# Patient Record
Sex: FEMALE | Race: BLACK OR AFRICAN AMERICAN | Employment: FULL TIME | URBAN - METROPOLITAN AREA
[De-identification: names, ages, dates, MRNs, and addresses within clinical notes are randomized per-mention and may not be internally consistent; named-entity substitution may affect disease eponyms.]

---

## 2017-02-14 ENCOUNTER — OFFICE VISIT (OUTPATIENT)
Dept: FAMILY MEDICINE CLINIC | Age: 22
End: 2017-02-14

## 2017-02-14 VITALS
DIASTOLIC BLOOD PRESSURE: 83 MMHG | BODY MASS INDEX: 24.83 KG/M2 | WEIGHT: 149 LBS | HEART RATE: 104 BPM | OXYGEN SATURATION: 98 % | RESPIRATION RATE: 18 BRPM | HEIGHT: 65 IN | TEMPERATURE: 98.1 F | SYSTOLIC BLOOD PRESSURE: 124 MMHG

## 2017-02-14 DIAGNOSIS — N93.9 VAGINAL BLEEDING: Primary | ICD-10-CM

## 2017-02-14 DIAGNOSIS — N76.0 ACUTE VAGINITIS: ICD-10-CM

## 2017-02-14 DIAGNOSIS — E55.9 VITAMIN D DEFICIENCY: ICD-10-CM

## 2017-02-14 DIAGNOSIS — R00.0 TACHYCARDIA: ICD-10-CM

## 2017-02-14 DIAGNOSIS — I10 ESSENTIAL HYPERTENSION: ICD-10-CM

## 2017-02-14 LAB
HCG URINE, QL. (POC): NEGATIVE
VALID INTERNAL CONTROL?: YES

## 2017-02-14 RX ORDER — ERGOCALCIFEROL 1.25 MG/1
50000 CAPSULE ORAL
Qty: 8 CAP | Refills: 0 | Status: SHIPPED | OUTPATIENT
Start: 2017-02-14 | End: 2017-04-05

## 2017-02-14 RX ORDER — MEDROXYPROGESTERONE ACETATE 150 MG/ML
INJECTION, SUSPENSION INTRAMUSCULAR
Refills: 0 | COMMUNITY
Start: 2016-12-22 | End: 2017-03-09 | Stop reason: SDUPTHER

## 2017-02-14 RX ORDER — NAPROXEN 500 MG/1
TABLET ORAL
Qty: 14 TAB | Refills: 0 | Status: SHIPPED | OUTPATIENT
Start: 2017-02-14 | End: 2017-07-20

## 2017-02-14 NOTE — PATIENT INSTRUCTIONS
TODAY, please go to:   CHECK OUT     Please schedule the following appointments at [facility] OUT:  · tachycardia follow up with Dr. Jocelin Jean after you see Cardiology    _____________________     Today's Plan:  · Your pregnancy test is negative  · We will let you know the results of the other tests when they return  · If the bleeding recurs, take the naprosyn, 1 pill every 12 hours for 7 days. I am sending this to your pharmacy. · In the interim you may take ibuprofen OR naprosyn/naproxen over-the-counter as needed for the pain    · Your BP is normal today    · Your heart rate is still above normal frequently. Your EKG today is normal. Please see cardiology, you may wear a heart rate monitor for several days. _____________________     Review your health maintenance below. Make plans to return and address anything that is due or will be due soon. There are no preventive care reminders to display for this patient.

## 2017-02-14 NOTE — PROGRESS NOTES
[address] Visit   Patient ID:   Christiana Gillespie is a 19 y.o. female. Assessment/Plan:    Rishi George was seen today for follow-up and depo. Diagnoses and all orders for this visit:    Vaginal bleeding  R/o pregnancy and infection as ordered. upt neg. Treat with naprosyn if recurs. If needed can consider LUIS. -     AMB POC URINE PREGNANCY TEST, VISUAL COLOR COMPARISON  -     NUSWAB VAGINITIS PLUS  -     naproxen (NAPROSYN) 500 mg tablet; If vaginal bleeding recurs, take 1 tab every 12 hours for 7 days. Tachycardia  EKG NSR. Refer to cardiology for likely holter. -     AMB POC EKG ROUTINE W/ 12 LEADS, INTER & REP  -     REFERRAL TO CARDIOLOGY    Vitamin D deficiency  Per prior labs  -     ergocalciferol (ERGOCALCIFEROL) 50,000 unit capsule; Take 1 Cap by mouth every seven (7) days for 8 doses. Then start taking 1000 international units of vitamin D3 daily. Essential hypertension  BP normal today    Discuss foot pain more on follow up, may need Sports Medicine or Ortho referral.    Naomi Vyas pt on:  Patient health concerns. Patient was offered a choice/choices in the treatment plan today. Patient expresses understanding of the plan and agrees with recommendations. More than 50% of this >25 minute encounter was spent in counseling and coordination of care today. Patient Instructions   TODAY, please go to:   CHECK OUT     Please schedule the following appointments at [facility] OUT:  · tachycardia follow up with Dr. Paris Ruiz after you see Cardiology    _____________________     Today's Plan:  · Your pregnancy test is negative  · We will let you know the results of the other tests when they return  · If the bleeding recurs, take the naprosyn, 1 pill every 12 hours for 7 days. I am sending this to your pharmacy.    · In the interim you may take ibuprofen OR naprosyn/naproxen over-the-counter as needed for the pain    · Your BP is normal today    · Your heart rate is still above normal frequently. Your EKG today is normal. Please see cardiology, you may wear a heart rate monitor for several days. _____________________     Review your health maintenance below. Make plans to return and address anything that is due or will be due soon. There are no preventive care reminders to display for this patient. ?  Subjective:   HPI:  Ina Cano is a 19 y.o. female being seen for:   Chief Complaint   Patient presents with    Follow-up     B/P    Depo     stated that she hasn't had a period for years and last week experienced horrible cramps with heavy bleeding and isn't sure why        BP  · At goal today    Abnormal bleeding  · About 6 days ago had crampy Right sided pelvic pain, noticed blood in panties  · Pain and blood lasted two days then resolved. · Has had some intermittent RLQ pain  · Some N yesterday  · No vomiting  · No diarrhea  · No vaginal d/c, itch, odor  · Active, monogamous  · No lesions of concern     HR up, notes that she sometimes feel it    Mentions persistent right dorsal foot pain at great toe before pelvic exam. Had strain in the past. Still bothers her. Review of Systems  Otherwise, per HPI  Active Problem List:  Patient Active Problem List   Diagnosis Code    Allergic rhinitis J30.9    Anemia D64.9    History of compression fracture of spine, thoracic with chronic back pain Z87.81    Unspecified vitamin D deficiency E55.9    Thyromegaly E04.9    Atypical mole D22.9    Mastodynia N64.4    History of gestational diabetes Z80.34    Essential hypertension I10     ?   Objective:     Visit Vitals    /83 (BP 1 Location: Right arm, BP Patient Position: Sitting)    Pulse (!) 104    Temp 98.1 °F (36.7 °C) (Oral)    Resp 18    Ht 5' 5\" (1.651 m)    Wt 149 lb (67.6 kg)    SpO2 98%    BMI 24.79 kg/m2     Wt Readings from Last 3 Encounters:   02/14/17 149 lb (67.6 kg)   12/22/16 150 lb 12.8 oz (68.4 kg)   12/10/16 151 lb (68.5 kg)     PHQ 2 / 9, over the last two weeks 2/14/2017   Little interest or pleasure in doing things Not at all   Feeling down, depressed or hopeless Not at all   Total Score PHQ 2 0     Physical Exam   Constitutional: She appears well-developed and well-nourished. No distress. Cardiovascular: Regular rhythm. Exam reveals no gallop and no friction rub. No murmur heard. Pulmonary/Chest: Effort normal and breath sounds normal. No respiratory distress. She has no wheezes. She has no rales. Abdominal: Soft. Bowel sounds are normal. She exhibits no distension and no mass. There is tenderness (mild right pelvis). There is no rigidity, no rebound and no guarding. Genitourinary: Vagina normal. Uterus is not tender. Cervix exhibits no motion tenderness, no discharge and no friability. Right adnexum displays tenderness. Right adnexum displays no mass and no fullness. Left adnexum displays no mass, no tenderness and no fullness. No erythema or bleeding in the vagina. No foreign body in the vagina. No signs of injury around the vagina. Vaginal discharge: thin, homogenous white, minimal.   Neurological: She is alert. Psychiatric: She has a normal mood and affect. Her behavior is normal.     No Known Allergies  Prior to Admission medications    Medication Sig Start Date End Date Taking? Authorizing Provider   medroxyPROGESTERone (DEPO-PROVERA) 150 mg/mL syrg INJECT 1 ML INTRAMUSCULAR EVERY 3 MONTHS 12/22/16  Yes Historical Provider   ergocalciferol (ERGOCALCIFEROL) 50,000 unit capsule Take 1 Cap by mouth every seven (7) days for 8 doses. Then start taking 1000 international units of vitamin D3 daily. 2/14/17 4/5/17 Yes 2115 Eufemia Murphy MD   naproxen (NAPROSYN) 500 mg tablet If vaginal bleeding recurs, take 1 tab every 12 hours for 7 days. 2/14/17  Yes 2115 Eufemia Murphy MD   diclofenac (VOLTAREN) 1 % gel Apply 2-4 g to affected area every six (6) hours as needed for Pain. 12/22/16  Yes 2115 Eufemia Murphy MD

## 2017-02-14 NOTE — PROGRESS NOTES
Chief Complaint   Patient presents with    Follow-up     B/P    Jose Miguel     stated that she hasn't had a period for years and last week experienced horrible cramps with heavy bleeding and isn't sure why      1. Have you been to the ER, urgent care clinic since your last visit? Hospitalized since your last visit? No     2. Have you seen or consulted any other health care providers outside of the [address] [place] [address] since your last visit? Include any pap smears or colon screening. No     The patient was counseled on the dangers of tobacco use, and was advised never to start. Reviewed strategies to maximize success, including never to start. There are no preventive care reminders to display for this patient.     ACP is not on file

## 2017-02-14 NOTE — MR AVS SNAPSHOT
Visit Information Date & Time Provider Department Dept. Phone Encounter #  
 2/14/2017  2:40 PM Jose Miguel Brown. Roberta Cortez MD [facility] [phone] [id number] Your Appointments 3/9/2017  3:40 PM  
Nurse Visit with Boubacar Briggs Roberta Cortez, [facility] ([address]) Appt Note: Depo injection. (10 min.) - lp  
 [address] 
[address] [facility] [address]  
[phone]  
  
   
 [address] [facility] [address] [facility] [address] [facility] Upcoming Health Maintenance Date Due  
 PAP AKA CERVICAL CYTOLOGY 11/25/2017 DTaP/Tdap/Td series (6 - Td) 2/15/2021 Allergies as of 2/14/2017  Review Complete On: 2/14/2017 By: Rajendra Vazquez LPN No Known Allergies Current Immunizations  Reviewed on 11/12/2015 Name Date DTAP Vaccine 9/1/2005, 2/18/1998, 1/21/1998, 1993 HIB Vaccine 1993 Hepatitis A Vaccine 2/15/2011, 8/28/2007 Hepatitis B Vaccine 8/28/2007, 8/23/2006, 9/1/2005 Human Papillomavirus 4/7/2008, 10/31/2007, 8/28/2007 IPV 2/18/1998, 1/21/1998, 1993 Influenza Vaccine 10/24/2014 Influenza Vaccine (Quad) PF 10/6/2016 MMR Vaccine 2/18/1998, 1/21/1998 Meningococcal Vaccine 2/15/2011 TDAP Vaccine 2/15/2011 Not reviewed this visit You Were Diagnosed With   
  
 Codes Comments Vaginal bleeding    -  Primary ICD-10-CM: N93.9 ICD-9-CM: 623.8 Tachycardia     ICD-10-CM: R00.0 ICD-9-CM: 245. 0 Vitamin D deficiency     ICD-10-CM: E55.9 ICD-9-CM: 268.9 Essential hypertension     ICD-10-CM: I10 
ICD-9-CM: 401.9 Vitals BP Pulse Temp Resp Height(growth percentile) Weight(growth percentile) 124/83 (BP 1 Location: Right arm, BP Patient Position: Sitting) (!) 104 98.1 °F (36.7 °C) (Oral) 18 5' 5\" (1.651 m) 149 lb (67.6 kg) SpO2 BMI OB Status Smoking Status 98% 24.79 kg/m2 Injection Never Smoker BMI and BSA Data Body Mass Index Body Surface Area 24.79 kg/m 2 1.76 m 2 Preferred Pharmacy Pharmacy Name Phone [facility]/PHARMACY #[id number] Cavanaugh [place] [phone] Your Updated Medication List  
  
   
This list is accurate as of: 2/14/17  4:07 PM.  Always use your most recent med list.  
  
  
  
  
 diclofenac 1 % Gel Commonly known as:  VOLTAREN Apply 2-4 g to affected area every six (6) hours as needed for Pain.  
  
 ergocalciferol 50,000 unit capsule Commonly known as:  ERGOCALCIFEROL Take 1 Cap by mouth every seven (7) days for 8 doses. Then start taking 1000 international units of vitamin D3 daily. medroxyPROGESTERone 150 mg/mL Syrg Commonly known as:  DEPO-PROVERA INJECT 1 ML INTRAMUSCULAR EVERY 3 MONTHS  
  
 naproxen 500 mg tablet Commonly known as:  NAPROSYN If vaginal bleeding recurs, take 1 tab every 12 hours for 7 days. Prescriptions Sent to Pharmacy Refills  
 ergocalciferol (ERGOCALCIFEROL) 50,000 unit capsule 0 Sig: Take 1 Cap by mouth every seven (7) days for 8 doses. Then start taking 1000 international units of vitamin D3 daily. Class: Normal  
 Pharmacy: [facility]/pharmacy #[id number] Stefano enymotion, [address] Ph #: [phone] Route: Oral  
 naproxen (NAPROSYN) 500 mg tablet 0 Sig: If vaginal bleeding recurs, take 1 tab every 12 hours for 7 days. Class: Normal  
 Pharmacy: [facility]/pharmacy #[id number] Stefano Hankins, [address] Ph #: [phone] We Performed the Following AMB POC EKG ROUTINE W/ 12 LEADS, INTER & REP [70866 CPT(R)] AMB POC URINE PREGNANCY TEST, VISUAL COLOR COMPARISON [41751 CPT(R)] [address] [id number] Custom] REFERRAL TO CARDIOLOGY [HSY64 Custom] Comments:  
 Please evaluate patient for tachycardia Referral Information  Referral ID Referred By Referred To  
  
 [id number] Javon Pena MD   
 [address] Phone: [phone] Visits Status Start Date End Date 1 New Request 2/14/17 2/14/18 If your referral has a status of pending review or denied, additional information will be sent to support the outcome of this decision. Patient Instructions TODAY, please go to: CHECK OUT Please schedule the following appointments at [facility] OUT: 
· tachycardia follow up with Dr. Bolaños Cancer after you see Cardiology 
 
_____________________ Today's Plan: 
· Your pregnancy test is negative · We will let you know the results of the other tests when they return · If the bleeding recurs, take the naprosyn, 1 pill every 12 hours for 7 days. I am sending this to your pharmacy. · In the interim you may take ibuprofen OR naprosyn/naproxen over-the-counter as needed for the pain · Your BP is normal today · Your heart rate is still above normal frequently. Your EKG today is normal. Please see cardiology, you may wear a heart rate monitor for several days. _____________________ Review your health maintenance below. Make plans to return and address anything that is due or will be due soon. There are no preventive care reminders to display for this patient. Introducing [facility]! Dear Jannie Nielson: 
Thank you for requesting a [employer] account. Our records indicate that you already have an active [employer] account. You can access your account anytime at [email or web address] Did you know that you can access your hospital and ER discharge instructions at any time in [employer] You can also review all of your test results from your hospital stay or ER visit. Additional Information If you have questions, please visit the Frequently Asked Questions section of the [employer] website at [email or web address] Remember, [employer] is NOT to be used for urgent needs. For medical emergencies, dial 911. Now available from your iPhone and Android! Please provide this summary of care documentation to your next provider. Your primary care clinician is listed as Ruma Beard. Rosalia Morgan. If you have any questions after today's visit, please call [phone].

## 2017-02-17 LAB
A VAGINAE DNA VAG QL NAA+PROBE: ABNORMAL SCORE
BVAB2 DNA VAG QL NAA+PROBE: ABNORMAL SCORE
C ALBICANS DNA VAG QL NAA+PROBE: POSITIVE
C GLABRATA DNA VAG QL NAA+PROBE: NEGATIVE
C TRACH RRNA SPEC QL NAA+PROBE: NEGATIVE
MEGA1 DNA VAG QL NAA+PROBE: ABNORMAL SCORE
N GONORRHOEA RRNA SPEC QL NAA+PROBE: NEGATIVE
T VAGINALIS RRNA SPEC QL NAA+PROBE: NEGATIVE

## 2017-02-17 RX ORDER — FLUCONAZOLE 150 MG/1
150 TABLET ORAL DAILY
Qty: 1 TAB | Refills: 1 | Status: SHIPPED | OUTPATIENT
Start: 2017-02-17 | End: 2017-02-18

## 2017-02-17 RX ORDER — METRONIDAZOLE 500 MG/1
500 TABLET ORAL 2 TIMES DAILY
Qty: 14 TAB | Refills: 0 | Status: SHIPPED | OUTPATIENT
Start: 2017-02-17 | End: 2017-02-24

## 2017-02-17 NOTE — PROGRESS NOTES
Called patient to give results, advised her of her treatment options she stated she would prefer oral meds.  02/17/17

## 2017-02-17 NOTE — PROGRESS NOTES
Please call patient and let her know that she has bacterial vaginosis and a yeast infection. I am sending the oral medications to treat them. She should take metronidazole first and then diclofenac. If she would prefer the vaginal medication I can send that instead.

## 2017-03-09 RX ORDER — MEDROXYPROGESTERONE ACETATE 150 MG/ML
INJECTION, SUSPENSION INTRAMUSCULAR
Qty: 1 SYRINGE | Refills: 0 | Status: SHIPPED | OUTPATIENT
Start: 2017-03-09 | End: 2017-06-13 | Stop reason: SDUPTHER

## 2017-03-21 ENCOUNTER — CLINICAL SUPPORT (OUTPATIENT)
Dept: FAMILY MEDICINE CLINIC | Age: 22
End: 2017-03-21

## 2017-03-21 DIAGNOSIS — Z78.9 USES BIRTH CONTROL: Primary | ICD-10-CM

## 2017-03-21 LAB
HCG URINE, QL. (POC): NEGATIVE
VALID INTERNAL CONTROL?: YES

## 2017-06-13 NOTE — TELEPHONE ENCOUNTER
Patient calling to request a script for her depo vaccine be called into her pharmacy. Her contact # is [phone].

## 2017-06-14 RX ORDER — MEDROXYPROGESTERONE ACETATE 150 MG/ML
150 INJECTION, SUSPENSION INTRAMUSCULAR
Qty: 1 SYRINGE | Refills: 3
Start: 2017-06-14 | End: 2018-09-24

## 2017-06-20 ENCOUNTER — CLINICAL SUPPORT (OUTPATIENT)
Dept: FAMILY MEDICINE CLINIC | Age: 22
End: 2017-06-20

## 2017-06-20 DIAGNOSIS — Z78.9 USES BIRTH CONTROL: Primary | ICD-10-CM

## 2017-06-20 LAB
HCG URINE, QL. (POC): NEGATIVE
VALID INTERNAL CONTROL?: YES

## 2017-06-20 NOTE — PROGRESS NOTES
Date last pap: 11-. Last Depo-Provera: 03-. Side Effects if any: None. Serum HCG indicated? Negative. Depo-Provera 150 mg IM given by: JEN Tatum  .   Next appointment due September 5-19 2017      Patient supplied her own  Lot: [id number]  EXP:   [address]: [id number]  Manufacture: Catarino Vu

## 2017-07-20 ENCOUNTER — HOSPITAL ENCOUNTER (OUTPATIENT)
Dept: LAB | Age: 22
Discharge: HOME | End: 2017-07-20

## 2017-07-20 ENCOUNTER — HOSPITAL ENCOUNTER (EMERGENCY)
Age: 22
Discharge: HOME | End: 2017-07-20
Attending: FAMILY MEDICINE

## 2017-07-20 VITALS
DIASTOLIC BLOOD PRESSURE: 98 MMHG | OXYGEN SATURATION: 99 % | BODY MASS INDEX: 26.33 KG/M2 | WEIGHT: 158 LBS | HEIGHT: 65 IN | SYSTOLIC BLOOD PRESSURE: 139 MMHG | RESPIRATION RATE: 16 BRPM | TEMPERATURE: 97.8 F | HEART RATE: 99 BPM

## 2017-07-20 DIAGNOSIS — N39.0 URINARY TRACT INFECTION WITHOUT HEMATURIA, SITE UNSPECIFIED: Primary | ICD-10-CM

## 2017-07-20 DIAGNOSIS — B96.89 BV (BACTERIAL VAGINOSIS): ICD-10-CM

## 2017-07-20 DIAGNOSIS — N76.0 BV (BACTERIAL VAGINOSIS): ICD-10-CM

## 2017-07-20 LAB
BILIRUB UR QL: NEGATIVE
GLUCOSE UR QL STRIP.AUTO: NEGATIVE MG/DL
HCG UR QL: NEGATIVE
KETONES UR-MCNC: ABNORMAL MG/DL
LEUKOCYTE ESTERASE UR QL STRIP: ABNORMAL
NITRITE UR QL: NEGATIVE
PH UR: 7 [PH] (ref 5–8)
PROT UR QL: NEGATIVE MG/DL
RBC # UR STRIP: ABNORMAL /UL
SP GR UR: 1.02 (ref 1–1.03)
UROBILINOGEN UR QL: 1 EU/DL (ref 0.2–1)

## 2017-07-20 PROCEDURE — 87147 CULTURE TYPE IMMUNOLOGIC: CPT | Performed by: FAMILY MEDICINE

## 2017-07-20 PROCEDURE — 87086 URINE CULTURE/COLONY COUNT: CPT | Performed by: FAMILY MEDICINE

## 2017-07-20 RX ORDER — CLINDAMYCIN HYDROCHLORIDE 300 MG/1
300 CAPSULE ORAL 4 TIMES DAILY
Qty: 28 CAP | Refills: 0 | Status: SHIPPED | OUTPATIENT
Start: 2017-07-20 | End: 2017-07-27

## 2017-07-20 NOTE — DISCHARGE INSTRUCTIONS
Urinary Tract Infection in Women: Care Instructions  Your Care Instructions    A urinary tract infection, or UTI, is a general term for an infection anywhere between the kidneys and the urethra (where urine comes out). Most UTIs are bladder infections. They often cause pain or burning when you urinate. UTIs are caused by bacteria and can be cured with antibiotics. Be sure to complete your treatment so that the infection goes away. Follow-up care is a key part of your treatment and safety. Be sure to make and go to all appointments, and call your doctor if you are having problems. It's also a good idea to know your test results and keep a list of the medicines you take. How can you care for yourself at home? · Take your antibiotics as directed. Do not stop taking them just because you feel better. You need to take the full course of antibiotics. · Drink extra water and other fluids for the next day or two. This may help wash out the bacteria that are causing the infection. (If you have kidney, heart, or liver disease and have to limit fluids, talk with your doctor before you increase your fluid intake.)  · Avoid drinks that are carbonated or have caffeine. They can irritate the bladder. · Urinate often. Try to empty your bladder each time. · To relieve pain, take a hot bath or lay a heating pad set on low over your lower belly or genital area. Never go to sleep with a heating pad in place. To prevent UTIs  · Drink plenty of water each day. This helps you urinate often, which clears bacteria from your system. (If you have kidney, heart, or liver disease and have to limit fluids, talk with your doctor before you increase your fluid intake.)  · Urinate when you need to. · Urinate right after you have sex. · Change sanitary pads often. · Avoid douches, bubble baths, feminine hygiene sprays, and other feminine hygiene products that have deodorants.   · After going to the bathroom, wipe from front to back.  When should you call for help? Call your doctor now or seek immediate medical care if:  · Symptoms such as fever, chills, nausea, or vomiting get worse or appear for the first time. · You have new pain in your back just below your rib cage. This is called flank pain. · There is new blood or pus in your urine. · You have any problems with your antibiotic medicine. Watch closely for changes in your health, and be sure to contact your doctor if:  · You are not getting better after taking an antibiotic for 2 days. · Your symptoms go away but then come back. Where can you learn more? Go to http://mickey-ksye.info/. Enter L438 in the search box to learn more about \"Urinary Tract Infection in Women: Care Instructions. \"  Current as of: November 28, 2016  Content Version: 11.3  © 9493-3389 [employer]. Care instructions adapted under license by [employer] (which disclaims liability or warranty for this information). If you have questions about a medical condition or this instruction, always ask your healthcare professional. [place] [address] any warranty or liability for your use of this information. Bacterial Vaginosis: Care Instructions  Your Care Instructions    Bacterial vaginosis is a type of vaginal infection. It is caused by excess growth of certain bacteria that are normally found in the vagina. Symptoms can include itching, swelling, pain when you urinate or have sex, and a gray or yellow discharge with a \"fishy\" odor. It is not considered an infection that is spread through sexual contact. Although symptoms can be annoying and uncomfortable, bacterial vaginosis does not usually cause other health problems. However, if you have it while you are pregnant, it can cause complications. While the infection may go away on its own, most doctors use antibiotics to treat it.  You may have been prescribed pills or vaginal cream. With treatment, bacterial vaginosis usually clears up in 5 to 7 days. Follow-up care is a key part of your treatment and safety. Be sure to make and go to all appointments, and call your doctor if you are having problems. It's also a good idea to know your test results and keep a list of the medicines you take. How can you care for yourself at home? · Take your antibiotics as directed. Do not stop taking them just because you feel better. You need to take the full course of antibiotics. · Do not eat or drink anything that contains alcohol if you are taking metronidazole (Flagyl). · Keep using your medicine if you start your period. Use pads instead of tampons while using a vaginal cream or suppository. Tampons can absorb the medicine. · Wear loose cotton clothing. Do not wear nylon and other materials that hold body heat and moisture close to the skin. · Do not scratch. Relieve itching with a cold pack or a cool bath. · Do not wash your vaginal area more than once a day. Use plain water or a mild, unscented soap. Do not douche. When should you call for help? Watch closely for changes in your health, and be sure to contact your doctor if:  · You have unexpected vaginal bleeding. · You have a fever. · You have new or increased pain in your vagina or pelvis. · You are not getting better after 1 week. · Your symptoms return after you finish the course of your medicine. Where can you learn more? Go to http://mickey-skye.info/. Eber Choudhary in the search box to learn more about \"Bacterial Vaginosis: Care Instructions. \"  Current as of: October 13, 2016  Content Version: 11.3  © 8628-5367 [employer]. Care instructions adapted under license by [employer] (which disclaims liability or warranty for this information).  If you have questions about a medical condition or this instruction, always ask your healthcare professional. Matias Carlos disclaims any warranty or liability for your use of this information.

## 2017-07-20 NOTE — UC PROVIDER NOTE
Patient is a 19 y.o. female presenting with urinary pain. The history is provided by the patient. Urinary Pain    This is a recurrent problem. The current episode started more than 1 week ago. The problem occurs intermittently. The problem has not changed since onset. The pain is at a severity of 5/10. The pain is moderate. There has been no fever. Associated symptoms include frequency, urgency, flank pain, vaginal discharge (fishy odor- h/o recureent BV and sxs of BV with UTI ) and back pain (on rt side). Pertinent negatives include no chills and no abdominal pain. She has tried nothing for the symptoms. Her past medical history is significant for recurrent UTIs. Her past medical history does not include kidney stones or single kidney. Past Medical History:   Diagnosis Date    Acne 10/7/2009    Allergic rhinitis 2009    Anemia 2009    Headache     History of gestational diabetes     diet controlled    Other ill-defined conditions(799.89)     scoliosis    PCB (post coital bleeding) 2014    Thoracic compression fracture ([address] 3/2014    Yousif Lopez    Thyromegaly 2014    On exam     Unspecified vitamin D deficiency 2014    Varicella 1998        Past Surgical History:   Procedure Laterality Date    HX GYN                Family History   Problem Relation Age of Onset    Breast Cancer Maternal Grandmother      great, great gma, age?  Hypertension Maternal Grandmother     Breast Cancer Maternal Aunt 32     survivor    Diabetes Father     Hypertension Paternal Grandmother     Diabetes Paternal Grandmother     Osteoporosis Neg Hx         Social History     Social History    Marital status: SINGLE     Spouse name: N/A    Number of children: N/A    Years of education: N/A     Occupational History    Not on file.      Social History Main Topics    Smoking status: Never Smoker    Smokeless tobacco: Never Used    Alcohol use Yes      Comment: once every 3 months, 3 drinks at a time    Drug use: No    Sexual activity: Yes     Partners: Male     Birth control/ protection: Injection     Other Topics Concern    Not on file     Social History Narrative    Lives in [facility] with parents and 3 yo son. Works at [employer]. ALLERGIES: Review of patient's allergies indicates no known allergies. Review of Systems   Constitutional: Negative for chills. Gastrointestinal: Negative for abdominal pain. Genitourinary: Positive for flank pain, frequency, urgency and vaginal discharge (fishy odor- h/o recureent BV and sxs of BV with UTI ). Musculoskeletal: Positive for back pain (on rt side). All other systems reviewed and are negative. Vitals:    07/20/17 1507   BP: (!) 139/98   Pulse: 99   Resp: 16   Temp: 97.8 °F (36.6 °C)   SpO2: 99%   Weight: 71.7 kg (158 lb)   Height: 5' 5\" (1.651 m)       Physical Exam   Constitutional: No distress. HENT:   Mouth/Throat: No oropharyngeal exudate. Eyes: No scleral icterus. Abdominal: Soft. Bowel sounds are normal. She exhibits no distension and no mass. There is no tenderness. There is no rebound and no guarding. Musculoskeletal: She exhibits no edema. Lumbar back: Normal.   Skin: No rash noted. Nursing note and vitals reviewed. MDM     Differential Diagnosis; Clinical Impression; Plan:     CLINICAL IMPRESSION:  Urinary tract infection without hematuria, site unspecified  (primary encounter diagnosis)  BV (bacterial vaginosis)      DDX    Plan:      UA- moderate LE  Start Clinda and follow urine culture  Amount and/or Complexity of Data Reviewed:   Clinical lab tests:  Ordered and reviewed   Review and summarize past medical records:  Yes  Risk of Significant Complications, Morbidity, and/or Mortality:   Presenting problems: Moderate  Management options:   Moderate      Procedures

## 2017-07-21 LAB
BACTERIA SPEC CULT: ABNORMAL
CC UR VC: ABNORMAL
SERVICE CMNT-IMP: ABNORMAL

## 2017-07-25 RX ORDER — CEPHALEXIN 500 MG/1
500 CAPSULE ORAL 2 TIMES DAILY
Qty: 14 CAP | Refills: 0 | Status: SHIPPED | OUTPATIENT
Start: 2017-07-25 | End: 2017-08-01

## 2017-08-22 ENCOUNTER — HOSPITAL ENCOUNTER (EMERGENCY)
Age: 22
Discharge: HOME | End: 2017-08-22
Attending: EMERGENCY MEDICINE
Payer: MEDICAID

## 2017-08-22 VITALS
HEIGHT: 65 IN | TEMPERATURE: 98.8 F | HEART RATE: 97 BPM | OXYGEN SATURATION: 100 % | BODY MASS INDEX: 26.52 KG/M2 | WEIGHT: 159.17 LBS | RESPIRATION RATE: 18 BRPM | DIASTOLIC BLOOD PRESSURE: 125 MMHG | SYSTOLIC BLOOD PRESSURE: 162 MMHG

## 2017-08-22 DIAGNOSIS — B96.89 BV (BACTERIAL VAGINOSIS): ICD-10-CM

## 2017-08-22 DIAGNOSIS — N89.8 VAGINAL LESION: ICD-10-CM

## 2017-08-22 DIAGNOSIS — K59.00 CONSTIPATION, UNSPECIFIED CONSTIPATION TYPE: ICD-10-CM

## 2017-08-22 DIAGNOSIS — N76.0 BV (BACTERIAL VAGINOSIS): ICD-10-CM

## 2017-08-22 DIAGNOSIS — N30.01 ACUTE CYSTITIS WITH HEMATURIA: Primary | ICD-10-CM

## 2017-08-22 LAB

## 2017-08-22 PROCEDURE — 81001 URINALYSIS AUTO W/SCOPE: CPT | Performed by: PHYSICIAN ASSISTANT

## 2017-08-22 PROCEDURE — 87255 GENET VIRUS ISOLATE HSV: CPT | Performed by: PHYSICIAN ASSISTANT

## 2017-08-22 PROCEDURE — 87210 SMEAR WET MOUNT SALINE/INK: CPT | Performed by: PHYSICIAN ASSISTANT

## 2017-08-22 PROCEDURE — 87086 URINE CULTURE/COLONY COUNT: CPT | Performed by: PHYSICIAN ASSISTANT

## 2017-08-22 PROCEDURE — 87077 CULTURE AEROBIC IDENTIFY: CPT | Performed by: PHYSICIAN ASSISTANT

## 2017-08-22 PROCEDURE — 74011250637 HC RX REV CODE- 250/637: Performed by: PHYSICIAN ASSISTANT

## 2017-08-22 PROCEDURE — 87186 SC STD MICRODIL/AGAR DIL: CPT | Performed by: PHYSICIAN ASSISTANT

## 2017-08-22 PROCEDURE — 87491 CHLMYD TRACH DNA AMP PROBE: CPT | Performed by: PHYSICIAN ASSISTANT

## 2017-08-22 PROCEDURE — 99284 EMERGENCY DEPT VISIT MOD MDM: CPT

## 2017-08-22 RX ORDER — MAGNESIUM CITRATE
296 SOLUTION, ORAL ORAL
Status: COMPLETED | OUTPATIENT
Start: 2017-08-22 | End: 2017-08-22

## 2017-08-22 RX ORDER — CIPROFLOXACIN 500 MG/1
500 TABLET ORAL 2 TIMES DAILY
Qty: 10 TAB | Refills: 0 | Status: SHIPPED | OUTPATIENT
Start: 2017-08-22 | End: 2017-08-27

## 2017-08-22 RX ORDER — LIDOCAINE HYDROCHLORIDE 20 MG/ML
JELLY TOPICAL
Qty: 11 ML | Refills: 0 | Status: SHIPPED | OUTPATIENT
Start: 2017-08-22 | End: 2017-10-17

## 2017-08-22 RX ORDER — METRONIDAZOLE 500 MG/1
500 TABLET ORAL 2 TIMES DAILY
Qty: 14 TAB | Refills: 0 | Status: SHIPPED | OUTPATIENT
Start: 2017-08-22 | End: 2017-10-17

## 2017-08-22 RX ORDER — ACYCLOVIR 800 MG/1
400 TABLET ORAL 3 TIMES DAILY
Qty: 15 TAB | Refills: 0 | Status: SHIPPED | OUTPATIENT
Start: 2017-08-22 | End: 2017-09-01

## 2017-08-22 RX ADMIN — MAGESIUM CITRATE 296 ML: 1.75 LIQUID ORAL at 19:34

## 2017-08-22 NOTE — ED PROVIDER NOTES
HPI Comments: Liban Vargas is a 19 y.o. female with PMhx significant for allergic rhinitis, anemia, varicella, scoliosis who presents ambulatory to the ED with cc of gradually worsening 8/10 vaginal pain x 1 week. Pt reports associated rectal pain noting that the skin between her vagina and rectum is \"extremely irritated. \" She also reports associated malodorous urine, and bilateral pelvic pain. Pt reports having a yeast infection \"not to long ago,\" and reports some residual vaginal discharge. She denies fitz of medication to modify her symptoms. Pt specifically denies any dysuria, nausea, vomiting, fevers, chills, vaginal bleedings, or hematuria . Social history significant for: - Tobacco, + EtOH, - Illicit drug use    PCP: Shaila Muhammad. Kee Tyson MD    There are no other complaints, changes or physical findings at this time. Written by JENNIFER Catherine, as dictated by Yusuf Holley PA-C      The history is provided by the patient. No  was used. Past Medical History:   Diagnosis Date    Acne 10/7/2009    Allergic rhinitis 2009    Anemia 2009    Headache     History of gestational diabetes     diet controlled    Other ill-defined conditions     scoliosis    PCB (post coital bleeding) 2014    Thoracic compression fracture ([address] 3/2014    Yousif Lopez    Thyromegaly 2014    On exam     Unspecified vitamin D deficiency 2014    Varicella 1998       Past Surgical History:   Procedure Laterality Date    HX GYN                Family History:   Problem Relation Age of Onset    Breast Cancer Maternal Grandmother      great, great gma, age?     Hypertension Maternal Grandmother     Breast Cancer Maternal Aunt 32     survivor    Diabetes Father     Hypertension Paternal Grandmother     Diabetes Paternal Grandmother     Osteoporosis Neg Hx        Social History     Social History    Marital status: SINGLE     Spouse name: N/A   Criselda Valencia Number of children: N/A    Years of education: N/A     Occupational History    Not on file. Social History Main Topics    Smoking status: Never Smoker    Smokeless tobacco: Never Used    Alcohol use Yes      Comment: once every 3 months, 3 drinks at a time    Drug use: No    Sexual activity: Yes     Partners: Male     Birth control/ protection: Injection     Other Topics Concern    Not on file     Social History Narrative    Lives in [facility] with parents and 3 yo son. Works at [employer]. ALLERGIES: Review of patient's allergies indicates no known allergies. Review of Systems   Constitutional: Negative. Negative for chills and fever. HENT: Negative. Negative for rhinorrhea and sore throat. Eyes: Negative. Negative for visual disturbance. Respiratory: Negative. Negative for cough, chest tightness, shortness of breath and wheezing. Cardiovascular: Negative. Negative for chest pain and palpitations. Gastrointestinal: Negative. Negative for abdominal pain, constipation, diarrhea, nausea and vomiting.        + rectal pain   Genitourinary: Positive for pelvic pain (Bilateral), vaginal discharge and vaginal pain. Negative for dysuria and hematuria.        + malodorous urine   Musculoskeletal: Negative. Negative for arthralgias and myalgias. Skin: Negative. Negative for rash. Allergic/Immunologic: Negative. Negative for environmental allergies and food allergies. Neurological: Negative. Negative for headaches. Psychiatric/Behavioral: Negative. Negative for suicidal ideas. Patient Vitals for the past 12 hrs:   Temp Pulse Resp BP SpO2   08/22/17 1801 98.8 °F (37.1 °C) 97 18 (!) 162/125 100 %     Physical Exam   Constitutional: She is oriented to person, place, and time. She appears well-developed and well-nourished. No distress. Pt is an AAF, awake and alert in NAD. HENT:   Head: Normocephalic and atraumatic.    Right Ear: Tympanic membrane, external ear and ear canal normal.   Left Ear: Tympanic membrane, external ear and ear canal normal.   Nose: Nose normal.   Mouth/Throat: Uvula is midline, oropharynx is clear and moist and mucous membranes are normal.   Eyes: Conjunctivae and EOM are normal. Pupils are equal, round, and reactive to light. Right eye exhibits no discharge. Left eye exhibits no discharge. Neck: Normal range of motion. Cardiovascular: Normal rate, normal heart sounds and intact distal pulses. Pulmonary/Chest: Effort normal and breath sounds normal. No respiratory distress. She has no wheezes. She has no rales. She exhibits no tenderness. Abdominal: Soft. Bowel sounds are normal. She exhibits no distension. There is no tenderness. There is no rebound and no guarding. No CVA tenderness b/l. Genitourinary:   Genitourinary Comments: External vagina with small open lesions to posterior labial commissure. No discoloration. + white discharge. Os closed. No CMT. No uterine or adnexal TTP. Palpable stool in rectum. Musculoskeletal: Normal range of motion. She exhibits no edema or tenderness. Neurological: She is alert and oriented to person, place, and time. Coordination normal.   No focal neuro deficits. Skin: Skin is warm and dry. No rash noted. She is not diaphoretic. No erythema. No pallor. Psychiatric: She has a normal mood and affect. Her behavior is normal.   Vitals reviewed.        MDM  Number of Diagnoses or Management Options  Acute cystitis with hematuria:   BV (bacterial vaginosis):   Constipation, unspecified constipation type:   Vaginal lesion:   Diagnosis management comments:   DDx: HSV, BV, STD, UTI, constipation       Amount and/or Complexity of Data Reviewed  Clinical lab tests: ordered and reviewed  Review and summarize past medical records: yes    Patient Progress  Patient progress: stable        Procedures    Procedure Note - Pelvic Exam:    6:50 PM  Performed by: Jesus Darby PA-C  Chaperoned by: Megan Pulido RN  Pelvic exam was performed using bimanual and speculum. Further findings noted in physical exam.   The procedure took 1-15 minutes, and pt tolerated well. Written by JENNIFER Elmore, as dictated by Dolores Rodríguez PA-C.    LABORATORY TESTS:  Recent Results (from the past 12 hour(s))   SHANNAN, OTHER SOURCES    Collection Time: 08/22/17  6:45 PM   Result Value Ref Range    Special Requests: NO SPECIAL REQUESTS      KOH NO YEAST SEEN     WET PREP    Collection Time: 08/22/17  6:55 PM   Result Value Ref Range    Clue cells CLUE CELLS PRESENT      Wet prep NO TRICHOMONAS SEEN     URINALYSIS W/ REFLEX CULTURE    Collection Time: 08/22/17  7:38 PM   Result Value Ref Range    Color YELLOW/STRAW      Appearance CLOUDY (A) CLEAR      Specific gravity 1.007 1.003 - 1.030      pH (UA) 6.5 5.0 - 8.0      Protein NEGATIVE  NEG mg/dL    Glucose NEGATIVE  NEG mg/dL    Ketone NEGATIVE  NEG mg/dL    Bilirubin NEGATIVE  NEG      Blood TRACE (A) NEG      Urobilinogen 0.2 0.2 - 1.0 EU/dL    Nitrites NEGATIVE  NEG      Leukocyte Esterase LARGE (A) NEG      WBC PENDING /hpf    RBC PENDING /hpf    Epithelial cells PENDING /lpf    Bacteria PENDING /hpf    UA:UC IF INDICATED PENDING      MEDICATIONS GIVEN:  Medications   magnesium citrate solution 296 mL (296 mL Oral Given 8/22/17 1934)       IMPRESSION:  1. Acute cystitis with hematuria    2. Constipation, unspecified constipation type    3. Vaginal lesion    4. BV (bacterial vaginosis)        PLAN:  1. Current Discharge Medication List      START taking these medications    Details   ciprofloxacin HCl (CIPRO) 500 mg tablet Take 1 Tab by mouth two (2) times a day for 5 days. Qty: 10 Tab, Refills: 0      metroNIDAZOLE (FLAGYL) 500 mg tablet Take 1 Tab by mouth two (2) times a day.   Qty: 14 Tab, Refills: 0      lidocaine (URO-JET) 2 % jelp jelly Apply to vagina as needed for pain  Qty: 11 mL, Refills: 0      acyclovir (ZOVIRAX) 800 mg tablet Take 0.5 Tabs by mouth three (3) times daily for 10 days. Qty: 15 Tab, Refills: 0           2. Follow-up Information     Follow up With Details Comments [address] [facility] MD Ismael Schedule an appointment as soon as possible for a visit in 2 days  [facility]  [phone]      [facility] EMERGENCY DEPT  As needed or, If symptoms worsen [address] [place] [address]  [phone]        Return to ED if worse     DISCHARGE NOTE  8:06 PM  The patient has been re-evaluated and is ready for discharge. Reviewed available results with patient. Counseled patient on diagnosis and care plan. Patient has expressed understanding, and all questions have been answered. Patient agrees with plan and agrees to follow up as recommended, or return to the ED if their symptoms worsen. Discharge instructions have been provided and explained to the patient, along with reasons to return to the ED. This note is prepared by Debby Jones, acting as Scribe for Wellington Plummer PA-C. Wellington Plummer PA-C: The scribe's documentation has been prepared under my direction and personally reviewed by me in its entirety. I confirm that the note above accurately reflects all work, treatment, procedures, and medical decision making performed by me. This note will not be viewable in [address]

## 2017-08-22 NOTE — ED NOTES
Pt reports to the ED for vaginal/rectal pain due to skin irritation. Pt reports using vaseline and anti-itch cream X1 week. Pt reports being treated for a UTI 3 weeks ago. Pt reports urine smelling strong, reports not drinking enough fluids. Pt alert, oriented and ambulatory to room. Visitors at bedside and call bell within reach.

## 2017-08-22 NOTE — LETTER
[facility] EMERGENCY DEPT 
[address] [place] [address] [address] [id number] 
[phone] Work/School Note Date: 8/22/2017 To Whom It May concern: 
 
Quesada Smoke was seen and treated today in the emergency room by the following provider(s): 
Attending Provider: Lenka Morrissey DO Physician Assistant: BOSSMAN Blackwell. Quesada Smoke may return to work in 1-2 days. Sincerely, Aaron Carty, [address]

## 2017-08-23 ENCOUNTER — HOSPITAL ENCOUNTER (EMERGENCY)
Age: 22
Discharge: HOME | End: 2017-08-23
Attending: STUDENT IN AN ORGANIZED HEALTH CARE EDUCATION/TRAINING PROGRAM
Payer: MEDICAID

## 2017-08-23 VITALS
DIASTOLIC BLOOD PRESSURE: 103 MMHG | HEIGHT: 66 IN | RESPIRATION RATE: 16 BRPM | TEMPERATURE: 98.6 F | SYSTOLIC BLOOD PRESSURE: 142 MMHG | WEIGHT: 158.5 LBS | OXYGEN SATURATION: 98 % | HEART RATE: 102 BPM | BODY MASS INDEX: 25.47 KG/M2

## 2017-08-23 DIAGNOSIS — K60.2 ANAL FISSURE: ICD-10-CM

## 2017-08-23 DIAGNOSIS — K59.00 CONSTIPATION, UNSPECIFIED CONSTIPATION TYPE: Primary | ICD-10-CM

## 2017-08-23 LAB
C TRACH DNA SPEC QL NAA+PROBE: NEGATIVE
HCG UR QL: NEGATIVE
N GONORRHOEA DNA SPEC QL NAA+PROBE: NEGATIVE
SAMPLE TYPE: NORMAL
SERVICE CMNT-IMP: NORMAL
SPECIMEN SOURCE: NORMAL

## 2017-08-23 PROCEDURE — 81025 URINE PREGNANCY TEST: CPT

## 2017-08-23 PROCEDURE — 99284 EMERGENCY DEPT VISIT MOD MDM: CPT

## 2017-08-23 RX ORDER — DOCUSATE SODIUM 100 MG/1
100 CAPSULE, LIQUID FILLED ORAL 2 TIMES DAILY
Qty: 60 CAP | Refills: 0 | Status: SHIPPED | OUTPATIENT
Start: 2017-08-23 | End: 2017-10-17

## 2017-08-23 RX ORDER — CLONIDINE HYDROCHLORIDE 0.1 MG/1
0.2 TABLET ORAL
Status: DISCONTINUED | OUTPATIENT
Start: 2017-08-23 | End: 2017-08-23

## 2017-08-23 NOTE — DISCHARGE INSTRUCTIONS
Bacterial Vaginosis: Care Instructions  Your Care Instructions    Bacterial vaginosis is a type of vaginal infection. It is caused by excess growth of certain bacteria that are normally found in the vagina. Symptoms can include itching, swelling, pain when you urinate or have sex, and a gray or yellow discharge with a \"fishy\" odor. It is not considered an infection that is spread through sexual contact. Although symptoms can be annoying and uncomfortable, bacterial vaginosis does not usually cause other health problems. However, if you have it while you are pregnant, it can cause complications. While the infection may go away on its own, most doctors use antibiotics to treat it. You may have been prescribed pills or vaginal cream. With treatment, bacterial vaginosis usually clears up in 5 to 7 days. Follow-up care is a key part of your treatment and safety. Be sure to make and go to all appointments, and call your doctor if you are having problems. It's also a good idea to know your test results and keep a list of the medicines you take. How can you care for yourself at home? · Take your antibiotics as directed. Do not stop taking them just because you feel better. You need to take the full course of antibiotics. · Do not eat or drink anything that contains alcohol if you are taking metronidazole (Flagyl). · Keep using your medicine if you start your period. Use pads instead of tampons while using a vaginal cream or suppository. Tampons can absorb the medicine. · Wear loose cotton clothing. Do not wear nylon and other materials that hold body heat and moisture close to the skin. · Do not scratch. Relieve itching with a cold pack or a cool bath. · Do not wash your vaginal area more than once a day. Use plain water or a mild, unscented soap. Do not douche. When should you call for help?   Watch closely for changes in your health, and be sure to contact your doctor if:  · You have unexpected vaginal bleeding. · You have a fever. · You have new or increased pain in your vagina or pelvis. · You are not getting better after 1 week. · Your symptoms return after you finish the course of your medicine. Where can you learn more? Go to http://mickey-skye.info/. Mick Islas in the search box to learn more about \"Bacterial Vaginosis: Care Instructions. \"  Current as of: October 13, 2016  Content Version: 11.3  © 5708-0506 [employer]. Care instructions adapted under license by [employer] (which disclaims liability or warranty for this information). If you have questions about a medical condition or this instruction, always ask your healthcare professional. [place] [address] any warranty or liability for your use of this information. Urinary Tract Infection in Women: Care Instructions  Your Care Instructions    A urinary tract infection, or UTI, is a general term for an infection anywhere between the kidneys and the urethra (where urine comes out). Most UTIs are bladder infections. They often cause pain or burning when you urinate. UTIs are caused by bacteria and can be cured with antibiotics. Be sure to complete your treatment so that the infection goes away. Follow-up care is a key part of your treatment and safety. Be sure to make and go to all appointments, and call your doctor if you are having problems. It's also a good idea to know your test results and keep a list of the medicines you take. How can you care for yourself at home? · Take your antibiotics as directed. Do not stop taking them just because you feel better. You need to take the full course of antibiotics. · Drink extra water and other fluids for the next day or two. This may help wash out the bacteria that are causing the infection.  (If you have kidney, heart, or liver disease and have to limit fluids, talk with your doctor before you increase your fluid intake.)  · Avoid drinks that are carbonated or have caffeine. They can irritate the bladder. · Urinate often. Try to empty your bladder each time. · To relieve pain, take a hot bath or lay a heating pad set on low over your lower belly or genital area. Never go to sleep with a heating pad in place. To prevent UTIs  · Drink plenty of water each day. This helps you urinate often, which clears bacteria from your system. (If you have kidney, heart, or liver disease and have to limit fluids, talk with your doctor before you increase your fluid intake.)  · Urinate when you need to. · Urinate right after you have sex. · Change sanitary pads often. · Avoid douches, bubble baths, feminine hygiene sprays, and other feminine hygiene products that have deodorants. · After going to the bathroom, wipe from front to back. When should you call for help? Call your doctor now or seek immediate medical care if:  · Symptoms such as fever, chills, nausea, or vomiting get worse or appear for the first time. · You have new pain in your back just below your rib cage. This is called flank pain. · There is new blood or pus in your urine. · You have any problems with your antibiotic medicine. Watch closely for changes in your health, and be sure to contact your doctor if:  · You are not getting better after taking an antibiotic for 2 days. · Your symptoms go away but then come back. Where can you learn more? Go to http://mickey-skye.info/. Enter T123 in the search box to learn more about \"Urinary Tract Infection in Women: Care Instructions. \"  Current as of: November 28, 2016  Content Version: 11.3  © 1291-2283 [employer]. Care instructions adapted under license by [employer] (which disclaims liability or warranty for this information).  If you have questions about a medical condition or this instruction, always ask your healthcare professional. Juanita Harp disclaims any warranty or liability for your use of this information.

## 2017-08-23 NOTE — ED NOTES
Discharge instructions reviewed with patient. Discharge instructions given to patient per Poli Everett PA-C. Patient able to return/verbalize discharge instructions. Copy of discharge instructions provided. Patient condition stable, respiratory status within normal limits, neuro status intact. Ambulatory out of ER, accompanied by family.

## 2017-08-23 NOTE — DISCHARGE INSTRUCTIONS
We hope that we have addressed all of your medical concerns. The examination and treatment you received in the Emergency Department were for an emergent problem and were not intended as complete care. It is important that you follow up with your healthcare provider(s) for ongoing care. If your symptoms worsen or do not improve as expected, and you are unable to reach your usual health care provider(s), you should return to the Emergency Department. Today's healthcare is undergoing tremendous change, and patient satisfaction surveys are one of the many tools to assess the quality of medical care. You may receive a survey from the [facility] regarding your experience in the Emergency Department. I hope that your experience has been completely positive, particularly the medical care that I provided. As such, please participate in the survey; anything less than excellent does not meet my expectations or intentions. [address] [facility] and [address] [place] [address] participate in nationally recognized quality of care measures. If your blood pressure is greater than 120/80, as reported below, we urge that you seek medical care to address the potential of high blood pressure, commonly known as hypertension. Hypertension can be hereditary or can be caused by certain medical conditions, pain, stress, or \"white coat syndrome. \"       Please make an appointment with your health care provider(s) for follow up of your Emergency Department visit. VITALS:   Patient Vitals for the past 8 hrs:   Temp Pulse Resp BP SpO2   08/23/17 1407 98.4 °F (36.9 °C) 100 16 (!) 145/105 100 %          Thank you for allowing us to provide you with medical care today. We realize that you have many choices for your emergency care needs. Please choose us in the future for any continued health care needs. Damion Chambers, [address] Office: [phone]            Recent Results (from the past 24 hour(s))   CHLAMYDIA/GC PCR    Collection Time: 08/22/17  6:45 PM   Result Value Ref Range    Sample type SWAB      Source ENDOCERVICAL      Chlamydia amplified NEGATIVE  NEG      N. gonorrhea, amplified NEGATIVE  NEG      Comment        Testing performed by the [employer] Jerilyn CT/NG method, utilizing PCR amplification to identify DNA of the pathogens. This method is not recommended as the sole method of evaluation of cases of sexual abuse nor for other medico-legal indications. SHANNAN, OTHER SOURCES    Collection Time: 08/22/17  6:45 PM   Result Value Ref Range    Special Requests: NO SPECIAL REQUESTS      KOH NO YEAST SEEN     WET PREP    Collection Time: 08/22/17  6:55 PM   Result Value Ref Range    Clue cells CLUE CELLS PRESENT      Wet prep NO TRICHOMONAS SEEN     URINALYSIS W/ REFLEX CULTURE    Collection Time: 08/22/17  7:38 PM   Result Value Ref Range    Color YELLOW/STRAW      Appearance CLOUDY (A) CLEAR      Specific gravity 1.007 1.003 - 1.030      pH (UA) 6.5 5.0 - 8.0      Protein NEGATIVE  NEG mg/dL    Glucose NEGATIVE  NEG mg/dL    Ketone NEGATIVE  NEG mg/dL    Bilirubin NEGATIVE  NEG      Blood TRACE (A) NEG      Urobilinogen 0.2 0.2 - 1.0 EU/dL    Nitrites NEGATIVE  NEG      Leukocyte Esterase LARGE (A) NEG      WBC 10-20 0 - 4 /hpf    RBC 5-10 0 - 5 /hpf    Epithelial cells MANY (A) FEW /lpf    Bacteria 2+ (A) NEG /hpf    UA:UC IF INDICATED URINE CULTURE ORDERED (A) CNI     CULTURE, URINE    Collection Time: 08/22/17  7:38 PM   Result Value Ref Range    Special Requests: NO SPECIAL REQUESTS  Reflexed from [id number]        [place] Count >100,000  COLONIES/mL        Culture result: GRAM NEGATIVE RODS (A)     HCG URINE, QL. - POC    Collection Time: 08/23/17  3:30 PM   Result Value Ref Range    Pregnancy test,urine (POC) NEGATIVE  NEG         No results found.            Constipation: Care Instructions  Your Care Instructions  Constipation means that you have a hard time passing stools (bowel movements). People pass stools from 3 times a day to once every 3 days. What is normal for you may be different. Constipation may occur with pain in the rectum and cramping. The pain may get worse when you try to pass stools. Sometimes there are small amounts of bright red blood on toilet paper or the surface of stools. This is because of enlarged veins near the rectum (hemorrhoids). A few changes in your diet and lifestyle may help you avoid ongoing constipation. Your doctor may also prescribe medicine to help loosen your stool. Some medicines can cause constipation. These include pain medicines and antidepressants. Tell your doctor about all the medicines you take. Your doctor may want to make a medicine change to ease your symptoms. Follow-up care is a key part of your treatment and safety. Be sure to make and go to all appointments, and call your doctor if you are having problems. It's also a good idea to know your test results and keep a list of the medicines you take. How can you care for yourself at home? · Drink plenty of fluids, enough so that your urine is light yellow or clear like water. If you have kidney, heart, or liver disease and have to limit fluids, talk with your doctor before you increase the amount of fluids you drink. · Include high-fiber foods in your diet each day. These include fruits, vegetables, beans, and whole grains. · Get at least 30 minutes of exercise on most days of the week. Walking is a good choice. You also may want to do other activities, such as running, swimming, cycling, or playing tennis or team sports. · Take a fiber supplement, such as Citrucel or Metamucil, every day. Read and follow all instructions on the label. · Schedule time each day for a bowel movement. A daily routine may help. Take your time having your bowel movement. · Support your feet with a small step stool when you sit on the toilet.  This helps flex your hips and places your pelvis in a squatting position. · Your doctor may recommend an over-the-counter laxative to relieve your constipation. Examples are Milk of Magnesia and MiraLax. Read and follow all instructions on the label. Do not use laxatives on a long-term basis. When should you call for help? Call your doctor now or seek immediate medical care if:  · You have new or worse belly pain. · You have new or worse nausea or vomiting. · You have blood in your stools. Watch closely for changes in your health, and be sure to contact your doctor if:  · Your constipation is getting worse. · You do not get better as expected. Where can you learn more? Go to http://mickey-skye.info/. Enter 21 [phone] in the search box to learn more about \"Constipation: Care Instructions. \"  Current as of: March 20, 2017  Content Version: 11.3  © 9477-3059 [employer]. Care instructions adapted under license by [employer] (which disclaims liability or warranty for this information). If you have questions about a medical condition or this instruction, always ask your healthcare professional. [place] [address] any warranty or liability for your use of this information. Anal Fissure: Care Instructions  Your Care Instructions  An anal fissure is a tear in the lining of the lower rectum (anus). It can itch and cause pain. You may notice bright red blood on toilet paper after you wipe. A fissure may form if you are constipated and try to pass a large, hard stool or if you do not relax your anal muscles during a bowel movement. Most anal fissures heal with home treatment after a few days or weeks. If you have an anal fissure that takes more time to heal, your doctor may prescribe medicine. In rare cases, surgery may be needed. Anal fissures do not lead to colon cancer or other serious illnesses.  However, if you have blood mixed in with the stool, talk to your doctor. Follow-up care is a key part of your treatment and safety. Be sure to make and go to all appointments, and call your doctor if you are having problems. It's also a good idea to know your test results and keep a list of the medicines you take. How can you care for yourself at home? · If your doctor prescribed cream or ointment, use it exactly as prescribed. Call your doctor if you think you are having a problem with your medicine. You will get more details on the specific medicines your doctor prescribes. · Sit in a few inches of warm water (sitz bath) 3 times a day and after bowel movements. The warm water helps the area heal and eases discomfort. Do not put soaps, salts, or shampoos in the water. · Avoid constipation:  ¨ Include fruits, vegetables, beans, and whole grains in your diet each day. These foods are high in fiber. ¨ Drink plenty of fluids, enough so that your urine is light yellow or clear like water. If you have kidney, heart, or liver disease and have to limit fluids, talk with your doctor before you increase the amount of fluids you drink. ¨ Get some exercise every day. Build up slowly to 30 to 60 minutes a day on 5 or more days of the week. ¨ Take a fiber supplement, such as Benefiber, Citrucel, or Metamucil, every day if needed. Read and follow all instructions on the label. ¨ Use the toilet when you feel the urge. Or when you can, schedule time each day for a bowel movement. A daily routine may help. Take your time and do not strain when having a bowel movement. But do not sit on the toilet too long. · Support your feet with a small step stool when you sit on the toilet. This helps flex your hips and places your pelvis in a squatting position. · Your doctor may recommend an over-the-counter laxative, such as Miralax, Milk of Magnesia, or Ex-Lax. Read and follow all instructions on the label, and do not use these medicines on a long-term basis.   · Do not use over-the-counter ointments or creams without talking to your doctor. Some of these preparations may not help. · Use baby wipes or medicated pads, such as Preparation H or Tucks, instead of toilet paper to clean after a bowel movement. These products do not irritate the anus. · Be safe with medicines. Read and follow all instructions on the label. If the doctor gave you a prescription medicine for pain, take it as prescribed. If you are not taking a prescription pain medicine, ask your doctor if you can take an over-the-counter medicine. When should you call for help? Watch closely for changes in your health, and be sure to contact your doctor if:  · You do not get better as expected. · You have difficulty passing stools. · You have any new symptoms, such as blood in your stools. Where can you learn more? Go to http://mickey-skye.info/. Enter L249 in the search box to learn more about \"Anal Fissure: Care Instructions. \"  Current as of: August 9, 2016  Content Version: 11.3  © 0095-8640 [employer]. Care instructions adapted under license by [employer] (which disclaims liability or warranty for this information). If you have questions about a medical condition or this instruction, always ask your healthcare professional. [address] any warranty or liability for your use of this information.

## 2017-08-23 NOTE — ED TRIAGE NOTES
Pt stated she is constipated, last bm last week, seen at [facility] yesterday, pt now having some rectal bleeding

## 2017-08-23 NOTE — ED PROVIDER NOTES
HPI Comments: 19 y.o. female with past medical history significant for anemia, varicella, scoliosis, and thoracic compression fracture who presents from home with chief complaint of constipation. Pt states for the past week she has been constipated and states her LBM was one week ago. Pt was seen at [facility] yesterday and diagnosed with a UTI and bacterial vaginosis. Pt was prescribed Cipro, Flagyl, Lidocaine, and magnesium citrate. Pt states she took the first dose of her abx today. Pt states she used the mag citrate last night but has been unable to pass a BM. Pt states she has had \"bright red\" blood in her stool since yesterday with severe rectal pain. Pt states these symptoms are new and not previously experienced in the past.  Pt states she normally has irregular bowel movements, but this is the longest she has gone without passing a BM. Pt also notes she regularly receives Depo-Provera injections. There are no other acute medical concerns at this time. Significant FMHx: Grandfather - colon CA    PCP: Tanja Macias MD    Note written by Saad Cortes, as dictated by Rob Razo MD 3:36 PM    The history is provided by the patient. Past Medical History:   Diagnosis Date    Acne 10/7/2009    Allergic rhinitis 2009    Anemia 2009    Headache     History of gestational diabetes     diet controlled    Other ill-defined conditions     scoliosis    PCB (post coital bleeding) 2014    Thoracic compression fracture ([address] 3/2014    Yousif Lopez    Thyromegaly 2014    On exam     Unspecified vitamin D deficiency 2014    Varicella 1998       Past Surgical History:   Procedure Laterality Date    HX GYN                Family History:   Problem Relation Age of Onset    Breast Cancer Maternal Grandmother      great, great gma, age?     Hypertension Maternal Grandmother     Breast Cancer Maternal Aunt 32     survivor    Diabetes Father     Hypertension Paternal Grandmother     Diabetes Paternal Grandmother     Osteoporosis Neg Hx        Social History     Social History    Marital status: SINGLE     Spouse name: N/A    Number of children: N/A    Years of education: N/A     Occupational History    Not on file. Social History Main Topics    Smoking status: Never Smoker    Smokeless tobacco: Never Used    Alcohol use Yes      Comment: once every 3 months, 3 drinks at a time    Drug use: No    Sexual activity: Yes     Partners: Male     Birth control/ protection: Injection     Other Topics Concern    Not on file     Social History Narrative    Lives in [place] with parents and 3 yo son. Works at [employer]. ALLERGIES: Review of patient's allergies indicates no known allergies. Review of Systems   Gastrointestinal: Positive for blood in stool, constipation and rectal pain. All other systems reviewed and are negative. Vitals:    08/23/17 1407   BP: (!) 145/105   Pulse: 100   Resp: 16   Temp: 98.4 °F (36.9 °C)   SpO2: 100%   Weight: 71.9 kg (158 lb 8 oz)   Height: 5' 5.5\" (1.664 m)            Physical Exam   Constitutional: She is oriented to person, place, and time. She appears well-developed. No distress. HENT:   Head: Normocephalic and atraumatic. Eyes: Conjunctivae and EOM are normal. Pupils are equal, round, and reactive to light. Neck: Normal range of motion. Neck supple. Cardiovascular: Normal rate, regular rhythm and normal heart sounds. No murmur heard. Pulmonary/Chest: Effort normal and breath sounds normal. No respiratory distress. Abdominal: Soft. Bowel sounds are normal. She exhibits no distension. There is no tenderness. There is no rebound. Genitourinary:   Genitourinary Comments: Rectal Exam: two small fissures at 12 and 6 o' clock. No active bleeding. No hemorrhoids. Small fecal impaction, but unable to remove any stool. Musculoskeletal: Normal range of motion.  She exhibits no edema. Neurological: She is alert and oriented to person, place, and time. No cranial nerve deficit. She exhibits normal muscle tone. Coordination normal.   Skin: Skin is warm and dry. No rash noted. Psychiatric: She has a normal mood and affect. Her behavior is normal.   Nursing note and vitals reviewed. Note written by Bee Canela.  Radha Savage, as dictated by Alberto Hernandez MD 3:36 PM       [facility]  ED Course       Procedures

## 2017-08-24 LAB
BACTERIA SPEC CULT: ABNORMAL
CC UR VC: ABNORMAL
SERVICE CMNT-IMP: ABNORMAL

## 2017-08-25 LAB
HSV SPEC CULT: POSITIVE
SPECIMEN SOURCE: ABNORMAL

## 2017-08-25 NOTE — PROGRESS NOTES
Left VM to review results. Pt was prescribed Acyclovir at time of visit. Need to review +HSV results and ensure she is taking antiviral.  Pt was seen at [facility] the day following this visit. No documentation noted that the HSV swab results were reviewed.

## 2017-08-25 NOTE — PROGRESS NOTES
Returned call, reviewed results. Encouraged use of Acyclovir as prescribed and follow up with her Ob/Gyn. Advised to use barrier protection and to contact her partner(s) so they might be tested. Good understanding noted.

## 2017-09-15 ENCOUNTER — CLINICAL SUPPORT (OUTPATIENT)
Dept: FAMILY MEDICINE CLINIC | Age: 22
End: 2017-09-15

## 2017-09-15 DIAGNOSIS — Z78.9 USES BIRTH CONTROL: Primary | ICD-10-CM

## 2017-10-17 ENCOUNTER — OFFICE VISIT (OUTPATIENT)
Dept: FAMILY MEDICINE CLINIC | Age: 22
End: 2017-10-17

## 2017-10-17 DIAGNOSIS — J06.9 VIRAL UPPER RESPIRATORY TRACT INFECTION: ICD-10-CM

## 2017-10-17 DIAGNOSIS — M94.0 COSTOCHONDRITIS: ICD-10-CM

## 2017-10-17 DIAGNOSIS — R07.9 CHEST PAIN, UNSPECIFIED TYPE: Primary | ICD-10-CM

## 2017-10-17 DIAGNOSIS — N64.52 NIPPLE DISCHARGE: ICD-10-CM

## 2017-10-17 RX ORDER — DICLOFENAC SODIUM 10 MG/G
4 GEL TOPICAL 4 TIMES DAILY
Qty: 100 G | Refills: 0 | Status: SHIPPED | OUTPATIENT
Start: 2017-10-17 | End: 2017-10-17 | Stop reason: SDUPTHER

## 2017-10-17 RX ORDER — DICLOFENAC SODIUM 10 MG/G
4 GEL TOPICAL 4 TIMES DAILY
Qty: 100 G | Refills: 0 | Status: SHIPPED | OUTPATIENT
Start: 2017-10-17 | End: 2018-02-26

## 2017-10-17 RX ORDER — ACYCLOVIR 200 MG/1
200 CAPSULE ORAL DAILY
COMMUNITY
Start: 2017-10-13 | End: 2021-12-06

## 2017-10-17 NOTE — PROGRESS NOTES
[address] [place] [address] Visit   10/17/2017  Patient ID: Lucie Lauren is a 23 y.o. female. Assessment/Plan:    Diagnoses and all orders for this visit:    1. Chest pain, unspecified type  EKG unremarkable  -     AMB POC EKG ROUTINE W/ 12 LEADS, INTER & REP    2. Viral upper respiratory tract infection    3. Costochondritis  Pain from chest wall.   -     diclofenac (VOLTAREN) 1 % gel; Apply 4 g to affected area four (4) times daily. For chest pain    4. Nipple discharge  Referred back to breast surgery to complete follow up  -     [facility] pt on Patient health concerns and plans. Patient was offered a choice/choices in the treatment plan today. Reviewed return precautions as appropriate. Patient expresses understanding of the plan and agrees with recommendations. See patient instructions for more. Patient Instructions   TODAY, please go to:     [address] the  your Referral Requisition      Please schedule the following appointments at CHECK OUT:  Bleeding follow up with  [facility] in 2 weeks    Today's Plan:      - voltaren, acetaminophen, heat or ice or chest           Subjective:   HPI:  Lucie Lauren is a 23 y.o. female being seen for:   Chief Complaint   Patient presents with    Chest Pain     patient states sx had happened on Monday [pain in the center of the chest and rib cage, pt states no heavy lifting,No s.o.b     ·  ribs hurt, back hurt, ear pressure, chest pain under bra on right, nasal congestion, productive cough. X1 day, headache  · No sick contacts  · Chest pain about 4-5 days, sensitive to touch. 8.5/10. Sore. Mostly present. · No clear triggers or mitigating factors  · Perhaps worse when moving      · Blood when toileting since about august.   · Stool improved, soft. Review of Systems   Constitutional: Negative for fever. Respiratory: Positive for cough. Negative for shortness of breath and wheezing. Gastrointestinal: Negative for abdominal pain, diarrhea, nausea and vomiting. Otherwise as noted in HPI  ? Objective: There were no vitals taken for this visit. Wt Readings from Last 3 Encounters:   17 158 lb 8 oz (71.9 kg)   17 159 lb 2.8 oz (72.2 kg)   17 158 lb (71.7 kg)     BP Readings from Last 3 Encounters:   17 (!) 142/103   17 (!) 162/125   17 (!) 139/98     PHQ over the last two weeks 2017   Little interest or pleasure in doing things Not at all   Feeling down, depressed or hopeless Not at all   Total Score PHQ 2 0       Physical Exam   Constitutional: She appears well-developed and well-nourished. No distress. Pulmonary/Chest: Effort normal. No respiratory distress. She exhibits tenderness. Right breast exhibits nipple discharge. Right breast exhibits no inverted nipple and no mass. Left breast exhibits nipple discharge. Left breast exhibits no inverted nipple and no mass. Breasts are symmetrical.   milkly discharge b/l with compression of areola   Lymphadenopathy:     She has no cervical adenopathy. She has no axillary adenopathy. Right: No supraclavicular adenopathy present. Left: No supraclavicular adenopathy present. Neurological: She is alert. Psychiatric: She has a normal mood and affect. Her behavior is normal.       No Known Allergies  Prior to Admission medications    Medication Sig Start Date End Date Taking? Authorizing Provider   acyclovir (ZOVIRAX) 200 mg capsule  10/13/17  Yes Historical Provider   diclofenac (VOLTAREN) 1 % gel Apply 4 g to affected area four (4) times daily. For chest pain 10/17/17  Yes Ltanya Stands. Maite Andrade MD   medroxyPROGESTERone (DEPO-PROVERA) 150 mg/mL syrg 1 mL by IntraMUSCular route every three (3) months. 17  Yes Ordonal Andrade MD     Patient Active Problem List   Diagnosis Code    Allergic rhinitis J30.9    Anemia D64.9    History of compression fracture of spine, thoracic with chronic back pain Z87.81    Unspecified vitamin D deficiency E55.9    Thyromegaly E01.0    Atypical mole D22.9    Mastodynia N64.4    History of gestational diabetes Z80.34    Essential hypertension I10

## 2017-10-17 NOTE — PROGRESS NOTES
Chief Complaint   Patient presents with    Chest Pain     patient states sx had happened on Monday [pain in the center of the chest and rib cage, pt states no heavy lifting,No s. o.b     1. Have you been to the ER, urgent care clinic since your last visit? Hospitalized since your last visit? No    2. Have you seen or consulted any other health care providers outside of the [address] [place] [address] since your last visit? Include any pap smears or colon screening.  Yes When: OB/GYN-(last seen on sept 2017)

## 2017-10-17 NOTE — PATIENT INSTRUCTIONS
TODAY, please go to:     [address] the  your Referral Requisition      Please schedule the following appointments at CHECK OUT:  Bleeding follow up with Dr. Lalitha Parikh in 2 weeks    Today's Plan:      - voltaren, acetaminophen, heat or ice or chest           Costochondritis: Care Instructions  Your Care Instructions  You have chest pain because the cartilage of your rib cage is inflamed. This problem is called costochondritis. This type of chest wall pain may last from days to weeks. It is not a heart problem. Sometimes costochondritis occurs with a cold or the flu, and other times the exact cause is not known. Follow-up care is a key part of your treatment and safety. Be sure to make and go to all appointments, and call your doctor if you are having problems. Its also a good idea to know your test results and keep a list of the medicines you take. How can you care for yourself at home? · Take medicines for pain and inflammation exactly as directed. ¨ If the doctor gave you a prescription medicine, take it as prescribed. ¨ If you are not taking a prescription pain medicine, ask your doctor if you can take an over-the-counter medicine. ¨ Do not take two or more pain medicines at the same time unless the doctor told you to. Many pain medicines have acetaminophen, which is Tylenol. Too much acetaminophen (Tylenol) can be harmful. · It may help to use a warm compress or heating pad (set on low) on your chest. You can also try alternating heat and ice. Put ice or a cold pack on the area for 10 to 20 minutes at a time. Put a thin cloth between the ice and your skin. · Avoid any activity that strains the chest area. As your pain gets better, you can slowly return to your normal activities. · Do not use tape, an elastic bandage, a \"rib belt,\" or anything else that restricts your chest wall motion. When should you call for help? Call 911 anytime you think you may need emergency care.  For example, call if:  · You have new or different chest pain or pressure. This may occur with:  ¨ Sweating. ¨ Shortness of breath. ¨ Nausea or vomiting. ¨ Pain that spreads from the chest to the neck, jaw, or one or both shoulders or arms. ¨ Dizziness or lightheadedness. ¨ A fast or uneven pulse. After calling 911, chew 1 adult-strength aspirin. Wait for an ambulance. Do not try to drive yourself. · You have severe trouble breathing. Call your doctor now or seek immediate medical care if:  · You have a fever or cough. · You have any trouble breathing. · Your chest pain gets worse. Watch closely for changes in your health, and be sure to contact your doctor if:  · Your chest pain continues even though you are taking anti-inflammatory medicine. · Your chest wall pain has not improved after 5 to 7 days. Where can you learn more? Go to http://mickey-skye.info/. Enter E699 in the search box to learn more about \"Costochondritis: Care Instructions. \"  Current as of: March 20, 2017  Content Version: 11.3  © 1251-6799 [employer]. Care instructions adapted under license by [employer] (which disclaims liability or warranty for this information). If you have questions about a medical condition or this instruction, always ask your healthcare professional. [address] any warranty or liability for your use of this information.

## 2017-12-01 ENCOUNTER — OFFICE VISIT (OUTPATIENT)
Dept: FAMILY MEDICINE CLINIC | Age: 22
End: 2017-12-01

## 2017-12-01 VITALS
HEIGHT: 66 IN | RESPIRATION RATE: 19 BRPM | BODY MASS INDEX: 27.31 KG/M2 | HEART RATE: 68 BPM | TEMPERATURE: 99 F | SYSTOLIC BLOOD PRESSURE: 124 MMHG | WEIGHT: 169.9 LBS | DIASTOLIC BLOOD PRESSURE: 84 MMHG | OXYGEN SATURATION: 97 %

## 2017-12-01 DIAGNOSIS — R03.0 ELEVATED BLOOD PRESSURE READING: Primary | ICD-10-CM

## 2017-12-01 RX ORDER — LISINOPRIL 10 MG/1
10 TABLET ORAL DAILY
Qty: 30 TAB | Refills: 1 | Status: SHIPPED | OUTPATIENT
Start: 2017-12-01 | End: 2018-03-22 | Stop reason: SDUPTHER

## 2017-12-01 NOTE — PROGRESS NOTES
Chief Complaint   Patient presents with    Elevated Blood Pressure    Headache       Reviewed record in preparation for visit and have obtained necessary documentation. Identified pt with two pt identifiers(name and ). Chief Complaint   Patient presents with    Elevated Blood Pressure    Headache       Vitals:    17 1450   BP: 124/84   Pulse: 68   Resp: 19   Temp: 99 °F (37.2 °C)   TempSrc: Oral   SpO2: 97%   Weight: 169 lb 14.4 oz (77.1 kg)   Height: 5' 5.5\" (1.664 m)   PainSc:   0 - No pain       Coordination of Care Questionnaire:  :     1) Have you been to an emergency room, urgent care clinic since your last visit? yes  [address] [facility] surgery  Hospitalized since your last visit? no             2) Have you seen or consulted any other health care providers outside of [address] [place] [address] since your last visit? no  (Include any pap smears or colon screenings in this section.)    3) In the event something were to happen to you and you were unable to speak on your behalf, do you have an Advance Directive/ Living Will in place stating your wishes? NO    Do you have an Advance Directive on file? no    4) Are you interested in receiving information on Advance Directives? NO    Patient is accompanied by self I have received verbal consent from Yanira Carlson to discuss any/all medical information while they are present in the room.

## 2017-12-01 NOTE — PATIENT INSTRUCTIONS
1) Please check your blood pressure 2-3 times a week. Keep a written record of your blood pressure readings and bring it to the next appointment in 4 week. If your systolic blood pressure is consistently greater than 150mmHg or less than 100mmHg then please call the office. 2) Healthy Weight  Body Mass Index is a noninvasive way to screen for weight and body fat. This is a mathematical calculation based on your height and weight. A healthy BMI is between 20% -24.9%. Your BMI is 27.8 %. There is a relationship between high BMI and various healthy problems, such as osteoarthritis, muscle pain, increased risk of cancer, diabetes, heart disease, stroke, hypertension, high cholesterol, sleep apnea, breathing problems, depression, which is why weight loss is so important. In terms of weight loss, a 5-10% reduction in body weight over 3-6 months is a reasonable goal.  There would likely be improvements in risk for disease such as diabetes and heart disease, with this amount of weight loss. In order to lose weight, try reducing your daily intake of calories by decreasing portion size of food and increase exercise to help reduce weight. Eat 3-5 small meals per day instead of 3 large meals. Choose lean meats for protein source which include chicken, pork, and turkey. The recommended serving size for protein is a 2-3 oz serving (the size of your fist), and 1-1.5 oz of carbohydrate per meal (about 1 cup). Increase servings of fruits and vegetables. Limit processed carbohydrates, (i.e. most breads, crackers, pasta, chips, rice, breaded or battered food, etc). If you choose to eat carbohydrates, whole wheat, (instead of white) is a healthier option for bread, rice, and crackers. Avoid fried foods. Limit sugar and do not drink alcohol, juice, sodas or sweet teas. Drink plenty of water (at least 64 oz/day). Daily exercise will also help with weight loss and overall health.   A minimum of 150 minutes a week of moderate exercise is recommended (30 minutes per day). Make exercise a routine part of your day - for example, park in spaces far away from [facility], take stairs instead of elevator, if sitting for long periods, get up from chair and walk every hour. Recruit a friend or relative to exercise with you and keep you on schedule. It is much easier to exercise with a vidhi who will make sure you work out each day! [employer] is a eight week mixed martial arts (MMA) based workout. It has 5 main workout DVDs, each one is 45 minutes consisting of a 10 minute warm-up, five 5 minute workouts and a 6 minute stretching/cool down. It is easy to follow, with options to modify each move and you only need hand weights and some space to do the work out. Meal planning smart phone applications like [employer] can help plan healthy meals. Get 7-8 hours of sleep each night. You may wish to consider seeing the nutritionist at [facility] ([phone]), [facility] ([phone]) or [facility] ([phone]). For reliable dietary information, go to www. EATRIGHT.org.    Free smart phone application to help manage weight loss: MyFitnessPal = tracks food intake, exercise and weight. Daily nutritional summary. Meal             Learning About High Blood Pressure  What is high blood pressure? Blood pressure is a measure of how hard the blood pushes against the walls of your arteries. It's normal for blood pressure to go up and down throughout the day, but if it stays up, you have high blood pressure. Another name for high blood pressure is hypertension. Two numbers tell you your blood pressure. The first number is the systolic pressure. It shows how hard the blood pushes when your heart is pumping. The second number is the diastolic pressure. It shows how hard the blood pushes between heartbeats, when your heart is relaxed and filling with blood.   A blood pressure of less than 120/80 (say \"120 over 80\") is ideal for an adult. High blood pressure is 140/90 or higher. You have high blood pressure if your top number is 140 or higher or your bottom number is 90 or higher, or both. Many people fall into the category in between, called prehypertension. People with prehypertension need to make lifestyle changes to bring their blood pressure down and help prevent or delay high blood pressure. What happens when you have high blood pressure? · Blood flows through your arteries with too much force. Over time, this damages the walls of your arteries. But you can't feel it. High blood pressure usually doesn't cause symptoms. · Fat and calcium start to build up in your arteries. This buildup is called plaque. Plaque makes your arteries narrower and stiffer. Blood can't flow through them as easily. · This lack of good blood flow starts to damage some of the organs in your body. This can lead to problems such as coronary artery disease and heart attack, heart failure, stroke, kidney failure, and eye damage. How can you prevent high blood pressure? · Stay at a healthy weight. · Try to limit how much sodium you eat to less than 2,300 milligrams (mg) a day. If you limit your sodium to 1,500 mg a day, you can lower your blood pressure even more. ¨ Buy foods that are labeled \"unsalted,\" \"sodium-free,\" or \"low-sodium. \" Foods labeled \"reduced-sodium\" and \"light sodium\" may still have too much sodium. ¨ Flavor your food with garlic, lemon juice, onion, vinegar, herbs, and spices instead of salt. Do not use soy sauce, steak sauce, onion salt, garlic salt, mustard, or ketchup on your food. ¨ Use less salt (or none) when recipes call for it. You can often use half the salt a recipe calls for without losing flavor. · Be physically active. Get at least 30 minutes of exercise on most days of the week. Walking is a good choice.  You also may want to do other activities, such as running, swimming, cycling, or playing tennis or team sports. · Limit alcohol to 2 drinks a day for men and 1 drink a day for women. · Eat plenty of fruits, vegetables, and low-fat dairy products. Eat less saturated and total fats. How is high blood pressure treated? · Your doctor will suggest making lifestyle changes. For example, your doctor may ask you to eat healthy foods, quit smoking, lose extra weight, and be more active. · If lifestyle changes don't help enough or your blood pressure is very high, you will have to take medicine every day. Follow-up care is a key part of your treatment and safety. Be sure to make and go to all appointments, and call your doctor if you are having problems. It's also a good idea to know your test results and keep a list of the medicines you take. Where can you learn more? Go to http://mickey-skye.info/. Enter P501 in the search box to learn more about \"Learning About High Blood Pressure. \"  Current as of: September 21, 2016  Content Version: 11.4  © 4852-3271 [employer]. Care instructions adapted under license by [employer] (which disclaims liability or warranty for this information). If you have questions about a medical condition or this instruction, always ask your healthcare professional. [address] any warranty or liability for your use of this information. Low Blood Pressure: Care Instructions  Your Care Instructions    Blood pressure is a measurement of the force of the blood against the walls of the blood vessels during and after each beat of the heart. Low blood pressure is also called hypotension. It means that your blood pressure is much lower than normal. Some people, especially young, slim women, may have slightly low blood pressure without symptoms. But in many people, low blood pressure can cause symptoms such as feeling dizzy or lightheaded.  When your blood pressure is too low, your heart, brain, and other organs do not get enough blood. Low blood pressure can be caused by many things, including heart problems and some medicines. Diabetes that is not under control can cause your blood pressure to drop. And so can a severe allergic reaction or infection. Another cause is dehydration, which is when your body loses too much fluid. Treatment for low blood pressure depends on the cause. Follow-up care is a key part of your treatment and safety. Be sure to make and go to all appointments, and call your doctor if you are having problems. It's also a good idea to know your test results and keep a list of the medicines you take. How can you care for yourself at home? · Drink plenty of fluids, enough so that your urine is light yellow or clear like water. If you have kidney, heart, or liver disease and have to limit fluids, talk with your doctor before you increase the amount of fluids you drink. · Be safe with medicines. Call your doctor if you think you are having a problem with your medicine. You will get more details on the specific medicines your doctor prescribes. · Stand up or get out of bed very slowly to allow your body to adjust.  · Get plenty of rest.  · Do not smoke. Smoking increases your risk of heart attack. If you need help quitting, talk to your doctor about stop-smoking programs and medicines. These can increase your chances of quitting for good. · Limit alcohol to 2 drinks a day for men and 1 drink a day for women. Alcohol may interfere with your medicine. In addition, alcohol can make your low blood pressure worse by causing your body to lose water. When should you call for help? Call 911 anytime you think you may need emergency care. For example, call if:  ? · You passed out (lost consciousness). ?Call your doctor now or seek immediate medical care if:  ? · You are dizzy or lightheaded, or you feel like you may faint. ? Watch closely for changes in your health, and be sure to contact your doctor if you have any problems. Where can you learn more? Go to http://mickey-skye.info/. Enter C304 in the search box to learn more about \"Low Blood Pressure: Care Instructions. \"  Current as of: September 21, 2016  Content Version: 11.4  © 5334-0852 [employer]. Care instructions adapted under license by [employer] (which disclaims liability or warranty for this information). If you have questions about a medical condition or this instruction, always ask your healthcare professional. [address] any warranty or liability for your use of this information.

## 2017-12-01 NOTE — MR AVS SNAPSHOT
Visit Information Date & Time Provider Department Dept. Phone Encounter #  
 12/1/2017  2:40 PM Brie Calloway  [facility] [phone] [id number] Your Appointments 12/13/2017  9:15 AM  
Nurse Visit with [address] Vanessa Gauthier ([address]) Appt Note: depo shot [address] [facility] [address]  
[phone]  
  
   
 [address] [facility] [address] [place] [address] Upcoming Health Maintenance Date Due Influenza Age 5 to Adult 8/1/2017 PAP AKA CERVICAL CYTOLOGY 11/25/2017 DTaP/Tdap/Td series (6 - Td) 2/15/2021 Allergies as of 12/1/2017  Review Complete On: 12/1/2017 By: Torie Ocampo LPN No Known Allergies Current Immunizations  Reviewed on 11/12/2015 Name Date DTAP Vaccine 9/1/2005, 2/18/1998, 1/21/1998, 1993 HIB Vaccine 1993 Hepatitis A Vaccine 2/15/2011, 8/28/2007 Hepatitis B Vaccine 8/28/2007, 8/23/2006, 9/1/2005 Human Papillomavirus 4/7/2008, 10/31/2007, 8/28/2007 IPV 2/18/1998, 1/21/1998, 1993 Influenza Vaccine 10/24/2014 Influenza Vaccine (Quad) PF 10/6/2016 MMR Vaccine 2/18/1998, 1/21/1998 Meningococcal Vaccine 2/15/2011 TDAP Vaccine 2/15/2011 Not reviewed this visit You Were Diagnosed With   
  
 Codes Comments Elevated blood pressure reading    -  Primary ICD-10-CM: R03.0 ICD-9-CM: 796.2 Vitals BP Pulse Temp Resp Height(growth percentile) Weight(growth percentile) 124/84 (BP 1 Location: Right arm, BP Patient Position: Sitting) 68 99 °F (37.2 °C) (Oral) 19 5' 5.5\" (1.664 m) 169 lb 14.4 oz (77.1 kg) SpO2 BMI OB Status Smoking Status 97% 27.84 kg/m2 Injection Never Smoker BMI and BSA Data Body Mass Index Body Surface Area  
 27.84 kg/m 2 1.89 m 2 Preferred Pharmacy Pharmacy Name Phone [facility][id number] Zhen Little, [address] [place] [address] [phone] Your Updated Medication List  
  
   
This list is accurate as of: 12/1/17  3:23 PM.  Always use your most recent med list.  
  
  
  
  
 acyclovir 200 mg capsule Commonly known as:  ZOVIRAX  
  
 diclofenac 1 % Gel Commonly known as:  VOLTAREN Apply 4 g to affected area four (4) times daily. For chest pain  
  
 lisinopril 10 mg tablet Commonly known as:  Yuni Faisal Take 1 Tab by mouth daily. medroxyPROGESTERone 150 mg/mL Syrg Commonly known as:  DEPO-PROVERA  
1 mL by IntraMUSCular route every three (3) months. Prescriptions Sent to Pharmacy Refills  
 lisinopril (PRINIVIL, ZESTRIL) 10 mg tablet 1 Sig: Take 1 Tab by mouth daily. Class: Normal  
 Pharmacy: [facility]/pharmacy [address] [facility], [address] [place] [address] Ph #: [phone] Route: Oral  
  
Patient Instructions 1) Please check your blood pressure 2-3 times a week. Keep a written record of your blood pressure readings and bring it to the next appointment in 4 week. If your systolic blood pressure is consistently greater than 150mmHg or less than 100mmHg then please call the office. 2) Healthy Weight Body Mass Index is a noninvasive way to screen for weight and body fat. This is a mathematical calculation based on your height and weight. A healthy BMI is between 20% -24.9%. Your BMI is 27.8 %. There is a relationship between high BMI and various healthy problems, such as osteoarthritis, muscle pain, increased risk of cancer, diabetes, heart disease, stroke, hypertension, high cholesterol, sleep apnea, breathing problems, depression, which is why weight loss is so important.  
 
In terms of weight loss, a 5-10% reduction in body weight over 3-6 months is a reasonable goal.  There would likely be improvements in risk for disease such as diabetes and heart disease, with this amount of weight loss. In order to lose weight, try reducing your daily intake of calories by decreasing portion size of food and increase exercise to help reduce weight. Eat 3-5 small meals per day instead of 3 large meals. Choose lean meats for protein source which include chicken, pork, and turkey. The recommended serving size for protein is a 2-3 oz serving (the size of your fist), and 1-1.5 oz of carbohydrate per meal (about 1 cup). Increase servings of fruits and vegetables. Limit processed carbohydrates, (i.e. most breads, crackers, pasta, chips, rice, breaded or battered food, etc). If you choose to eat carbohydrates, whole wheat, (instead of white) is a healthier option for bread, rice, and crackers. Avoid fried foods. Limit sugar and do not drink alcohol, juice, sodas or sweet teas. Drink plenty of water (at least 64 oz/day). Daily exercise will also help with weight loss and overall health. A minimum of 150 minutes a week of moderate exercise is recommended (30 minutes per day). Make exercise a routine part of your day - for example, park in spaces far away from [facility], take stairs instead of elevator, if sitting for long periods, get up from chair and walk every hour. Recruit a friend or relative to exercise with you and keep you on schedule. It is much easier to exercise with a vidhi who will make sure you work out each day! "[employer] is a eight week mixed martial arts (MMA) based workout. It has 5 main workout DVDs, each one is 45 minutes consisting of a 10 minute warm-up, five 5 minute workouts and a 6 minute stretching/cool down. It is easy to follow, with options to modify each move and you only need hand weights and some space to do the work out. Meal planning smart phone applications like [employer] can help plan healthy meals. Get 7-8 hours of sleep each night. You may wish to consider seeing the nutritionist at [facility] ([phone]), [facility] ([phone]) or [facility] ([phone]). For reliable dietary information, go to www. EATRIGHT.org. 
 
Free smart phone application to help manage weight loss: [employer] = tracks food intake, exercise and weight. Daily nutritional summary. Meal  Learning About High Blood Pressure What is high blood pressure? Blood pressure is a measure of how hard the blood pushes against the walls of your arteries. It's normal for blood pressure to go up and down throughout the day, but if it stays up, you have high blood pressure. Another name for high blood pressure is hypertension. Two numbers tell you your blood pressure. The first number is the systolic pressure. It shows how hard the blood pushes when your heart is pumping. The second number is the diastolic pressure. It shows how hard the blood pushes between heartbeats, when your heart is relaxed and filling with blood. A blood pressure of less than 120/80 (say \"120 over 80\") is ideal for an adult. High blood pressure is 140/90 or higher. You have high blood pressure if your top number is 140 or higher or your bottom number is 90 or higher, or both. Many people fall into the category in between, called prehypertension. People with prehypertension need to make lifestyle changes to bring their blood pressure down and help prevent or delay high blood pressure. What happens when you have high blood pressure? · Blood flows through your arteries with too much force. Over time, this damages the walls of your arteries. But you can't feel it. High blood pressure usually doesn't cause symptoms. · Fat and calcium start to build up in your arteries. This buildup is called plaque. Plaque makes your arteries narrower and stiffer. Blood can't flow through them as easily. · This lack of good blood flow starts to damage some of the organs in your body. This can lead to problems such as coronary artery disease and heart attack, heart failure, stroke, kidney failure, and eye damage. How can you prevent high blood pressure? · Stay at a healthy weight. · Try to limit how much sodium you eat to less than 2,300 milligrams (mg) a day. If you limit your sodium to 1,500 mg a day, you can lower your blood pressure even more. ¨ Buy foods that are labeled \"unsalted,\" \"sodium-free,\" or \"low-sodium. \" Foods labeled \"reduced-sodium\" and \"light sodium\" may still have too much sodium. ¨ Flavor your food with garlic, lemon juice, onion, vinegar, herbs, and spices instead of salt. Do not use soy sauce, steak sauce, onion salt, garlic salt, mustard, or ketchup on your food. ¨ Use less salt (or none) when recipes call for it. You can often use half the salt a recipe calls for without losing flavor. · Be physically active. Get at least 30 minutes of exercise on most days of the week. Walking is a good choice. You also may want to do other activities, such as running, swimming, cycling, or playing tennis or team sports. · Limit alcohol to 2 drinks a day for men and 1 drink a day for women. · Eat plenty of fruits, vegetables, and low-fat dairy products. Eat less saturated and total fats. How is high blood pressure treated? · Your doctor will suggest making lifestyle changes. For example, your doctor may ask you to eat healthy foods, quit smoking, lose extra weight, and be more active. · If lifestyle changes don't help enough or your blood pressure is very high, you will have to take medicine every day. Follow-up care is a key part of your treatment and safety. Be sure to make and go to all appointments, and call your doctor if you are having problems. It's also a good idea to know your test results and keep a list of the medicines you take. Where can you learn more? Go to http://mickey-skye.info/. Enter P501 in the search box to learn more about \"Learning About High Blood Pressure. \" Current as of: September 21, 2016 Content Version: 11.4 © 9536-9178 [employer]. Care instructions adapted under license by [employer] (which disclaims liability or warranty for this information). If you have questions about a medical condition or this instruction, always ask your healthcare professional. [place] [address] any warranty or liability for your use of this information. Low Blood Pressure: Care Instructions Your Care Instructions Blood pressure is a measurement of the force of the blood against the walls of the blood vessels during and after each beat of the heart. Low blood pressure is also called hypotension. It means that your blood pressure is much lower than normal. Some people, especially young, slim women, may have slightly low blood pressure without symptoms. But in many people, low blood pressure can cause symptoms such as feeling dizzy or lightheaded. When your blood pressure is too low, your heart, brain, and other organs do not get enough blood. Low blood pressure can be caused by many things, including heart problems and some medicines. Diabetes that is not under control can cause your blood pressure to drop. And so can a severe allergic reaction or infection. Another cause is dehydration, which is when your body loses too much fluid. Treatment for low blood pressure depends on the cause. Follow-up care is a key part of your treatment and safety. Be sure to make and go to all appointments, and call your doctor if you are having problems. It's also a good idea to know your test results and keep a list of the medicines you take. How can you care for yourself at home?  
· Drink plenty of fluids, enough so that your urine is light yellow or clear like water. If you have kidney, heart, or liver disease and have to limit fluids, talk with your doctor before you increase the amount of fluids you drink. · Be safe with medicines. Call your doctor if you think you are having a problem with your medicine. You will get more details on the specific medicines your doctor prescribes. · Stand up or get out of bed very slowly to allow your body to adjust. 
· Get plenty of rest. 
· Do not smoke. Smoking increases your risk of heart attack. If you need help quitting, talk to your doctor about stop-smoking programs and medicines. These can increase your chances of quitting for good. · Limit alcohol to 2 drinks a day for men and 1 drink a day for women. Alcohol may interfere with your medicine. In addition, alcohol can make your low blood pressure worse by causing your body to lose water. When should you call for help? Call 911 anytime you think you may need emergency care. For example, call if: 
? · You passed out (lost consciousness). ?Call your doctor now or seek immediate medical care if: 
? · You are dizzy or lightheaded, or you feel like you may faint. ? Watch closely for changes in your health, and be sure to contact your doctor if you have any problems. Where can you learn more? Go to http://mickey-skye.info/. Enter C304 in the search box to learn more about \"Low Blood Pressure: Care Instructions. \" Current as of: September 21, 2016 Content Version: 11.4 © 4115-9104 [employer]enda. Care instructions adapted under license by [employer] (which disclaims liability or warranty for this information). If you have questions about a medical condition or this instruction, always ask your healthcare professional. [place] [address] any warranty or liability for your use of this information. Introducing hospitals & HEALTH SERVICES! Dear Melba Brock: Thank you for requesting a Parsimotion account. Our records indicate that you already have an active Parsimotion account. You can access your account anytime at [email or web address] Did you know that you can access your hospital and ER discharge instructions at any time in Parsimotion? You can also review all of your test results from your hospital stay or ER visit. Additional Information If you have questions, please visit the Frequently Asked Questions section of the Parsimotion website at [email or web address] Remember, Parsimotion is NOT to be used for urgent needs. For medical emergencies, dial 911. Now available from your iPhone and Android! Please provide this summary of care documentation to your next provider. Your primary care clinician is listed as Dung Fuller. If you have any questions after today's visit, please call [phone].

## 2017-12-01 NOTE — PROGRESS NOTES
S: Jaiden Ling is a 23 y.o. female who presents for hypertension    Assessment/Plan:  1. Elevated blood pressure reading  -s/s of HTN including HA, SOB, palpitations  -discussed s/s of anxiety  -reviewed s/s of hypotension and hypertension  -advised healthy diet, daily exercise and weight loss   -trial lisinopril 10mg  -pt to take BP 2-3x week and RTC in 4 weeks with data to assess efficacy of med         HPI:  Jaiden Ling has 11 gained lbs since last visit  8/23/17. Had LEEP procedure yesterday - no issues, just spotting, some cramping no heavy bleeding  Pt worried bc HTN runs in family, has been having sx for a few weeks.   At GYN office, BP running in 140's  +HA - massive  No chest pain  + Palpitations  + Dizziness  +Tightness  + SOB: - sometimes   Fam hx of HTN - dad, aunt, uncle    PHQ over the last two weeks 12/1/2017   Little interest or pleasure in doing things Not at all   Feeling down, depressed or hopeless Not at all   Total Score PHQ 2 0     Social History:  Occupation: works making appts for [employer]  Nutrition: overall ok  Drinks: water, will make sure she gets 64oz  Physical: goes to gym and works out    Review of Systems:  - Constitutional Symptoms: no fevers, chills, + fatigue  - Eyes: no blurry vision or double vision  - Respiratory: no cough or wheezing  - Gastrointestinal: no dysphagia or abdominal pain  - Neurological: no numbness, tingling    I reviewed the following:  Past Medical History:   Diagnosis Date    Abnormal Pap smear of cervix 2017    seeing OBGYN    Acne 10/7/2009    Allergic rhinitis 9/2/2009    Anemia 9/2/2009    Headache     Herpes genitalia 2017    dx by [facility]    History of gestational diabetes     diet controlled    Other ill-defined conditions(799.89)     scoliosis    PCB (post coital bleeding) 11/25/2014    Thoracic compression fracture ([address] 3/2014    Dewain Moulds    Thyromegaly 11/25/2014    On exam     Unspecified vitamin D deficiency 6/26/2014    Varicella 5/1/1998       Current Outpatient Prescriptions   Medication Sig Dispense Refill    acyclovir (ZOVIRAX) 200 mg capsule       diclofenac (VOLTAREN) 1 % gel Apply 4 g to affected area four (4) times daily. For chest pain 100 g 0    medroxyPROGESTERone (DEPO-PROVERA) 150 mg/mL syrg 1 mL by IntraMUSCular route every three (3) months. 1 Syringe 3       No Known Allergies     O: VS:   Visit Vitals    /84 (BP 1 Location: Right arm, BP Patient Position: Sitting)    Pulse 68    Temp 99 °F (37.2 °C) (Oral)    Resp 19    Ht 5' 5.5\" (1.664 m)    Wt 169 lb 14.4 oz (77.1 kg)    SpO2 97%    BMI 27.84 kg/m2     Data Reviewed:   Labs:  (11/2017)  Hgb: 12.6    GENERAL: Raina Krishna is in no acute distress. Non-toxic. Well nourished. Well developed. Appropriately groomed. NECK: supple. Midline trachea. No carotid bruits noted bilaterally. No thyromegaly noted. RESP: Breath sounds are symmetrical bilaterally. Unlabored without SOB. Speaking in full sentences. Clear to auscultation bilaterally anteriorly and posteriorly. No wheezes. No rales or rhonchi. CV: Normal rate. Regular rhythm. S1, S2 audible. No murmur noted. No rubs, clicks or gallops noted. HEME/LYMPH: Peripheral pulses palpable 2+ x 4 extremities. No peripheral edema is noted. SKIN:  Skin is warm and dry. Turgor is normal. No petechiae, no purpura, no rash. No cyanosis. No mottling, jaundice or pallor. _______________________________________________________________________  Patient education was done. Advised on nutrition, physical activity, weight management, tobacco, alcohol and safety. Counseling included discussion of diagnosis, differentials, treatment options, prescribed treatment, warning signs and follow up. Medication risks/benefits,interactions and alternatives discussed with patient.      Patient verbalized understanding and agreed to plan of care.   Patient was given an after visit summary which included current diagnoses, medications and vital signs. Follow up in 4 weeks as directed.

## 2017-12-01 NOTE — Clinical Note
Started pt on lisinopril 10mg due to s/s of HTN. Advised to take BP 2-3x week and RTC in 4 weeks w/BP data.

## 2017-12-13 ENCOUNTER — CLINICAL SUPPORT (OUTPATIENT)
Dept: FAMILY MEDICINE CLINIC | Age: 22
End: 2017-12-13

## 2017-12-13 DIAGNOSIS — Z23 ENCOUNTER FOR IMMUNIZATION: Primary | ICD-10-CM

## 2017-12-13 DIAGNOSIS — Z78.9 USES BIRTH CONTROL: ICD-10-CM

## 2017-12-13 NOTE — LETTER
NOTIFICATION RETURN TO WORK / SCHOOL 
 
12/13/2017 9:34 AM 
 
Ms. Zeferino Schultz [facility] [phone] To Whom It May Concern: 
 
Zeferino Schultz is currently under the care of Erasmo Pope. She will return to work/school on: 12/14/2017 If there are questions or concerns please have the patient contact our office. Sincerely, 
 
 
Mehran Roman MD

## 2017-12-13 NOTE — PROGRESS NOTES
Radha Gates is a 23 y.o. female who presents for routine immunizations. She denies any symptoms , reactions or allergies that would exclude them from being immunized today. Risks and adverse reactions were discussed and the VIS was given to them. All questions were addressed. She was observed for 10 min post injection. There were no reactions observed.     Scot Heredia LPN

## 2017-12-14 LAB
HCG URINE, QL. (POC): NEGATIVE
VALID INTERNAL CONTROL?: YES

## 2018-02-26 ENCOUNTER — HOSPITAL ENCOUNTER (EMERGENCY)
Age: 23
Discharge: HOME | End: 2018-02-26
Attending: FAMILY MEDICINE

## 2018-02-26 VITALS
OXYGEN SATURATION: 100 % | TEMPERATURE: 98.2 F | DIASTOLIC BLOOD PRESSURE: 111 MMHG | SYSTOLIC BLOOD PRESSURE: 165 MMHG | RESPIRATION RATE: 20 BRPM | BODY MASS INDEX: 27.32 KG/M2 | HEIGHT: 65 IN | WEIGHT: 164 LBS | HEART RATE: 86 BPM

## 2018-02-26 DIAGNOSIS — R03.0 ELEVATED BLOOD PRESSURE READING: ICD-10-CM

## 2018-02-26 DIAGNOSIS — J01.90 ACUTE BACTERIAL SINUSITIS: Primary | ICD-10-CM

## 2018-02-26 DIAGNOSIS — J20.9 ACUTE BRONCHITIS, UNSPECIFIED ORGANISM: ICD-10-CM

## 2018-02-26 DIAGNOSIS — B96.89 ACUTE BACTERIAL SINUSITIS: Primary | ICD-10-CM

## 2018-02-26 PROBLEM — J20.8 ACUTE BRONCHITIS DUE TO OTHER SPECIFIED ORGANISMS: Status: ACTIVE | Noted: 2018-02-26

## 2018-02-26 RX ORDER — FLUTICASONE PROPIONATE 50 MCG
2 SPRAY, SUSPENSION (ML) NASAL DAILY
Qty: 1 BOTTLE | Refills: 0 | Status: SHIPPED | OUTPATIENT
Start: 2018-02-26 | End: 2018-04-16 | Stop reason: SDUPTHER

## 2018-02-26 RX ORDER — AMOXICILLIN AND CLAVULANATE POTASSIUM 875; 125 MG/1; MG/1
1 TABLET, FILM COATED ORAL 2 TIMES DAILY
Qty: 14 TAB | Refills: 0 | Status: SHIPPED | OUTPATIENT
Start: 2018-02-26 | End: 2018-03-05

## 2018-02-26 NOTE — DISCHARGE INSTRUCTIONS
Elevated Blood Pressure: Care Instructions  Your Care Instructions    Blood pressure is a measure of how hard the blood pushes against the walls of your arteries. It's normal for blood pressure to go up and down throughout the day. But if it stays up over time, you have high blood pressure. Two numbers tell you your blood pressure. The first number is the systolic pressure. It shows how hard the blood pushes when your heart is pumping. The second number is the diastolic pressure. It shows how hard the blood pushes between heartbeats, when your heart is relaxed and filling with blood. An ideal blood pressure in adults is less than 120/80 (say \"120 over 80\"). High blood pressure is 140/90 or higher. You have high blood pressure if your top number is 140 or higher or your bottom number is 90 or higher, or both. The main test for high blood pressure is simple, fast, and painless. To diagnose high blood pressure, your doctor will test your blood pressure at different times. After testing your blood pressure, your doctor may ask you to test it again when you are home. If you are diagnosed with high blood pressure, you can work with your doctor to make a long-term plan to manage it. Follow-up care is a key part of your treatment and safety. Be sure to make and go to all appointments, and call your doctor if you are having problems. It's also a good idea to know your test results and keep a list of the medicines you take. How can you care for yourself at home? · Do not smoke. Smoking increases your risk for heart attack and stroke. If you need help quitting, talk to your doctor about stop-smoking programs and medicines. These can increase your chances of quitting for good. · Stay at a healthy weight. · Try to limit how much sodium you eat to less than 2,300 milligrams (mg) a day. Your doctor may ask you to try to eat less than 1,500 mg a day. · Be physically active.  Get at least 30 minutes of exercise on most days of the week. Walking is a good choice. You also may want to do other activities, such as running, swimming, cycling, or playing tennis or team sports. · Avoid or limit alcohol. Talk to your doctor about whether you can drink any alcohol. · Eat plenty of fruits, vegetables, and low-fat dairy products. Eat less saturated and total fats. · Learn how to check your blood pressure at home. When should you call for help? Call your doctor now or seek immediate medical care if:  ? · Your blood pressure is much higher than normal (such as 180/110 or higher). ? · You think high blood pressure is causing symptoms such as:  ¨ Severe headache. ¨ Blurry vision. ? Watch closely for changes in your health, and be sure to contact your doctor if:  ? · You do not get better as expected. Where can you learn more? Go to http://mickeyCEDAR RIDGE RESEARCHskye.info/. Enter G612 in the search box to learn more about \"Elevated Blood Pressure: Care Instructions. \"  Current as of: September 21, 2016  Content Version: 11.4  © 3809-0589 [employer]. Care instructions adapted under license by Blockboard (which disclaims liability or warranty for this information). If you have questions about a medical condition or this instruction, always ask your healthcare professional. [address] any warranty or liability for your use of this information. Sinusitis: Care Instructions  Your Care Instructions    Sinusitis is an infection of the lining of the sinus cavities in your head. Sinusitis often follows a cold. It causes pain and pressure in your head and face. In most cases, sinusitis gets better on its own in 1 to 2 weeks. But some mild symptoms may last for several weeks. Sometimes antibiotics are needed. Follow-up care is a key part of your treatment and safety. Be sure to make and go to all appointments, and call your doctor if you are having problems.  It's also a good idea to know your test results and keep a list of the medicines you take. How can you care for yourself at home? · Take an over-the-counter pain medicine, such as acetaminophen (Tylenol), ibuprofen (Advil, Motrin), or naproxen (Aleve). Read and follow all instructions on the label. · If the doctor prescribed antibiotics, take them as directed. Do not stop taking them just because you feel better. You need to take the full course of antibiotics. · Be careful when taking over-the-counter cold or flu medicines and Tylenol at the same time. Many of these medicines have acetaminophen, which is Tylenol. Read the labels to make sure that you are not taking more than the recommended dose. Too much acetaminophen (Tylenol) can be harmful. · Breathe warm, moist air from a steamy shower, a hot bath, or a sink filled with hot water. Avoid cold, dry air. Using a humidifier in your home may help. Follow the directions for cleaning the machine. · Use saline (saltwater) nasal washes to help keep your nasal passages open and wash out mucus and bacteria. You can buy saline nose drops at a grocery store or drugstore. Or you can make your own at home by adding 1 teaspoon of salt and 1 teaspoon of baking soda to 2 cups of distilled water. If you make your own, fill a bulb syringe with the solution, insert the tip into your nostril, and squeeze gently. Chaudhry Aguilar your nose. · Put a hot, wet towel or a warm gel pack on your face 3 or 4 times a day for 5 to 10 minutes each time. · Try a decongestant nasal spray like oxymetazoline (Afrin). Do not use it for more than 3 days in a row. Using it for more than 3 days can make your congestion worse. When should you call for help? Call your doctor now or seek immediate medical care if:  ? · You have new or worse swelling or redness in your face or around your eyes. ? · You have a new or higher fever. ? Watch closely for changes in your health, and be sure to contact your doctor if:  ? · You have new or worse facial pain. ? · The mucus from your nose becomes thicker (like pus) or has new blood in it. ? · You are not getting better as expected. Where can you learn more? Go to http://mickey-skye.info/. Enter T633 in the search box to learn more about \"Sinusitis: Care Instructions. \"  Current as of: May 12, 2017  Content Version: 11.4  © 4085-3873 eTapestry. Care instructions adapted under license by KIHEITAI (which disclaims liability or warranty for this information). If you have questions about a medical condition or this instruction, always ask your healthcare professional. [address] any warranty or liability for your use of this information.

## 2018-02-26 NOTE — LETTER
[facility] 
[address] [address] 
[phone] Work/School Note Date: 2/26/2018 To Whom It May concern: 
 
Luz Crowe was seen and treated today in the urgent care center by the following provider(s): 
Nurse Practitioner: Mary Hussein NP. Luz Crowe may return to work on Wednesday 2/28/18. Sincerely, Faisal Darby

## 2018-02-26 NOTE — UC PROVIDER NOTE
HPI Comments:   Felt like she had a cold 2 weeks ago that resolved however had continued cough, nasal congestion with progressively worsening sinus pain and pressure over the past 3-4 days that is constant. Has tried OTC mucinex without resolution. No associated fever, chills, SOB or severe headache, CP or dizziness. Drinking plenty of fluids. Hx of HTN; says hasnt taken BP med today. Patient is a 23 y.o. female presenting with cold symptoms. Cold Symptoms    Pertinent negatives include no chest pain, no nausea and no vomiting. Past Medical History:   Diagnosis Date    Abnormal Pap smear of cervix     seeing OBGYN    Acne 10/7/2009    Allergic rhinitis 2009    Anemia 2009    Headache     Herpes genitalia     dx by [place]    History of gestational diabetes     diet controlled    Other ill-defined conditions(799.89)     scoliosis    PCB (post coital bleeding) 2014    Thoracic compression fracture ([address] 3/2014    Yousif Davischodom    Thyromegaly 2014    On exam     Unspecified vitamin D deficiency 2014    Varicella 1998        Past Surgical History:   Procedure Laterality Date    HX GYN                Family History   Problem Relation Age of Onset    Breast Cancer Maternal Grandmother      great, great gma, age?  Hypertension Maternal Grandmother     Breast Cancer Maternal Aunt 32     survivor    Diabetes Father     Hypertension Paternal Grandmother     Diabetes Paternal Grandmother     Osteoporosis Neg Hx         Social History     Social History    Marital status: SINGLE     Spouse name: N/A    Number of children: N/A    Years of education: N/A     Occupational History    Not on file.      Social History Main Topics    Smoking status: Never Smoker    Smokeless tobacco: Never Used    Alcohol use Yes      Comment: once every 3 months, 3 drinks at a time    Drug use: No    Sexual activity: Yes     Partners: Male     Birth control/ protection: Injection     Other Topics Concern    Not on file     Social History Narrative    Lives in [facility] with parents and 3 yo son. Works at [employer]. ALLERGIES: Review of patient's allergies indicates no known allergies. Review of Systems   Eyes: Negative for visual disturbance. Cardiovascular: Negative for chest pain and palpitations. Gastrointestinal: Negative for nausea and vomiting. Neurological: Negative for dizziness and weakness. All other systems reviewed and are negative. Vitals:    02/26/18 0914 02/26/18 0954   BP: (!) 162/105 (!) 165/111   Pulse: 86    Resp: 20    Temp: 98.2 °F (36.8 °C)    SpO2: 100%    Weight: 74.4 kg (164 lb)    Height: 5' 5\" (1.651 m)        Physical Exam   Constitutional: She is oriented to person, place, and time. No distress. Non-toxic appearing, well hydrated   HENT:   Right nasal turbinates swollen with thick yellowish mucus in passage. Left passage clear. TMs normal. OP with post nasal drip. Right tenderness to sinus palpable. Eyes: Conjunctivae and EOM are normal. Pupils are equal, round, and reactive to light. No scleral icterus. Neck: Normal range of motion. Neck supple. Cardiovascular: Normal rate, regular rhythm and normal heart sounds. Exam reveals no gallop and no friction rub. No murmur heard. Pulmonary/Chest: Effort normal and breath sounds normal. No respiratory distress. She has no wheezes. She has no rales. Good air movement throughout    Lymphadenopathy:     She has no cervical adenopathy. Neurological: She is alert and oriented to person, place, and time. No cranial nerve deficit. Skin: Skin is warm and dry. No rash noted. She is not diaphoretic. No erythema. No pallor. Psychiatric: She has a normal mood and affect. Her behavior is normal. Thought content normal.   Nursing note and vitals reviewed.       MDM     Differential Diagnosis; Clinical Impression; Plan:       CLINICAL IMPRESSION:  (J01.90, B96.89) Acute bacterial sinusitis  (primary encounter diagnosis)  (R03.0) Elevated blood pressure reading  (J20.9) Acute bronchitis, unspecified organism    Orders Placed This Encounter      amoxicillin-clavulanate (AUGMENTIN) 875-125 mg per tablet      fluticasone (FLONASE) 50 mcg/actuation nasal spray      Plan:    1. See above orders  2. Review provided handouts  3. Maintain adequate fluid intake and try humidified air at night  4. May use OTC fever reducers PRN as directed, for general aches, pain or fever; check active ingredients to ensure you are not duplicating. We have reviewed concerning signs/symptoms, normal vs abnormal progression of medical condition and when and where to seek immediate medical attention   ED or Urgent Care immediately for worsening or new symptoms. See your PCP for blood pressure follow up within the next 7-14 days.     Risk of Significant Complications, Morbidity, and/or Mortality:   Presenting problems:  Low  Diagnostic procedures:  Low  Management options:  Low  Progress:   Patient progress:  Stable      Procedures

## 2018-02-26 NOTE — LETTER
NOTIFICATION RETURN TO WORK / SCHOOL 
 
2/26/2018 9:50 AM 
 
Ms. Anival Morgan [facility] [address] To Whom It May Concern: 
 
Anival Morgan is currently under the care of [address] [place] [address]. She will return to work/school on: 02/27/2018 If there are questions or concerns please have the patient contact our office. Sincerely, Roni Elizabeth NP

## 2018-03-07 NOTE — TELEPHONE ENCOUNTER
From: Shannan Hamlin  To: Sherrin Prader Dorene Scotland, MD  Sent: 3/7/2018 8:15 AM EST  Subject: Medication Renewal Request    Original authorizing provider: Cielo Almanza. MD Lubna Gonzalez would like a refill of the following medications:  medroxyPROGESTERone (DEPO-PROVERA) 150 mg/mL syrg Adela Sanchez. Bernarda Levin MD]    Preferred pharmacy: [facility][id number] - [place], [place] - [address]    Comment:  Hello I have a Appointment to get my Depo Shot 3/9/18 and I spoke with a Suha Ruiz in the office Last Thursday 3/1/18 and she stated it will be sent over to the pharmacy but it has not yet.  [facility] [address] [place] [address], [address] [place] [address] Way is where I would like it to be sent Please give a call [phone] when things have been sent over , Thanks

## 2018-03-07 NOTE — TELEPHONE ENCOUNTER
PCP: Ulisses Castañeda. Richard Alonso MD    Last appt: 2017  Future Appointments  Date Time Provider Ana Thorpe   3/9/2018 8:40 AM [address] Richard Alonso MD [facility]       Requested Prescriptions     Pending Prescriptions Disp Refills    medroxyPROGESTERone (DEPO-PROVERA) 150 mg/mL syrg 1 Syringe 3     Si mL by IntraMUSCular route every three (3) months.      Lab Results   Component Value Date/Time    Sodium 141 2016 11:10 AM    Potassium 4.2 2016 11:10 AM    Chloride 103 2016 11:10 AM    CO2 23 2016 11:10 AM    Anion gap 7 2016 09:28 AM    Glucose 85 2016 11:10 AM    BUN 11 2016 11:10 AM    Creatinine 0.63 2016 11:10 AM    BUN/Creatinine ratio 17 2016 11:10 AM    GFR est  2016 11:10 AM    GFR est non- 2016 11:10 AM    Calcium 9.9 2016 11:10 AM     Lab Results   Component Value Date/Time    Hemoglobin A1c 5.2 2016 11:10 AM     Lab Results   Component Value Date/Time    Cholesterol, total 144 2016 11:10 AM    HDL Cholesterol 56 2016 11:10 AM    LDL, calculated 78 2016 11:10 AM    VLDL, calculated 10 2016 11:10 AM    Triglyceride 50 2016 11:10 AM     Lab Results   Component Value Date/Time    TSH 0.923 2016 11:10 AM

## 2018-03-09 ENCOUNTER — CLINICAL SUPPORT (OUTPATIENT)
Dept: FAMILY MEDICINE CLINIC | Age: 23
End: 2018-03-09

## 2018-03-09 VITALS
OXYGEN SATURATION: 99 % | DIASTOLIC BLOOD PRESSURE: 94 MMHG | TEMPERATURE: 98.9 F | BODY MASS INDEX: 27.32 KG/M2 | WEIGHT: 164 LBS | HEART RATE: 100 BPM | HEIGHT: 65 IN | RESPIRATION RATE: 18 BRPM | SYSTOLIC BLOOD PRESSURE: 131 MMHG

## 2018-03-09 DIAGNOSIS — Z78.9 USES BIRTH CONTROL: Primary | ICD-10-CM

## 2018-03-09 RX ORDER — MEDROXYPROGESTERONE ACETATE 150 MG/ML
150 INJECTION, SUSPENSION INTRAMUSCULAR
Qty: 1 SYRINGE | Refills: 3
Start: 2018-03-09

## 2018-03-09 NOTE — PROGRESS NOTES
Chief Complaint   Patient presents with    Depo     Jarred Severinoking  Identified pt with two pt identifiers(name and ). Chief Complaint   Patient presents with    Depo       1. Have you been to the ER, urgent care clinic since your last visit? Hospitalized since your last visit? NO    2. Have you seen or consulted any other health care providers outside of the [address] [place] [address] since your last visit? Include any pap smears or colon screening. NO    Today's provider has been notified of reason for visit, vitals and flowsheets obtained on patients. Patient received paperwork for advance directive during previous visit but has not completed at this time     Reviewed record In preparation for visit, huddled with provider and have obtained necessary documentation      Health Maintenance Due   Topic    Influenza Age 5 to Adult Discussed with patient today and advised to follow up.  PAP AKA CERVICAL CYTOLOGY Discussed with patient today and advised to follow up. Wt Readings from Last 3 Encounters:   18 164 lb (74.4 kg)   17 169 lb 14.4 oz (77.1 kg)   17 158 lb 8 oz (71.9 kg)     Temp Readings from Last 3 Encounters:   18 98.2 °F (36.8 °C)   17 99 °F (37.2 °C) (Oral)   17 98.6 °F (37 °C)     BP Readings from Last 3 Encounters:   18 (!) 165/111   17 124/84   17 (!) 142/103     Pulse Readings from Last 3 Encounters:   18 86   17 68   17 (!) 102     There were no vitals filed for this visit.       Learning Assessment:  :     Learning Assessment 2016   PRIMARY LEARNER Patient Patient   HIGHEST LEVEL OF EDUCATION - PRIMARY LEARNER  - GRADUATED HIGH SCHOOL OR GED   BARRIERS PRIMARY LEARNER - NONE   CO-LEARNER CAREGIVER - No   PRIMARY LANGUAGE ENGLISH ENGLISH    NEED - No   LEARNER PREFERENCE PRIMARY DEMONSTRATION DEMONSTRATION     LISTENING LISTENING   LEARNING SPECIAL TOPICS - mo   ANSWERED BY patient self   RELATIONSHIP SELF SELF   ASSESSMENT COMMENT - none       Depression Screening:  :     PHQ over the last two weeks 12/1/2017   Little interest or pleasure in doing things Not at all   Feeling down, depressed or hopeless Not at all   Total Score PHQ 2 0       Fall Risk Assessment:  :     No flowsheet data found. Abuse Screening:  :     No flowsheet data found. ADL Screening:  :     No flowsheet data found. ACP is not on file, advised to return. Medication reconciliation up to date and corrected with patient at this time. Subjective:          Objective: There were no vitals taken for this visit. Assessment/Plan:         Subjective:      Raina Rebollar is here for her depoprovera injection. Patient wishes to continue depoprovera treatment for contraception. Side effects of treatment to date: N/A   Standing order is on patient's medication list.    Last Depoprovera injection date: 12/13/2017  Last Pap smear date: 09/2017    Objective: There were no vitals taken for this visit. Assessment/Plan:     Stable, doing well on Depoprovera, appropriate to continue. Depoprovera 150 mg IM given. She tolerated the injection well, see Immunization activity for details. [employer], LPN     .

## 2018-04-16 ENCOUNTER — OFFICE VISIT (OUTPATIENT)
Dept: URGENT CARE | Age: 23
End: 2018-04-16

## 2018-04-16 VITALS
WEIGHT: 164 LBS | SYSTOLIC BLOOD PRESSURE: 162 MMHG | OXYGEN SATURATION: 97 % | HEIGHT: 65 IN | BODY MASS INDEX: 27.32 KG/M2 | HEART RATE: 81 BPM | DIASTOLIC BLOOD PRESSURE: 115 MMHG | RESPIRATION RATE: 18 BRPM | TEMPERATURE: 97.2 F

## 2018-04-16 DIAGNOSIS — N76.0 BV (BACTERIAL VAGINOSIS): Primary | ICD-10-CM

## 2018-04-16 DIAGNOSIS — B96.89 ACUTE BACTERIAL SINUSITIS: ICD-10-CM

## 2018-04-16 DIAGNOSIS — B96.89 BV (BACTERIAL VAGINOSIS): Primary | ICD-10-CM

## 2018-04-16 DIAGNOSIS — J01.90 ACUTE BACTERIAL SINUSITIS: ICD-10-CM

## 2018-04-16 RX ORDER — CLINDAMYCIN PHOSPHATE 20 MG/G
1 CREAM VAGINAL
Qty: 40 G | Refills: 0 | Status: SHIPPED | OUTPATIENT
Start: 2018-04-16 | End: 2018-04-23

## 2018-04-16 RX ORDER — CLINDAMYCIN PHOSPHATE 20 MG/G
1 CREAM VAGINAL
Qty: 40 G | Refills: 0 | Status: SHIPPED | OUTPATIENT
Start: 2018-04-16 | End: 2018-04-16 | Stop reason: SDUPTHER

## 2018-04-16 NOTE — MR AVS SNAPSHOT
[facility] [address] [place], [place] [id number] [phone] Patient: Joelle Temple MRN: [id number] GPO:6/03/5396 Visit Information Date & Time Provider Department Dept. Phone Encounter #  
 4/16/2018  5:00 PM [facility] [phone] [id number] Follow-up Instructions Return if symptoms worsen or fail to improve, for Follow up with Gynecologist. Upcoming Health Maintenance Date Due Influenza Age 5 to Adult 8/1/2017 PAP AKA CERVICAL CYTOLOGY 11/25/2017 DTaP/Tdap/Td series (6 - Td) 2/15/2021 Allergies as of 4/16/2018  Review Complete On: 4/16/2018 By: Eulogio Caba RN No Known Allergies Current Immunizations  Reviewed on 11/12/2015 Name Date DTAP Vaccine 9/1/2005, 2/18/1998, 1/21/1998, 1993 HIB Vaccine 1993 Hepatitis A Vaccine 2/15/2011, 8/28/2007 Hepatitis B Vaccine 8/28/2007, 8/23/2006, 9/1/2005 Human Papillomavirus 4/7/2008, 10/31/2007, 8/28/2007 IPV 2/18/1998, 1/21/1998, 1993 Influenza Vaccine 10/24/2014 Influenza Vaccine (Quad) PF 10/6/2016 MMR Vaccine 2/18/1998, 1/21/1998 Meningococcal Vaccine 2/15/2011 TDAP Vaccine 2/15/2011 Not reviewed this visit You Were Diagnosed With   
  
 Codes Comments BV (bacterial vaginosis)    -  Primary ICD-10-CM: N76.0, B96.89 
ICD-9-CM: 616.10, 041.9 Vitals BP Pulse Temp Resp Height(growth percentile) Weight(growth percentile) (!) 162/115 81 97.2 °F (36.2 °C) 18 5' 5\" (1.651 m) 164 lb (74.4 kg) SpO2 BMI OB Status Smoking Status 97% 27.29 kg/m2 Injection Never Smoker BMI and BSA Data Body Mass Index Body Surface Area  
 27.29 kg/m 2 1.85 m 2 Preferred Pharmacy Pharmacy Name Phone [id number] [address] [phone] Your Updated Medication List  
  
   
 This list is accurate as of 4/16/18  5:16 PM.  Always use your most recent med list.  
  
  
  
  
 acyclovir 200 mg capsule Commonly known as:  ZOVIRAX  
  
 clindamycin 2 % vaginal cream  
Commonly known as:  CLEOCIN Insert 1 Applicator into vagina nightly for 7 days. fluticasone 50 mcg/actuation nasal spray Commonly known as:  Beverlyn Maker 2 Sprays by Both Nostrils route daily. lisinopril 10 mg tablet Commonly known as:  Velora Phil Take 1 Tab by mouth daily. medroxyPROGESTERone 150 mg/mL Syrg Commonly known as:  DEPO-PROVERA  
1 mL by IntraMUSCular route every three (3) months. Prescriptions Sent to Pharmacy Refills  
 clindamycin (CLEOCIN) 2 % vaginal cream 0 Sig: Insert 1 Applicator into vagina nightly for 7 days. Class: Normal  
 Pharmacy: [facility], [address] [place] [address] #: [phone] Route: Vaginal  
  
Follow-up Instructions Return if symptoms worsen or fail to improve, for Follow up with Gynecologist.  
  
  
Patient Instructions Bacterial Vaginosis: Care Instructions Your Care Instructions Bacterial vaginosis is a type of vaginal infection. It is caused by excess growth of certain bacteria that are normally found in the vagina. Symptoms can include itching, swelling, pain when you urinate or have sex, and a gray or yellow discharge with a \"fishy\" odor. It is not considered an infection that is spread through sexual contact. Although symptoms can be annoying and uncomfortable, bacterial vaginosis does not usually cause other health problems. However, if you have it while you are pregnant, it can cause complications. While the infection may go away on its own, most doctors use antibiotics to treat it. You may have been prescribed pills or vaginal cream. With treatment, bacterial vaginosis usually clears up in 5 to 7 days. Follow-up care is a key part of your treatment and safety. Be sure to make and go to all appointments, and call your doctor if you are having problems. It's also a good idea to know your test results and keep a list of the medicines you take. How can you care for yourself at home? · Take your antibiotics as directed. Do not stop taking them just because you feel better. You need to take the full course of antibiotics. · Do not eat or drink anything that contains alcohol if you are taking metronidazole (Flagyl). · Keep using your medicine if you start your period. Use pads instead of tampons while using a vaginal cream or suppository. Tampons can absorb the medicine. · Wear loose cotton clothing. Do not wear nylon and other materials that hold body heat and moisture close to the skin. · Do not scratch. Relieve itching with a cold pack or a cool bath. · Do not wash your vaginal area more than once a day. Use plain water or a mild, unscented soap. Do not douche. When should you call for help? Watch closely for changes in your health, and be sure to contact your doctor if: 
? · You have unexpected vaginal bleeding. ? · You have a fever. ? · You have new or increased pain in your vagina or pelvis. ? · You are not getting better after 1 week. ? · Your symptoms return after you finish the course of your medicine. Where can you learn more? Go to http://mickey-skye.info/. Becca Pitts in the search box to learn more about \"Bacterial Vaginosis: Care Instructions. \" Current as of: October 13, 2016 Content Version: 11.4 © 2657-1133 Nerdies. Care instructions adapted under license by whereIstand.[email or web address] (which disclaims liability or warranty for this information). If you have questions about a medical condition or this instruction, always ask your healthcare professional. [address] any warranty or liability for your use of this information. Introducing [facility]! Dear Janine Fu: 
Thank you for requesting a [facility] account. Our records indicate that you already have an active [facility] account. You can access your account anytime at [email or web address] Did you know that you can access your hospital and ER discharge instructions at any time in [facility]? You can also review all of your test results from your hospital stay or ER visit. Additional Information If you have questions, please visit the Frequently Asked Questions section of the [facility] website at [email or web address] Remember, [facility] is NOT to be used for urgent needs. For medical emergencies, dial 911. Now available from your iPhone and Android! Please provide this summary of care documentation to your next provider. Your primary care clinician is listed as Amina Talamantes. If you have any questions after today's visit, please call [phone].

## 2018-04-16 NOTE — PATIENT INSTRUCTIONS
Bacterial Vaginosis: Care Instructions  Your Care Instructions    Bacterial vaginosis is a type of vaginal infection. It is caused by excess growth of certain bacteria that are normally found in the vagina. Symptoms can include itching, swelling, pain when you urinate or have sex, and a gray or yellow discharge with a \"fishy\" odor. It is not considered an infection that is spread through sexual contact. Although symptoms can be annoying and uncomfortable, bacterial vaginosis does not usually cause other health problems. However, if you have it while you are pregnant, it can cause complications. While the infection may go away on its own, most doctors use antibiotics to treat it. You may have been prescribed pills or vaginal cream. With treatment, bacterial vaginosis usually clears up in 5 to 7 days. Follow-up care is a key part of your treatment and safety. Be sure to make and go to all appointments, and call your doctor if you are having problems. It's also a good idea to know your test results and keep a list of the medicines you take. How can you care for yourself at home? · Take your antibiotics as directed. Do not stop taking them just because you feel better. You need to take the full course of antibiotics. · Do not eat or drink anything that contains alcohol if you are taking metronidazole (Flagyl). · Keep using your medicine if you start your period. Use pads instead of tampons while using a vaginal cream or suppository. Tampons can absorb the medicine. · Wear loose cotton clothing. Do not wear nylon and other materials that hold body heat and moisture close to the skin. · Do not scratch. Relieve itching with a cold pack or a cool bath. · Do not wash your vaginal area more than once a day. Use plain water or a mild, unscented soap. Do not douche. When should you call for help?   Watch closely for changes in your health, and be sure to contact your doctor if:  ? · You have unexpected vaginal bleeding. ? · You have a fever. ? · You have new or increased pain in your vagina or pelvis. ? · You are not getting better after 1 week. ? · Your symptoms return after you finish the course of your medicine. Where can you learn more? Go to http://mickey-skye.info/. Fredis Biggs in the search box to learn more about \"Bacterial Vaginosis: Care Instructions. \"  Current as of: October 13, 2016  Content Version: 11.4  © 5316-0178 [employer]. Care instructions adapted under license by [employer] (which disclaims liability or warranty for this information). If you have questions about a medical condition or this instruction, always ask your healthcare professional. [address] any warranty or liability for your use of this information.

## 2018-04-16 NOTE — PROGRESS NOTES
Patient is a 23 y.o. female presenting with vaginal discharge. Vaginal Discharge    The history is provided by the patient. This is a recurrent problem. The current episode started 2 days ago. The problem has not changed since onset. The discharge was thin and malodorous (fishy). Associated symptoms include perineal odor. Pertinent negatives include no fever, no genital burning, no genital itching, no genital lesions, no perineal pain and no painful intercourse. Her past medical history does not include PID or STD. Past Medical History:   Diagnosis Date    Abnormal Pap smear of cervix     seeing OBGYN    Acne 10/7/2009    Allergic rhinitis 2009    Anemia 2009    Headache     Herpes genitalia     [facility] by [facility]    History of gestational diabetes     diet controlled    Other ill-defined conditions(799.89)     scoliosis    PCB (post coital bleeding) 2014    Thoracic compression fracture ([address] 3/2014    Yousif John    Thyromegaly 2014    On exam     Unspecified vitamin D deficiency 2014    Varicella 1998        Past Surgical History:   Procedure Laterality Date    HX GYN                Family History   Problem Relation Age of Onset    Breast Cancer Maternal Grandmother      great, great gma, age?  Hypertension Maternal Grandmother     Breast Cancer Maternal Aunt 32     survivor    Diabetes Father     Hypertension Paternal Grandmother     Diabetes Paternal Grandmother     Osteoporosis Neg Hx         Social History     Social History    Marital status: SINGLE     Spouse name: N/A    Number of children: N/A    Years of education: N/A     Occupational History    Not on file.      Social History Main Topics    Smoking status: Never Smoker    Smokeless tobacco: Never Used    Alcohol use Yes      Comment: once every 3 months, 3 drinks at a time    Drug use: No    Sexual activity: Yes     Partners: Male     Birth control/ protection: Injection     Other Topics Concern    Not on file     Social History Narrative    Lives in [place] [facility] with parents and 3 yo son. Works at [employer]. ALLERGIES: Review of patient's allergies indicates no known allergies. Review of Systems   Constitutional: Negative for fever. Genitourinary: Positive for vaginal discharge. All other systems reviewed and are negative. Vitals:    04/16/18 1651   BP: (!) 162/115   Pulse: 81   Resp: 18   Temp: 97.2 °F (36.2 °C)   SpO2: 97%   Weight: 164 lb (74.4 kg)   Height: 5' 5\" (1.651 m)       Physical Exam   Abdominal: Soft. She exhibits no distension. There is no tenderness. Genitourinary:   Genitourinary Comments: deferred       MDM    Procedures        ICD-10-CM ICD-9-CM    1. BV (bacterial vaginosis) N76.0 616.10     B96.89 041. 9      Medications Ordered Today   Medications    DISCONTD: clindamycin (CLEOCIN) 2 % vaginal cream     Sig: Insert 1 Applicator into vagina nightly. Dispense:  40 g     Refill:  0    clindamycin (CLEOCIN) 2 % vaginal cream     Sig: Insert 1 Applicator into vagina nightly for 7 days. Dispense:  40 g     Refill:  0     No results found for any visits on 04/16/18. The patients condition was discussed with the patient and they understand. The patient is to follow up with primary care doctor. If signs and symptoms become worse the pt is to go to the ER. The patient is to take medications as prescribed.

## 2018-04-18 RX ORDER — FLUTICASONE PROPIONATE 50 MCG
SPRAY, SUSPENSION (ML) NASAL
Qty: 1 BOTTLE | Refills: 1 | Status: SHIPPED | OUTPATIENT
Start: 2018-04-18 | End: 2018-11-28

## 2018-06-01 ENCOUNTER — CLINICAL SUPPORT (OUTPATIENT)
Dept: FAMILY MEDICINE CLINIC | Age: 23
End: 2018-06-01

## 2018-06-01 DIAGNOSIS — Z30.42 ENCOUNTER FOR DEPO-PROVERA CONTRACEPTION: Primary | ICD-10-CM

## 2018-06-01 LAB
HCG URINE, QL. (POC): NEGATIVE
VALID INTERNAL CONTROL?: YES

## 2018-06-01 RX ORDER — MEDROXYPROGESTERONE ACETATE 150 MG/ML
150 INJECTION, SUSPENSION INTRAMUSCULAR ONCE
Qty: 1 ML | Refills: 0 | Status: SHIPPED | COMMUNITY
Start: 2018-06-01 | End: 2018-06-01

## 2018-06-01 NOTE — PROGRESS NOTES
3/9/18 Last Depo. Has had weight gain and no MP. Pregnancy test was Negative. Subjective:      Esmond Cockayne is here for her depoprovera injection. Patient wishes to continue depoprovera treatment for contraception. Side effects of treatment to date: None  Standing order is on patient's medication list.    Last Depoprovera injection date: 03/09/18  Last Pap smear date: 11/25/14    Objective: There were no vitals taken for this visit. Assessment/Plan:     Stable, doing well on Depoprovera, appropriate to continue. Depoprovera 150 mg IM given. She tolerated the injection well, see Immunization activity for details.     Lucas Chaves RN Cognitively Impaired

## 2018-07-09 ENCOUNTER — OFFICE VISIT (OUTPATIENT)
Dept: FAMILY MEDICINE CLINIC | Age: 23
End: 2018-07-09

## 2018-07-09 VITALS
RESPIRATION RATE: 16 BRPM | SYSTOLIC BLOOD PRESSURE: 132 MMHG | DIASTOLIC BLOOD PRESSURE: 80 MMHG | OXYGEN SATURATION: 100 % | HEART RATE: 91 BPM | HEIGHT: 65 IN | WEIGHT: 162.5 LBS | TEMPERATURE: 97.6 F | BODY MASS INDEX: 27.07 KG/M2

## 2018-07-09 DIAGNOSIS — M25.50 ARTHRALGIA, UNSPECIFIED JOINT: ICD-10-CM

## 2018-07-09 DIAGNOSIS — E55.9 VITAMIN D DEFICIENCY: ICD-10-CM

## 2018-07-09 DIAGNOSIS — I10 ESSENTIAL HYPERTENSION: ICD-10-CM

## 2018-07-09 DIAGNOSIS — R76.8 SS-A ANTIBODY POSITIVE: ICD-10-CM

## 2018-07-09 DIAGNOSIS — R07.9 CHEST PAIN, UNSPECIFIED TYPE: Primary | ICD-10-CM

## 2018-07-09 DIAGNOSIS — R03.0 ELEVATED BLOOD PRESSURE READING: ICD-10-CM

## 2018-07-09 DIAGNOSIS — R76.8 ANA POSITIVE: ICD-10-CM

## 2018-07-09 DIAGNOSIS — R76.8 RHEUMATOID FACTOR POSITIVE: ICD-10-CM

## 2018-07-09 RX ORDER — NAPROXEN 500 MG/1
500 TABLET ORAL
Qty: 60 TAB | Refills: 2 | Status: SHIPPED | OUTPATIENT
Start: 2018-07-09 | End: 2019-02-28

## 2018-07-09 RX ORDER — LISINOPRIL 20 MG/1
20 TABLET ORAL DAILY
Qty: 30 TAB | Refills: 2 | Status: SHIPPED | OUTPATIENT
Start: 2018-07-09 | End: 2018-09-24 | Stop reason: ALTCHOICE

## 2018-07-09 NOTE — PROGRESS NOTES
Radha Gates  Identified pt with two pt identifiers(name and ). Chief Complaint   Patient presents with    Chest Pain     Started 3-4 weeks ago has had SOB, Rib pain, Knee and hand pain. Pain is off and on. Hand pain is more often than the other pains. 1. Have you been to the ER, urgent care clinic since your last visit? Hospitalized since your last visit? No    2. Have you seen or consulted any other health care providers outside of the [facility] since your last visit? Include any pap smears or colon screening. No    Today's provider has been notified of reason for visit, vitals and flowsheets obtained on patients.        Reviewed record In preparation for visit, huddled with provider and have obtained necessary documentation      Health Maintenance Due   Topic    PAP AKA CERVICAL CYTOLOGY        Wt Readings from Last 3 Encounters:   18 162 lb 8 oz (73.7 kg)   18 164 lb (74.4 kg)   18 164 lb (74.4 kg)     Temp Readings from Last 3 Encounters:   18 97.2 °F (36.2 °C)   18 98.9 °F (37.2 °C) (Oral)   18 98.2 °F (36.8 °C)     BP Readings from Last 3 Encounters:   18 (!) 162/115   18 (!) 131/94   18 (!) 165/111     Pulse Readings from Last 3 Encounters:   18 81   18 100   18 86     Vitals:    18 1206   Weight: 162 lb 8 oz (73.7 kg)   Height: 5' 5\" (1.651 m)   PainSc:   6   PainLoc: Knee         Learning Assessment:  :     Learning Assessment 2016   PRIMARY LEARNER Patient Patient   HIGHEST LEVEL OF EDUCATION - PRIMARY LEARNER  - GRADUATED HIGH SCHOOL OR GED   BARRIERS PRIMARY LEARNER - NONE   CO-LEARNER CAREGIVER - No   PRIMARY LANGUAGE ENGLISH ENGLISH    NEED - No   LEARNER PREFERENCE PRIMARY DEMONSTRATION DEMONSTRATION     LISTENING LISTENING   LEARNING SPECIAL TOPICS - mo   ANSWERED BY patient self   RELATIONSHIP SELF SELF   ASSESSMENT COMMENT - none       Depression Screening:  : PHQ over the last two weeks 3/9/2018   Little interest or pleasure in doing things Not at all   Feeling down, depressed or hopeless Not at all   Total Score PHQ 2 0       Fall Risk Assessment:  :     No flowsheet data found. Abuse Screening:  :     No flowsheet data found. ADL Screening:  :     ADL Assessment 7/9/2018   Feeding yourself No Help Needed   Getting from bed to chair No Help Needed   Getting dressed No Help Needed   Bathing or showering No Help Needed   Walk across the room (includes cane/walker) No Help Needed   Using the telphone No Help Needed   Taking your medications No Help Needed   Preparing meals No Help Needed   Managing money (expenses/bills) No Help Needed   Moderately strenuous housework (laundry) No Help Needed   Shopping for personal items (toiletries/medicines) No Help Needed   Shopping for groceries No Help Needed   Driving No Help Needed   Climbing a flight of stairs No Help Needed   Getting to places beyond walking distances No Help Needed                 Medication reconciliation up to date and corrected with patient at this time.

## 2018-07-09 NOTE — MR AVS SNAPSHOT
[address] [place] [address] [place] 
[address] [place] [facility] [address] 
[phone] Patient: Jourdan Posey MRN:  [id number] Visit Information Date & Time Provider Department Dept. Phone Encounter #  
 7/9/2018 11:20 AM Levon Pickard. Lazara Ybarra MD [facility] [phone] [id number] Upcoming Health Maintenance Date Due  
 PAP AKA CERVICAL CYTOLOGY 10/9/2018* Influenza Age 5 to Adult 8/1/2018 DTaP/Tdap/Td series (6 - Td) 2/15/2021 *Topic was postponed. The date shown is not the original due date. Allergies as of 7/9/2018  Review Complete On: 7/9/2018 By: Levon Pickard. Lazara Ybarra MD  
 No Known Allergies Current Immunizations  Reviewed on 11/12/2015 Name Date DTAP Vaccine 9/1/2005, 2/18/1998, 1/21/1998, 1993 HIB Vaccine 1993 Hepatitis A Vaccine 2/15/2011, 8/28/2007 Hepatitis B Vaccine 8/28/2007, 8/23/2006, 9/1/2005 Human Papillomavirus 4/7/2008, 10/31/2007, 8/28/2007 IPV 2/18/1998, 1/21/1998, 1993 Influenza Vaccine 10/24/2014 Influenza Vaccine (Quad) PF 10/6/2016 MMR Vaccine 2/18/1998, 1/21/1998 Meningococcal Vaccine 2/15/2011 TDAP Vaccine 2/15/2011 Not reviewed this visit You Were Diagnosed With   
  
 Codes Comments Chest pain, unspecified type    -  Primary ICD-10-CM: R07.9 ICD-9-CM: 786.50 Elevated blood pressure reading     ICD-10-CM: R03.0 ICD-9-CM: 796.2 Arthralgia, unspecified joint     ICD-10-CM: M25.50 ICD-9-CM: 719.40 Vitals BP Pulse Temp Resp Height(growth percentile) Weight(growth percentile) 130/90 (BP 1 Location: Left arm, BP Patient Position: Supine) 91 97.6 °F (36.4 °C) 16 5' 5\" (1.651 m) 162 lb 8 oz (73.7 kg) SpO2 BMI OB Status Smoking Status 100% 27.04 kg/m2 Injection Never Smoker Vitals History BMI and BSA Data Body Mass Index Body Surface Area  
 27.04 kg/m 2 1.84 m 2 Preferred Pharmacy Pharmacy Name Phone [id number] [place] [address] [phone] Your Updated Medication List  
  
   
This list is accurate as of 7/9/18  1:13 PM.  Always use your most recent med list.  
  
  
  
  
 acyclovir 200 mg capsule Commonly known as:  ZOVIRAX Take 200 mg by mouth daily. fluticasone 50 mcg/actuation nasal spray Commonly known as:  FLONASE  
USE 2 SPRAYS IN EACH NOSTRIL DAILY  
  
 lisinopril 20 mg tablet Commonly known as:  Katie Tessa Take 1 Tab by mouth daily. NEW DOSE  
  
 medroxyPROGESTERone 150 mg/mL Syrg Commonly known as:  DEPO-PROVERA  
1 mL by IntraMUSCular route every three (3) months. Prescriptions Sent to Pharmacy Refills  
 lisinopril (PRINIVIL, ZESTRIL) 20 mg tablet 2 Sig: Take 1 Tab by mouth daily. NEW DOSE Class: Normal  
 Pharmacy: [facility] #[id number] [place], [address] Ph #: [phone] Route: Oral  
  
We Performed the Following AMB POC EKG ROUTINE W/ 12 LEADS, INTER & REP [05447 CPT(R)] Patient Instructions TODAY, please go to: CHECK OUT - If you received a referral, Show the  Please schedule the following appointments at [facility] OUT: 
Lab appointment tomorrow, not fasting Blood pressure follow up with Dr. Raghu Marsh in 2 weeks Today's Plan: 
Take new dose of lisinopril Use naprosyn when needed for pain. Also use heat, ice, massage 
 
 
_____________________ Consider signing up for [employer] to receive your results electronically. Introducing [facility]! Dear Triny Carey: 
Thank you for requesting a [employer] account. Our records indicate that you have previously registered for a [employer] account but its currently inactive. Please call our [employer] support line at [phone]. Additional Information If you have questions, please visit the Frequently Asked Questions section of the [employer] website at [email or web address] Remember, [employer] is NOT to be used for urgent needs. For medical emergencies, dial 911. Now available from your iPhone and Android! Please provide this summary of care documentation to your next provider. Your primary care clinician is listed as Ronnie Lopez. If you have any questions after today's visit, please call [phone].

## 2018-07-09 NOTE — PATIENT INSTRUCTIONS
TODAY, please go to:   CHECK OUT - If you received a referral, Show the       Please schedule the following appointments at [address] [place] [address]:  Lab appointment tomorrow, not fasting  Blood pressure follow up with Dr. Nicol Fernandes in 2 weeks     Today's Plan:  Take new dose of lisinopril  Use naprosyn when needed for pain. Also use heat, ice, massage      _____________________   Consider signing up for "[employer] to receive your results electronically.

## 2018-07-09 NOTE — PROGRESS NOTES
[address] [place] [address] Visit   07/09/2018  Patient ID: Paola Park is a 23 y.o. female. Assessment/Plan:    Diagnoses and all orders for this visit:    1. Chest pain, unspecified type  -     AMB POC EKG ROUTINE W/ 12 LEADS, INTER & REP  -     METABOLIC PANEL, COMPREHENSIVE  -     SED RATE (ESR)  -     C REACTIVE PROTEIN, QT  -     CYCLIC CITRUL PEPTIDE AB, IGG  -     RHEUMATOID FACTOR, QL  -     NANCI, DIRECT, W/REFLEX  -     R RICKETTSII AB IGG W/REFL  -     EHRLICHIOSIS (HME AND HGE) AB PANEL  -     LYME AB/WESTERN BLOT REFLEX (Do not use for [place])  -     MAGNESIUM  -     TSH AND FREE T4  -     CBC WITH AUTOMATED DIFF  -     VITAMIN D, 25 HYDROXY    2. Elevated blood pressure reading  -     lisinopril (PRINIVIL, ZESTRIL) 20 mg tablet; Take 1 Tab by mouth daily. NEW DOSE    3. Arthralgia, unspecified joint  -     naproxen (NAPROSYN) 500 mg tablet; Take 1 Tab by mouth every twelve (12) hours as needed. -     METABOLIC PANEL, COMPREHENSIVE  -     SED RATE (ESR)  -     C REACTIVE PROTEIN, QT  -     CYCLIC CITRUL PEPTIDE AB, IGG  -     RHEUMATOID FACTOR, QL  -     NANCI, DIRECT, W/REFLEX  -     R RICKETTSII AB IGG W/REFL  -     EHRLICHIOSIS (HME AND HGE) AB PANEL  -     LYME AB/WESTERN BLOT REFLEX (Do not use for [place])  -     MAGNESIUM  -     TSH AND FREE T4  -     CBC WITH AUTOMATED DIFF  -     VITAMIN D, 25 HYDROXY    4. Essential hypertension    5. Vitamin D deficiency  -     VITAMIN D, 25 HYDROXY      BP often above goal. Last BP today was better. Will increase to 20mg daily  EKG NSR  DDx for constellation of symptoms: tick borne illness, costochondritis, RA, SLE, fibromyalgia. Start eval with labs. See patient instructions for more. Counselled pt on Patient health concerns and plans. Patient was offered a choice/choices in the treatment plan today. Reviewed return precautions as appropriate. Patient expresses understanding of the plan and agrees with recommendations.     Patient Instructions TODAY, please go to:   CHECK OUT - If you received a referral, Show the       Please schedule the following appointments at [address] [place] [address]:  Lab appointment tomorrow, not fasting  Blood pressure follow up with Dr. Raeann Payne in 2 weeks     Today's Plan:  Take new dose of lisinopril  Use naprosyn when needed for pain. Also use heat, ice, massage      _____________________   Consider signing up for [employer] to receive your results electronically. Subjective:   HPI:  Frandy Babb is a 23 y.o. female being seen for:   Chief Complaint   Patient presents with    Chest Pain     Started 3-4 weeks ago has had SOB, Rib pain, Knee and hand pain. Pain is off and on. Hand pain is more often than the other pains. Here with son and b/f today    Chest and other pain   ·  occasional chest pain, then rib pain. May last for a week. Sore. Feel like  · Knees hurt randomly  · Hand pain  · Foot pain  · No clear trigger. No trauma. · Chest intermittent daily  · Fatigue, sleeps poorly  · Has night sweats  · About 3-4 weeks. · Occasional SOB and HA. Has had rib pain before, that sponteneosly resolved. · Left upper chest pain, sharp, takes breath away, lasts seconds, 3-4 times a day. No clear triggers. · SOB when walking. More with more exertion. Associates with her weight. · Has tried heating pads, aleeve  · Only domestic travel recently  · No known sick contacts. Review of Systems   Constitutional: Positive for chills (occasional, ) and diaphoresis. Negative for fever and unexpected weight change. HENT: Positive for congestion. Eyes: Negative. Cardiovascular: Positive for chest pain and palpitations. Gastrointestinal: Positive for abdominal pain (occasional cramps) and nausea. Negative for blood in stool, constipation (stool about 2-3 times a week. firm), diarrhea and vomiting. Musculoskeletal: Positive for arthralgias. Skin: Negative for rash. No known tick bites.       Otherwise as noted in HPI    Social History     Social History Narrative    Lives in [place] with parents and 3 yo son. Works at [employer]. History   Smoking Status    Never Smoker   Smokeless Tobacco    Never Used     ? Objective:     Visit Vitals    /80 (BP 1 Location: Right arm, BP Patient Position: Sitting)    Pulse 91    Temp 97.6 °F (36.4 °C)    Resp 16    Ht 5' 5\" (1.651 m)    Wt 162 lb 8 oz (73.7 kg)    SpO2 100%    BMI 27.04 kg/m2       BP Readings from Last 3 Encounters:   07/09/18 132/80   04/16/18 (!) 162/115   03/09/18 (!) 131/94       Wt Readings from Last 3 Encounters:   07/09/18 162 lb 8 oz (73.7 kg)   04/16/18 164 lb (74.4 kg)   03/09/18 164 lb (74.4 kg)       Physical Exam   Constitutional: She appears well-developed and well-nourished. No distress. Cardiovascular: Normal rate, regular rhythm and normal heart sounds. Exam reveals no gallop and no friction rub. No murmur heard. Pulmonary/Chest: Effort normal. No respiratory distress. She has no wheezes. She has no rales. Abdominal: Soft. Bowel sounds are normal. She exhibits no distension and no mass. There is tenderness (LLQ, mild). There is no rebound and no guarding. No HSM   Musculoskeletal: She exhibits no edema. No tenderness or edema of knees, elbows, wrists, ankles   Neurological: She is alert. Psychiatric: She has a normal mood and affect. Her behavior is normal.       No Known Allergies  Prior to Admission medications    Medication Sig Start Date End Date Taking? Authorizing Provider   lisinopril (PRINIVIL, ZESTRIL) 20 mg tablet Take 1 Tab by mouth daily. NEW DOSE 7/9/18  Yes Kimmie Bark. Gillian Koch MD   naproxen (NAPROSYN) 500 mg tablet Take 1 Tab by mouth every twelve (12) hours as needed. 7/9/18  Yes Crystal Koch MD   fluticasone [facility]) 50 mcg/actuation nasal spray USE 2 SPRAYS IN [facility] NOSTRIL DAILY 4/18/18  Yes Kary Hernández NP   acyclovir (ZOVIRAX) 200 mg capsule Take 200 mg by mouth daily. 10/13/17  Yes Historical Provider   medroxyPROGESTERone (DEPO-PROVERA) 150 mg/mL syrg 1 mL by IntraMUSCular route every three (3) months. 6/14/17  Yes Marce Rocha MD     Patient Active Problem List   Diagnosis Code    Allergic rhinitis J30.9    Anemia D64.9    History of compression fracture of spine, thoracic with chronic back pain Z87.81    Vitamin D deficiency E55.9    Thyromegaly E01.0    Atypical mole D22.9    Mastodynia N64.4    History of gestational diabetes Z80.34    Essential hypertension I10    Acute bacterial sinusitis J01.90, B96.89    Acute bronchitis due to other specified organisms J20.8

## 2018-07-14 LAB
25(OH)D3+25(OH)D2 SERPL-MCNC: 18.7 NG/ML (ref 30–100)
A PHAGOCYTOPH IGG TITR SER IF: NEGATIVE {TITER}
A PHAGOCYTOPH IGM TITR SER IF: NEGATIVE {TITER}
ALBUMIN SERPL-MCNC: 4.7 G/DL (ref 3.5–5.5)
ALBUMIN/GLOB SERPL: 1.1 {RATIO} (ref 1.2–2.2)
ALP SERPL-CCNC: 75 IU/L (ref 39–117)
ALT SERPL-CCNC: 9 IU/L (ref 0–32)
ANA SER QL: POSITIVE
AST SERPL-CCNC: 18 IU/L (ref 0–40)
B BURGDOR IGG+IGM SER-ACNC: <0.91 ISR (ref 0–0.9)
B BURGDOR IGM SER IA-ACNC: <0.8 INDEX (ref 0–0.79)
BASOPHILS # BLD AUTO: 0 X10E3/UL (ref 0–0.2)
BASOPHILS NFR BLD AUTO: 0 %
BILIRUB SERPL-MCNC: 0.8 MG/DL (ref 0–1.2)
BUN SERPL-MCNC: 11 MG/DL (ref 6–20)
BUN/CREAT SERPL: 14 (ref 9–23)
CALCIUM SERPL-MCNC: 9.6 MG/DL (ref 8.7–10.2)
CCP IGA+IGG SERPL IA-ACNC: 8 UNITS (ref 0–19)
CENTROMERE B AB SER-ACNC: <0.2 AI (ref 0–0.9)
CHLORIDE SERPL-SCNC: 104 MMOL/L (ref 96–106)
CHROMATIN AB SERPL-ACNC: 0.2 AI (ref 0–0.9)
CO2 SERPL-SCNC: 20 MMOL/L (ref 20–29)
CREAT SERPL-MCNC: 0.81 MG/DL (ref 0.57–1)
CRP SERPL-MCNC: 2.1 MG/L (ref 0–4.9)
DSDNA AB SER-ACNC: 1 IU/ML (ref 0–9)
E CHAFFEENSIS IGG TITR SER IF: NEGATIVE {TITER}
E CHAFFEENSIS IGM TITR SER IF: NEGATIVE {TITER}
ENA JO1 AB SER-ACNC: <0.2 AI (ref 0–0.9)
ENA RNP AB SER-ACNC: 0.2 AI (ref 0–0.9)
ENA SCL70 AB SER-ACNC: <0.2 AI (ref 0–0.9)
ENA SM AB SER-ACNC: <0.2 AI (ref 0–0.9)
ENA SS-A AB SER-ACNC: >8 AI (ref 0–0.9)
ENA SS-B AB SER-ACNC: <0.2 AI (ref 0–0.9)
EOSINOPHIL # BLD AUTO: 0.2 X10E3/UL (ref 0–0.4)
EOSINOPHIL NFR BLD AUTO: 3 %
ERYTHROCYTE [DISTWIDTH] IN BLOOD BY AUTOMATED COUNT: 12.7 % (ref 12.3–15.4)
ERYTHROCYTE [SEDIMENTATION RATE] IN BLOOD BY WESTERGREN METHOD: 30 MM/HR (ref 0–32)
GLOBULIN SER CALC-MCNC: 4.1 G/DL (ref 1.5–4.5)
GLUCOSE SERPL-MCNC: 122 MG/DL (ref 65–99)
HCT VFR BLD AUTO: 37.3 % (ref 34–46.6)
HGB BLD-MCNC: 12.2 G/DL (ref 11.1–15.9)
IMM GRANULOCYTES # BLD: 0 X10E3/UL (ref 0–0.1)
IMM GRANULOCYTES NFR BLD: 0 %
LYMPHOCYTES # BLD AUTO: 1.8 X10E3/UL (ref 0.7–3.1)
LYMPHOCYTES NFR BLD AUTO: 32 %
MAGNESIUM SERPL-MCNC: 2.1 MG/DL (ref 1.6–2.3)
MCH RBC QN AUTO: 29.8 PG (ref 26.6–33)
MCHC RBC AUTO-ENTMCNC: 32.7 G/DL (ref 31.5–35.7)
MCV RBC AUTO: 91 FL (ref 79–97)
MONOCYTES # BLD AUTO: 0.3 X10E3/UL (ref 0.1–0.9)
MONOCYTES NFR BLD AUTO: 5 %
NEUTROPHILS # BLD AUTO: 3.2 X10E3/UL (ref 1.4–7)
NEUTROPHILS NFR BLD AUTO: 60 %
PLATELET # BLD AUTO: 399 X10E3/UL (ref 150–379)
POTASSIUM SERPL-SCNC: 3.7 MMOL/L (ref 3.5–5.2)
PROT SERPL-MCNC: 8.8 G/DL (ref 6–8.5)
R RICKETTSI IGG SER QL IA: NEGATIVE
RBC # BLD AUTO: 4.1 X10E6/UL (ref 3.77–5.28)
RHEUMATOID FACT SERPL-ACNC: 17.5 IU/ML (ref 0–13.9)
SEE BELOW, 164869: ABNORMAL
SODIUM SERPL-SCNC: 140 MMOL/L (ref 134–144)
T4 FREE SERPL-MCNC: 1.23 NG/DL (ref 0.82–1.77)
TSH SERPL DL<=0.005 MIU/L-ACNC: 0.69 UIU/ML (ref 0.45–4.5)
WBC # BLD AUTO: 5.4 X10E3/UL (ref 3.4–10.8)

## 2018-07-16 ENCOUNTER — TELEPHONE (OUTPATIENT)
Dept: FAMILY MEDICINE CLINIC | Age: 23
End: 2018-07-16

## 2018-07-16 PROBLEM — R76.8 ANA POSITIVE: Status: ACTIVE | Noted: 2018-07-16

## 2018-07-16 PROBLEM — R76.8 SS-A ANTIBODY POSITIVE: Status: ACTIVE | Noted: 2018-07-16

## 2018-07-16 PROBLEM — R76.8 RHEUMATOID FACTOR POSITIVE: Status: ACTIVE | Noted: 2018-07-16

## 2018-07-16 RX ORDER — ERGOCALCIFEROL 1.25 MG/1
50000 CAPSULE ORAL
Qty: 12 CAP | Refills: 0 | Status: SHIPPED | OUTPATIENT
Start: 2018-07-16 | End: 2018-10-02

## 2018-07-16 NOTE — PROGRESS NOTES
7/16/2018   [phone] (home)   11:13 AM- no answer      Daniel Ms. Bean Congress,    Some of your tests for inflammation and inflammatory diseases came back positive. You could have Rheumatoid Arthritis or Lupus  Also your platelets are elevated, this may be related to the above. Your vitamin D is low. Please take vitamin D as prescribed. Please call to schedule and see rheumatology. See below      Your electrolytes are normal.   Your kidney function is normal.   Your liver function is normal.  You do not have ehrlichiosis, lyme disease, or rachelle mountain spotted fever.      Dr. Denis Molina, [place] [address]         Phone:  Fax:   Mahi Casey [phone]

## 2018-07-16 NOTE — TELEPHONE ENCOUNTER
Patient returning a call she received, patient said didn't leave any information she think it might be in reference to her lab results.   P: [phone]

## 2018-07-16 NOTE — TELEPHONE ENCOUNTER
Replied to pt via [employer]. Dr. Jarrett Montague personally called pt. Dr. Jarrett Montague is aware of pt calling back and will call pt back at the end of when she is done seeing pts.

## 2018-07-17 ENCOUNTER — TELEPHONE (OUTPATIENT)
Dept: FAMILY MEDICINE CLINIC | Age: 23
End: 2018-07-17

## 2018-07-17 DIAGNOSIS — M25.50 ARTHRALGIA, UNSPECIFIED JOINT: ICD-10-CM

## 2018-07-17 DIAGNOSIS — R76.8 SS-A ANTIBODY POSITIVE: Primary | ICD-10-CM

## 2018-07-17 DIAGNOSIS — R76.8 RHEUMATOID FACTOR POSITIVE: ICD-10-CM

## 2018-07-17 DIAGNOSIS — R76.8 ANA POSITIVE: ICD-10-CM

## 2018-07-17 NOTE — TELEPHONE ENCOUNTER
Received e mail in my chart that the patient can not be seen until Nov. 2018 by Dr. Bea Monreal. Office called by nurse and I was told Dr. Wendy Kerr will have to speak to Dr. Bea Monreal about moving her appointment up. His cell phone number is [phone].

## 2018-07-20 NOTE — TELEPHONE ENCOUNTER
No call needed to MD. it is important, but not urgent.  I will refer her to Dr. Anjana Garcia also, she can call and see if that office has any sooner appointments, please call and give pt new referral info, see referral dated 7/20/2018

## 2018-07-20 NOTE — TELEPHONE ENCOUNTER
Writer attempt to call patient to give information about referral. VM left with details. Patient will be sent a [employer] message as well.

## 2018-08-01 NOTE — PROGRESS NOTES
8/1/2018, 12:49 PM  [phone] (home)   Telephone Information:  Mobile          [phone]  - no answer. _____________________   Please send LETTER with below. Daniel Ms. Rosangela Bernal,     I was unable to reach you by phone. Some of your tests for inflammation and inflammatory diseases came back positive. You could have Rheumatoid Arthritis or Lupus   Also your platelets are elevated, this may be related to the above. Your vitamin D is low. Please take vitamin D as prescribed. Please call to schedule and see rheumatology. See below       Your electrolytes are normal.   Your kidney function is normal.   Your liver function is normal.   You do not have ehrlichiosis, lyme disease, or rachelle mountain spotted fever.      Jesse,   Dr. Collin Bedoya, [facility]             [place] [address] [phone]           Phone:   Fax:   Salazar Lanier [phone]

## 2018-08-02 ENCOUNTER — TELEPHONE (OUTPATIENT)
Dept: FAMILY MEDICINE CLINIC | Age: 23
End: 2018-08-02

## 2018-08-02 NOTE — TELEPHONE ENCOUNTER
----- Message from [address] sent at 8/1/2018  5:31 PM EDT -----  Regarding: Dr. Jazmine Andres  Pt is returning a missed call.  Best contact [phone]

## 2018-08-02 NOTE — TELEPHONE ENCOUNTER
Writer called pt back regarding referral information. Writer spoke pt. Pt verified . Pt stated that she has an appointment on 2018 with another doctor with Dr. Subha Mclain office, stated that it was a man, but cannot remember his name. Stated that Dr. Leigh Wilson did not have any upcoming appointment either. Writer verbalized understanding and appreciation. Stated that a message would be put into for Dr. Wendy Kerr so that she will be aware. Pt verbalized understanding and appreciation.

## 2018-08-21 ENCOUNTER — OFFICE VISIT (OUTPATIENT)
Dept: RHEUMATOLOGY | Age: 23
End: 2018-08-21

## 2018-08-21 VITALS
DIASTOLIC BLOOD PRESSURE: 93 MMHG | OXYGEN SATURATION: 98 % | HEART RATE: 92 BPM | SYSTOLIC BLOOD PRESSURE: 131 MMHG | RESPIRATION RATE: 16 BRPM | BODY MASS INDEX: 27.56 KG/M2 | WEIGHT: 165.4 LBS | HEIGHT: 65 IN | TEMPERATURE: 98.4 F

## 2018-08-21 DIAGNOSIS — M94.0 COSTOCHONDRITIS: ICD-10-CM

## 2018-08-21 DIAGNOSIS — M25.50 ARTHRALGIA, UNSPECIFIED JOINT: ICD-10-CM

## 2018-08-21 DIAGNOSIS — M35.7 HYPERMOBILITY SYNDROME: ICD-10-CM

## 2018-08-21 DIAGNOSIS — G56.01 CARPAL TUNNEL SYNDROME OF RIGHT WRIST: ICD-10-CM

## 2018-08-21 DIAGNOSIS — E55.9 HYPOVITAMINOSIS D: ICD-10-CM

## 2018-08-21 DIAGNOSIS — R76.8 POSITIVE ANA (ANTINUCLEAR ANTIBODY): Primary | ICD-10-CM

## 2018-08-21 NOTE — PATIENT INSTRUCTIONS
Please talk to your doctor about getting a sleep study for sleep apnea  Pleas take vitamin D  Please try to be more active.

## 2018-08-21 NOTE — MR AVS SNAPSHOT
[address] [place] [id number] [phone] Patient: Balbina Kay MRN:  [id number] Visit Information Date & Time Provider Department Dept. Phone Encounter #  
 8/21/2018 10:30 AM MD Thomas Hinkle [phone] [id number] Follow-up Instructions Return in about 3 months (around 11/21/2018). Your Appointments 11/14/2018  1:00 PM  
New Patient with MD Thomas Wheeler [facility]) Appt Note: NP/Arthralgia, unspecified joint, NANCI positive, Rheumatoid factor positive; NP/Arthralgia, unspecified joint, NANCI positive, Rheumatoid factor positive  
 [address] [place] [address]  
[address] [place] [address] [place] [phone] Upcoming Health Maintenance Date Due Influenza Age 5 to Adult 8/1/2018 PAP AKA CERVICAL CYTOLOGY 10/9/2018* DTaP/Tdap/Td series (6 - Td) 2/15/2021 *Topic was postponed. The date shown is not the original due date. Allergies as of 8/21/2018  Review Complete On: 8/21/2018 By: Nolene Skiff, LPN No Known Allergies Current Immunizations  Reviewed on 11/12/2015 Name Date DTAP Vaccine 9/1/2005, 2/18/1998, 1/21/1998, 1993 HIB Vaccine 1993 Hepatitis A Vaccine 2/15/2011, 8/28/2007 Hepatitis B Vaccine 8/28/2007, 8/23/2006, 9/1/2005 Human Papillomavirus 4/7/2008, 10/31/2007, 8/28/2007 IPV 2/18/1998, 1/21/1998, 1993 Influenza Vaccine 10/24/2014 Influenza Vaccine (Quad) PF 10/6/2016 MMR Vaccine 2/18/1998, 1/21/1998 Meningococcal Vaccine 2/15/2011 TDAP Vaccine 2/15/2011 Not reviewed this visit You Were Diagnosed With   
  
 Codes Comments Positive NANCI (antinuclear antibody)    -  Primary ICD-10-CM: R76.8 ICD-9-CM: 795.79 Costochondritis     ICD-10-CM: M94.0 ICD-9-CM: 733.6 Hypermobility syndrome     ICD-10-CM: M35.7 ICD-9-CM: 728.5 Arthralgia, unspecified joint     ICD-10-CM: M25.50 ICD-9-CM: 719.40 Carpal tunnel syndrome of right wrist     ICD-10-CM: G56.01 
ICD-9-CM: 354.0 Hypovitaminosis D     ICD-10-CM: E55.9 ICD-9-CM: 268.9 Vitals BP Pulse Temp Resp Height(growth percentile) Weight(growth percentile) (!) 131/93 (BP 1 Location: Left arm, BP Patient Position: Sitting) 92 98.4 °F (36.9 °C) (Oral) 16 5' 5\" (1.651 m) 165 lb 6.4 oz (75 kg) SpO2 BMI OB Status Smoking Status 98% 27.52 kg/m2 Injection Never Smoker Vitals History BMI and BSA Data Body Mass Index Body Surface Area  
 27.52 kg/m 2 1.85 m 2 Preferred Pharmacy Pharmacy Name Phone [id number] [id number] [phone] Your Updated Medication List  
  
   
This list is accurate as of 8/21/18 10:59 AM.  Always use your most recent med list.  
  
  
  
  
 acyclovir 200 mg capsule Commonly known as:  ZOVIRAX Take 200 mg by mouth daily. Arm Brace Misc Commonly known as:  NEOPRENE WRIST SPLINT SUPPORT  
1 Applicator by Does Not Apply route nightly.  
  
 ergocalciferol 50,000 unit capsule Commonly known as:  ERGOCALCIFEROL Take 1 Cap by mouth every seven (7) days for 12 doses. When done with prescription, switch to vitamin D3 1000 units by mouth daily OTC  
  
 fluticasone 50 mcg/actuation nasal spray Commonly known as:  FLONASE  
USE 2 SPRAYS IN EACH NOSTRIL DAILY  
  
 lisinopril 20 mg tablet Commonly known as:  Nicole Crowe Take 1 Tab by mouth daily. NEW DOSE  
  
 medroxyPROGESTERone 150 mg/mL Syrg Commonly known as:  DEPO-PROVERA  
1 mL by IntraMUSCular route every three (3) months. naproxen 500 mg tablet Commonly known as:  NAPROSYN Take 1 Tab by mouth every twelve (12) hours as needed. Prescriptions Sent to Pharmacy Refills Arm Brace (NEOPRENE WRIST SPLINT SUPPORT) misc 0 Si Applicator by Does Not Apply route nightly. Class: Normal  
 Pharmacy: [id number] [place] Ingram, [address] Ph #: [phone] Route: Does Not Apply We Performed the Following ANTINUCLEAR ANTIBODIES, IFA [id number] CPT(R)] DSDNA (NDNA) SCRN BY BETSYTHIPARKERA [[id number] Custom] SJOGREN'S ABS, SSA AND SSB [[id number] Custom] RODRIGUES ANTIBODIES [[id number] Custom] URINALYSIS W/ RFLX MICROSCOPIC [11745 CPT(R)] Follow-up Instructions Return in about 3 months (around 2018). Patient Instructions Please talk to your doctor about getting a sleep study for sleep apnea Pleas take vitamin D Please try to be more active. Introducing [facility] SERVICES! Dear Martha Church: 
Thank you for requesting a [employer] account. Our records indicate that you already have an active [employer] account. You can access your account anytime at [email or web address] Did you know that you can access your hospital and ER discharge instructions at any time in [employer]? You can also review all of your test results from your hospital stay or ER visit. Additional Information If you have questions, please visit the Frequently Asked Questions section of the [employer] website at [email or web address] Remember, [employer] is NOT to be used for urgent needs. For medical emergencies, dial 911. Now available from your iPhone and Android! Please provide this summary of care documentation to your next provider. Your primary care clinician is listed as Qi Marin. If you have any questions after today's visit, please call [phone].

## 2018-08-21 NOTE — PROGRESS NOTES
REASON FOR VISIT    This is the initial evaluation for Ms. Chandni Madison a 23 y.o.  female for question of possible SLE. The patient is referred to the [facility] at the request of Dr. Raeann Payne. HISTORY OF PRESENT ILLNESS     Per chart review, patient has had arthralgias. She has b/l nipple discharge which was deemed to be hormonal by her doctor. She presents with chief complaint of pain in knees, wrist, fingers, toes, back. The patient notes that she had blood work which tested positive so she presents here today. The patient notes the blood work was done due to pain in wrist, knees, back and overall aching. The symptoms started about a year ago but they recently got worse. She is not able to do as much as she used to such as lifting, bending etc.  She cannot think of anything which could have made symptoms worse. It is a dull pain but sharp pain in the back. The pain is intermittent. It is worsened by activity. IT is improved by resting. She has noted swelling in toes on right foot. Occasionally right knee. Morning stiffness in legs, feet, back and goes away in under an hour. Has non restorative sleep. Son was born in 2013 and has not had a full night's rest since then. REVIEW OF SYSTEMS    A 15 point review of systems was performed and summarized below. The questionnaire was reviewed with the patient and scanned into the patient's medical record.     General: endorses fatigue, weakness, night sweats denies recent weight gain, recent weight loss, fever,  Musculoskeletal: endorses morning stiffness (lasting 60 minutes), joint pain, joint swelling,  muscle pain  denies Ears: endorses denies ringing in ears, loss of hearing, deafness  Eyes: endorses denies pain, redness, loss of vision, double vision, blurred vision, dryness, foreign body sensation  Mouth: endorses denies sore tongue, oral ulcers, bleeding gums, loss of taste, dryness, increased dental caries  Nose: endorses denies nosebleeds, loss of smell, nasal ulcers  Throat: endorses denies frequent sore throats, hoarseness, difficulty in swallowing, pain in jaw while chewing  Neck: endorses denies swollen glands, tender glands  Cardiopulmonary: endorses denies pain in chest, irregular heart beat, sudden changes in heart beat, shortness of breath, difficulty breathing at night, dry cough, productive cough, coughing of blood, wheezing  Gastrointestinal: endorses denies nausea, heartburn, stomach pain relieved by food, vomiting of blood/\"coffee grounds\", jaundice, increasing constipation, persistent diarrhea, blood in stools, black stools  Genitourinary: endorses denies nocturia, difficult urination, pain or burning on urination, blood in urine, cloudy urine, pus in urine, genital discharge, frequent urination, vaginal dryness, rash/ulcers, sexual difficulties,   Hematologic: endorses denies anemia, bleeding tendency, blood clots  Skin: endorses denies easy bruising, sun sensitive, rash, redness, hives, skin tightness, nodules/bumps, hair loss, color changes of hands or feet in the cold (Raynaud's), nailbed changes, mechanics hands  Neurologic: endorses dizziness, muscle weakness, numbness or tingling in hands/feet (wrist, back) wrist tingling wakes her up from sleep, denies headaches,  memory loss  Psychiatric: endorses  Depression, denies excessive worries, PTSD, Bipolar  Sleep: endorses  difficulty falling asleep, difficulty staying asleep snoring denies poor sleep (6 hours), denies , apnea, daytime somnolence    PAST MEDICAL HISTORY    Past Medical History:   Diagnosis Date    Abnormal Pap smear of cervix 2017    seeing OBGYN    Acne 10/7/2009    Allergic rhinitis 9/2/2009    Anemia 9/2/2009    CHAR III (cervical intraepithelial neoplasia III) 11/30/2017    found by LEEP     Headache     Herpes genitalia 2017    dx by OB    History of gestational diabetes     diet controlled    Other ill-defined conditions(799.89)     scoliosis    PCB (post coital bleeding) 2014    Thoracic compression fracture ([address] 3/2014    Yousif Juarezsandeep    Thyromegaly 2014    On exam     Unspecified vitamin D deficiency 2014    Varicella 1998        Past Surgical History:   Procedure Laterality Date    HX GYN           HX LEEP PROCEDURE  18    found CHAR 3       FAMILY HISTORY    Family History   Problem Relation Age of Onset    Breast Cancer Maternal Grandmother      great, great gma, age?  Hypertension Maternal Grandmother     Breast Cancer Maternal Aunt 32     survivor    Diabetes Father     Hypertension Paternal Grandmother     Diabetes Paternal Grandmother     Osteoporosis Neg Hx        SOCIAL HISTORY    Social History   Substance Use Topics    Smoking status: Never Smoker    Smokeless tobacco: Never Used    Alcohol use Yes      Comment: once every 3 months, 3 drinks at a time       MEDICATIONS    Current Outpatient Prescriptions   Medication Sig Dispense Refill    Arm Brace (NEOPRENE WRIST SPLINT SUPPORT) misc 1 Applicator by Does Not Apply route nightly. 1 Each 0    ergocalciferol (ERGOCALCIFEROL) 50,000 unit capsule Take 1 Cap by mouth every seven (7) days for 12 doses. When done with prescription, switch to vitamin D3 1000 units by mouth daily OTC 12 Cap 0    lisinopril (PRINIVIL, ZESTRIL) 20 mg tablet Take 1 Tab by mouth daily. NEW DOSE 30 Tab 2    naproxen (NAPROSYN) 500 mg tablet Take 1 Tab by mouth every twelve (12) hours as needed. 60 Tab 2    fluticasone (FLONASE) 50 mcg/actuation nasal spray USE 2 SPRAYS IN EACH NOSTRIL DAILY 1 Bottle 1    acyclovir (ZOVIRAX) 200 mg capsule Take 200 mg by mouth daily.  medroxyPROGESTERone (DEPO-PROVERA) 150 mg/mL syrg 1 mL by IntraMUSCular route every three (3) months.  1 Syringe 3       ALLERGIES    No Known Allergies    PHYSICAL EXAMINATION    Visit Vitals    BP (!) 131/93 (BP 1 Location: Left arm, BP Patient Position: Sitting)    Pulse 92    Temp 98.4 °F (36.9 °C) (Oral)    Resp 16    Ht 5' 5\" (1.651 m)    Wt 165 lb 6.4 oz (75 kg)    SpO2 98%    BMI 27.52 kg/m2     Body mass index is 27.52 kg/(m^2). General: NAD  HEENT: PERRL, anicteric, non-injected sclerae; oropharynx without ulcers, erythema, or exudate. Moist mucous membranes. Lymphatic: No cervical or axillary lymphadenopathy. Cardiovascular: S1, S2,no R/M/G  Pulmonary: CTA b/l. No wheezes/rales/rhonchi. Abdominal: Soft,NTND, + BS. Skin: No rash, nodules, or periungual changes. Neuro: Alert; able to carry normal conversation. Normal strength    Musculoskeletal:  Hypermobile joints. TTP of the chest wall. + Tinel sign of the right wrist  Cervical & Lumbar Spine  Neck and spine have no noted deformities or signs of inflammation. Curvature of cervical, thoracic, and lumbar spine are within normal limits. Wrist, Hand, & Fingers  TTP of MCPs, PIPs with no bony deformity or swelling      Elbow  No bony deformities, inflammation, or tenderness in olecranon, medial, lateral epicondyle elbow. Full ROM upon flexion and extension. Shoulder  No bony deformities, inflammation, or tenderness in rotator cuff, biceps tendon, or acromioclavicular joint. Full ROM and strength in shoulder upon adduction, abduction, internal and external rotation. Hip  No bony deformities, inflammation, or tenderness in hip joint. Knee  FROM of the knee. NO swelling or warmth noted.  No bony deformity noted    Ankle/toes  No bony deformities, inflammation or tenderness    DATA REVIEW    Prior medical records were reviewed and if applicable are summarized as below:    Labs:   7/2018:  Vitamin D 18.7 (low), WBC 5.4, Hb 12.1 Plt 399, normal diff, TSH normal, Lyme IgG/IgM negative, Ehrlichiosis/rickettsii negative, NANCI positive, SSA +, negative dsDNA, smith/RNP, SCL 70, SSb, anti chromatin, Jo1, RF 17.5 (high), CCP, ESR, CRP Negative, CMP unremarkable  8/2017: Urinalysis negative  7/2016: HIV Negative  2014: hep B surface antigen, Hep C negative    Imaging:   Not pertinent at this time    ASSESSMENT AND PLAN    A 23 y.o. female with hx of HTN, hypovitaminosis D presents as a new patient for positive ANNCI and RF. The patient's NANCI is not IFA and therefore there is a high likelihood that its' a false positive. Further she has hypermobility syndrome, hypovitaminosis D which could be contributing to her arthralgias. On my exam, there is not evidence to suggest RA or SLE at this time. # Positive NANCI:    - will repeat NANCI with IFA with reflex ENAs. - will repeat SSA given it was positive  - urinalysis    # Hypermobility syndrome:  Based on my exam, the patient has hypermobile joints which is likely contributing to her chronic joint pains.  - advised PT but patient declined and noted she will be active on her own. # Poor sleep with snoring: likely also contributing to her arthralgias. Explained this in detail to her. And the possibility that this can lead to fibromyalgias. - she will speak with her PCP regarding getting a sleep study to [facility] for sleep apnea. # Hypovitaminosis D:  Again this could contribute to her arthralgias and fatigue.  - on supplementation    # Carpal tunnel syndrome of the right wrist:  + tinel sign  - prescribed the wrist splint    # costochondritis:    - naproxen PRN    The patient voiced understanding of the aforementioned assessment and plan. Summary of plan was provided in the After Visit Summary patient instructions. I also provided education about MyChart setup and utility. Ms. Bird Tapia has a reminder for a \"due or due soon\" health maintenance. I have asked that she contact her primary care provider for follow-up on this health maintenance.     TODAY'S ORDERS    Orders Placed This Encounter    ANTINUCLEAR ANTIBODIES, IFA    DSDNA (NDNA) SCRN BY ALVARO    SJOGREN'S ABS, SSA AND SSB    SMITH ANTIBODIES    URINALYSIS W/ RFLX MICROSCOPIC    Arm Brace (NEOPRENE WRIST SPLINT SUPPORT) [facility]       Future Appointments  Date Time Provider Ana Thorpe   11/21/2018 10:30 AM Breana Baker  [address], MD    Adult Rheumatology   [facility]  A Part of [facility], [address] [place] [address] Road   Phone [phone]  Fax [phone]

## 2018-08-22 LAB
APPEARANCE UR: ABNORMAL
BACTERIA #/AREA URNS HPF: ABNORMAL /[HPF]
BILIRUB UR QL STRIP: NEGATIVE
CASTS URNS QL MICRO: ABNORMAL /LPF
COLOR UR: YELLOW
EPI CELLS #/AREA URNS HPF: >10 /HPF
GLUCOSE UR QL: NEGATIVE
HGB UR QL STRIP: NEGATIVE
KETONES UR QL STRIP: NEGATIVE
LEUKOCYTE ESTERASE UR QL STRIP: NEGATIVE
MICRO URNS: ABNORMAL
MUCOUS THREADS URNS QL MICRO: PRESENT
NITRITE UR QL STRIP: POSITIVE
PH UR STRIP: 7 [PH] (ref 5–7.5)
PROT UR QL STRIP: ABNORMAL
RBC #/AREA URNS HPF: ABNORMAL /HPF
SP GR UR: 1.02 (ref 1–1.03)
UROBILINOGEN UR STRIP-MCNC: 1 MG/DL (ref 0.2–1)
WBC #/AREA URNS HPF: ABNORMAL /HPF

## 2018-08-24 DIAGNOSIS — M35.00 SJOGREN'S SYNDROME, WITH UNSPECIFIED ORGAN INVOLVEMENT (HCC): ICD-10-CM

## 2018-08-24 DIAGNOSIS — R53.83 FATIGUE, UNSPECIFIED TYPE: Primary | ICD-10-CM

## 2018-08-24 LAB
ANA SPECKLED TITR SER: ABNORMAL {TITER}
ANA TITR SER IF: POSITIVE {TITER}
DSDNA (NDNA) SCRN BY CRITHIDIA: NORMAL TITER
ENA SM AB SER-ACNC: <0.2 AI (ref 0–0.9)
ENA SS-A AB SER-ACNC: >8 AI (ref 0–0.9)
ENA SS-B AB SER-ACNC: <0.2 AI (ref 0–0.9)
Lab: ABNORMAL

## 2018-08-24 NOTE — PROGRESS NOTES
Hi Ms. Klein Carol,    Some of your blood work came back abnormal which makes me concerned about something called Sjogrens Syndrome in you. To evaluate further whether you have this disease or not, I want to send you to an ophthalmologist who will evaluate you for dry eyes and to an ENT doctor who will do a small salivary gland biopsy to evaluate for dry mouth. I tried call you to discuss but you were not available. We will mail the referrals to you. Please let me know if you have any questions.       Thanks  Dr. Jostin Pagan

## 2018-09-24 ENCOUNTER — OFFICE VISIT (OUTPATIENT)
Dept: FAMILY MEDICINE CLINIC | Age: 23
End: 2018-09-24

## 2018-09-24 VITALS
HEART RATE: 95 BPM | DIASTOLIC BLOOD PRESSURE: 98 MMHG | RESPIRATION RATE: 20 BRPM | BODY MASS INDEX: 28.16 KG/M2 | HEIGHT: 65 IN | SYSTOLIC BLOOD PRESSURE: 138 MMHG | WEIGHT: 169 LBS | TEMPERATURE: 98.6 F | OXYGEN SATURATION: 96 %

## 2018-09-24 DIAGNOSIS — G47.00 INSOMNIA, UNSPECIFIED TYPE: ICD-10-CM

## 2018-09-24 DIAGNOSIS — R06.83 SNORING: Primary | ICD-10-CM

## 2018-09-24 DIAGNOSIS — I10 ESSENTIAL HYPERTENSION: ICD-10-CM

## 2018-09-24 RX ORDER — TRAZODONE HYDROCHLORIDE 50 MG/1
50 TABLET ORAL
Qty: 30 TAB | Refills: 1 | Status: SHIPPED | OUTPATIENT
Start: 2018-09-24 | End: 2019-11-14

## 2018-09-24 RX ORDER — AMLODIPINE BESYLATE 2.5 MG/1
2.5 TABLET ORAL DAILY
Qty: 30 TAB | Refills: 1 | Status: SHIPPED | OUTPATIENT
Start: 2018-09-24 | End: 2019-05-07 | Stop reason: SDUPTHER

## 2018-09-24 NOTE — PATIENT INSTRUCTIONS
1) We changed your blood pressure medication from lisinopril 20mg to amlodipine 2.5 mg. This may have to be increased, depending on your BP readings. Goal is <120/80    Please check your blood pressure through the week at staggered times in the day. (If you check in the morning, it should be at least one hour after your morning blood pressure medications.)      Arm monitors are most accurate. If you use a wrist monitor, make sure your wrist is at heart level. You can bring your home monitor to your next visit and have it calibrated with the machine in the office to gauge your readings. Sit  with your feet uncrossed and relax for 5 minutes before taking your BP. Keep a written record of your blood pressure readings and bring it to each appointment. If your systolic (top) blood pressure is consistently greater than 140mmHg or less than [id number] of the diastolic (bottom) number is consistently greater than 90mmHg or less than 60mmHg then please schedule an office appointment. Cardiac symptoms that would need immediate attention include: uncomfortable pressure, squeezing, fullness or pain in the center of your chest. Pain or discomfort in one or both arms, the back, neck, jaw or stomach. Shortness of breath with or without chest discomfort, breaking out in a cold sweat, nausea or lightheadedness. 2) referral to Sleep Medicine for insomnia and snoring. Sleep apnea can also cause blood pressure to increase. 3) Insomnia - will try trazadone to help with insomnia. Take 1/2tab for 1 week and see if you are sleeping better. If you are not getting a good response, please increase to full tab at night. Good Sleep Hygiene  · Maintain a routine of regular bedtime and awakening time 7 days a weeks. Get up about the same time every day, regardless of what time you fell asleep. · Establish a regular, relaxing bedtime routine.   Relaxing rituals prior to bedtime may include a warm bath or shower, aroma therapy, reading, or listening to soothing music. · Sleep in a room that is dark, quiet, comfortable, and cool;  sleep on comfortable mattress and pillows. · Use your bedroom only for sleep and sex. Have work materials, computers and TVs in another room. · Do not look at screens - computer, TV, phone, etc - within an hour of going to sleep. The blue light from the screen resets the circadian rhythm and prevents you from falling asleep. · Finish eating at least 2-3 hours prior to your regular bedtime. · Avoid caffeine within 6 hours; Alcohol and smoking within 2 hours of bedtime. · Exercise regularly; Finish a few hours before bedtime. · Avoid naps. · Go to bed only when sleepy. Antoine Racer in bed only for sleeping, not for work or watching TV. · Designate another time to write down problems & possible solutions in the late afternoon or early evening, not close to bedtime. · After 10-15 minutes of not being able to get to sleep, go to another room to read or watch TV until sleepy; Then repeat your normal \"going to bed\" routine (brushing your teeth, voiding, etc.) before getting into bed. You can try melantonin supplements to help with falling asleep. Melatonin is a naturally occurring hormone in the body that influences sleep and circadian rhythm. In the [address] [facility], melantonin is considered a supplement, not a medication, and is available without a prescription. Recommended dosage is 1-2mg nightly, no more than 4mg/night. Dalia for smart phone:   Oscar Easton is a quick and simple way for patients to log their sleep and improve their sleep hygiene. Learning About High Blood Pressure  What is high blood pressure? Blood pressure is a measure of how hard the blood pushes against the walls of your arteries. It's normal for blood pressure to go up and down throughout the day, but if it stays up, you have high blood pressure. Another name for high blood pressure is hypertension.   Two numbers tell you your blood pressure. The first number is the systolic pressure. It shows how hard the blood pushes when your heart is pumping. The second number is the diastolic pressure. It shows how hard the blood pushes between heartbeats, when your heart is relaxed and filling with blood. A blood pressure of less than 120/80 (say \"120 over 80\") is ideal for an adult. High blood pressure is 130/80 or higher. You have high blood pressure if your top number is 130 or higher or your bottom number is 80 or higher, or both. What happens when you have high blood pressure? · Blood flows through your arteries with too much force. Over time, this damages the walls of your arteries. But you can't feel it. High blood pressure usually doesn't cause symptoms. · Fat and calcium start to build up in your arteries. This buildup is called plaque. Plaque makes your arteries narrower and stiffer. Blood can't flow through them as easily. · This lack of good blood flow starts to damage some of the organs in your body. This can lead to problems such as coronary artery disease and heart attack, heart failure, stroke, kidney failure, and eye damage. How can you prevent high blood pressure? · Stay at a healthy weight. · Try to limit how much sodium you eat to less than 2,300 milligrams (mg) a day. If you limit your sodium to 1,500 mg a day, you can lower your blood pressure even more. ¨ Buy foods that are labeled \"unsalted,\" \"sodium-free,\" or \"low-sodium. \" Foods labeled \"reduced-sodium\" and \"light sodium\" may still have too much sodium. ¨ Flavor your food with garlic, lemon juice, onion, vinegar, herbs, and spices instead of salt. Do not use soy sauce, steak sauce, onion salt, garlic salt, mustard, or ketchup on your food. ¨ Use less salt (or none) when recipes call for it. You can often use half the salt a recipe calls for without losing flavor. · Be physically active. Get at least 30 minutes of exercise on most days of the week.  Walking is a good choice. You also may want to do other activities, such as running, swimming, cycling, or playing tennis or team sports. · Limit alcohol to 2 drinks a day for men and 1 drink a day for women. · Eat plenty of fruits, vegetables, and low-fat dairy products. Eat less saturated and total fats. How is high blood pressure treated? · Your doctor will suggest making lifestyle changes. For example, your doctor may ask you to eat healthy foods, quit smoking, lose extra weight, and be more active. · If lifestyle changes don't help enough or your blood pressure is very high, you will have to take medicine every day. Follow-up care is a key part of your treatment and safety. Be sure to make and go to all appointments, and call your doctor if you are having problems. It's also a good idea to know your test results and keep a list of the medicines you take. Where can you learn more? Go to http://mickey-skye.info/. Enter P501 in the search box to learn more about \"Learning About High Blood Pressure. \"  Current as of: May 10, 2017  Content Version: 11.7  © 9653-8695 [employer]. Care instructions adapted under license by [employer] (which disclaims liability or warranty for this information). If you have questions about a medical condition or this instruction, always ask your healthcare professional. [address] any warranty or liability for your use of this information. Insomnia: Care Instructions  Your Care Instructions    Insomnia is the inability to sleep well. It is a common problem for most people at some time. Insomnia may make it hard for you to get to sleep, stay asleep, or sleep as long as you need to. This can make you tired and grouchy during the day. It can also make you forgetful, less effective at work, and unhappy. Insomnia can be caused by conditions such as depression or anxiety. Pain can also affect your ability to sleep.  When these problems are solved, the insomnia usually clears up. But sometimes bad sleep habits can cause insomnia. If insomnia is affecting your work or your enjoyment of life, you can take steps to improve your sleep. Follow-up care is a key part of your treatment and safety. Be sure to make and go to all appointments, and call your doctor if you are having problems. It's also a good idea to know your test results and keep a list of the medicines you take. How can you care for yourself at home? What to avoid  · Do not have drinks with caffeine, such as coffee or black tea, for 8 hours before bed. · Do not smoke or use other types of tobacco near bedtime. Nicotine is a stimulant and can keep you awake. · Avoid drinking alcohol late in the evening, because it can cause you to wake in the middle of the night. · Do not eat a big meal close to bedtime. If you are hungry, eat a light snack. · Do not drink a lot of water close to bedtime, because the need to urinate may wake you up during the night. · Do not read or watch TV in bed. Use the bed only for sleeping and sexual activity. What to try  · Go to bed at the same time every night, and wake up at the same time every morning. Do not take naps during the day. · Keep your bedroom quiet, dark, and cool. · Sleep on a comfortable pillow and mattress. · If watching the clock makes you anxious, turn it facing away from you so you cannot see the time. · If you worry when you lie down, start a worry book. Well before bedtime, write down your worries, and then set the book and your concerns aside. · Try meditation or other relaxation techniques before you go to bed. · If you cannot fall asleep, get up and go to another room until you feel sleepy. Do something relaxing. Repeat your bedtime routine before you go to bed again. · Make your house quiet and calm about an hour before bedtime.  Turn down the lights, turn off the TV, log off the computer, and turn down the volume on music. This can help you relax after a busy day. When should you call for help? Watch closely for changes in your health, and be sure to contact your doctor if:    · Your efforts to improve your sleep do not work.     · Your insomnia gets worse.     · You have been feeling down, depressed, or hopeless or have lost interest in things that you usually enjoy. Where can you learn more? Go to http://mickey-skye.info/. Enter P513 in the search box to learn more about \"Insomnia: Care Instructions. \"  Current as of: October 10, 2017  Content Version: 11.7  © 9005-6270 [employer]. Care instructions adapted under license by [employer] (which disclaims liability or warranty for this information). If you have questions about a medical condition or this instruction, always ask your healthcare professional. [address] any warranty or liability for your use of this information. Sleep Apnea: Care Instructions  Your Care Instructions    Sleep apnea means that you frequently stop breathing for 10 seconds or longer during sleep. It can be mild to severe, based on the number of times an hour that you stop breathing or have slowed breathing. Blocked or narrowed airways in your nose, mouth, or throat can cause sleep apnea. Your airway can become blocked when your throat muscles and tongue relax during sleep. You can treat sleep apnea at home by making lifestyle changes. You also can use a CPAP breathing machine that keeps tissues in the throat from blocking your airway. Or your doctor may suggest that you use a breathing device while you sleep. It helps keep your airway open. This could be a device that you put in your mouth. Other examples include strips or disks that you use on your nose. In some cases, surgery may be needed to remove enlarged tissues in the throat. Follow-up care is a key part of your treatment and safety.  Be sure to make and go to all appointments, and call your doctor if you are having problems. It's also a good idea to know your test results and keep a list of the medicines you take. How can you care for yourself at home? · Lose weight, if needed. It may reduce the number of times you stop breathing or have slowed breathing. · Sleep on your side. It may stop mild apnea. If you tend to roll onto your back, sew a pocket in the back of your pajama top. Put a tennis ball into the pocket, and stitch the pocket shut. This will help keep you from sleeping on your back. · Avoid alcohol and medicines such as sleeping pills and sedatives before bed. · Do not smoke. Smoking can make sleep apnea worse. If you need help quitting, talk to your doctor about stop-smoking programs and medicines. These can increase your chances of quitting for good. · Prop up the head of your bed 4 to 6 inches by putting bricks under the legs of the bed. · Treat breathing problems, such as a stuffy nose, caused by a cold or allergies. · Try a continuous positive airway pressure (CPAP) breathing machine if your doctor recommends it. The machine keeps your airway open when you sleep. · If CPAP does not work for you, ask your doctor if you can try other breathing machines. A bilevel positive airway pressure machine uses one type of air pressure for breathing in and another type for breathing out. Another device raises or lowers air pressure as needed while you breathe. · Talk to your doctor if:  ¨ Your nose feels dry or bleeds when you use one of these machines. You may need to increase moisture in the air. A humidifier may help. ¨ Your nose is runny or stuffy from using a breathing machine. Decongestants or a corticosteroid nasal spray may help. ¨ You are sleepy during the day and it gets in the way of the normal things you do. Do not drive when you are drowsy. When should you call for help?   Watch closely for changes in your health, and be sure to contact your doctor if:    · You still have sleep apnea even though you have made lifestyle changes.     · You are thinking of trying a device such as CPAP.     · You are having problems using a CPAP or similar machine. Where can you learn more? Go to http://mickey-skye.info/. Enter L506 in the search box to learn more about \"Sleep Apnea: Care Instructions. \"  Current as of: December 6, 2017  Content Version: 11.7  © 2459-7947 [employer]. Care instructions adapted under license by [employer] (which disclaims liability or warranty for this information). If you have questions about a medical condition or this instruction, always ask your healthcare professional. [place] [address] any warranty or liability for your use of this information.

## 2018-09-24 NOTE — PROGRESS NOTES
S: Jackeline Harris is a 20 y.o. female who presents for insomnia    Assessment/Plan:  1. Snoring  - REFERRAL TO SLEEP STUDIES    2. Insomnia, unspecified type  -supportive instructions given  -trial:  traZODone (DESYREL) 50 mg tab nightly.       3. Essential hypertension  -prescribed lisinopril 20mg, but not taking  -BP today = 139/98  -trial:  amLODIPine (NORVASC) 2.5 mg  -advised to monitor BP at home and keep log; goal <120/80    RTC 4 weeks for HTN/med check       HPI:  Has been waking up and choking - after she coughs and tears come out her eyes; - has happened 2x and now avoid to go to sleep  wakes up in a sweat  +snoring  Will wake up 2-3x night     +Difficulty falling and staying asleep  Addition or changes in medications: not taking lisinopril  Lisinopril - right foot swelling, \"weird dreams\"  Recent changes in chronic health conditions: none  Recent change or life or stress: grandmother passed 2 weeks ago   Other interventions tried: Nyquil, Benadryl  Relieving factors: alcohol - but still wake up    Any apneic events - Thursday night  Recent weight gain: none    Family history of CPAP use or ALEKSANDER diagnosis: uncle    No c/o sore throat or URI sx    PHQ over the last two weeks 9/24/2018   Little interest or pleasure in doing things Not at all   Feeling down, depressed, irritable, or hopeless Not at all   Total Score PHQ 2 0     Social history:   Nutrition: appetite is good, water  Physical: none   Social: has boyfriend, has son   Occupation: BS - clerical  Stimulant (caffeine): none - coffee (gives pt HA)    History   Smoking Status    Never Smoker   Smokeless Tobacco    Never Used     History   Alcohol Use    Yes     Comment: once every 3 months, 3 drinks at a time     History   Drug Use No       Review of Systems:  - Constitutional Symptoms: no fevers, chills  - Cardiovascular: no chest pain or palpitations  - Respiratory: no cough or shortness of breath  - Neurological: no numbness, tingling, or headaches  - Psychiatric: no depression or anxiety  ROS is negative otherwise. I reviewed the following:  Past Medical History:   Diagnosis Date    Abnormal Pap smear of cervix 2017    seeing OBGYN    Acne 10/7/2009    Allergic rhinitis 9/2/2009    Anemia 9/2/2009    ASCUS HPV+ 09/14/2017    H/o ASCUS HPV + with Dr. Selwyn Cornejo 9/14/2017    CHAR III (cervical intraepithelial neoplasia III) 11/30/2017    found by LEEP     Headache     Herpes genitalia 2017    dx by OB    History of gestational diabetes     diet controlled    Other ill-defined conditions(799.89)     scoliosis    PCB (post coital bleeding) 11/25/2014    Thoracic compression fracture ([address] 3/2014    Yousif Vanichkachorn    Thyromegaly 11/25/2014    On exam     Unspecified vitamin D deficiency 6/26/2014    Varicella 5/1/1998       Current Outpatient Prescriptions   Medication Sig Dispense Refill    ergocalciferol (ERGOCALCIFEROL) 50,000 unit capsule Take 1 Cap by mouth every seven (7) days for 12 doses. When done with prescription, switch to vitamin D3 1000 units by mouth daily OTC 12 Cap 0    lisinopril (PRINIVIL, ZESTRIL) 20 mg tablet Take 1 Tab by mouth daily. NEW DOSE 30 Tab 2    naproxen (NAPROSYN) 500 mg tablet Take 1 Tab by mouth every twelve (12) hours as needed. 60 Tab 2    fluticasone (FLONASE) 50 mcg/actuation nasal spray USE 2 SPRAYS IN EACH NOSTRIL DAILY 1 Bottle 1    Arm Brace (NEOPRENE WRIST SPLINT SUPPORT) misc 1 Applicator by Does Not Apply route nightly. 1 Each 0    acyclovir (ZOVIRAX) 200 mg capsule Take 200 mg by mouth daily.  medroxyPROGESTERone (DEPO-PROVERA) 150 mg/mL syrg 1 mL by IntraMUSCular route every three (3) months.  1 Syringe 3       No Known Allergies    OBJECTIVE:  Visit Vitals    BP (!) 138/98 (BP 1 Location: Left arm, BP Patient Position: Sitting)    Pulse 95    Temp 98.6 °F (37 °C) (Oral)    Resp 20    Ht 5' 5\" (1.651 m)    Wt 169 lb (76.7 kg)    SpO2 96%    BMI 28.12 kg/m2 GENERAL: Evan Fan is in no acute distress. Non-toxic. Well nourished. Well developed. Appropriately groomed. HEAD:  Normocephalic. Atraumatic. Non tender sinuses x 4. EYE: PERRLA. EOMs intact. Sclera anicteric without injection. No drainage or discharge. EARS: Hearing intact bilaterally. External ear canals normal without evidence of blood or swelling. Bilateral TM's intact, pearly grey with landmarks visible. No erythema or effusion. NOSE: Patent. Nasal turbinates pink. No polyps noted. No erythema. No discharge. MOUTH: mucous membranes pink and moist. Posterior pharynx injected with cobblestone appearance. No erythema, white exudate or obstruction. Tonsils 2.5+  NECK: supple. Midline trachea. No carotid bruits noted bilaterally. No thyromegaly noted. RESP: Breath sounds are symmetrical bilaterally. Unlabored without SOB. Speaking in full sentences. Clear to auscultation bilaterally anteriorly and posteriorly. No wheezes. No rales or rhonchi. CV: normal rate. Regular rhythm. S1, S2 audible. No murmur noted. No rubs, clicks or gallops noted. SKIN: Skin is warm and dry. Turgor is normal. No petechiae, no purpura, no rash. No cyanosis. No mottling, jaundice or pallor. PSYCH: appropriate behavior, dress and thought processes. Good eye contact. Clear and coherent speech. Full affect. Good insight.     _______________________________________________________________  Patient education was done. Advised on sleep hygiene, nutrition, physical activity, weight management, tobacco, alcohol and safety. Counseling included discussion of diagnosis, differentials, treatment options, prescribed treatment, warning signs and follow up. Medication risks/benefits,interactions and alternatives discussed with patient.      Patient verbalized understanding and agreed to plan of care.   Patient was given an after visit summary which included current diagnoses, medications and vital signs.      Follow up as needed or if symptoms progress.

## 2018-09-24 NOTE — PROGRESS NOTES
Chief Complaint   Patient presents with    Sleep Problem     Zeferino Schultz  Identified pt with two pt identifiers(name and ). Chief Complaint   Patient presents with    Sleep Problem       1. Have you been to the ER, urgent care clinic since your last visit? n  Hospitalized since your last visit? n  2. Have you seen or consulted any other health care providers outside of the [facility] since your last visit? n  Include any pap smears or colon screening. n    Advance Care Planning    In the event something were to happen to you and you were unable to speak on your behalf, do you have an Advance Directive/ Living Will in place stating your wishes?   NO     If yes, do we have a copy on file  NO     If no, would you like information YES     Medication reconciliation up to date and corrected with patient at this time. y    Today's provider has been notified of reason for visit, vitals and flowsheets obtained on patients. y    Reviewed record in preparation for visit, huddled with provider and have obtained necessary documentation.y      Health Maintenance Due   Topic    Influenza Age 5 to Adult        Wt Readings from Last 3 Encounters:   18 169 lb (76.7 kg)   18 165 lb 6.4 oz (75 kg)   18 162 lb 8 oz (73.7 kg)     Temp Readings from Last 3 Encounters:   18 98.6 °F (37 °C) (Oral)   18 98.4 °F (36.9 °C) (Oral)   18 97.6 °F (36.4 °C)     BP Readings from Last 3 Encounters:   18 (!) 138/98   18 (!) 131/93   18 132/80     Pulse Readings from Last 3 Encounters:   18 95   18 92   18 91     Vitals:    18 1554   BP: (!) 138/98   Pulse: 95   Resp: 20   Temp: 98.6 °F (37 °C)   TempSrc: Oral   SpO2: 96%   Weight: 169 lb (76.7 kg)   Height: 5' 5\" (1.651 m)   PainSc:   0 - No pain         Learning Assessment:  :     Learning Assessment 2016   PRIMARY LEARNER Patient Patient   HIGHEST LEVEL OF EDUCATION - PRIMARY LEARNER - GRADUATED HIGH SCHOOL OR GED   BARRIERS PRIMARY LEARNER - NONE   CO-LEARNER CAREGIVER - No   PRIMARY LANGUAGE ENGLISH ENGLISH    NEED - No   LEARNER PREFERENCE PRIMARY DEMONSTRATION DEMONSTRATION     LISTENING LISTENING   LEARNING SPECIAL TOPICS - mo   ANSWERED BY patient self   RELATIONSHIP SELF SELF   ASSESSMENT COMMENT - none       Depression Screening:  :     PHQ over the last two weeks 9/24/2018   Little interest or pleasure in doing things Not at all   Feeling down, depressed, irritable, or hopeless Not at all   Total Score PHQ 2 0       Fall Risk Assessment:  :     No flowsheet data found. Abuse Screening:  :     No flowsheet data found. ADL Screening:  :     ADL Assessment 9/24/2018   Feeding yourself No Help Needed   Getting from bed to chair No Help Needed   Getting dressed No Help Needed   Bathing or showering No Help Needed   Walk across the room (includes cane/walker) No Help Needed   Using the telphone No Help Needed   Taking your medications No Help Needed   Preparing meals No Help Needed   Managing money (expenses/bills) No Help Needed   Moderately strenuous housework (laundry) No Help Needed   Shopping for personal items (toiletries/medicines) No Help Needed   Shopping for groceries No Help Needed   Driving No Help Needed   Climbing a flight of stairs No Help Needed   Getting to places beyond walking distances No Help Needed                 Medication reconciliation up to date and corrected with patient at this time.

## 2018-09-24 NOTE — LETTER
9/24/2018 4:46 PM 
 
Ms. Rebekah Dudley [facility] [id number] Re: Rebekah Dudley To Whom It May Concern: 
 
Please excuse Rebekah Dudley from work Sept 21 and Sept 24, 2018. She is under my care for a medical issue. Thank you for your assistance in this matter. If there are questions or concerns, please have the patient contact our office. Sincerely, Maria Luz Lawson NP

## 2018-09-24 NOTE — MR AVS SNAPSHOT
[address] [place] [address] Ag[place] 
[address] Harjeet Lazar [address] 
[phone] Patient: Balbina Kay MRN:  [id number] Visit Information Date & Time Provider Department Dept. Phone Encounter #  
 9/24/2018  4:00 PM Ashleigh Landin NP [facility] [phone] [id number] Follow-up Instructions Return in about 4 weeks (around 10/22/2018) for HTN check. Your Appointments 11/21/2018 10:30 AM  
ESTABLISHED PATIENT with Viky Hooper MD  
[facility] [place] [address] [facility] [id number]  
[address] [place] [id number] Upcoming Health Maintenance Date Due Influenza Age 5 to Adult 8/1/2018 PAP AKA CERVICAL CYTOLOGY 10/3/2020 DTaP/Tdap/Td series (6 - Td) 2/15/2021 Allergies as of 9/24/2018  Review Complete On: 9/24/2018 By: Helen Flynn LPN No Known Allergies Current Immunizations  Reviewed on 11/12/2015 Name Date DTAP Vaccine 9/1/2005, 2/18/1998, 1/21/1998, 1993 HIB Vaccine 1993 Hepatitis A Vaccine 2/15/2011, 8/28/2007 Hepatitis B Vaccine 8/28/2007, 8/23/2006, 9/1/2005 Human Papillomavirus 4/7/2008, 10/31/2007, 8/28/2007 IPV 2/18/1998, 1/21/1998, 1993 Influenza Vaccine 10/24/2014 Influenza Vaccine (Quad) PF 10/6/2016 MMR Vaccine 2/18/1998, 1/21/1998 Meningococcal Vaccine 2/15/2011 TDAP Vaccine 2/15/2011 Not reviewed this visit You Were Diagnosed With   
  
 Codes Comments Snoring    -  Primary ICD-10-CM: R06.83 
ICD-9-CM: 786.09 Insomnia, unspecified type     ICD-10-CM: G47.00 ICD-9-CM: 780.52 Essential hypertension     ICD-10-CM: I10 
ICD-9-CM: 401.9 Vitals BP Pulse Temp Resp Height(growth percentile) Weight(growth percentile)  (!) 138/98 (BP 1 Location: Left arm, BP Patient Position: Sitting) 95 98.6 °F (37 °C) (Oral) 20 5' 5\" (1.651 m) 169 lb (76.7 kg) SpO2 BMI OB Status Smoking Status 96% 28.12 kg/m2 Injection Never Smoker BMI and BSA Data Body Mass Index Body Surface Area  
 28.12 kg/m 2 1.88 m 2 Preferred Pharmacy Pharmacy Name Phone [facility][id number] Katy Graves, [address] [place] [address] [phone] Your Updated Medication List  
  
   
This list is accurate as of 9/24/18  4:46 PM.  Always use your most recent med list.  
  
  
  
  
 acyclovir 200 mg capsule Commonly known as:  ZOVIRAX Take 200 mg by mouth daily. amLODIPine 2.5 mg tablet Commonly known as:  Caroline Citron Take 1 Tab by mouth daily. Arm Brace Misc Commonly known as:  NEOPRENE WRIST SPLINT SUPPORT  
1 Applicator by Does Not Apply route nightly.  
  
 ergocalciferol 50,000 unit capsule Commonly known as:  ERGOCALCIFEROL Take 1 Cap by mouth every seven (7) days for 12 doses. When done with prescription, switch to vitamin D3 1000 units by mouth daily OTC  
  
 fluticasone 50 mcg/actuation nasal spray Commonly known as:  FLONASE  
USE 2 SPRAYS IN EACH NOSTRIL DAILY  
  
 naproxen 500 mg tablet Commonly known as:  NAPROSYN Take 1 Tab by mouth every twelve (12) hours as needed. traZODone 50 mg tablet Commonly known as:  Aiden Fray Take 1 Tab by mouth nightly. Prescriptions Sent to Pharmacy Refills  
 amLODIPine (NORVASC) 2.5 mg tablet 1 Sig: Take 1 Tab by mouth daily. Class: Normal  
 Pharmacy: [facility] [address] [place] [address] Ph #: [phone] Route: Oral  
 traZODone (DESYREL) 50 mg tablet 1 Sig: Take 1 Tab by mouth nightly. Class: Normal  
 Pharmacy: [facility] [address] [place] [address] Ph #: [phone] Route: Oral  
  
We Performed the Following REFERRAL TO SLEEP STUDIES [REF99 Custom] Follow-up Instructions Return in about 4 weeks (around 10/22/2018) for HTN check. Referral Information Referral ID Referred By Referred To  
  
 [id number] Robert Estrada Phone: [phone] Visits Status Start Date End Date 1 New Request 9/24/18 9/24/19 If your referral has a status of pending review or denied, additional information will be sent to support the outcome of this decision. Patient Instructions 1) We changed your blood pressure medication from lisinopril 20mg to amlodipine 2.5 mg. This may have to be increased, depending on your BP readings. Goal is <120/80 Please check your blood pressure through the week at staggered times in the day. (If you check in the morning, it should be at least one hour after your morning blood pressure medications.) Arm monitors are most accurate. If you use a wrist monitor, make sure your wrist is at heart level. You can bring your home monitor to your next visit and have it calibrated with the machine in the office to gauge your readings. Sit  with your feet uncrossed and relax for 5 minutes before taking your BP. Keep a written record of your blood pressure readings and bring it to each appointment. If your systolic (top) blood pressure is consistently greater than 140mmHg or less than [id number] of the diastolic (bottom) number is consistently greater than 90mmHg or less than 60mmHg then please schedule an office appointment. Cardiac symptoms that would need immediate attention include: uncomfortable pressure, squeezing, fullness or pain in the center of your chest. Pain or discomfort in one or both arms, the back, neck, jaw or stomach. Shortness of breath with or without chest discomfort, breaking out in a cold sweat, nausea or lightheadedness. 2) referral to Sleep Medicine for insomnia and snoring.   Sleep apnea can also cause blood pressure to increase. 3) Insomnia - will try trazadone to help with insomnia. Take 1/2tab for 1 week and see if you are sleeping better. If you are not getting a good response, please increase to full tab at night. Good Sleep Hygiene · Maintain a routine of regular bedtime and awakening time 7 days a weeks. Get up about the same time every day, regardless of what time you fell asleep. · Establish a regular, relaxing bedtime routine. Relaxing rituals prior to bedtime may include a warm bath or shower, aroma therapy, reading, or listening to soothing music. · Sleep in a room that is dark, quiet, comfortable, and cool;  sleep on comfortable mattress and pillows. · Use your bedroom only for sleep and sex. Have work materials, computers and TVs in another room. · Do not look at screens - computer, TV, phone, etc - within an hour of going to sleep. The blue light from the screen resets the circadian rhythm and prevents you from falling asleep. · Finish eating at least 2-3 hours prior to your regular bedtime. · Avoid caffeine within 6 hours; Alcohol and smoking within 2 hours of bedtime. · Exercise regularly; Finish a few hours before bedtime. · Avoid naps. · Go to bed only when sleepy. Doris Maria Isabel in bed only for sleeping, not for work or watching TV. · Designate another time to write down problems & possible solutions in the late afternoon or early evening, not close to bedtime. · After 10-15 minutes of not being able to get to sleep, go to another room to read or watch TV until sleepy; Then repeat your normal \"going to bed\" routine (brushing your teeth, voiding, etc.) before getting into bed. You can try melantonin supplements to help with falling asleep. Melatonin is a naturally occurring hormone in the body that influences sleep and circadian rhythm.   In the [address] [facility], melantonin is considered a supplement, not a medication, and is available without a prescription. Recommended dosage is 1-2mg nightly, no more than 4mg/night. Dalia for smart phone: SLEEPBOT SleepBot is a quick and simple way for patients to log their sleep and improve their sleep hygiene. Learning About High Blood Pressure What is high blood pressure? Blood pressure is a measure of how hard the blood pushes against the walls of your arteries. It's normal for blood pressure to go up and down throughout the day, but if it stays up, you have high blood pressure. Another name for high blood pressure is hypertension. Two numbers tell you your blood pressure. The first number is the systolic pressure. It shows how hard the blood pushes when your heart is pumping. The second number is the diastolic pressure. It shows how hard the blood pushes between heartbeats, when your heart is relaxed and filling with blood. A blood pressure of less than 120/80 (say \"120 over 80\") is ideal for an adult. High blood pressure is 130/80 or higher. You have high blood pressure if your top number is 130 or higher or your bottom number is 80 or higher, or both. What happens when you have high blood pressure? · Blood flows through your arteries with too much force. Over time, this damages the walls of your arteries. But you can't feel it. High blood pressure usually doesn't cause symptoms. · Fat and calcium start to build up in your arteries. This buildup is called plaque. Plaque makes your arteries narrower and stiffer. Blood can't flow through them as easily. · This lack of good blood flow starts to damage some of the organs in your body. This can lead to problems such as coronary artery disease and heart attack, heart failure, stroke, kidney failure, and eye damage. How can you prevent high blood pressure? · Stay at a healthy weight.  
· Try to limit how much sodium you eat to less than 2,300 milligrams (mg) a day. If you limit your sodium to 1,500 mg a day, you can lower your blood pressure even more. ¨ Buy foods that are labeled \"unsalted,\" \"sodium-free,\" or \"low-sodium. \" Foods labeled \"reduced-sodium\" and \"light sodium\" may still have too much sodium. ¨ Flavor your food with garlic, lemon juice, onion, vinegar, herbs, and spices instead of salt. Do not use soy sauce, steak sauce, onion salt, garlic salt, mustard, or ketchup on your food. ¨ Use less salt (or none) when recipes call for it. You can often use half the salt a recipe calls for without losing flavor. · Be physically active. Get at least 30 minutes of exercise on most days of the week. Walking is a good choice. You also may want to do other activities, such as running, swimming, cycling, or playing tennis or team sports. · Limit alcohol to 2 drinks a day for men and 1 drink a day for women. · Eat plenty of fruits, vegetables, and low-fat dairy products. Eat less saturated and total fats. How is high blood pressure treated? · Your doctor will suggest making lifestyle changes. For example, your doctor may ask you to eat healthy foods, quit smoking, lose extra weight, and be more active. · If lifestyle changes don't help enough or your blood pressure is very high, you will have to take medicine every day. Follow-up care is a key part of your treatment and safety. Be sure to make and go to all appointments, and call your doctor if you are having problems. It's also a good idea to know your test results and keep a list of the medicines you take. Where can you learn more? Go to http://mickey-skye.info/. Enter P501 in the search box to learn more about \"Learning About High Blood Pressure. \" Current as of: May 10, 2017 Content Version: 11.7 © 4570-7140 [employer]mit[employer].  Care instructions adapted under license by [employer] (which disclaims liability or warranty for this information). If you have questions about a medical condition or this instruction, always ask your healthcare professional. [address] any warranty or liability for your use of this information. Insomnia: Care Instructions Your Care Instructions Insomnia is the inability to sleep well. It is a common problem for most people at some time. Insomnia may make it hard for you to get to sleep, stay asleep, or sleep as long as you need to. This can make you tired and grouchy during the day. It can also make you forgetful, less effective at work, and unhappy. Insomnia can be caused by conditions such as depression or anxiety. Pain can also affect your ability to sleep. When these problems are solved, the insomnia usually clears up. But sometimes bad sleep habits can cause insomnia. If insomnia is affecting your work or your enjoyment of life, you can take steps to improve your sleep. Follow-up care is a key part of your treatment and safety. Be sure to make and go to all appointments, and call your doctor if you are having problems. It's also a good idea to know your test results and keep a list of the medicines you take. How can you care for yourself at home? What to avoid · Do not have drinks with caffeine, such as coffee or black tea, for 8 hours before bed. · Do not smoke or use other types of tobacco near bedtime. Nicotine is a stimulant and can keep you awake. · Avoid drinking alcohol late in the evening, because it can cause you to wake in the middle of the night. · Do not eat a big meal close to bedtime. If you are hungry, eat a light snack. · Do not drink a lot of water close to bedtime, because the need to urinate may wake you up during the night. · Do not read or watch TV in bed. Use the bed only for sleeping and sexual activity. What to try · Go to bed at the same time every night, and wake up at the same time every morning. Do not take naps during the day. · Keep your bedroom quiet, dark, and cool. · Sleep on a comfortable pillow and mattress. · If watching the clock makes you anxious, turn it facing away from you so you cannot see the time. · If you worry when you lie down, start a worry book. Well before bedtime, write down your worries, and then set the book and your concerns aside. · Try meditation or other relaxation techniques before you go to bed. · If you cannot fall asleep, get up and go to another room until you feel sleepy. Do something relaxing. Repeat your bedtime routine before you go to bed again. · Make your house quiet and calm about an hour before bedtime. Turn down the lights, turn off the TV, log off the computer, and turn down the volume on music. This can help you relax after a busy day. When should you call for help? Watch closely for changes in your health, and be sure to contact your doctor if: 
  · Your efforts to improve your sleep do not work.  
  · Your insomnia gets worse.  
  · You have been feeling down, depressed, or hopeless or have lost interest in things that you usually enjoy. Where can you learn more? Go to http://mickey-skye.info/. Enter P513 in the search box to learn more about \"Insomnia: Care Instructions. \" Current as of: October 10, 2017 Content Version: 11.7 © 4296-4781 [employer]orporated. Care instructions adapted under license by Grade[employer] (which disclaims liability or warranty for this information). If you have questions about a medical condition or this instruction, always ask your healthcare professional. [place] [address] any warranty or liability for your use of this information. Sleep Apnea: Care Instructions Your Care Instructions Sleep apnea means that you frequently stop breathing for 10 seconds or longer during sleep.  It can be mild to severe, based on the number of times an hour that you stop breathing or have slowed breathing. Blocked or narrowed airways in your nose, mouth, or throat can cause sleep apnea. Your airway can become blocked when your throat muscles and tongue relax during sleep. You can treat sleep apnea at home by making lifestyle changes. You also can use a CPAP breathing machine that keeps tissues in the throat from blocking your airway. Or your doctor may suggest that you use a breathing device while you sleep. It helps keep your airway open. This could be a device that you put in your mouth. Other examples include strips or disks that you use on your nose. In some cases, surgery may be needed to remove enlarged tissues in the throat. Follow-up care is a key part of your treatment and safety. Be sure to make and go to all appointments, and call your doctor if you are having problems. It's also a good idea to know your test results and keep a list of the medicines you take. How can you care for yourself at home? · Lose weight, if needed. It may reduce the number of times you stop breathing or have slowed breathing. · Sleep on your side. It may stop mild apnea. If you tend to roll onto your back, sew a pocket in the back of your paelmenus top. Put a tennis ball into the pocket, and stitch the pocket shut. This will help keep you from sleeping on your back. · Avoid alcohol and medicines such as sleeping pills and sedatives before bed. · Do not smoke. Smoking can make sleep apnea worse. If you need help quitting, talk to your doctor about stop-smoking programs and medicines. These can increase your chances of quitting for good. · Prop up the head of your bed 4 to 6 inches by putting bricks under the legs of the bed. · Treat breathing problems, such as a stuffy nose, caused by a cold or allergies. · Try a continuous positive airway pressure (CPAP) breathing machine if your doctor recommends it. The machine keeps your airway open when you sleep. · If CPAP does not work for you, ask your doctor if you can try other breathing machines. A bilevel positive airway pressure machine uses one type of air pressure for breathing in and another type for breathing out. Another device raises or lowers air pressure as needed while you breathe. · Talk to your doctor if: 
¨ Your nose feels dry or bleeds when you use one of these machines. You may need to increase moisture in the air. A humidifier may help. ¨ Your nose is runny or stuffy from using a breathing machine. Decongestants or a corticosteroid nasal spray may help. ¨ You are sleepy during the day and it gets in the way of the normal things you do. Do not drive when you are drowsy. When should you call for help? Watch closely for changes in your health, and be sure to contact your doctor if: 
  · You still have sleep apnea even though you have made lifestyle changes.  
  · You are thinking of trying a device such as CPAP.  
  · You are having problems using a CPAP or similar machine. Where can you learn more? Go to http://mickey-skye.info/. Enter F365 in the search box to learn more about \"Sleep Apnea: Care Instructions. \" Current as of: December 6, 2017 Content Version: 11.7 © 0217-5887 Vestorly. Care instructions adapted under license by [employer] (which disclaims liability or warranty for this information). If you have questions about a medical condition or this instruction, always ask your healthcare professional. [place] [address] any warranty or liability for your use of this information. Introducing [facility]! Dear Dom Damon: 
Thank you for requesting a Charity Engine account. Our records indicate that you already have an active Charity Engine account. You can access your account anytime at [email or web address] Did you know that you can access your hospital and ER discharge instructions at any time in Cleeng? You can also review all of your test results from your hospital stay or ER visit. Additional Information If you have questions, please visit the Frequently Asked Questions section of the Cleeng website at [email or web address] Remember, Cleeng is NOT to be used for urgent needs. For medical emergencies, dial 911. Now available from your iPhone and Android! Please provide this summary of care documentation to your next provider. Your primary care clinician is listed as Cassy Garcia. If you have any questions after today's visit, please call [phone].

## 2018-09-27 ENCOUNTER — PATIENT MESSAGE (OUTPATIENT)
Dept: RHEUMATOLOGY | Age: 23
End: 2018-09-27

## 2018-09-27 DIAGNOSIS — G56.03 BILATERAL CARPAL TUNNEL SYNDROME: Primary | ICD-10-CM

## 2018-09-27 NOTE — TELEPHONE ENCOUNTER
From: Christina Dhaliwal  Sent: 9/27/2018 9:17 AM EDT  Subject: Prescription Question     never received my wrist band for my Carpal tunnel can you please send that too my pharmacy at [facility] at [address] [facility], [address]

## 2018-10-30 ENCOUNTER — HOSPITAL ENCOUNTER (EMERGENCY)
Age: 23
Discharge: HOME | End: 2018-10-30
Attending: EMERGENCY MEDICINE
Payer: COMMERCIAL

## 2018-10-30 ENCOUNTER — APPOINTMENT (OUTPATIENT)
Dept: GENERAL RADIOLOGY | Age: 23
End: 2018-10-30
Attending: EMERGENCY MEDICINE
Payer: COMMERCIAL

## 2018-10-30 VITALS
OXYGEN SATURATION: 100 % | WEIGHT: 175.04 LBS | HEIGHT: 66 IN | HEART RATE: 99 BPM | SYSTOLIC BLOOD PRESSURE: 137 MMHG | RESPIRATION RATE: 16 BRPM | BODY MASS INDEX: 28.13 KG/M2 | DIASTOLIC BLOOD PRESSURE: 98 MMHG | TEMPERATURE: 99.1 F

## 2018-10-30 DIAGNOSIS — R07.9 CHEST PAIN, UNSPECIFIED TYPE: Primary | ICD-10-CM

## 2018-10-30 DIAGNOSIS — R09.89 PHLEGM IN THROAT: ICD-10-CM

## 2018-10-30 LAB
ATRIAL RATE: 91 BPM
CALCULATED P AXIS, ECG09: 52 DEGREES
CALCULATED R AXIS, ECG10: 12 DEGREES
CALCULATED T AXIS, ECG11: 15 DEGREES
DIAGNOSIS, 93000: NORMAL
P-R INTERVAL, ECG05: 166 MS
Q-T INTERVAL, ECG07: 368 MS
QRS DURATION, ECG06: 74 MS
QTC CALCULATION (BEZET), ECG08: 452 MS
VENTRICULAR RATE, ECG03: 91 BPM

## 2018-10-30 PROCEDURE — 99283 EMERGENCY DEPT VISIT LOW MDM: CPT

## 2018-10-30 PROCEDURE — 93005 ELECTROCARDIOGRAM TRACING: CPT

## 2018-10-30 PROCEDURE — 71046 X-RAY EXAM CHEST 2 VIEWS: CPT

## 2018-10-30 NOTE — ED PROVIDER NOTES
HPI  
 
  22y F here with chest pain, sore throat, and increased phlegm production. Sx's ongoing for the past month or so. Pain in chest worse yesterday. No vomiting. Noticed when she clears her throat there are streaks of blood. No fever. No night sweats. No recent travel. No weight loss. Taking PO well. No trouble breathing. No vomiting. No stomach pain. Past Medical History:  
Diagnosis Date  Abnormal Pap smear of cervix   
 seeing OBGYN  
 Acne 10/7/2009  Allergic rhinitis 2009  Anemia 2009  ASCUS HPV+ 2017 H/o ASCUS HPV + with Dr. Enid Purcell 2017  
 CHAR III (cervical intraepithelial neoplasia III) 2017  
 found by LEEP   
 Headache  Herpes genitalia   
 dx by OB  
 History of gestational diabetes   
 diet controlled  Other ill-defined conditions(799.89)   
 scoliosis  PCB (post coital bleeding) 2014  Thoracic compression fracture ([address] 3/2014 Bobby Early  Thyromegaly 2014 On exam   
 Unspecified vitamin D deficiency 2014  Varicella 1998 Past Surgical History:  
Procedure Laterality Date  HX GYN    
    HX LEEP PROCEDURE  18  
 found CHAR 3 Family History:  
Problem Relation Age of Onset  Breast Cancer Maternal Grandmother   
     great, great gma, age?  Hypertension Maternal Grandmother  Breast Cancer Maternal Aunt 32  
     survivor  Diabetes Father  Hypertension Paternal Grandmother  Diabetes Paternal Grandmother  Osteoporosis Neg Hx Social History Socioeconomic History  Marital status: SINGLE Spouse name: Not on file  Number of children: Not on file  Years of education: Not on file  Highest education level: Not on file Social Needs  Financial resource strain: Not on file  Food insecurity - worry: Not on file  Food insecurity - inability: Not on file  Transportation needs - medical: Not on file  Transportation needs - non-medical: Not on file Occupational History  Not on file Tobacco Use  Smoking status: Never Smoker  Smokeless tobacco: Never Used Substance and Sexual Activity  Alcohol use: Yes Comment: once every 3 months, 3 drinks at a time  Drug use: No  
 Sexual activity: Yes  
  Partners: Male Birth control/protection: Injection Other Topics Concern  Not on file Social History Narrative Lives in [facility] with parents and 3 yo son. Works at [employer]. ALLERGIES: Patient has no known allergies. Review of Systems Review of Systems Constitutional: (-) weight loss. HEENT: (-) stiff neck Eyes: (-) discharge. Respiratory: (+) cough. Cardiovascular: (-) syncope. Gastrointestinal: (-) blood in stool. Genitourinary: (-) hematuria. Musculoskeletal: (-) myalgias. Neurological: (-) seizure. Skin: (-) petechiae Lymph/Immunologic: (-) enlarged lymph nodes All other systems reviewed and are negative. Vitals:  
 10/30/18 1905 BP: (!) 145/102 Pulse: (!) 103 Resp: 16 Temp: 98.4 °F (36.9 °C) SpO2: 100% Weight: 79.4 kg (175 lb 0.7 oz) Height: 5' 5.5\" (1.664 m) Physical Exam Nursing note and vitals reviewed. Constitutional: oriented to person, place, and time. appears well-developed and well-nourished. No distress. Head: Normocephalic and atraumatic. Sclera anicteric Nose: No rhinorrhea Mouth/Throat: Oropharynx is clear and moist. Pharynx normal 
Eyes: Conjunctivae are normal. Pupils are equal, round, and reactive to light. Right eye exhibits no discharge. Left eye exhibits no discharge. Neck: Painless normal range of motion. Neck supple. No LAD. Cardiovascular: Normal rate, regular rhythm, normal heart sounds and intact distal pulses. Exam reveals no gallop and no friction rub. No murmur heard. Pulmonary/Chest:  No respiratory distress. No wheezes. No rales.  No rhonchi. No increased work of breathing. No accessory muscle use. Good air exchange throughout. Abdominal: soft, non-tender, no rebound or guarding. No hepatosplenomegaly. Normal bowel sounds throughout. Back: no tenderness to palpation, no deformities, no CVA tenderness Extremities/Musculoskeletal: Normal range of motion. no tenderness. No edema. Distal extremities are neurovasc intact. Lymphadenopathy:   No adenopathy. Neurological:  Alert and oriented to person, place, and time. Coordination normal. CN 2-12 intact. Motor and sensory function intact. Skin: Skin is warm and dry. No rash noted. No pallor. MDM 23y F here with what sounds like cough, sore throat, and chest pain. Appears well here. Will check CXR and ECG. Procedures ED EKG interpretation: 
Rhythm: normal sinus rhythm; and regular . Rate (approx.): 91; Axis: normal; P wave: normal; QRS interval: normal ; ST/T wave: normal;  This EKG was interpreted by Zelalem Weir MD,ED Provider.

## 2018-10-30 NOTE — ED TRIAGE NOTES
Pt stated she has \"phlegm\" in her throat x 2 months, denies cough, denies fever, some of her phlegm has blood streaks in it, feels cold, +sore throat

## 2018-10-30 NOTE — DISCHARGE INSTRUCTIONS

## 2018-11-13 ENCOUNTER — HOSPITAL ENCOUNTER (EMERGENCY)
Age: 23
Discharge: HOME | End: 2018-11-14
Attending: EMERGENCY MEDICINE
Payer: COMMERCIAL

## 2018-11-13 VITALS
SYSTOLIC BLOOD PRESSURE: 142 MMHG | DIASTOLIC BLOOD PRESSURE: 103 MMHG | TEMPERATURE: 98.4 F | RESPIRATION RATE: 18 BRPM | HEART RATE: 95 BPM | OXYGEN SATURATION: 98 %

## 2018-11-13 DIAGNOSIS — K60.2 ANAL FISSURE: ICD-10-CM

## 2018-11-13 DIAGNOSIS — K62.5 RECTAL BLEEDING: Primary | ICD-10-CM

## 2018-11-13 LAB
ANION GAP BLD CALC-SCNC: 20 MMOL/L (ref 10–20)
BUN BLD-MCNC: 11 MG/DL (ref 9–20)
CA-I BLD-MCNC: 1.23 MMOL/L (ref 1.12–1.32)
CHLORIDE BLD-SCNC: 102 MMOL/L (ref 98–107)
CO2 BLD-SCNC: 26 MMOL/L (ref 21–32)
CREAT BLD-MCNC: 0.7 MG/DL (ref 0.6–1.3)
GLUCOSE BLD-MCNC: 115 MG/DL (ref 65–100)
HCT VFR BLD CALC: 41 % (ref 35–47)
POTASSIUM BLD-SCNC: 3.5 MMOL/L (ref 3.5–5.1)
SERVICE CMNT-IMP: ABNORMAL
SODIUM BLD-SCNC: 143 MMOL/L (ref 136–145)

## 2018-11-13 PROCEDURE — 80047 BASIC METABLC PNL IONIZED CA: CPT

## 2018-11-13 PROCEDURE — 99282 EMERGENCY DEPT VISIT SF MDM: CPT

## 2018-11-14 RX ORDER — HYDROCORTISONE ACETATE 25 MG/1
25 SUPPOSITORY RECTAL EVERY 12 HOURS
Qty: 14 SUPPOSITORY | Refills: 0 | Status: SHIPPED | OUTPATIENT
Start: 2018-11-14 | End: 2018-11-28

## 2018-11-14 NOTE — ED TRIAGE NOTES
Pt arrives ambulatory c/o rectal bleeding. Pt states her last BM was Sunday 11/11/18, with blood, clots, and green mucous. Also c/o fatigue, lower abd cramping pain, and chronic constipation that is not relieved with laxatives or stool softeners. Denies N/V Pt states she had an enema a year ago and \"has not been right since,\" with some rectal bleeding. States rectal bleeding has increased and has seen blood in underwear.

## 2018-11-14 NOTE — ED NOTES
Provider reviewed discharge instructions and options with patient and patient verbalized understanding. RN reviewed discharge instructions using teachback method. Pt ambulated to exit without difficulty and in no signs of acute distress. Patient was counseled on medications prescribed at discharge. VSS at time of discharge. No complaints, needs, or questions at this time. Pt to call PCP and Dr. Peng Holguin in the morning for follow up.

## 2018-11-14 NOTE — ED PROVIDER NOTES
This is a 19 yo AAF with complaint of rectal bleeding when attempting to past bowels with associated pressure and mized with mucous since noticed a few drops of bright red blood in the undergarments and with wiping for past three days intermittently. No bleeding on yesterday. Some mild lower abdominal cramping associated with fatigue. One yr hx of pain with BM. No antigoagulation use. No new foods. Mild HA described as nml. Denies ear pain, sore throat, cough, runny nose, chest pain, SOB, dysuria, urinary frequency or urgency. Past Medical History:  
Diagnosis Date  Abnormal Pap smear of cervix   
 seeing OBGYN  
 Acne 10/7/2009  Allergic rhinitis 2009  Anemia 2009  ASCUS HPV+ 2017 H/o ASCUS HPV + with Dr. Collin Owens 2017  
 CHAR III (cervical intraepithelial neoplasia III) 2017  
 found by LEEP   
 Headache  Herpes genitalia   
 dx by OB  
 History of gestational diabetes   
 diet controlled  Other ill-defined conditions(799.89)   
 scoliosis  PCB (post coital bleeding) 2014  Thoracic compression fracture ([address] 3/2014 John Matos  Thyromegaly 2014 On exam   
 Unspecified vitamin D deficiency 2014  Varicella 1998 Past Surgical History:  
Procedure Laterality Date  HX GYN    
    HX LEEP PROCEDURE  18  
 found CHAR 3 Family History:  
Problem Relation Age of Onset  Breast Cancer Maternal Grandmother   
     great, great gma, age?  Hypertension Maternal Grandmother  Breast Cancer Maternal Aunt 32  
     survivor  Diabetes Father  Hypertension Paternal Grandmother  Diabetes Paternal Grandmother  Osteoporosis Neg Hx Social History Socioeconomic History  Marital status: SINGLE Spouse name: Not on file  Number of children: Not on file  Years of education: Not on file  Highest education level: Not on file Social Needs  Financial resource strain: Not on file  Food insecurity - worry: Not on file  Food insecurity - inability: Not on file  Transportation needs - medical: Not on file  Transportation needs - non-medical: Not on file Occupational History  Not on file Tobacco Use  Smoking status: Never Smoker  Smokeless tobacco: Never Used Substance and Sexual Activity  Alcohol use: Yes Comment: once every 3 months, 3 drinks at a time  Drug use: No  
 Sexual activity: Yes  
  Partners: Male Birth control/protection: Injection Other Topics Concern  Not on file Social History Narrative Lives in [facility] with parents and 3 yo son. Works at [employer]. ALLERGIES: Patient has no known allergies. Review of Systems Constitutional: Negative. Negative for chills, fatigue and fever. HENT: Negative. Negative for congestion, ear pain, facial swelling, rhinorrhea, sneezing and sore throat. Eyes: Negative for pain, discharge and itching. Respiratory: Negative for cough, chest tightness and shortness of breath. Cardiovascular: Negative. Negative for chest pain and leg swelling. Gastrointestinal: Positive for abdominal pain and blood in stool. Negative for abdominal distention, constipation, diarrhea, nausea and vomiting. Genitourinary: Negative for difficulty urinating, frequency and urgency. Musculoskeletal: Negative for arthralgias, back pain, joint swelling, neck pain and neck stiffness. Skin: Negative for color change and rash. Neurological: Negative for dizziness, numbness and headaches. Psychiatric/Behavioral: Negative for confusion and decreased concentration. All other systems reviewed and are negative. Vitals:  
 11/13/18 2253 Pulse: (!) 105 SpO2: 100% Physical Exam  
Constitutional: She is oriented to person, place, and time. She appears well-developed and well-nourished. No distress. Well appearing AAF in NAD HENT:  
Head: Normocephalic and atraumatic. Right Ear: External ear normal.  
Left Ear: External ear normal.  
Nose: Nose normal.  
Mouth/Throat: Oropharynx is clear and moist. No oropharyngeal exudate. Eyes: Conjunctivae and EOM are normal. Pupils are equal, round, and reactive to light. Right eye exhibits no discharge. Left eye exhibits no discharge. No scleral icterus. Neck: Normal range of motion. Neck supple. Cardiovascular: Regular rhythm. Exam reveals no gallop and no friction rub. No murmur heard. Pulmonary/Chest: Effort normal and breath sounds normal. She has no wheezes. She has no rales. Abdominal: Soft. Bowel sounds are normal. She exhibits no distension. There is no tenderness. There is no rebound and no guarding. Genitourinary: Rectal exam shows fissure (~3 oclock non-bleeding). Rectal exam shows no external hemorrhoid and no internal hemorrhoid. Neurological: She is alert and oriented to person, place, and time. No cranial nerve deficit. Coordination normal.  
Skin: Skin is warm and dry. She is not diaphoretic. Psychiatric: She has a normal mood and affect. Her behavior is normal.  
Nursing note and vitals reviewed. MDM Number of Diagnoses or Management Options Anal fissure:  
Rectal bleeding:  
Diagnosis management comments: This is a 19 yo AAF with complaint of rectal bleeding in setting of chronic constipation. ? Internal hemorrhoid vs related to fissure. No active bleeding noted on exam. Will recommend high fiber diet, and GI eval to assess for other more possible etiologies. Hemodynamically stable with hct 41. April C [place], [place] Procedures

## 2018-11-14 NOTE — DISCHARGE INSTRUCTIONS
Anal Fissure: Care Instructions  Your Care Instructions  An anal fissure is a tear in the lining of the lower rectum (anus). It can itch and cause pain. You may notice bright red blood on toilet paper after you wipe. A fissure may form if you are constipated and try to pass a large, hard stool or if you do not relax your anal muscles during a bowel movement. Most anal fissures heal with home treatment after a few days or weeks. If you have an anal fissure that takes more time to heal, your doctor may prescribe medicine. In rare cases, surgery may be needed. Anal fissures do not lead to colon cancer or other serious illnesses. However, if you have blood mixed in with the stool, talk to your doctor. Follow-up care is a key part of your treatment and safety. Be sure to make and go to all appointments, and call your doctor if you are having problems. It's also a good idea to know your test results and keep a list of the medicines you take. How can you care for yourself at home? · If your doctor prescribed cream or ointment, use it exactly as prescribed. Call your doctor if you think you are having a problem with your medicine. You will get more details on the specific medicines your doctor prescribes. · Sit in a few inches of warm water (sitz bath) 3 times a day and after bowel movements. The warm water helps the area heal and eases discomfort. Do not put soaps, salts, or shampoos in the water. · Avoid constipation:  ? Include fruits, vegetables, beans, and whole grains in your diet each day. These foods are high in fiber. ? Drink plenty of fluids, enough so that your urine is light yellow or clear like water. If you have kidney, heart, or liver disease and have to limit fluids, talk with your doctor before you increase the amount of fluids you drink. ? Get some exercise every day. Build up slowly to 30 to 60 minutes a day on 5 or more days of the week.   ? Take a fiber supplement, such as Benefiber, Citrucel, or Metamucil, every day if needed. Read and follow all instructions on the label. ? Use the toilet when you feel the urge. Or when you can, schedule time each day for a bowel movement. A daily routine may help. Take your time and do not strain when having a bowel movement. But do not sit on the toilet too long. · Support your feet with a small step stool when you sit on the toilet. This helps flex your hips and places your pelvis in a squatting position. · Your doctor may recommend an over-the-counter laxative, such as Miralax, Milk of Magnesia, or Ex-Lax. Read and follow all instructions on the label, and do not use these medicines on a long-term basis. · Do not use over-the-counter ointments or creams without talking to your doctor. Some of these preparations may not help. · Use baby wipes or medicated pads, such as Preparation H or Tucks, instead of toilet paper to clean after a bowel movement. These products do not irritate the anus. · Be safe with medicines. Read and follow all instructions on the label. ? If the doctor gave you a prescription medicine for pain, take it as prescribed. ? If you are not taking a prescription pain medicine, ask your doctor if you can take an over-the-counter medicine. When should you call for help? Call your doctor now or seek immediate medical care if:    · You have new or worse pain.     · You have new or worse bleeding from the rectum.    Watch closely for changes in your health, and be sure to contact your doctor if:    · You have trouble passing stools.     · You do not get better as expected. Where can you learn more? Go to http://mickey-skye.info/. Enter A844 in the search box to learn more about \"Anal Fissure: Care Instructions. \"  Current as of: March 28, 2018  Content Version: 11.8  © 9172-2384 [employer].  Care instructions adapted under license by [employer] (which disclaims liability or warranty for this information). If you have questions about a medical condition or this instruction, always ask your healthcare professional. [place] [address] any warranty or liability for your use of this information.

## 2018-11-28 ENCOUNTER — OFFICE VISIT (OUTPATIENT)
Dept: URGENT CARE | Age: 23
End: 2018-11-28

## 2018-11-28 VITALS
TEMPERATURE: 97.1 F | HEIGHT: 66 IN | SYSTOLIC BLOOD PRESSURE: 139 MMHG | WEIGHT: 175 LBS | DIASTOLIC BLOOD PRESSURE: 100 MMHG | BODY MASS INDEX: 28.12 KG/M2 | HEART RATE: 93 BPM | OXYGEN SATURATION: 100 % | RESPIRATION RATE: 18 BRPM

## 2018-11-28 DIAGNOSIS — J40 BRONCHITIS: Primary | ICD-10-CM

## 2018-11-28 DIAGNOSIS — J32.9 SINUSITIS, UNSPECIFIED CHRONICITY, UNSPECIFIED LOCATION: ICD-10-CM

## 2018-11-28 RX ORDER — PREDNISONE 5 MG/1
TABLET ORAL
Qty: 21 TAB | Refills: 0 | Status: SHIPPED | OUTPATIENT
Start: 2018-11-28 | End: 2019-05-30

## 2018-11-28 RX ORDER — AZITHROMYCIN 250 MG/1
TABLET, FILM COATED ORAL
Qty: 6 TAB | Refills: 0 | Status: SHIPPED | OUTPATIENT
Start: 2018-11-28 | End: 2019-05-30

## 2018-11-28 NOTE — PROGRESS NOTES
Cough   The history is provided by the patient. This is a new problem. Episode onset: 3 weeks ago; had normal CXR 1 month ago. The problem occurs constantly. The problem has been gradually worsening. The cough is productive of sputum. There has been no fever. Associated symptoms include rhinorrhea. Pertinent negatives include no chest pain, no chills, no sweats, no ear congestion, no ear pain, no headaches, no sore throat, no myalgias, no shortness of breath, no wheezing, no nausea and no vomiting. Associated symptoms comments: Chest tightness  Chest congestion  Sinus pressure/pain. She has tried decongestants for the symptoms. The treatment provided no relief. She is not a smoker. Past Medical History:   Diagnosis Date    Abnormal Pap smear of cervix     seeing OBGYN    Acne 10/7/2009    Allergic rhinitis 2009    Anemia 2009    ASCUS HPV+ 2017    H/o ASCUS HPV + with Dr. Benton Myers 2017    CHAR III (cervical intraepithelial neoplasia III) 2017    found by LEEP     Headache     Herpes genitalia     dx by OB    History of gestational diabetes     diet controlled    Other ill-defined conditions(799.89)     scoliosis    PCB (post coital bleeding) 2014    Thoracic compression fracture ([address] 3/2014    [place]    Thyromegaly 2014    On exam     Unspecified vitamin D deficiency 2014    Varicella 1998        Past Surgical History:   Procedure Laterality Date    HX GYN           HX LEEP PROCEDURE  18    found CHAR 3         Family History   Problem Relation Age of Onset    Breast Cancer Maternal Grandmother         great, great gma, age?     Hypertension Maternal Grandmother     Breast Cancer Maternal Aunt 32        survivor    Diabetes Father     Hypertension Paternal Grandmother     Diabetes Paternal Grandmother     Osteoporosis Neg Hx         Social History     Socioeconomic History    Marital status: SINGLE     Spouse name: Not on file    Number of children: Not on file    Years of education: Not on file    Highest education level: Not on file   Social Needs    Financial resource strain: Not on file    Food insecurity - worry: Not on file    Food insecurity - inability: Not on file    Transportation needs - medical: Not on file   [employer] needs - non-medical: Not on file   Occupational History    Not on file   Tobacco Use    Smoking status: Never Smoker    Smokeless tobacco: Never Used   Substance and Sexual Activity    Alcohol use: Yes     Comment: once every 3 months, 3 drinks at a time    Drug use: No    Sexual activity: Yes     Partners: Male     Birth control/protection: Injection   Other Topics Concern    Not on file   Social History Narrative    Lives in [facility] with parents and 3 yo son. Works at [employer]. ALLERGIES: Patient has no known allergies. Review of Systems   Constitutional: Negative for activity change, appetite change, chills and fever. HENT: Positive for congestion, rhinorrhea, sinus pressure and sinus pain. Negative for ear pain, sore throat and trouble swallowing. Respiratory: Positive for cough and chest tightness. Negative for shortness of breath and wheezing. Cardiovascular: Negative for chest pain and palpitations. Gastrointestinal: Negative for nausea and vomiting. Musculoskeletal: Negative for myalgias. Neurological: Negative for dizziness and headaches. Hematological: Negative for adenopathy. Vitals:    11/28/18 0909 11/28/18 0930   BP: (!) 163/113 (!) 139/100   Pulse: 93    Resp: 18    Temp: 97.1 °F (36.2 °C)    TempSrc: Tympanic    SpO2: 100%    Weight: 175 lb (79.4 kg)    Height: 5' 5.5\" (1.664 m)      Pt had not taken BP medication today. Physical Exam   Constitutional: She appears well-developed and well-nourished. No distress.    HENT:   Right Ear: Tympanic membrane, external ear and ear canal normal. Left Ear: Tympanic membrane, external ear and ear canal normal.   Nose: Rhinorrhea present. Right sinus exhibits maxillary sinus tenderness. Right sinus exhibits no frontal sinus tenderness. Left sinus exhibits maxillary sinus tenderness. Left sinus exhibits no frontal sinus tenderness. Mouth/Throat: Oropharynx is clear and moist and mucous membranes are normal. No oropharyngeal exudate, posterior oropharyngeal edema, posterior oropharyngeal erythema or tonsillar abscesses. Cardiovascular: Normal rate, regular rhythm and normal heart sounds. Pulmonary/Chest: Effort normal and breath sounds normal. No respiratory distress. She has no wheezes. She has no rales. Lymphadenopathy:     She has no cervical adenopathy. Neurological: She is alert. Skin: She is not diaphoretic. Psychiatric: She has a normal mood and affect. Her behavior is normal. Judgment and thought content normal.   Nursing note and vitals reviewed. MDM    ICD-10-CM ICD-9-CM    1. Bronchitis J40 490    2. Sinusitis, unspecified chronicity, unspecified location J32.9 473.9      Medications Ordered Today   Medications    azithromycin (ZITHROMAX) 250 mg tablet     Sig: Take two tablets today then one tablet daily     Dispense:  6 Tab     Refill:  0    predniSONE (STERAPRED) 5 mg dose pack     Sig: See administration instruction per 5mg dose pack     Dispense:  21 Tab     Refill:  0     The patients condition was discussed with the patient and they understand. The patient is to follow up with PCP. If signs and symptoms become worse the pt is to go to the ER. The patient is to take medications as prescribed.              Procedures

## 2018-11-28 NOTE — PATIENT INSTRUCTIONS
Bronchitis: Care Instructions  Your Care Instructions    Bronchitis is inflammation of the bronchial tubes, which carry air to the lungs. The tubes swell and produce mucus, or phlegm. The mucus and inflamed bronchial tubes make you cough. You may have trouble breathing. Most cases of bronchitis are caused by viruses like those that cause colds. Antibiotics usually do not help and they may be harmful. Bronchitis usually develops rapidly and lasts about 2 to 3 weeks in otherwise healthy people. Follow-up care is a key part of your treatment and safety. Be sure to make and go to all appointments, and call your doctor if you are having problems. It's also a good idea to know your test results and keep a list of the medicines you take. How can you care for yourself at home? · Take all medicines exactly as prescribed. Call your doctor if you think you are having a problem with your medicine. · Get some extra rest.  · Take an over-the-counter pain medicine, such as acetaminophen (Tylenol), ibuprofen (Advil, Motrin), or naproxen (Aleve) to reduce fever and relieve body aches. Read and follow all instructions on the label. · Do not take two or more pain medicines at the same time unless the doctor told you to. Many pain medicines have acetaminophen, which is Tylenol. Too much acetaminophen (Tylenol) can be harmful. · Take an over-the-counter cough medicine that contains dextromethorphan to help quiet a dry, hacking cough so that you can sleep. Avoid cough medicines that have more than one active ingredient. Read and follow all instructions on the label. · Breathe moist air from a humidifier, hot shower, or sink filled with hot water. The heat and moisture will thin mucus so you can cough it out. · Do not smoke. Smoking can make bronchitis worse. If you need help quitting, talk to your doctor about stop-smoking programs and medicines. These can increase your chances of quitting for good.   When should you call for help? Call 911 anytime you think you may need emergency care. For example, call if:    · You have severe trouble breathing.    Call your doctor now or seek immediate medical care if:    · You have new or worse trouble breathing.     · You cough up dark brown or bloody mucus (sputum).     · You have a new or higher fever.     · You have a new rash.    Watch closely for changes in your health, and be sure to contact your doctor if:    · You cough more deeply or more often, especially if you notice more mucus or a change in the color of your mucus.     · You are not getting better as expected. Where can you learn more? Go to http://mickey-skye.info/. Enter H333 in the search box to learn more about \"Bronchitis: Care Instructions. \"  Current as of: December 6, 2017  Content Version: 11.8  © 1701-4007 [employer]. Care instructions adapted under license by [employer] (which disclaims liability or warranty for this information). If you have questions about a medical condition or this instruction, always ask your healthcare professional. [place] [address] any warranty or liability for your use of this information. Sinusitis: Care Instructions  Your Care Instructions    Sinusitis is an infection of the lining of the sinus cavities in your head. Sinusitis often follows a cold. It causes pain and pressure in your head and face. In most cases, sinusitis gets better on its own in 1 to 2 weeks. But some mild symptoms may last for several weeks. Sometimes antibiotics are needed. Follow-up care is a key part of your treatment and safety. Be sure to make and go to all appointments, and call your doctor if you are having problems. It's also a good idea to know your test results and keep a list of the medicines you take. How can you care for yourself at home?   · Take an over-the-counter pain medicine, such as acetaminophen (Tylenol), ibuprofen (Advil, Motrin), or naproxen (Aleve). Read and follow all instructions on the label. · If the doctor prescribed antibiotics, take them as directed. Do not stop taking them just because you feel better. You need to take the full course of antibiotics. · Be careful when taking over-the-counter cold or flu medicines and Tylenol at the same time. Many of these medicines have acetaminophen, which is Tylenol. Read the labels to make sure that you are not taking more than the recommended dose. Too much acetaminophen (Tylenol) can be harmful. · Breathe warm, moist air from a steamy shower, a hot bath, or a sink filled with hot water. Avoid cold, dry air. Using a humidifier in your home may help. Follow the directions for cleaning the machine. · Use saline (saltwater) nasal washes to help keep your nasal passages open and wash out mucus and bacteria. You can buy saline nose drops at a grocery store or drugstore. Or you can make your own at home by adding 1 teaspoon of salt and 1 teaspoon of baking soda to 2 cups of distilled water. If you make your own, fill a bulb syringe with the solution, insert the tip into your nostril, and squeeze gently. Sudie Amanuel your nose. · Put a hot, wet towel or a warm gel pack on your face 3 or 4 times a day for 5 to 10 minutes each time. · Try a decongestant nasal spray like oxymetazoline (Afrin). Do not use it for more than 3 days in a row. Using it for more than 3 days can make your congestion worse. When should you call for help? Call your doctor now or seek immediate medical care if:    · You have new or worse swelling or redness in your face or around your eyes.     · You have a new or higher fever.    Watch closely for changes in your health, and be sure to contact your doctor if:    · You have new or worse facial pain.     · The mucus from your nose becomes thicker (like pus) or has new blood in it.     · You are not getting better as expected. Where can you learn more?   Go to http://mickey-skye.info/. Enter K574 in the search box to learn more about \"Sinusitis: Care Instructions. \"  Current as of: March 28, 2018  Content Version: 11.8  © 0701-8365 [employer]. Care instructions adapted under license by "[employer] (which disclaims liability or warranty for this information). If you have questions about a medical condition or this instruction, always ask your healthcare professional. [place] [address] any warranty or liability for your use of this information.

## 2018-11-28 NOTE — LETTER
[address] 
 
[address] [place] [address]. [place] Cannon [address] 
[phone] Work/School Note Date: 11/28/2018 To Whom It May concern: 
 
Hilda Kirk was seen and treated today in the urgent care center. Hilda Kirk may return to work on 11/29/2018. Sincerely, Glenny Gruber MD

## 2018-12-02 ENCOUNTER — OFFICE VISIT (OUTPATIENT)
Dept: URGENT CARE | Age: 23
End: 2018-12-02

## 2018-12-02 VITALS
BODY MASS INDEX: 29.16 KG/M2 | HEART RATE: 100 BPM | WEIGHT: 175 LBS | TEMPERATURE: 98.2 F | RESPIRATION RATE: 18 BRPM | SYSTOLIC BLOOD PRESSURE: 178 MMHG | HEIGHT: 65 IN | OXYGEN SATURATION: 98 % | DIASTOLIC BLOOD PRESSURE: 118 MMHG

## 2018-12-02 DIAGNOSIS — Z20.2 EXPOSURE TO SEXUALLY TRANSMITTED DISEASE (STD): Primary | ICD-10-CM

## 2018-12-02 LAB
HCG URINE, QL. (POC): NEGATIVE
VALID INTERNAL CONTROL?: YES

## 2018-12-02 RX ORDER — CEFTRIAXONE 250 MG/8ML
250 INJECTION, POWDER, FOR SOLUTION INTRAMUSCULAR; INTRAVENOUS ONCE
Status: COMPLETED | OUTPATIENT
Start: 2018-12-02 | End: 2018-12-02

## 2018-12-02 RX ORDER — DOXYCYCLINE 100 MG/1
100 TABLET ORAL 2 TIMES DAILY
Qty: 14 TAB | Refills: 0 | Status: SHIPPED | OUTPATIENT
Start: 2018-12-02 | End: 2018-12-09

## 2018-12-02 RX ADMIN — CEFTRIAXONE 250 MG: 250 INJECTION, POWDER, FOR SOLUTION INTRAMUSCULAR; INTRAVENOUS at 14:36

## 2018-12-02 NOTE — PATIENT INSTRUCTIONS
Safer Sex: Care Instructions  Your Care Instructions  Safer sex is a way to reduce your risk of getting an infection spread through sex. It can also help prevent pregnancy. Most infections that are spread through sex, also called sexually transmitted infections or STIs, can be cured. But some can decrease your chances of getting pregnant if they are not treated early. Others, such as herpes, have no cure. And some, such as HIV, can be deadly. Several products can help you practice safer sex and reduce your chance of STIs. One of the best is a condom. There are condoms for men and for women. The female condom is a tube of soft plastic with a closed end that is placed deep into the vagina. You can use a special rubber sheet (dental dam) for protection during oral sex. Latex gloves can keep your hands from touching blood, semen, or other body fluids that can carry infections. Remember that birth control methods such as diaphragms, IUDs, foams, and birth control pills do not stop you from getting STIs. Follow-up care is a key part of your treatment and safety. Be sure to make and go to all appointments, and call your doctor if you are having problems. It's also a good idea to know your test results and keep a list of the medicines you take. How can you care for yourself at home? · Think about getting shots to prevent hepatitis A and hepatitis B. These two diseases can be spread through sex. You also can get hepatitis A if you eat infected food. · Use condoms or female condoms each time and every time you have sex. · Learn the right way to use a male condom:  ? Condoms come in several sizes. Make sure you use the right size. A condom that is too small can break easily. A condom that is too big can slip off during sex. Use a new condom each time you have sex. ? Be careful not to poke a hole in the condom when you open the wrapper. ? Squeeze the tip of the condom to keep out air.   ? Pull down the loose skin (foreskin) from the head of an uncircumcised penis. ? While squeezing the tip of the condom, unroll it all the way down to the base of the firm penis. ? Never use petroleum jelly (such as Vaseline), grease, hand lotion, baby oil, or anything with oil in it. These products can make holes in the condom. ? After sex, hold the condom on your penis as you remove your penis from your partner. This will keep semen from spilling out of the condom. · Learn to use a female condom:  ? You can put in a female condom up to 8 hours before sex. ? Squeeze the smaller ring at the closed end and insert it deep into the vagina. The larger ring at the open end should stay outside the vagina. ? During sex, make sure the penis goes into the condom. ? After the penis is removed, close the open end of the condom by twisting it. Remove the condom. · Do not use a female condom and male condom at the same time. · Do not have sex with anyone who has symptoms of an STI, such as sores on the genitals or mouth. The herpes virus that causes cold sores can spread to and from the penis and vagina. · Do not drink a lot of alcohol or use drugs before sex. This can cause you to let down your guard and not practice safer sex. · Having one sex partner (who does not have STIs and does not have sex with anyone else) is a sure way to avoid STIs. · Talk to your partner before you have sex. Find out if he or she has or is at risk for any STI. Keep in mind that a person may be able to spread an STI even if he or she does not have symptoms. You and your partner may want to get an HIV test. You should get tested again 6 months later. Where can you learn more? Go to http://mickey-skye.info/. Enter P763 in the search box to learn more about \"Safer Sex: Care Instructions. \"  Current as of: November 27, 2017  Content Version: 11.8  © 9967-4294 [employer].  Care instructions adapted under license by [employer] (which disclaims liability or warranty for this information). If you have questions about a medical condition or this instruction, always ask your healthcare professional. [address] any warranty or liability for your use of this information.

## 2018-12-02 NOTE — PROGRESS NOTES
Reports her partner was treated for gonnorhea. She has pelvic pain and dysuria. There is a vaginal discharge. Past Medical History:   Diagnosis Date    Abnormal Pap smear of cervix     seeing OBGYN    Acne 10/7/2009    Allergic rhinitis 2009    Anemia 2009    ASCUS HPV+ 2017    H/o ASCUS HPV + with Dr. Rodriguez Mcfadden 2017    CHAR III (cervical intraepithelial neoplasia III) 2017    found by LEEP     Headache     Herpes genitalia     dx by OB    History of gestational diabetes     diet controlled    Other ill-defined conditions(799.89)     scoliosis    PCB (post coital bleeding) 2014    Thoracic compression fracture ([address] 3/2014    Vallerie Ballard    Thyromegaly 2014    On exam     Unspecified vitamin D deficiency 2014    Varicella 1998        Past Surgical History:   Procedure Laterality Date    HX GYN           HX LEEP PROCEDURE  18    found CHAR 3         Family History   Problem Relation Age of Onset    Breast Cancer Maternal Grandmother         great, great gma, age?     Hypertension Maternal Grandmother     Breast Cancer Maternal Aunt 32        survivor    Diabetes Father     Hypertension Paternal Grandmother     Diabetes Paternal Grandmother     Osteoporosis Neg Hx         Social History     Socioeconomic History    Marital status: SINGLE     Spouse name: Not on file    Number of children: Not on file    Years of education: Not on file    Highest education level: Not on file   Social Needs    Financial resource strain: Not on file    Food insecurity - worry: Not on file    Food insecurity - inability: Not on file   [employer] needs - medical: Not on file   [employer] needs - non-medical: Not on file   Occupational History    Not on file   Tobacco Use    Smoking status: Never Smoker    Smokeless tobacco: Never Used   Substance and Sexual Activity    Alcohol use: Yes     Comment: once every 3 months, 3 drinks at a time    Drug use: No    Sexual activity: Yes     Partners: Male     Birth control/protection: Injection   Other Topics Concern    Not on file   Social History Narrative    Lives in [facility] with parents and 3 yo son. Works at [employer]. ALLERGIES: Patient has no known allergies. Review of Systems   Genitourinary: Positive for dysuria, pelvic pain and vaginal discharge. Vitals:    12/02/18 1338 12/02/18 1404   BP:  (!) 178/118   Pulse:  100   Resp:  18   Temp:  98.2 °F (36.8 °C)   SpO2:  98%   Weight: 175 lb (79.4 kg)    Height: 5' 5\" (1.651 m)        Physical Exam   Constitutional: She appears well-developed and well-nourished. Neurological: She is alert. Psychiatric: She has a normal mood and affect. Her behavior is normal.   Nursing note and vitals reviewed. MDM     Differential Diagnosis; Clinical Impression; Plan:     STI exposure-cautioned to use condoms and ensure her partner has been treated prior to resuming sex. Recommended HIV test with her PCP. Will treat presumptively. Uncontrolled HTN- Rechecked manually in the left arm and it was 140/100. Did not take her BP meds today and advised she f/u with her PCP.   Amount and/or Complexity of Data Reviewed:   Clinical lab tests:  Ordered  Progress:   Patient progress:  Stable      Procedures

## 2018-12-05 LAB
C TRACH RRNA SPEC QL NAA+PROBE: POSITIVE
N GONORRHOEA RRNA SPEC QL NAA+PROBE: NEGATIVE

## 2018-12-05 NOTE — PROGRESS NOTES
Pt made aware of + test results, instructed to complete treatment, f/u with GYN is symptoms persist and notify sexual partners.

## 2018-12-08 ENCOUNTER — TELEPHONE (OUTPATIENT)
Dept: SLEEP MEDICINE | Age: 23
End: 2018-12-08

## 2019-02-27 PROCEDURE — 99283 EMERGENCY DEPT VISIT LOW MDM: CPT

## 2019-02-27 PROCEDURE — 96372 THER/PROPH/DIAG INJ SC/IM: CPT

## 2019-02-28 ENCOUNTER — HOSPITAL ENCOUNTER (EMERGENCY)
Age: 24
Discharge: HOME | End: 2019-02-28
Attending: EMERGENCY MEDICINE
Payer: COMMERCIAL

## 2019-02-28 ENCOUNTER — APPOINTMENT (OUTPATIENT)
Dept: GENERAL RADIOLOGY | Age: 24
End: 2019-02-28
Attending: EMERGENCY MEDICINE
Payer: COMMERCIAL

## 2019-02-28 VITALS
TEMPERATURE: 98.5 F | SYSTOLIC BLOOD PRESSURE: 149 MMHG | DIASTOLIC BLOOD PRESSURE: 107 MMHG | HEART RATE: 90 BPM | OXYGEN SATURATION: 99 % | RESPIRATION RATE: 16 BRPM

## 2019-02-28 DIAGNOSIS — S39.012A BACK STRAIN, INITIAL ENCOUNTER: Primary | ICD-10-CM

## 2019-02-28 LAB
APPEARANCE UR: CLEAR
BACTERIA URNS QL MICRO: NEGATIVE /HPF
BILIRUB UR QL: NEGATIVE
COLOR UR: ABNORMAL
EPITH CASTS URNS QL MICRO: ABNORMAL /LPF
GLUCOSE UR STRIP.AUTO-MCNC: NEGATIVE MG/DL
HGB UR QL STRIP: ABNORMAL
HYALINE CASTS URNS QL MICRO: ABNORMAL /LPF (ref 0–5)
KETONES UR QL STRIP.AUTO: NEGATIVE MG/DL
LEUKOCYTE ESTERASE UR QL STRIP.AUTO: NEGATIVE
NITRITE UR QL STRIP.AUTO: NEGATIVE
PH UR STRIP: 6 [PH] (ref 5–8)
PROT UR STRIP-MCNC: NEGATIVE MG/DL
RBC #/AREA URNS HPF: ABNORMAL /HPF (ref 0–5)
SP GR UR REFRACTOMETRY: 1.02 (ref 1–1.03)
UR CULT HOLD, URHOLD: NORMAL
UROBILINOGEN UR QL STRIP.AUTO: 1 EU/DL (ref 0.2–1)
WBC URNS QL MICRO: ABNORMAL /HPF (ref 0–4)

## 2019-02-28 PROCEDURE — 81001 URINALYSIS AUTO W/SCOPE: CPT

## 2019-02-28 PROCEDURE — 72100 X-RAY EXAM L-S SPINE 2/3 VWS: CPT

## 2019-02-28 PROCEDURE — 74011250636 HC RX REV CODE- 250/636: Performed by: EMERGENCY MEDICINE

## 2019-02-28 RX ORDER — KETOROLAC TROMETHAMINE 30 MG/ML
30 INJECTION, SOLUTION INTRAMUSCULAR; INTRAVENOUS
Status: COMPLETED | OUTPATIENT
Start: 2019-02-28 | End: 2019-02-28

## 2019-02-28 RX ORDER — NAPROXEN 500 MG/1
500 TABLET ORAL 2 TIMES DAILY WITH MEALS
Qty: 20 TAB | Refills: 0 | Status: SHIPPED | OUTPATIENT
Start: 2019-02-28 | End: 2019-05-30

## 2019-02-28 RX ORDER — CYCLOBENZAPRINE HCL 10 MG
10 TABLET ORAL 2 TIMES DAILY
Qty: 20 TAB | Refills: 0 | Status: SHIPPED | OUTPATIENT
Start: 2019-02-28 | End: 2019-05-30

## 2019-02-28 RX ADMIN — KETOROLAC TROMETHAMINE 30 MG: 30 INJECTION, SOLUTION INTRAMUSCULAR; INTRAVENOUS at 00:49

## 2019-02-28 NOTE — ED TRIAGE NOTES
Patient ambulatory to ER with cc of lower back pain x 3 days that radiates to lower abdomen. Pt reports hx of abnormal pap smear. Most recent menstrual cycle was \"3 years ago\". Pt denies dysuria, urinary frequency, n/v, or direct injury.

## 2019-02-28 NOTE — LETTER
[address] 
[address] [facility] [address] [id number] 
[phone] Work/School Note Date: 2/27/2019 To Whom It May concern: 
 
Elyse Lawrence was seen and treated today in the emergency room by the following provider(s): 
Attending Provider: Dequan Greer MD.   
 
Elyse Lawrence may return to work on 03/2/2019. Sincerely, Jey Pedro MD

## 2019-02-28 NOTE — ED PROVIDER NOTES
Anastacia Gunter. Dick Collado  is a 20 y.o. female who presents ambulatory to the ED with a c/o lower back pain which radiates around to her abdomen onset 3 days ago. Pt currently rates her pain at 10/10 in severity and notes significant pain with movement, laying down, and sitting up. Pt reports that she has been unable to sleep or work due to the pain and states that it is getting worse. She denies any recent trauma or injury to her back or abdomen. Pt specifically denies chest pain, shortness of breath, n/v,d , fever, chills, numbness, tingling, , cough, leg swelling, dysuria, hematuria, constipation, blood in stool, dizziness or any other acute sx. Pt denies any recent travel, known sick contacts, or recent illness. Of note pt states that she has had 2 abnormal pap smears and is scheduled for a biopsy soon. Pt has been trying heating pads for her pain but has not taken any OTC medications. There are no other acute medical concerns at this time. PCP: Abe Huizar MD 
PMHx significant for: Allergic Rhinitis, Varicella, Scoliosis, Thoracic Compression Fx, Vitamin D Deficiency, PCS, Thyromegaly, Herpes genitalia, ASCUS HPV + 
PSHx significant for: , LEEP Procedure Social Hx: Tobacco: none EtOH: occaisional Illicit drug use: none There are no further complaints or symptoms at this time. Signed by: Leela Mcbride, scribing for Elina Carlson MD on 2019 at 00:21am. 
 
 
 
  
 
Past Medical History:  
Diagnosis Date  Abnormal Pap smear of cervix   
 seeing OBGYN  
 Acne 10/7/2009  Allergic rhinitis 2009  Anemia 2009  ASCUS HPV+ 2017 H/o ASCUS HPV + with Dr. Henao Holding 2017  
 CHAR III (cervical intraepithelial neoplasia III) 2017  
 found by LEEP   
 Headache  Herpes genitalia 2017  
 dx by OB  
 History of gestational diabetes   
 diet controlled  Other ill-defined conditions(799.89)   
 scoliosis  PCB (post coital bleeding) 2014  Thoracic compression fracture ([address] 3/2014 Skipper Cordia  Thyromegaly 2014 On exam   
 Unspecified vitamin D deficiency 2014  Varicella 1998 Past Surgical History:  
Procedure Laterality Date  HX GYN    
    HX LEEP PROCEDURE  18  
 found CHAR 3 Family History:  
Problem Relation Age of Onset  Breast Cancer Maternal Grandmother   
     great, great gma, age?  Hypertension Maternal Grandmother  Breast Cancer Maternal Aunt 32  
     survivor  Diabetes Father  Hypertension Paternal Grandmother  Diabetes Paternal Grandmother  Osteoporosis Neg Hx Social History Socioeconomic History  Marital status: SINGLE Spouse name: Not on file  Number of children: Not on file  Years of education: Not on file  Highest education level: Not on file Social Needs  Financial resource strain: Not on file  Food insecurity - worry: Not on file  Food insecurity - inability: Not on file  Transportation needs - medical: Not on file  Transportation needs - non-medical: Not on file Occupational History  Not on file Tobacco Use  Smoking status: Never Smoker  Smokeless tobacco: Never Used Substance and Sexual Activity  Alcohol use: Yes Comment: once every 3 months, 3 drinks at a time  Drug use: No  
 Sexual activity: Yes  
  Partners: Male Birth control/protection: Injection Other Topics Concern  Not on file Social History Narrative Lives in [facility] with parents and 3 yo son. Works at [employer]. ALLERGIES: Patient has no known allergies. Review of Systems All other systems reviewed and are negative. Vitals:  
 19 2353 19 0015 BP:  (!) 153/117 Pulse:  99 Resp:  16 Temp:  98.6 °F (37 °C) SpO2: 99% 98% Physical Exam  
Nursing note and vitals reviewed. CONSTITUTIONAL: Well-appearing; well-nourished; in no apparent distress HEAD: Normocephalic; atraumatic EYES: PERRL; EOM intact; conjunctiva and sclera are clear bilaterally. ENT: No rhinorrhea; normal pharynx with no tonsillar hypertrophy; mucous membranes pink/moist, no erythema, no exudate. NECK: Supple; non-tender; no cervical lymphadenopathy CARD: Normal S1, S2; no murmurs, rubs, or gallops. Regular rate and rhythm. RESP: Normal respiratory effort; breath sounds clear and equal bilaterally; no wheezes, rhonchi, or rales. ABD: Normal bowel sounds; non-distended; non-tender; no palpable organomegaly, no masses, no bruits. Back Exam: Normal inspection; L/S vertebral point tenderness, no CVA tenderness. Normal range of motion. Leg raised: Negative for pain B/L; normal gait; reflexes intact;  
EXT: Normal ROM in all four extremities; non-tender to palpation; no swelling or deformity; distal pulses are normal, no edema. SKIN: Warm; dry; no rash. NEURO:Alert and oriented x 3, coherent, MARLYS-XII grossly intact, sensory and motor are non-focal. 
 
 
 
MDM Number of Diagnoses or Management Options Back strain, initial encounter:  
Diagnosis management comments: Assessment: suspect musculoskeletal back strain/ pain- rule out UTI. The patient has a fairly normal exam without any neurological findings. She appears well. Plan: x-ray lumbar spine/ analgesia/ lab/ Monitor and Reevaluate. Amount and/or Complexity of Data Reviewed Clinical lab tests: ordered and reviewed Tests in the radiology section of CPT®: ordered and reviewed Tests in the medicine section of CPT®: reviewed and ordered Discussion of test results with the performing providers: yes Decide to obtain previous medical records or to obtain history from someone other than the patient: yes Obtain history from someone other than the patient: yes Review and summarize past medical records: yes Discuss the patient with other providers: yes Independent visualization of images, tracings, or specimens: yes Risk of Complications, Morbidity, and/or Mortality Presenting problems: moderate Diagnostic procedures: moderate Management options: moderate Patient Progress Patient progress: stable Procedures XRAY INTERPRETATION (ED MD) Xray of L/S shows no fracture. No subluxation/dislocation. No bony abnormality. Kathy Bonilla MD 12:25 AM 
 
Progress Note:  
Pt has been reexamined by Joel Bailey MD. Pt is feeling much better. Symptoms have improved. All available results have been reviewed with pt and any available family. Pt understands sx, dx, and tx in ED. Care plan has been outlined and questions have been answered. Pt is ready to go home. Will send home on back strain instructions and prescription of naproxen and Flexeril. Outpatient referral with PCP as needed. Written by Joel Bailey MD,12:25 AM 
 
. [facility]

## 2019-02-28 NOTE — DISCHARGE INSTRUCTIONS
Patient Education        Back Strain: Care Instructions  Overview    A back strain happens when you overstretch, or pull, a muscle in your back. You may hurt your back in an accident or when you exercise or lift something. Sometimes you may not know how you hurt your back. Most back pain will get better with rest and time. You can take care of yourself at home to help your back heal.  Follow-up care is a key part of your treatment and safety. Be sure to make and go to all appointments, and call your doctor if you are having problems. It's also a good idea to know your test results and keep a list of the medicines you take. How can you care for yourself at home? · Try to stay as active as you can, but stop or reduce any activity that causes pain. · Put ice or a cold pack on the sore muscle for 10 to 20 minutes at a time to stop swelling. Try this every 1 to 2 hours for 3 days (when you are awake) or until the swelling goes down. Put a thin cloth between the ice pack and your skin. · After 2 or 3 days, apply a heating pad on low or a warm cloth to your back. Some doctors suggest that you go back and forth between hot and cold treatments. · Take pain medicines exactly as directed. ? If the doctor gave you a prescription medicine for pain, take it as prescribed. ? If you are not taking a prescription pain medicine, ask your doctor if you can take an over-the-counter medicine. · Try sleeping on your side with a pillow between your legs. Or put a pillow under your knees when you lie on your back. These measures can ease pain in your lower back. · Return to your usual level of activity slowly. When should you call for help? Call 911 anytime you think you may need emergency care. For example, call if:    · You are unable to move a leg at all.   [facility] your doctor now or seek immediate medical care if:    · You have new or worse symptoms in your legs, belly, or buttocks.  Symptoms may include:  ? Numbness or tingling. ? Weakness. ? Pain.     · You lose bladder or bowel control.    Watch closely for changes in your health, and be sure to contact your doctor if:    · You have a fever, lose weight, or don't feel well.     · You are not getting better as expected. Where can you learn more? Go to http://mickey-skye.info/. Enter E904 in the search box to learn more about \"Back Strain: Care Instructions. \"  Current as of: September 20, 2018  Content Version: 11.9  © 9363-4683 [employer]. Care instructions adapted under license by [employer] (which disclaims liability or warranty for this information). If you have questions about a medical condition or this instruction, always ask your healthcare professional. [address] any warranty or liability for your use of this information. Patient Education        Back Pain: Care Instructions  Your Care Instructions    Back pain has many possible causes. It is often related to problems with muscles and ligaments of the back. It may also be related to problems with the nerves, discs, or bones of the back. Moving, lifting, standing, sitting, or sleeping in an awkward way can strain the back. Sometimes you don't notice the injury until later. Arthritis is another common cause of back pain. Although it may hurt a lot, back pain usually improves on its own within several weeks. Most people recover in 12 weeks or less. Using good home treatment and being careful not to stress your back can help you feel better sooner. Follow-up care is a key part of your treatment and safety. Be sure to make and go to all appointments, and call your doctor if you are having problems. It's also a good idea to know your test results and keep a list of the medicines you take. How can you care for yourself at home? · Sit or lie in positions that are most comfortable and reduce your pain.  Try one of these positions when you lie down:  ? Lie on your back with your knees bent and supported by large pillows. ? Lie on the floor with your legs on the seat of a sofa or chair. ? Lie on your side with your knees and hips bent and a pillow between your legs. ? Lie on your stomach if it does not make pain worse. · Do not sit up in bed, and avoid soft couches and twisted positions. Bed rest can help relieve pain at first, but it delays healing. Avoid bed rest after the first day of back pain. · Change positions every 30 minutes. If you must sit for long periods of time, take breaks from sitting. Get up and walk around, or lie in a comfortable position. · Try using a heating pad on a low or medium setting for 15 to 20 minutes every 2 or 3 hours. Try a warm shower in place of one session with the heating pad. · You can also try an ice pack for 10 to 15 minutes every 2 to 3 hours. Put a thin cloth between the ice pack and your skin. · Take pain medicines exactly as directed. ? If the doctor gave you a prescription medicine for pain, take it as prescribed. ? If you are not taking a prescription pain medicine, ask your doctor if you can take an over-the-counter medicine. · Take short walks several times a day. You can start with 5 to 10 minutes, 3 or 4 times a day, and work up to longer walks. Walk on level surfaces and avoid hills and stairs until your back is better. · Return to work and other activities as soon as you can. Continued rest without activity is usually not good for your back. · To prevent future back pain, do exercises to stretch and strengthen your back and stomach. Learn how to use good posture, safe lifting techniques, and proper body mechanics. When should you call for help? Call your doctor now or seek immediate medical care if:    · You have new or worsening numbness in your legs.     · You have new or worsening weakness in your legs.  (This could make it hard to stand up.)     · You lose control of your bladder or bowels.    Watch closely for changes in your health, and be sure to contact your doctor if:    · You have a fever, lose weight, or don't feel well.     · You do not get better as expected. Where can you learn more? Go to http://mickey-skye.info/. Enter F682 in the search box to learn more about \"Back Pain: Care Instructions. \"  Current as of: September 20, 2018  Content Version: 11.9  © 1186-0051 [employer]. Care instructions adapted under license by [employer] (which disclaims liability or warranty for this information). If you have questions about a medical condition or this instruction, always ask your healthcare professional. [address] any warranty or liability for your use of this information.

## 2019-02-28 NOTE — ED NOTES
Patient discharged by [facility] MD. 
 
Pt given discharge instructions, patient education, 1 prescription, and follow up information. Pt verbalizes understanding. All questions answered. Pt discharged to home in private vehicle, ambulatory. Pt A/Ox4, RA, pain controlled.

## 2019-05-07 ENCOUNTER — OFFICE VISIT (OUTPATIENT)
Dept: URGENT CARE | Age: 24
End: 2019-05-07

## 2019-05-07 VITALS
WEIGHT: 187 LBS | BODY MASS INDEX: 31.16 KG/M2 | SYSTOLIC BLOOD PRESSURE: 134 MMHG | HEART RATE: 88 BPM | RESPIRATION RATE: 16 BRPM | HEIGHT: 65 IN | DIASTOLIC BLOOD PRESSURE: 93 MMHG | OXYGEN SATURATION: 98 % | TEMPERATURE: 98.6 F

## 2019-05-07 DIAGNOSIS — R07.9 CHEST PAIN, UNSPECIFIED TYPE: Primary | ICD-10-CM

## 2019-05-07 DIAGNOSIS — I10 ESSENTIAL HYPERTENSION: ICD-10-CM

## 2019-05-07 RX ORDER — PANTOPRAZOLE SODIUM 40 MG/1
40 TABLET, DELAYED RELEASE ORAL DAILY
Qty: 14 TAB | Refills: 0 | Status: SHIPPED | OUTPATIENT
Start: 2019-05-07 | End: 2019-05-21

## 2019-05-07 RX ORDER — AMLODIPINE BESYLATE 2.5 MG/1
2.5 TABLET ORAL DAILY
Qty: 30 TAB | Refills: 1 | OUTPATIENT
Start: 2019-05-07 | End: 2020-10-01

## 2019-05-07 NOTE — PATIENT INSTRUCTIONS
High Blood Pressure: Care Instructions  Overview    It's normal for blood pressure to go up and down throughout the day. But if it stays up, you have high blood pressure. Another name for high blood pressure is hypertension. Despite what a lot of people think, high blood pressure usually doesn't cause headaches or make you feel dizzy or lightheaded. It usually has no symptoms. But it does increase your risk of stroke, heart attack, and other problems. You and your doctor will talk about your risks of these problems based on your blood pressure. Your doctor will give you a goal for your blood pressure. Your goal will be based on your health and your age. Lifestyle changes, such as eating healthy and being active, are always important to help lower blood pressure. You might also take medicine to reach your blood pressure goal.  Follow-up care is a key part of your treatment and safety. Be sure to make and go to all appointments, and call your doctor if you are having problems. It's also a good idea to know your test results and keep a list of the medicines you take. How can you care for yourself at home? Medical treatment  · If you stop taking your medicine, your blood pressure will go back up. You may take one or more types of medicine to lower your blood pressure. Be safe with medicines. Take your medicine exactly as prescribed. Call your doctor if you think you are having a problem with your medicine. · Talk to your doctor before you start taking aspirin every day. Aspirin can help certain people lower their risk of a heart attack or stroke. But taking aspirin isn't right for everyone, because it can cause serious bleeding. · See your doctor regularly. You may need to see the doctor more often at first or until your blood pressure comes down. · If you are taking blood pressure medicine, talk to your doctor before you take decongestants or anti-inflammatory medicine, such as ibuprofen.  Some of these medicines can raise blood pressure. · Learn how to check your blood pressure at home. Lifestyle changes  · Stay at a healthy weight. This is especially important if you put on weight around the waist. Losing even 10 pounds can help you lower your blood pressure. · If your doctor recommends it, get more exercise. Walking is a good choice. Bit by bit, increase the amount you walk every day. Try for at least 30 minutes on most days of the week. You also may want to swim, bike, or do other activities. · Avoid or limit alcohol. Talk to your doctor about whether you can drink any alcohol. · Try to limit how much sodium you eat to less than 2,300 milligrams (mg) a day. Your doctor may ask you to try to eat less than 1,500 mg a day. · Eat plenty of fruits (such as bananas and oranges), vegetables, legumes, whole grains, and low-fat dairy products. · Lower the amount of saturated fat in your diet. Saturated fat is found in animal products such as milk, cheese, and meat. Limiting these foods may help you lose weight and also lower your risk for heart disease. · Do not smoke. Smoking increases your risk for heart attack and stroke. If you need help quitting, talk to your doctor about stop-smoking programs and medicines. These can increase your chances of quitting for good. When should you call for help? Call 911 anytime you think you may need emergency care. This may mean having symptoms that suggest that your blood pressure is causing a serious heart or blood vessel problem. Your blood pressure may be over 180/120.   For example, call 911 if:    · You have symptoms of a heart attack. These may include:  ? Chest pain or pressure, or a strange feeling in the chest.  ? Sweating. ? Shortness of breath. ? Nausea or vomiting. ? Pain, pressure, or a strange feeling in the back, neck, jaw, or upper belly or in one or both shoulders or arms. ? Lightheadedness or sudden weakness.   ? A fast or irregular heartbeat.     · You have symptoms of a stroke. These may include:  ? Sudden numbness, tingling, weakness, or loss of movement in your face, arm, or leg, especially on only one side of your body. ? Sudden vision changes. ? Sudden trouble speaking. ? Sudden confusion or trouble understanding simple statements. ? Sudden problems with walking or balance. ? A sudden, severe headache that is different from past headaches.     · You have severe back or belly pain.    Do not wait until your blood pressure comes down on its own. Get help right away.   Call your doctor now or seek immediate care if:    · Your blood pressure is much higher than normal (such as 180/120 or higher), but you don't have symptoms.     · You think high blood pressure is causing symptoms, such as:  ? Severe headache.  ? Blurry vision.    Watch closely for changes in your health, and be sure to contact your doctor if:    · Your blood pressure measures higher than your doctor recommends at least 2 times. That means the top number is higher or the bottom number is higher, or both.     · You think you may be having side effects from your blood pressure medicine. Where can you learn more? Go to http://mickey-skye.info/. Enter H980 in the search box to learn more about \"High Blood Pressure: Care Instructions. \"  Current as of: July 22, 2018  Content Version: 11.9  © 7536-5954 [employer]. Care instructions adapted under license by [employer] (which disclaims liability or warranty for this information). If you have questions about a medical condition or this instruction, always ask your healthcare professional. [place] [address] any warranty or liability for your use of this information. Gastroesophageal Reflux Disease (GERD): Care Instructions  Your Care Instructions    Gastroesophageal reflux disease (GERD) is the backward flow of stomach acid into the esophagus.  The esophagus is the tube that leads from your throat to your stomach. A one-way valve prevents the stomach acid from moving up into this tube. When you have GERD, this valve does not close tightly enough. If you have mild GERD symptoms including heartburn, you may be able to control the problem with antacids or over-the-counter medicine. Changing your diet, losing weight, and making other lifestyle changes can also help reduce symptoms. Follow-up care is a key part of your treatment and safety. Be sure to make and go to all appointments, and call your doctor if you are having problems. It's also a good idea to know your test results and keep a list of the medicines you take. How can you care for yourself at home? · Take your medicines exactly as prescribed. Call your doctor if you think you are having a problem with your medicine. · Your doctor may recommend over-the-counter medicine. For mild or occasional indigestion, antacids, such as Tums, Gaviscon, Mylanta, or Maalox, may help. Your doctor also may recommend over-the-counter acid reducers, such as Pepcid AC, Tagamet HB, Zantac 75, or Prilosec. Read and follow all instructions on the label. If you use these medicines often, talk with your doctor. · Change your eating habits. ? It's best to eat several small meals instead of two or three large meals. ? After you eat, wait 2 to 3 hours before you lie down. ? Chocolate, mint, and alcohol can make GERD worse. ? Spicy foods, foods that have a lot of acid (like tomatoes and oranges), and coffee can make GERD symptoms worse in some people. If your symptoms are worse after you eat a certain food, you may want to stop eating that food to see if your symptoms get better. · Do not smoke or chew tobacco. Smoking can make GERD worse. If you need help quitting, talk to your doctor about stop-smoking programs and medicines. These can increase your chances of quitting for good.   · If you have GERD symptoms at night, raise the head of your bed 6 to 8 inches by putting the frame on blocks or placing a foam wedge under the head of your mattress. (Adding extra pillows does not work.)  · Do not wear tight clothing around your middle. · Lose weight if you need to. Losing just 5 to 10 pounds can help. When should you call for help? Call your doctor now or seek immediate medical care if:    · You have new or different belly pain.     · Your stools are black and tarlike or have streaks of blood.    Watch closely for changes in your health, and be sure to contact your doctor if:    · Your symptoms have not improved after 2 days.     · Food seems to catch in your throat or chest.   Where can you learn more? Go to http://mickey-skye.info/. Enter N914 in the search box to learn more about \"Gastroesophageal Reflux Disease (GERD): Care Instructions. \"  Current as of: March 27, 2018  Content Version: 11.9  © 5287-6268 [employer]. Care instructions adapted under license by [employer] (which disclaims liability or warranty for this information). If you have questions about a medical condition or this instruction, always ask your healthcare professional. [address] any warranty or liability for your use of this information.

## 2019-05-07 NOTE — PROGRESS NOTES
21 yo female presents to  with cc of hypertension after lunch today and GERD. She reports that she stopped taking her amlodipine due to hearing on the news that there was a recall on her medication. She has replaced taking the amlodipine with using apple cider vinegar. She reports that she now has reflux symptoms and a \"sour taste\" in her mouth. Hypertension   Associated symptoms include chest pain. Past Medical History:   Diagnosis Date    Abnormal Pap smear of cervix     seeing OBGYN    Acne 10/7/2009    Allergic rhinitis 2009    Anemia 2009    ASCUS HPV+ 2017    H/o ASCUS HPV + with Dr. Esmer Jin 2017    CHAR III (cervical intraepithelial neoplasia III) 2017    found by LEEP     Headache     Herpes genitalia     dx by OB    History of gestational diabetes     diet controlled    Other ill-defined conditions(799.89)     scoliosis    PCB (post coital bleeding) 2014    Thoracic compression fracture ([address] 3/2014    Wilhelmenia Ape    Thyromegaly 2014    On exam     Unspecified vitamin D deficiency 2014    Varicella 1998        Past Surgical History:   Procedure Laterality Date    HX GYN           HX LEEP PROCEDURE  18    found CHAR 3         Family History   Problem Relation Age of Onset    Breast Cancer Maternal Grandmother         great, great gma, age?     Hypertension Maternal Grandmother     Breast Cancer Maternal Aunt 32        survivor    Diabetes Father     Hypertension Paternal Grandmother     Diabetes Paternal Grandmother     Osteoporosis Neg Hx         Social History     Socioeconomic History    Marital status: SINGLE     Spouse name: Not on file    Number of children: Not on file    Years of education: Not on file    Highest education level: Not on file   Occupational History    Not on file   Social Needs    Financial resource strain: Not on file    Food insecurity:     Worry: Not on file     Inability: Not on file    Transportation needs:     Medical: Not on file     Non-medical: Not on file   Tobacco Use    Smoking status: Never Smoker    Smokeless tobacco: Never Used   Substance and Sexual Activity    Alcohol use: Yes     Comment: once every 3 months, 3 drinks at a time    Drug use: No    Sexual activity: Yes     Partners: Male     Birth control/protection: Injection   Lifestyle    Physical activity:     Days per week: Not on file     Minutes per session: Not on file    Stress: Not on file   Relationships    Social connections:     Talks on phone: Not on file     Gets together: Not on file     Attends Mandaeism service: Not on file     Active member of club or organization: Not on file     Attends meetings of clubs or organizations: Not on file     Relationship status: Not on file    Intimate partner violence:     Fear of current or ex partner: Not on file     Emotionally abused: Not on file     Physically abused: Not on file     Forced sexual activity: Not on file   Other Topics Concern    Not on file   Social History Narrative    Lives in [facility] with parents and 3 yo son. Works at [employer]. ALLERGIES: Patient has no known allergies. Review of Systems   Constitutional: Negative. Negative for fatigue. HENT: Positive for sore throat. Cardiovascular: Positive for chest pain. Gastrointestinal: Positive for nausea. Sour taste       Vitals:    05/07/19 1732   BP: (!) 134/93   Pulse: 88   Resp: 16   Temp: 98.6 °F (37 °C)   SpO2: 98%   Weight: 187 lb (84.8 kg)   Height: 5' 5\" (1.651 m)       Physical Exam   Constitutional: Vital signs are normal. She appears well-developed and well-nourished. No distress. HENT:   Head: Normocephalic and atraumatic. Mouth/Throat: No oropharyngeal exudate, posterior oropharyngeal edema or posterior oropharyngeal erythema. Cardiovascular: Normal rate, regular rhythm and normal heart sounds. Pulmonary/Chest: Effort normal and breath sounds normal. No respiratory distress. She has no wheezes. Abdominal: Soft. She exhibits no distension. Skin: She is not diaphoretic. Nursing note and vitals reviewed. MDM     Differential Diagnosis; Clinical Impression; Plan:     Hypertension  -informed patient that ACV will not control hypertension  -offered to change patient to a BB, but she prefers to stay on amlodipine  -informed patient of importance of continuing to take blood pressure medication  -told patient to stop using ACV, as this is likely contributing to GERD symptoms and \"sour taste\"  Reviewed findings of EKG and informed of LVH. Encouraged pt to follow up with PCP for further management and follow up regarding weight loss, diet control, blood pressure management. Orders Placed This Encounter      pantoprazole (PROTONIX) 40 mg tablet          Sig: Take 1 Tab by mouth daily for 14 days. Dispense:  14 Tab          Refill:  0      amLODIPine (NORVASC) 2.5 mg tablet          Sig: Take 1 Tab by mouth daily. Dispense:  30 Tab          Refill:  1    The patients condition was discussed with the patient and they understand. The patient is to follow up with PCP. If signs and symptoms become worse the pt is to go to the ER. The patient is to take medications as prescribed.          EKG    Date/Time: 5/7/2019 5:50 PM  Performed by: Huma Aparicio NP  Authorized by: Huma Aparicio NP   Comparison: compared with previous ECG   Similar to previous ECG  Comparison to previous ECG: Compared to multiple previous EKG  Rhythm: sinus rhythm  Rate: normal  BPM: 86  Conduction: conduction normal  ST Segments: ST segments normal  T Waves: T waves normal  Other findings: LVH and LAE  Clinical impression: non-specific ECG

## 2019-05-08 ENCOUNTER — OFFICE VISIT (OUTPATIENT)
Dept: FAMILY MEDICINE CLINIC | Age: 24
End: 2019-05-08

## 2019-05-08 VITALS
DIASTOLIC BLOOD PRESSURE: 112 MMHG | OXYGEN SATURATION: 98 % | HEIGHT: 65 IN | WEIGHT: 183.3 LBS | BODY MASS INDEX: 30.54 KG/M2 | HEART RATE: 93 BPM | TEMPERATURE: 98.2 F | RESPIRATION RATE: 19 BRPM | SYSTOLIC BLOOD PRESSURE: 144 MMHG

## 2019-05-08 DIAGNOSIS — J30.2 SEASONAL ALLERGIC RHINITIS, UNSPECIFIED TRIGGER: ICD-10-CM

## 2019-05-08 DIAGNOSIS — M94.0 COSTOCHONDRITIS: ICD-10-CM

## 2019-05-08 DIAGNOSIS — R07.9 CHEST PAIN, UNSPECIFIED TYPE: Primary | ICD-10-CM

## 2019-05-08 DIAGNOSIS — H83.03 LABYRINTHITIS OF BOTH EARS: ICD-10-CM

## 2019-05-08 DIAGNOSIS — I51.7 LVH (LEFT VENTRICULAR HYPERTROPHY): ICD-10-CM

## 2019-05-08 DIAGNOSIS — I10 ESSENTIAL HYPERTENSION: ICD-10-CM

## 2019-05-08 DIAGNOSIS — K21.9 GASTROESOPHAGEAL REFLUX DISEASE, ESOPHAGITIS PRESENCE NOT SPECIFIED: ICD-10-CM

## 2019-05-08 RX ORDER — NORETHINDRONE ACETATE AND ETHINYL ESTRADIOL, ETHINYL ESTRADIOL AND FERROUS FUMARATE 1MG-10(24)
KIT ORAL
Refills: 4 | COMMUNITY
Start: 2019-04-14 | End: 2019-11-14

## 2019-05-08 RX ORDER — FLUTICASONE PROPIONATE 50 MCG
2 SPRAY, SUSPENSION (ML) NASAL DAILY
Qty: 3 BOTTLE | Refills: 1 | Status: SHIPPED | OUTPATIENT
Start: 2019-05-08 | End: 2019-11-14

## 2019-05-08 NOTE — PROGRESS NOTES
HPI:  20 y.o.  presents c/o chest tightness across upper chest x 2 wks. Her pain is constant and lasts all day. It is sore to touch. She sometimes has increased pain on breathing. With exertion, she feels shortness of breath for about 2-3 years, is not new, but over the last 2 wks feels she is breathing heavier. She has h/o HTN, has been off of amlodipine x 2 wks as she heard it was recalled, but did have any notice from her pharmacy to stop it. Chart review shows amlodipine prescribed 9/24/2018, #30 with 1RF. When I tell her she should have run out of medication in November 2018, patient now admits to not taking it on a daily basis and ran out [place] while ago\". She was taking apple cider vinegar instead x 2 wks by her report. Of note, the chest tightness started 2 wks ago. She checked her BP at [facility], 163/111, yesterday. So she went to . She was seen at [facility] yesterday, 5/07/2019. EKG shows NSR with LVH suggested by voltage in limb lead and was advised to f/u with PCP. She was prescribed prior dose of amlodipine 2.5 mg daily and had one dose. She was advised to stop the apple cider vinegar and was prescribed protonix, which she has not started. She has noticed acid indigestion for several weeks, with a burning foul tasting fluid in her mouth. She changed her diet to vegetarian recently. She had Chipotle yesterday and accidentally had too much hot sauce on it, and she felt worse reflux and chest pain after that. She reports weight gain after previously being on Depo-provera for 3-4 yrs, so she stopped Depo 8/2018. She started treatment for HTN while on the Depo. She is now on OCP. Onset of HTN preceded initiation of OCP. She has strong family hx CVD and HTN. Father in 46s and has been treated for severe HTN. PGM HTN, DM; paternal aunt HTN. Nose and sinus congestion off and on with post-nasal drip this spring. She coughs out thick mucus. Notices a \"spin\" when she turns her head. Patient Active Problem List    Diagnosis    Hypermobility syndrome    Arthralgia    Costochondritis    Carpal tunnel syndrome of right wrist    NANCI positive    SS-A antibody positive    Rheumatoid factor positive    Acute bacterial sinusitis    Acute bronchitis due to other specified organisms    Essential hypertension    History of gestational diabetes     diet controlled      Mastodynia     Nl b/l breast US in 2016.    Prolactin pending 12/22/2016   With galactorrhea 2/2 depo      Thyromegaly     On exam      Atypical mole     Noted at vaginal introitus (7 o'clock position)      Vitamin D deficiency    History of compression fracture of spine, thoracic with chronic back pain    Allergic rhinitis    Anemia         Past Medical History:   Diagnosis Date    Abnormal Pap smear of cervix 2017    seeing OBGYN    Acne 10/7/2009    Allergic rhinitis 9/2/2009    Anemia 9/2/2009    ASCUS HPV+ 09/14/2017    H/o ASCUS HPV + with Dr. Lisseth Knutson 9/14/2017    CHAR III (cervical intraepithelial neoplasia III) 11/30/2017    found by LEEP     Headache     Herpes genitalia 2017    dx by OB    History of gestational diabetes     diet controlled    Other ill-defined conditions(799.89)     scoliosis    PCB (post coital bleeding) 11/25/2014    Thoracic compression fracture ([address] 3/2014    Yousif Groverkachorn    Thyromegaly 11/25/2014    On exam     Unspecified vitamin D deficiency 6/26/2014    Varicella 5/1/1998       Social History     Tobacco Use    Smoking status: Never Smoker    Smokeless tobacco: Never Used   Substance Use Topics    Alcohol use: Yes     Comment: once every 3 months, 3 drinks at a time    Drug use: No       Outpatient Medications Marked as Taking for the 5/8/19 encounter (Office Visit) with Elvia Wellington PA-C   Medication Sig Dispense Refill    LO LOESTRIN FE 1 mg-10 mcg (24)/10 mcg (2) tab TAKE 1 TABLET BY MOUTH EVERY DAY  4    pantoprazole (PROTONIX) 40 mg tablet Take 1 Tab by mouth daily for 14 days. 14 Tab 0    amLODIPine (NORVASC) 2.5 mg tablet Take 1 Tab by mouth daily. 30 Tab 1    naproxen (NAPROSYN) 500 mg tablet Take 1 Tab by mouth two (2) times daily (with meals). 20 Tab 0    cyclobenzaprine (FLEXERIL) 10 mg tablet Take 1 Tab by mouth two (2) times a day. 20 Tab 0    traZODone (DESYREL) 50 mg tablet Take 1 Tab by mouth nightly. 30 Tab 1    acyclovir (ZOVIRAX) 200 mg capsule Take 200 mg by mouth daily. No Known Allergies    ROS:  ROS negative except as per HPI. PE:  Visit Vitals  BP (!) 144/112 (BP 1 Location: Right arm, BP Patient Position: Sitting)   Pulse 93   Temp 98.2 °F (36.8 °C) (Oral)   Resp 19   Ht 5' 5\" (1.651 m)   Wt 183 lb 4.8 oz (83.1 kg)   LMP 04/17/2019 (Within Days)   SpO2 98%   BMI 30.50 kg/m²     Gen: alert, oriented, no acute distress  Head: normocephalic, atraumatic  Ears: external auditory canals clear, TMs without erythema or effusion; modified Epley's maneuver positive bilaterally  Eyes: pupils equal round reactive to light, sclera clear, conjunctiva clear, no nystagmus   Oral: moist mucus membranes, no oral lesions, no pharyngeal inflammation or exudate  Neck: symmetric normal sized thyroid, no carotid bruits, no jugular vein distention  Resp: no increase work of breathing, lungs clear to ausculation bilaterally, no wheezing, rales or rhonchi  CV: S1, S2 normal.  No murmurs, rubs, or gallops. Bilateral chest wall tenderness at upper costo-sternal margin  Abd: soft, not tender, not distended. No hepatosplenomegaly. Normal bowel sounds. Neuro: cranial nerves intact, normal strength and movement in all extremities, sensation intact and symmetric. Skin: no lesion or rash  Extremities: no cyanosis or edema    No results found for this visit on 05/08/19. Assessment/Plan:      ICD-10-CM ICD-9-CM    1.  Chest pain, unspecified type - atypical, associated with GERD, onset when started apple cider vinegar, also uncontrolled HTN noncompliant with medicines, LVH on EKG at [facility] 5/07/2019, costochondritis (see below), have CXR and echo R07.9 786.50 ECHOCARDIOGRAM      XR CHEST PA LAT   2. Essential hypertension - uncontrolled, noncompliant with amlodipine 2.5 mg prescribed 9/2018 with refills lasting only through 11/2018; LVH by voltage on EKG at [facility] 5/07/2019, advised to increase amlodipine from 2.5 mg to 5 mg daily, take medication daily, have CXR and schedule echo, f/u next wk for recheck BP I10 401.9 ECHOCARDIOGRAM      XR CHEST PA LAT   3. Costochondritis - bilateral, upper chest, apply warm compresses   M94.0 733.6    4. Gastroesophageal reflux disease, esophagitis presence not specified - started when began taking apple cider vinegar, has not started protonix that was prescribed yesterday at , advised again to begin protonix, an reiterated to stop apple cider vinegar   K21.9 530.81    5. Seasonal allergic rhinitis, unspecified trigger J30.2 477.9 fluticasone propionate (FLONASE) 50 mcg/actuation nasal spray   6. Labyrinthitis of both ears H83.03 386.30 fluticasone propionate (FLONASE) 50 mcg/actuation nasal spray   7. LVH (left ventricular hypertrophy) I51.7 429.3 ECHOCARDIOGRAM      XR CHEST PA LAT       Take 2 pills of your amlodipine (for a total of 5 mg) once daily. Start the protonix (pantoprazole) once daily and take for 4 weeks for the acid indigestion. Stop the apple cider vinegar. Start the flonase, 2 sprays each nostril once daily, for 4 weeks. Have chest xray done at any [address] [place] [address] location. You may walk-in for this. You will get a call to schedule the echocardiogram.    Warm compresses for 5-10 minutes up to 4 times daily to the upper chest for the tenderness. Letter given to excuse from work for today. Health Maintenance reviewed - deferred. There are no discontinued medications.     Current Outpatient Medications   Medication Sig Dispense Refill    LO LOESTRIN FE 1 mg-10 mcg (24)/10 mcg (2) tab TAKE 1 TABLET BY MOUTH EVERY DAY  4    fluticasone propionate (FLONASE) 50 mcg/actuation nasal spray 2 Sprays by Both Nostrils route daily. 3 Bottle 1    pantoprazole (PROTONIX) 40 mg tablet Take 1 Tab by mouth daily for 14 days. 14 Tab 0    amLODIPine (NORVASC) 2.5 mg tablet Take 1 Tab by mouth daily. 30 Tab 1    naproxen (NAPROSYN) 500 mg tablet Take 1 Tab by mouth two (2) times daily (with meals). 20 Tab 0    cyclobenzaprine (FLEXERIL) 10 mg tablet Take 1 Tab by mouth two (2) times a day. 20 Tab 0    traZODone (DESYREL) 50 mg tablet Take 1 Tab by mouth nightly. 30 Tab 1    acyclovir (ZOVIRAX) 200 mg capsule Take 200 mg by mouth daily.  azithromycin (ZITHROMAX) 250 mg tablet Take two tablets today then one tablet daily 6 Tab 0    predniSONE (STERAPRED) 5 mg dose pack See administration instruction per 5mg dose pack 21 Tab 0       Recommended healthy diet low in carbohydrates, fats, sodium and cholesterol. Recommended regular cardiovascular exercise 3-6 times per week for 30-60 minutes daily. Verbal and written instructions (see AVS) provided. Patient expresses understanding of diagnosis and treatment plan. Follow-up and Dispositions    · Return in about 1 week (around 5/15/2019) for chest pain, HTN (fasting).

## 2019-05-08 NOTE — PATIENT INSTRUCTIONS
Take 2 pills of your amlodipine (for a total of 5 mg) once daily. Start the protonix (pantoprazole) once daily and take for 4 weeks for the acid indigestion. Stop the apple cider vinegar. Start the flonase, 2 sprays each nostril once daily, for 4 weeks. Have chest xray done at any [address] [place] [address] location. You may walk-in for this. You will get a call to schedule the echocardiogram.    Warm compresses for 5-10 minutes up to 4 times daily to the upper chest for the tenderness. Using a Nasal Steroid Spray: Care Instructions  Your Care Instructions    Your doctor may suggest using a corticosteroid nasal spray for your allergy symptoms or sinus problems. These sprays reduce the swelling inside the nose and sinuses. Unlike decongestant nasal sprays, steroid sprays won't lead to more swelling when you stop taking them. These sprays start working in a few days, but it may take several weeks before you get the full effect. Most side effects are minor. The most common complaint is a burning feeling in the nose right after the spray is used. Some people get nosebleeds. Follow-up care is a key part of your treatment and safety. Be sure to make and go to all appointments, and call your doctor if you are having problems. It's also a good idea to know your test results and keep a list of the medicines you take. How can you care for yourself at home? Here are some tips for using these sprays:  · You may need to prime the sprayer before you use it. This means spraying it into the air a few times to make sure you get the right amount of medicine. Follow the directions on the label. · Blow your nose before you spray. This will help clear out your nostrils. · Gently sniff the medicine into your nose as you spray. Don't snort, or the medicine will go all the way into your throat where it won't do much good. · Aim the nozzle straight toward the outer wall of your nostril.  This will help keep the medicine from irritating the inner walls of your nose, especially your septum (the wall that separates your left and right nostrils). · Don't blow your nose for 10 minutes or so after you spray. And try not to sneeze. · Be safe with medicines. Use this medicine exactly as prescribed. Call your doctor if you think you are having a problem with your medicine. · Clean your sprayer once a week. Read the label to learn how. When should you call for help? Watch closely for changes in your health, and be sure to contact your doctor if you have any problems. Where can you learn more? Go to http://mickey-skye.info/. Enter X169 in the search box to learn more about \"Using a Nasal Steroid Spray: Care Instructions. \"  Current as of: March 27, 2018  Content Version: 11.9  © 2286-3114 [employer]. Care instructions adapted under license by [employer] (which disclaims liability or warranty for this information). If you have questions about a medical condition or this instruction, always ask your healthcare professional. [address] any warranty or liability for your use of this information.

## 2019-05-08 NOTE — PROGRESS NOTES
Reshma Freeman  Identified pt with two pt identifiers(name and ). Chief Complaint   Patient presents with    Chest Pain     rm 10/non fasting/chest tightness/heavy breathing       1. Have you been to the ER, urgent care clinic since your last visit? Yes 2019, had EKG done, was told enlarged heart Hospitalized since your last visit? no    2. Have you seen or consulted any other health care providers outside of the [address] [place] [address] since your last visit? Include any pap smears or colon screening. no      Advance Care Planning    In the event something were to happen to you and you were unable to speak on your behalf, do you have an Advance Directive/ Living Will in place stating your wishes? NO    If yes, do we have a copy on file NO    If no, would you like information NO    Medication reconciliation up to date and corrected with patient at this time. Today's provider has been notified of reason for visit, vitals and flowsheets obtained on patients. Reviewed record in preparation for visit, huddled with provider and have obtained necessary documentation. There are no preventive care reminders to display for this patient. Wt Readings from Last 3 Encounters:   19 187 lb (84.8 kg)   18 175 lb (79.4 kg)   18 175 lb (79.4 kg)     Temp Readings from Last 3 Encounters:   19 98.6 °F (37 °C)   19 98.5 °F (36.9 °C)   18 98.2 °F (36.8 °C)     BP Readings from Last 3 Encounters:   19 (!) 134/93   19 (!) 149/107   18 (!) 178/118     Pulse Readings from Last 3 Encounters:   19 88   19 90   18 100     There were no vitals filed for this visit.       Learning Assessment:  :     Learning Assessment 2016   PRIMARY LEARNER Patient Patient   HIGHEST LEVEL OF EDUCATION - PRIMARY LEARNER  - GRADUATED HIGH SCHOOL OR GED   BARRIERS PRIMARY LEARNER - NONE   CO-LEARNER CAREGIVER - No   PRIMARY LANGUAGE ENGLISH ENGLISH  NEED - No   LEARNER PREFERENCE PRIMARY DEMONSTRATION DEMONSTRATION     LISTENING LISTENING   LEARNING SPECIAL TOPICS - mo   ANSWERED BY patient self   RELATIONSHIP SELF SELF   ASSESSMENT COMMENT - none       Depression Screening:  :     3 most recent PHQ Screens 9/24/2018   Little interest or pleasure in doing things Not at all   Feeling down, depressed, irritable, or hopeless Not at all   Total Score PHQ 2 0       No flowsheet data found. Fall Risk Assessment:  :     No flowsheet data found. Abuse Screening:  :     No flowsheet data found. ADL Screening:  :     ADL Assessment 9/24/2018   Feeding yourself No Help Needed   Getting from bed to chair No Help Needed   Getting dressed No Help Needed   Bathing or showering No Help Needed   Walk across the room (includes cane/walker) No Help Needed   Using the telphone No Help Needed   Taking your medications No Help Needed   Preparing meals No Help Needed   Managing money (expenses/bills) No Help Needed   Moderately strenuous housework (laundry) No Help Needed   Shopping for personal items (toiletries/medicines) No Help Needed   Shopping for groceries No Help Needed   Driving No Help Needed   Climbing a flight of stairs No Help Needed   Getting to places beyond walking distances No Help Needed           BMI:  Weight Metrics 5/7/2019 12/2/2018 11/28/2018 10/30/2018 9/24/2018 8/21/2018 7/9/2018   Weight 187 lb 175 lb 175 lb 175 lb 0.7 oz 169 lb 165 lb 6.4 oz 162 lb 8 oz   BMI 31.12 kg/m2 29.12 kg/m2 28.68 kg/m2 28.69 kg/m2 28.12 kg/m2 27.52 kg/m2 27.04 kg/m2           Medication reconciliation up to date and corrected with patient at this time.

## 2019-05-08 NOTE — LETTER
NOTIFICATION RETURN TO WORK / SCHOOL 
 
5/8/2019 1:13 PM 
 
Ms. Christina Dhaliwal [address] [place], [place] [id number] To Whom It May Concern: 
 
Christina Dhaliwal is currently under the care of Erasmo Pope. She will return to work/school on: 5/09/2019 If there are questions or concerns please have the patient contact our office. Sincerely, Elvia Morris PA-C

## 2019-05-20 ENCOUNTER — HOSPITAL ENCOUNTER (EMERGENCY)
Age: 24
Discharge: HOME | End: 2019-05-20
Attending: EMERGENCY MEDICINE
Payer: COMMERCIAL

## 2019-05-20 VITALS
SYSTOLIC BLOOD PRESSURE: 148 MMHG | OXYGEN SATURATION: 100 % | TEMPERATURE: 98 F | HEIGHT: 65 IN | RESPIRATION RATE: 16 BRPM | HEART RATE: 100 BPM | BODY MASS INDEX: 31.65 KG/M2 | DIASTOLIC BLOOD PRESSURE: 112 MMHG | WEIGHT: 190 LBS

## 2019-05-20 DIAGNOSIS — R68.2 DRY MOUTH: Primary | ICD-10-CM

## 2019-05-20 LAB
ALBUMIN SERPL-MCNC: 3.5 G/DL (ref 3.5–5)
ALBUMIN/GLOB SERPL: 0.6 {RATIO} (ref 1.1–2.2)
ALP SERPL-CCNC: 89 U/L (ref 45–117)
ALT SERPL-CCNC: 86 U/L (ref 12–78)
ANION GAP SERPL CALC-SCNC: 5 MMOL/L (ref 5–15)
APPEARANCE UR: CLEAR
AST SERPL-CCNC: 76 U/L (ref 15–37)
BACTERIA URNS QL MICRO: NEGATIVE /HPF
BASOPHILS # BLD: 0 K/UL (ref 0–0.1)
BASOPHILS NFR BLD: 0 % (ref 0–1)
BILIRUB SERPL-MCNC: 0.3 MG/DL (ref 0.2–1)
BILIRUB UR QL: NEGATIVE
BUN SERPL-MCNC: 10 MG/DL (ref 6–20)
BUN/CREAT SERPL: 15 (ref 12–20)
CALCIUM SERPL-MCNC: 9.6 MG/DL (ref 8.5–10.1)
CHLORIDE SERPL-SCNC: 104 MMOL/L (ref 97–108)
CO2 SERPL-SCNC: 25 MMOL/L (ref 21–32)
COLOR UR: NORMAL
COMMENT, HOLDF: NORMAL
CREAT SERPL-MCNC: 0.66 MG/DL (ref 0.55–1.02)
DIFFERENTIAL METHOD BLD: NORMAL
EOSINOPHIL # BLD: 0.2 K/UL (ref 0–0.4)
EOSINOPHIL NFR BLD: 3 % (ref 0–7)
EPITH CASTS URNS QL MICRO: NORMAL /LPF
ERYTHROCYTE [DISTWIDTH] IN BLOOD BY AUTOMATED COUNT: 12.7 % (ref 11.5–14.5)
GLOBULIN SER CALC-MCNC: 5.7 G/DL (ref 2–4)
GLUCOSE SERPL-MCNC: 80 MG/DL (ref 65–100)
GLUCOSE UR STRIP.AUTO-MCNC: NEGATIVE MG/DL
HCG SERPL QL: NEGATIVE
HCG UR QL: NEGATIVE
HCT VFR BLD AUTO: 38.9 % (ref 35–47)
HETEROPH AB SER QL: NEGATIVE
HGB BLD-MCNC: 12.2 G/DL (ref 11.5–16)
HGB UR QL STRIP: NEGATIVE
HYALINE CASTS URNS QL MICRO: NORMAL /LPF (ref 0–5)
IMM GRANULOCYTES # BLD AUTO: 0 K/UL (ref 0–0.04)
IMM GRANULOCYTES NFR BLD AUTO: 0 % (ref 0–0.5)
KETONES UR QL STRIP.AUTO: NEGATIVE MG/DL
LEUKOCYTE ESTERASE UR QL STRIP.AUTO: NEGATIVE
LYMPHOCYTES # BLD: 2.8 K/UL (ref 0.8–3.5)
LYMPHOCYTES NFR BLD: 33 % (ref 12–49)
MCH RBC QN AUTO: 29.6 PG (ref 26–34)
MCHC RBC AUTO-ENTMCNC: 31.4 G/DL (ref 30–36.5)
MCV RBC AUTO: 94.4 FL (ref 80–99)
MONOCYTES # BLD: 0.7 K/UL (ref 0–1)
MONOCYTES NFR BLD: 8 % (ref 5–13)
NEUTS SEG # BLD: 4.7 K/UL (ref 1.8–8)
NEUTS SEG NFR BLD: 56 % (ref 32–75)
NITRITE UR QL STRIP.AUTO: NEGATIVE
NRBC # BLD: 0 K/UL (ref 0–0.01)
NRBC BLD-RTO: 0 PER 100 WBC
PH UR STRIP: 6.5 [PH] (ref 5–8)
PLATELET # BLD AUTO: 366 K/UL (ref 150–400)
PMV BLD AUTO: 9.4 FL (ref 8.9–12.9)
POTASSIUM SERPL-SCNC: 3.5 MMOL/L (ref 3.5–5.1)
PROT SERPL-MCNC: 9.2 G/DL (ref 6.4–8.2)
PROT UR STRIP-MCNC: NEGATIVE MG/DL
RBC # BLD AUTO: 4.12 M/UL (ref 3.8–5.2)
RBC #/AREA URNS HPF: NORMAL /HPF (ref 0–5)
SAMPLES BEING HELD,HOLD: NORMAL
SODIUM SERPL-SCNC: 134 MMOL/L (ref 136–145)
SP GR UR REFRACTOMETRY: 1.01 (ref 1–1.03)
T4 FREE SERPL-MCNC: 1 NG/DL (ref 0.8–1.5)
TSH SERPL DL<=0.05 MIU/L-ACNC: 1.31 UIU/ML (ref 0.36–3.74)
UR CULT HOLD, URHOLD: NORMAL
UROBILINOGEN UR QL STRIP.AUTO: 1 EU/DL (ref 0.2–1)
WBC # BLD AUTO: 8.4 K/UL (ref 3.6–11)
WBC URNS QL MICRO: NORMAL /HPF (ref 0–4)

## 2019-05-20 PROCEDURE — 81001 URINALYSIS AUTO W/SCOPE: CPT

## 2019-05-20 PROCEDURE — 99283 EMERGENCY DEPT VISIT LOW MDM: CPT

## 2019-05-20 PROCEDURE — 74011250636 HC RX REV CODE- 250/636: Performed by: PHYSICIAN ASSISTANT

## 2019-05-20 PROCEDURE — 84439 ASSAY OF FREE THYROXINE: CPT

## 2019-05-20 PROCEDURE — 84443 ASSAY THYROID STIM HORMONE: CPT

## 2019-05-20 PROCEDURE — 81025 URINE PREGNANCY TEST: CPT

## 2019-05-20 PROCEDURE — 85025 COMPLETE CBC W/AUTO DIFF WBC: CPT

## 2019-05-20 PROCEDURE — 86308 HETEROPHILE ANTIBODY SCREEN: CPT

## 2019-05-20 PROCEDURE — 84703 CHORIONIC GONADOTROPIN ASSAY: CPT

## 2019-05-20 PROCEDURE — 80053 COMPREHEN METABOLIC PANEL: CPT

## 2019-05-20 PROCEDURE — 36415 COLL VENOUS BLD VENIPUNCTURE: CPT

## 2019-05-20 RX ADMIN — SODIUM CHLORIDE 1000 ML: 900 INJECTION, SOLUTION INTRAVENOUS at 19:00

## 2019-05-20 NOTE — ED TRIAGE NOTES
Patient reports for approx two weeks feelings dehydrated, generally week, \"cant clear my throat\", and nausea. Denies vomiting or diarrhea. Denies chest pain or difficulty urinating.

## 2019-05-20 NOTE — ED PROVIDER NOTES
21 yo F with hx of anemia, AR, scoliosis here for dry mouth. Symptoms x 2 weeks. States some mild dizziness at times; not currently. Phlegm in throat. Itchy eyes. No cough. LMP- 5 weeks ago. Took home HCG that was negative. Denies fever, CP, SOB, abd pain, flank pain, urinary symptoms. The history is provided by the patient. Fatigue   This is a new problem. The current episode started more than 1 week ago. There has been no fever. Pertinent negatives include no shortness of breath, no chest pain, no vomiting, no altered mental status, no confusion, no headaches and no nausea. Past Medical History:   Diagnosis Date    Abnormal Pap smear of cervix     seeing OBGYN    Acne 10/7/2009    Allergic rhinitis 2009    Anemia 2009    ASCUS HPV+ 2017    H/o ASCUS HPV + with Dr. Yakov Sommers 2017    CHAR III (cervical intraepithelial neoplasia III) 2017    found by LEEP     Headache     Herpes genitalia     dx by OB    History of gestational diabetes     diet controlled    Other ill-defined conditions(799.89)     scoliosis    PCB (post coital bleeding) 2014    Thoracic compression fracture ([address] 3/2014    Petr Chew    Thyromegaly 2014    On exam     Unspecified vitamin D deficiency 2014    Varicella 1998       Past Surgical History:   Procedure Laterality Date    HX GYN           HX LEEP PROCEDURE  18    found CHAR 3         Family History:   Problem Relation Age of Onset    Breast Cancer Maternal Grandmother         great, great gma, age?     Hypertension Maternal Grandmother     Breast Cancer Maternal Aunt 32        survivor    Diabetes Father     Hypertension Paternal Grandmother     Diabetes Paternal Grandmother     Osteoporosis Neg Hx        Social History     Socioeconomic History    Marital status: SINGLE     Spouse name: Not on file    Number of children: Not on file    Years of education: Not on file    Highest education level: Not on file   Occupational History    Not on file   Social Needs    Financial resource strain: Not on file    Food insecurity:     Worry: Not on file     Inability: Not on file    Transportation needs:     Medical: Not on file     Non-medical: Not on file   Tobacco Use    Smoking status: Never Smoker    Smokeless tobacco: Never Used   Substance and Sexual Activity    Alcohol use: Yes     Comment: once every 3 months, 3 drinks at a time    Drug use: No    Sexual activity: Yes     Partners: Male     Birth control/protection: Injection   Lifestyle    Physical activity:     Days per week: Not on file     Minutes per session: Not on file    Stress: Not on file   Relationships    Social connections:     Talks on phone: Not on file     Gets together: Not on file     Attends Muslim service: Not on file     Active member of club or organization: Not on file     Attends meetings of clubs or organizations: Not on file     Relationship status: Not on file    Intimate partner violence:     Fear of current or ex partner: Not on file     Emotionally abused: Not on file     Physically abused: Not on file     Forced sexual activity: Not on file   Other Topics Concern    Not on file   Social History Narrative    Lives in [place] with parents and 3 yo son. Works at [employer]. ALLERGIES: Patient has no known allergies. Review of Systems   Constitutional: Positive for fatigue. HENT: Negative for ear discharge. Eyes: Negative for photophobia, pain, discharge and visual disturbance. Respiratory: Negative for apnea, cough, chest tightness and shortness of breath. Cardiovascular: Negative for chest pain, palpitations and leg swelling. Gastrointestinal: Negative for abdominal distention, abdominal pain, blood in stool, nausea and vomiting. Genitourinary: Negative for difficulty urinating, dysuria, flank pain, frequency and hematuria.    Musculoskeletal: Negative for back pain, gait problem, joint swelling, myalgias and neck pain. Skin: Negative for color change and pallor. Neurological: Positive for dizziness (intermittent). Negative for syncope, weakness, numbness and headaches. Psychiatric/Behavioral: Negative for behavioral problems and confusion. The patient is not nervous/anxious. Vitals:    05/20/19 1745   BP: (!) 155/113   Pulse: 98   Resp: 16   Temp: 98.8 °F (37.1 °C)   SpO2: 100%   Weight: 86.2 kg (190 lb)   Height: 5' 5\" (1.651 m)            Physical Exam   Constitutional: She is oriented to person, place, and time. She appears well-developed and well-nourished. HENT:   Head: Normocephalic and atraumatic. Right Ear: External ear normal.   Left Ear: External ear normal.   Nose: Nose normal.   Mouth/Throat: Oropharynx is clear and moist.   Eyes: Pupils are equal, round, and reactive to light. Conjunctivae and EOM are normal. Right eye exhibits no discharge. Left eye exhibits no discharge. Neck: Normal range of motion. Neck supple. Cardiovascular: Normal rate, regular rhythm, normal heart sounds and intact distal pulses. Pulmonary/Chest: Effort normal and breath sounds normal.   Abdominal: Soft. Bowel sounds are normal. She exhibits no distension. There is no tenderness. There is no rebound and no guarding. NO CVA tenderness    Musculoskeletal: Normal range of motion. She exhibits no edema or tenderness. Neurological: She is alert and oriented to person, place, and time. No cranial nerve deficit. Coordination normal.   Skin: Skin is warm and dry. No rash noted. Psychiatric: She has a normal mood and affect. Her behavior is normal. Judgment and thought content normal.   Nursing note and vitals reviewed.        MDM  Number of Diagnoses or Management Options  Dry mouth:      Amount and/or Complexity of Data Reviewed  Clinical lab tests: ordered and reviewed  Decide to obtain previous medical records or to obtain history from someone other than the patient: yes  Review and summarize past medical records: yes  Discuss the patient with other providers: yes           Procedures    Patient has been reassessed. Feeling better. Reviewed labs, medications with patient. Ready to discharge home. Discussed case with attending Physician. Agrees with care and will D/C with follow up. Patient's results have been reviewed with them. Patient and/or family have verbally conveyed their understanding and agreement of the patient's signs, symptoms, diagnosis, treatment and prognosis and additionally agree to follow up as recommended or return to the Emergency Room should their condition change prior to follow-up. Discharge instructions have also been provided to the patient with some educational information regarding their diagnosis as well a list of reasons why they would want to return to the ER prior to their follow-up appointment should their condition change.   BOSSMAN Black

## 2019-05-21 NOTE — ED NOTES
Discharge instructions given to patient by BOSSMAN Owens. All questions answered and patient verbalized understanding.   Patient ambulatory to ED marybeth

## 2019-05-21 NOTE — DISCHARGE INSTRUCTIONS

## 2019-05-30 ENCOUNTER — OFFICE VISIT (OUTPATIENT)
Dept: URGENT CARE | Age: 24
End: 2019-05-30

## 2019-05-30 VITALS
HEART RATE: 102 BPM | DIASTOLIC BLOOD PRESSURE: 100 MMHG | HEIGHT: 65 IN | BODY MASS INDEX: 31.99 KG/M2 | TEMPERATURE: 97.9 F | OXYGEN SATURATION: 99 % | WEIGHT: 192 LBS | RESPIRATION RATE: 18 BRPM | SYSTOLIC BLOOD PRESSURE: 139 MMHG

## 2019-05-30 DIAGNOSIS — J32.9 SINUSITIS, UNSPECIFIED CHRONICITY, UNSPECIFIED LOCATION: Primary | ICD-10-CM

## 2019-05-30 RX ORDER — CEFDINIR 300 MG/1
300 CAPSULE ORAL 2 TIMES DAILY
Qty: 20 CAP | Refills: 0 | Status: SHIPPED | OUTPATIENT
Start: 2019-05-30 | End: 2019-06-09

## 2019-05-30 NOTE — PATIENT INSTRUCTIONS
OTC Mucinex, Flonase, Allegra     Sinusitis: Care Instructions  Your Care Instructions    Sinusitis is an infection of the lining of the sinus cavities in your head. Sinusitis often follows a cold. It causes pain and pressure in your head and face. In most cases, sinusitis gets better on its own in 1 to 2 weeks. But some mild symptoms may last for several weeks. Sometimes antibiotics are needed. Follow-up care is a key part of your treatment and safety. Be sure to make and go to all appointments, and call your doctor if you are having problems. It's also a good idea to know your test results and keep a list of the medicines you take. How can you care for yourself at home? · Take an over-the-counter pain medicine, such as acetaminophen (Tylenol), ibuprofen (Advil, Motrin), or naproxen (Aleve). Read and follow all instructions on the label. · If the doctor prescribed antibiotics, take them as directed. Do not stop taking them just because you feel better. You need to take the full course of antibiotics. · Be careful when taking over-the-counter cold or flu medicines and Tylenol at the same time. Many of these medicines have acetaminophen, which is Tylenol. Read the labels to make sure that you are not taking more than the recommended dose. Too much acetaminophen (Tylenol) can be harmful. · Breathe warm, moist air from a steamy shower, a hot bath, or a sink filled with hot water. Avoid cold, dry air. Using a humidifier in your home may help. Follow the directions for cleaning the machine. · Use saline (saltwater) nasal washes to help keep your nasal passages open and wash out mucus and bacteria. You can buy saline nose drops at a grocery store or drugstore. Or you can make your own at home by adding 1 teaspoon of salt and 1 teaspoon of baking soda to 2 cups of distilled water. If you make your own, fill a bulb syringe with the solution, insert the tip into your nostril, and squeeze gently.  Smita Males your nose.  · Put a hot, wet towel or a warm gel pack on your face 3 or 4 times a day for 5 to 10 minutes each time. · Try a decongestant nasal spray like oxymetazoline (Afrin). Do not use it for more than 3 days in a row. Using it for more than 3 days can make your congestion worse. When should you call for help? Call your doctor now or seek immediate medical care if:    · You have new or worse swelling or redness in your face or around your eyes.     · You have a new or higher fever.    Watch closely for changes in your health, and be sure to contact your doctor if:    · You have new or worse facial pain.     · The mucus from your nose becomes thicker (like pus) or has new blood in it.     · You are not getting better as expected. Where can you learn more? Go to http://mickey-skye.info/. Enter P024 in the search box to learn more about \"Sinusitis: Care Instructions. \"  Current as of: March 27, 2018  Content Version: 11.9  © 5120-0822 [employer]. Care instructions adapted under license by [employer] (which disclaims liability or warranty for this information). If you have questions about a medical condition or this instruction, always ask your healthcare professional. [address] any warranty or liability for your use of this information.

## 2019-05-30 NOTE — PROGRESS NOTES
Cold Symptoms   The history is provided by the patient. This is a new problem. Episode onset: 3 weeks ago. The problem occurs constantly. The problem has been gradually worsening. The cough is productive of sputum. There has been no fever. Associated symptoms include ear congestion, rhinorrhea and nausea (\"due to the drainage\"). Pertinent negatives include no chest pain, no chills, no sweats, no ear pain, no headaches, no sore throat, no myalgias, no shortness of breath, no wheezing and no vomiting. Treatments tried: allegra. She is not a smoker. Past Medical History:   Diagnosis Date    Abnormal Pap smear of cervix     seeing OBGYN    Acne 10/7/2009    Allergic rhinitis 2009    Anemia 2009    ASCUS HPV+ 2017    H/o ASCUS HPV + with Dr. Amor Carry 2017    CHAR III (cervical intraepithelial neoplasia III) 2017    found by LEEP     Headache     Herpes genitalia     dx by OB    History of gestational diabetes     diet controlled    Other ill-defined conditions(799.89)     scoliosis    PCB (post coital bleeding) 2014    Thoracic compression fracture ([address] 3/2014    Camille Raisin    Thyromegaly 2014    On exam     Unspecified vitamin D deficiency 2014    Varicella 1998        Past Surgical History:   Procedure Laterality Date    HX GYN           HX LEEP PROCEDURE  18    found CHAR 3         Family History   Problem Relation Age of Onset    Breast Cancer Maternal Grandmother         great, great gma, age?     Hypertension Maternal Grandmother     Breast Cancer Maternal Aunt 32        survivor    Diabetes Father     Hypertension Paternal Grandmother     Diabetes Paternal Grandmother     Osteoporosis Neg Hx         Social History     Socioeconomic History    Marital status: SINGLE     Spouse name: Not on file    Number of children: Not on file    Years of education: Not on file    Highest education level: Not on file   Occupational History    Not on file   Social Needs    Financial resource strain: Not on file    Food insecurity:     Worry: Not on file     Inability: Not on file    Transportation needs:     Medical: Not on file     Non-medical: Not on file   Tobacco Use    Smoking status: Never Smoker    Smokeless tobacco: Never Used   Substance and Sexual Activity    Alcohol use: Yes     Comment: once every 3 months, 3 drinks at a time    Drug use: No    Sexual activity: Yes     Partners: Male     Birth control/protection: Injection   Lifestyle    Physical activity:     Days per week: Not on file     Minutes per session: Not on file    Stress: Not on file   Relationships    Social connections:     Talks on phone: Not on file     Gets together: Not on file     Attends Zoroastrianism service: Not on file     Active member of club or organization: Not on file     Attends meetings of clubs or organizations: Not on file     Relationship status: Not on file    Intimate partner violence:     Fear of current or ex partner: Not on file     Emotionally abused: Not on file     Physically abused: Not on file     Forced sexual activity: Not on file   Other Topics Concern    Not on file   Social History Narrative    Lives in [facility] with parents and 3 yo son. Works at [employer]. ALLERGIES: Patient has no known allergies. Review of Systems   Constitutional: Negative for activity change, appetite change, chills and fever. HENT: Positive for congestion, rhinorrhea, sinus pressure and sinus pain. Negative for ear pain, sore throat and trouble swallowing. Respiratory: Positive for cough. Negative for shortness of breath and wheezing. Cardiovascular: Negative for chest pain and palpitations. Gastrointestinal: Positive for nausea (\"due to the drainage\"). Negative for vomiting. Musculoskeletal: Negative for myalgias. Neurological: Negative for dizziness and headaches.    Hematological: Negative for adenopathy. Vitals:    05/30/19 1728   BP: (!) 138/96   Pulse: (!) 102   Resp: 18   Temp: 97.9 °F (36.6 °C)   SpO2: 99%   Weight: 192 lb (87.1 kg)   Height: 5' 5\" (1.651 m)       Physical Exam   Constitutional: She appears well-developed and well-nourished. No distress. HENT:   Right Ear: Tympanic membrane, external ear and ear canal normal.   Left Ear: Tympanic membrane, external ear and ear canal normal.   Nose: Rhinorrhea present. Right sinus exhibits maxillary sinus tenderness and frontal sinus tenderness. Left sinus exhibits maxillary sinus tenderness and frontal sinus tenderness. Mouth/Throat: Oropharynx is clear and moist and mucous membranes are normal. No oropharyngeal exudate, posterior oropharyngeal edema, posterior oropharyngeal erythema or tonsillar abscesses. Cardiovascular: Normal rate, regular rhythm and normal heart sounds. Pulmonary/Chest: Effort normal and breath sounds normal. No respiratory distress. She has no wheezes. She has no rales. Lymphadenopathy:     She has cervical adenopathy. Neurological: She is alert. Skin: She is not diaphoretic. Psychiatric: She has a normal mood and affect. Her behavior is normal. Judgment and thought content normal.   Nursing note and vitals reviewed. MDM    ICD-10-CM ICD-9-CM    1. Sinusitis, unspecified chronicity, unspecified location J32.9 473.9      Medications Ordered Today   Medications    cefdinir (OMNICEF) 300 mg capsule     Sig: Take 1 Cap by mouth two (2) times a day for 10 days. Dispense:  20 Cap     Refill:  0     OTC Mucinex, Flonase, Allegra     The patient is to follow up with PCP. If signs and symptoms become worse the pt is to go to the ER. The patient is to take medications as prescribed.              Procedures

## 2019-06-02 ENCOUNTER — HOSPITAL ENCOUNTER (EMERGENCY)
Age: 24
Discharge: HOME | End: 2019-06-02
Attending: EMERGENCY MEDICINE
Payer: COMMERCIAL

## 2019-06-02 ENCOUNTER — APPOINTMENT (OUTPATIENT)
Dept: CT IMAGING | Age: 24
End: 2019-06-02
Attending: EMERGENCY MEDICINE
Payer: COMMERCIAL

## 2019-06-02 VITALS
HEART RATE: 87 BPM | TEMPERATURE: 98.1 F | RESPIRATION RATE: 17 BRPM | SYSTOLIC BLOOD PRESSURE: 122 MMHG | DIASTOLIC BLOOD PRESSURE: 69 MMHG | OXYGEN SATURATION: 99 %

## 2019-06-02 DIAGNOSIS — R10.84 ABDOMINAL PAIN, GENERALIZED: Primary | ICD-10-CM

## 2019-06-02 DIAGNOSIS — R11.0 NAUSEA WITHOUT VOMITING: ICD-10-CM

## 2019-06-02 LAB
ALBUMIN SERPL-MCNC: 3.5 G/DL (ref 3.5–5)
ALBUMIN/GLOB SERPL: 0.6 {RATIO} (ref 1.1–2.2)
ALP SERPL-CCNC: 103 U/L (ref 45–117)
ALT SERPL-CCNC: 282 U/L (ref 12–78)
ANION GAP SERPL CALC-SCNC: 7 MMOL/L (ref 5–15)
APPEARANCE UR: CLEAR
AST SERPL-CCNC: 180 U/L (ref 15–37)
BACTERIA URNS QL MICRO: NEGATIVE /HPF
BASOPHILS # BLD: 0 K/UL (ref 0–0.1)
BASOPHILS NFR BLD: 0 % (ref 0–1)
BILIRUB SERPL-MCNC: 0.3 MG/DL (ref 0.2–1)
BILIRUB UR QL: NEGATIVE
BUN SERPL-MCNC: 14 MG/DL (ref 6–20)
BUN/CREAT SERPL: 19 (ref 12–20)
CALCIUM SERPL-MCNC: 9.1 MG/DL (ref 8.5–10.1)
CHLORIDE SERPL-SCNC: 105 MMOL/L (ref 97–108)
CO2 SERPL-SCNC: 25 MMOL/L (ref 21–32)
COLOR UR: NORMAL
COMMENT, HOLDF: NORMAL
CREAT SERPL-MCNC: 0.75 MG/DL (ref 0.55–1.02)
DIFFERENTIAL METHOD BLD: ABNORMAL
EOSINOPHIL # BLD: 0.2 K/UL (ref 0–0.4)
EOSINOPHIL NFR BLD: 2 % (ref 0–7)
EPITH CASTS URNS QL MICRO: NORMAL /LPF
ERYTHROCYTE [DISTWIDTH] IN BLOOD BY AUTOMATED COUNT: 12.6 % (ref 11.5–14.5)
GLOBULIN SER CALC-MCNC: 5.7 G/DL (ref 2–4)
GLUCOSE SERPL-MCNC: 89 MG/DL (ref 65–100)
GLUCOSE UR STRIP.AUTO-MCNC: NEGATIVE MG/DL
HCG UR QL: NEGATIVE
HCT VFR BLD AUTO: 37 % (ref 35–47)
HGB BLD-MCNC: 11.5 G/DL (ref 11.5–16)
HGB UR QL STRIP: NEGATIVE
HYALINE CASTS URNS QL MICRO: NORMAL /LPF (ref 0–5)
IMM GRANULOCYTES # BLD AUTO: 0 K/UL
IMM GRANULOCYTES NFR BLD AUTO: 0 %
KETONES UR QL STRIP.AUTO: NEGATIVE MG/DL
LEUKOCYTE ESTERASE UR QL STRIP.AUTO: NEGATIVE
LIPASE SERPL-CCNC: 73 U/L (ref 73–393)
LYMPHOCYTES # BLD: 1.2 K/UL (ref 0.8–3.5)
LYMPHOCYTES NFR BLD: 15 % (ref 12–49)
MCH RBC QN AUTO: 29.1 PG (ref 26–34)
MCHC RBC AUTO-ENTMCNC: 31.1 G/DL (ref 30–36.5)
MCV RBC AUTO: 93.7 FL (ref 80–99)
METAMYELOCYTES NFR BLD MANUAL: 1 %
MONOCYTES # BLD: 0.5 K/UL (ref 0–1)
MONOCYTES NFR BLD: 6 % (ref 5–13)
MYELOCYTES NFR BLD MANUAL: 1 %
NEUTS SEG # BLD: 6 K/UL (ref 1.8–8)
NEUTS SEG NFR BLD: 75 % (ref 32–75)
NITRITE UR QL STRIP.AUTO: NEGATIVE
NRBC # BLD: 0.04 K/UL (ref 0–0.01)
NRBC BLD-RTO: 0.5 PER 100 WBC
PH UR STRIP: 6 [PH] (ref 5–8)
PLATELET # BLD AUTO: 440 K/UL (ref 150–400)
PMV BLD AUTO: 9.1 FL (ref 8.9–12.9)
POTASSIUM SERPL-SCNC: 3.4 MMOL/L (ref 3.5–5.1)
PROT SERPL-MCNC: 9.2 G/DL (ref 6.4–8.2)
PROT UR STRIP-MCNC: NEGATIVE MG/DL
RBC # BLD AUTO: 3.95 M/UL (ref 3.8–5.2)
RBC #/AREA URNS HPF: NORMAL /HPF (ref 0–5)
RBC MORPH BLD: ABNORMAL
SAMPLES BEING HELD,HOLD: NORMAL
SODIUM SERPL-SCNC: 137 MMOL/L (ref 136–145)
SP GR UR REFRACTOMETRY: 1.02 (ref 1–1.03)
UR CULT HOLD, URHOLD: NORMAL
UROBILINOGEN UR QL STRIP.AUTO: 1 EU/DL (ref 0.2–1)
WBC # BLD AUTO: 8 K/UL (ref 3.6–11)
WBC URNS QL MICRO: NORMAL /HPF (ref 0–4)

## 2019-06-02 PROCEDURE — 81025 URINE PREGNANCY TEST: CPT

## 2019-06-02 PROCEDURE — 74011000258 HC RX REV CODE- 258: Performed by: RADIOLOGY

## 2019-06-02 PROCEDURE — 99284 EMERGENCY DEPT VISIT MOD MDM: CPT

## 2019-06-02 PROCEDURE — 81001 URINALYSIS AUTO W/SCOPE: CPT

## 2019-06-02 PROCEDURE — 74011000250 HC RX REV CODE- 250: Performed by: EMERGENCY MEDICINE

## 2019-06-02 PROCEDURE — 96361 HYDRATE IV INFUSION ADD-ON: CPT

## 2019-06-02 PROCEDURE — 74177 CT ABD & PELVIS W/CONTRAST: CPT

## 2019-06-02 PROCEDURE — 36415 COLL VENOUS BLD VENIPUNCTURE: CPT

## 2019-06-02 PROCEDURE — 96374 THER/PROPH/DIAG INJ IV PUSH: CPT

## 2019-06-02 PROCEDURE — 74011250637 HC RX REV CODE- 250/637: Performed by: EMERGENCY MEDICINE

## 2019-06-02 PROCEDURE — 80053 COMPREHEN METABOLIC PANEL: CPT

## 2019-06-02 PROCEDURE — 74011250636 HC RX REV CODE- 250/636: Performed by: EMERGENCY MEDICINE

## 2019-06-02 PROCEDURE — 74011636320 HC RX REV CODE- 636/320: Performed by: RADIOLOGY

## 2019-06-02 PROCEDURE — 85025 COMPLETE CBC W/AUTO DIFF WBC: CPT

## 2019-06-02 PROCEDURE — 83690 ASSAY OF LIPASE: CPT

## 2019-06-02 RX ORDER — FAMOTIDINE 20 MG/1
20 TABLET, FILM COATED ORAL 2 TIMES DAILY
Qty: 20 TAB | Refills: 0 | Status: SHIPPED | OUTPATIENT
Start: 2019-06-02 | End: 2019-11-14

## 2019-06-02 RX ORDER — SODIUM CHLORIDE 9 MG/ML
1000 INJECTION, SOLUTION INTRAVENOUS ONCE
Status: COMPLETED | OUTPATIENT
Start: 2019-06-02 | End: 2019-06-02

## 2019-06-02 RX ORDER — FAMOTIDINE 20 MG/1
20 TABLET, FILM COATED ORAL
Status: COMPLETED | OUTPATIENT
Start: 2019-06-02 | End: 2019-06-02

## 2019-06-02 RX ORDER — DICYCLOMINE HYDROCHLORIDE 20 MG/1
20 TABLET ORAL
Qty: 15 TAB | Refills: 0 | Status: SHIPPED | OUTPATIENT
Start: 2019-06-02 | End: 2019-11-14

## 2019-06-02 RX ORDER — SODIUM CHLORIDE 0.9 % (FLUSH) 0.9 %
10 SYRINGE (ML) INJECTION
Status: COMPLETED | OUTPATIENT
Start: 2019-06-02 | End: 2019-06-02

## 2019-06-02 RX ORDER — ONDANSETRON 4 MG/1
4 TABLET, ORALLY DISINTEGRATING ORAL
Qty: 10 TAB | Refills: 0 | Status: SHIPPED | OUTPATIENT
Start: 2019-06-02 | End: 2019-11-14

## 2019-06-02 RX ORDER — ONDANSETRON 2 MG/ML
4 INJECTION INTRAMUSCULAR; INTRAVENOUS ONCE
Status: COMPLETED | OUTPATIENT
Start: 2019-06-02 | End: 2019-06-02

## 2019-06-02 RX ADMIN — FAMOTIDINE 20 MG: 20 TABLET ORAL at 21:01

## 2019-06-02 RX ADMIN — SODIUM CHLORIDE 1000 ML: 900 INJECTION, SOLUTION INTRAVENOUS at 20:56

## 2019-06-02 RX ADMIN — SODIUM CHLORIDE 100 ML: 900 INJECTION, SOLUTION INTRAVENOUS at 21:34

## 2019-06-02 RX ADMIN — ONDANSETRON 4 MG: 2 INJECTION INTRAMUSCULAR; INTRAVENOUS at 21:01

## 2019-06-02 RX ADMIN — LIDOCAINE HYDROCHLORIDE 40 ML: 20 SOLUTION ORAL; TOPICAL at 21:02

## 2019-06-02 RX ADMIN — IOPAMIDOL 100 ML: 755 INJECTION, SOLUTION INTRAVENOUS at 21:34

## 2019-06-02 RX ADMIN — Medication 10 ML: at 21:34

## 2019-06-03 NOTE — ED PROVIDER NOTES
21 yo female presents to ER for evaluation of abdominal pain and nausea. Pt reports burning inside of stomach. Symptoms have been present for 2 weeks. Pt states nauseated daily for 2 weeks. Food makes nausea worse. No vomiting. No diarrhea. No fever or chills. No dysuria. Pt has tried pepto bismol. No relief. No medicines for nausea. No chest pain, shortness of breath, difficulty breathing. Social hx  Nonsmoker  No alcohol        The history is provided by the patient. Abdominal Pain    Associated symptoms include nausea. Pertinent negatives include no fever, no diarrhea, no vomiting, no dysuria, no frequency, no headaches, no arthralgias, no myalgias and no chest pain. Past Medical History:   Diagnosis Date    Abnormal Pap smear of cervix     seeing OBGYN    Acne 10/7/2009    Allergic rhinitis 2009    Anemia 2009    ASCUS HPV+ 2017    H/o ASCUS HPV + with Dr. Bassem Phillips 2017    CHAR III (cervical intraepithelial neoplasia III) 2017    found by LEEP     Headache     Herpes genitalia     dx by OB    History of gestational diabetes     diet controlled    Other ill-defined conditions(799.89)     scoliosis    PCB (post coital bleeding) 2014    Thoracic compression fracture ([address] 3/2014    Gara Shaker    Thyromegaly 2014    On exam     Unspecified vitamin D deficiency 2014    Varicella 1998       Past Surgical History:   Procedure Laterality Date    HX GYN           HX LEEP PROCEDURE  18    found CHAR 3         Family History:   Problem Relation Age of Onset    Breast Cancer Maternal Grandmother         great, great gma, age?     Hypertension Maternal Grandmother     Breast Cancer Maternal Aunt 32        survivor    Diabetes Father     Hypertension Paternal Grandmother     Diabetes Paternal Grandmother     Osteoporosis Neg Hx        Social History     Socioeconomic History    Marital status: SINGLE Spouse name: Not on file    Number of children: Not on file    Years of education: Not on file    Highest education level: Not on file   Occupational History    Not on file   Social Needs    Financial resource strain: Not on file    Food insecurity:     Worry: Not on file     Inability: Not on file    Transportation needs:     Medical: Not on file     Non-medical: Not on file   Tobacco Use    Smoking status: Never Smoker    Smokeless tobacco: Never Used   Substance and Sexual Activity    Alcohol use: Yes     Comment: once every 3 months, 3 drinks at a time    Drug use: No    Sexual activity: Yes     Partners: Male     Birth control/protection: Injection   Lifestyle    Physical activity:     Days per week: Not on file     Minutes per session: Not on file    Stress: Not on file   Relationships    Social connections:     Talks on phone: Not on file     Gets together: Not on file     Attends Church service: Not on file     Active member of club or organization: Not on file     Attends meetings of clubs or organizations: Not on file     Relationship status: Not on file    Intimate partner violence:     Fear of current or ex partner: Not on file     Emotionally abused: Not on file     Physically abused: Not on file     Forced sexual activity: Not on file   Other Topics Concern    Not on file   Social History Narrative    Lives in [facility] with parents and 3 yo son. Works at [employer]. ALLERGIES: Patient has no known allergies. Review of Systems   Constitutional: Negative for chills and fever. Respiratory: Negative for cough and shortness of breath. Cardiovascular: Negative for chest pain and palpitations. Gastrointestinal: Positive for abdominal pain and nausea. Negative for blood in stool, diarrhea and vomiting. Genitourinary: Negative for dysuria, flank pain, frequency and urgency. Musculoskeletal: Negative for arthralgias, myalgias, neck pain and neck stiffness.    Skin: Negative for rash and wound. Neurological: Negative for dizziness, numbness and headaches. All other systems reviewed and are negative. Vitals:    06/02/19 1953   Pulse: 97   SpO2: 100%            Physical Exam   Constitutional: She is oriented to person, place, and time. She appears well-developed and well-nourished. No distress. HENT:   Head: Normocephalic and atraumatic. Right Ear: External ear normal.   Left Ear: External ear normal.   Eyes: Pupils are equal, round, and reactive to light. Conjunctivae and EOM are normal.   Neck: Normal range of motion. Neck supple. Cardiovascular: Normal rate, regular rhythm and normal heart sounds. Pulmonary/Chest: Effort normal and breath sounds normal. No respiratory distress. She has no wheezes. Abdominal: Soft. Normal appearance and bowel sounds are normal. She exhibits no shifting dullness, no distension, no pulsatile liver, no abdominal bruit, no pulsatile midline mass and no mass. There is no hepatosplenomegaly, splenomegaly or hepatomegaly. There is no tenderness. There is no rigidity, no rebound, no guarding, no CVA tenderness, no tenderness at McBurney's point and negative Paris's sign. Abdomen exposed for exam.  Minimal left upper quadrant pain with palpation  No peritoneal signs   Musculoskeletal: Normal range of motion. She exhibits no edema or tenderness. Neurological: She is alert and oriented to person, place, and time. She has normal reflexes. She displays normal reflexes. No cranial nerve deficit. Coordination normal.   Skin: Skin is warm and dry. No rash noted. No erythema. Psychiatric: She has a normal mood and affect. Her behavior is normal. Judgment and thought content normal.   Nursing note and vitals reviewed. MDM  Number of Diagnoses or Management Options  Abdominal pain, generalized:   Nausea without vomiting:   Diagnosis management comments: 19 yo female present for nausea x 2 weeks, left upper quadrant pain.  No distress. No fever. No peritoneal signs  P: ct, labs, ua    11:06 PM  The patient is resting comfortably  is alert and in no distress. The repeat examination is unremarkable and benign; in particular, there is no discomfort at McBurney's point. The history, exam, diagnostic testing, and current condition do not suggest acute appendicitis, bowel obstruction, incarcerated hernia, acute cholecystitis, bowel perforation, major gastrointestinal bleeding, severe diverticulitis, sepsis, or other significant pathology to warrant further testing, continued ED treatment, admission, or surgical evaluation at this point. The vital signs have been stable and are within normal limits at this time. The patient does not have uncontrollable pain, intractable vomiting, or other significant symptoms. The patient's condition is stable and appropriate for discharge. The patient will pursue further outpatient evaluation with the primary care physician or other designated or consulting physician as indicated in the discharge instructions. Patient's results have been reviewed with them. Patient and/or family have verbally conveyed their understanding and agreement of the patient's signs, symptoms, diagnosis, treatment and prognosis and additionally agree to follow up as recommended or return to the Emergency Room should their condition change prior to follow-up. Discharge instructions have also been provided to the patient with some educational information regarding their diagnosis as well a list of reasons why they would want to return to the ER prior to their follow-up appointment should their condition change. Amount and/or Complexity of Data Reviewed  Discuss the patient with other providers: yes (ER attendingKaylin)           Procedures        Pt case including HPI, PE, and all available lab and radiology results has been discussed with attending physician.  Opportunity to evaluate patient has been provided to ER attending. Discharge and prescription plan has been agreed upon.

## 2019-06-03 NOTE — ED TRIAGE NOTES
Pt arrives d/t burning in her stoamch x 2 weeks. PT reports constant nausea and states \"I'm tired of feeling like this\" States no medication is working for her, and reports decreased appetite. Denies vomiting or diarrhea, fever or chills.  Denies urinary pain

## 2019-06-03 NOTE — DISCHARGE INSTRUCTIONS
Use bland diet. Frequent small sips for hydration. Zofran:1 pill every 8 hours as needed for nausea. Bentyl:1 pill every 6 hours as needed for pain. Pepcid:1 pill twice a day. Return to ER for any vomiting, fever, chills, inability to drink. Abdominal Pain: Care Instructions  Your Care Instructions    Abdominal pain has many possible causes. Some aren't serious and get better on their own in a few days. Others need more testing and treatment. If your pain continues or gets worse, you need to be rechecked and may need more tests to find out what is wrong. You may need surgery to correct the problem. Don't ignore new symptoms, such as fever, nausea and vomiting, urination problems, pain that gets worse, and dizziness. These may be signs of a more serious problem. Your doctor may have recommended a follow-up visit in the next 8 to 12 hours. If you are not getting better, you may need more tests or treatment. The doctor has checked you carefully, but problems can develop later. If you notice any problems or new symptoms, get medical treatment right away. Follow-up care is a key part of your treatment and safety. Be sure to make and go to all appointments, and call your doctor if you are having problems. It's also a good idea to know your test results and keep a list of the medicines you take. How can you care for yourself at home? · Rest until you feel better. · To prevent dehydration, drink plenty of fluids, enough so that your urine is light yellow or clear like water. Choose water and other caffeine-free clear liquids until you feel better. If you have kidney, heart, or liver disease and have to limit fluids, talk with your doctor before you increase the amount of fluids you drink. · If your stomach is upset, eat mild foods, such as rice, dry toast or crackers, bananas, and applesauce. Try eating several small meals instead of two or three large ones.   · Wait until 48 hours after all symptoms have gone away before you have spicy foods, alcohol, and drinks that contain caffeine. · Do not eat foods that are high in fat. · Avoid anti-inflammatory medicines such as aspirin, ibuprofen (Advil, Motrin), and naproxen (Aleve). These can cause stomach upset. Talk to your doctor if you take daily aspirin for another health problem. When should you call for help? Call 911 anytime you think you may need emergency care. For example, call if:    · You passed out (lost consciousness).     · You pass maroon or very bloody stools.     · You vomit blood or what looks like coffee grounds.     · You have new, severe belly pain.    Call your doctor now or seek immediate medical care if:    · Your pain gets worse, especially if it becomes focused in one area of your belly.     · You have a new or higher fever.     · Your stools are black and look like tar, or they have streaks of blood.     · You have unexpected vaginal bleeding.     · You have symptoms of a urinary tract infection. These may include:  ? Pain when you urinate. ? Urinating more often than usual.  ? Blood in your urine.     · You are dizzy or lightheaded, or you feel like you may faint.    Watch closely for changes in your health, and be sure to contact your doctor if:    · You are not getting better after 1 day (24 hours). Where can you learn more? Go to http://mickey-skye.info/. Enter O297 in the search box to learn more about \"Abdominal Pain: Care Instructions. \"  Current as of: September 23, 2018  Content Version: 11.9  © 3833-2254 [employer]. Care instructions adapted under license by [employer] (which disclaims liability or warranty for this information). If you have questions about a medical condition or this instruction, always ask your healthcare professional. [place] [address] any warranty or liability for your use of this information.          Patient Education        Nausea and Vomiting: Care Instructions  Your Care Instructions    When you are nauseated, you may feel weak and sweaty and notice a lot of saliva in your mouth. Nausea often leads to vomiting. Most of the time you do not need to worry about nausea and vomiting, but they can be signs of other illnesses. Two common causes of nausea and vomiting are stomach flu and food poisoning. Nausea and vomiting from viral stomach flu will usually start to improve within 24 hours. Nausea and vomiting from food poisoning may last from 12 to 48 hours. The doctor has checked you carefully, but problems can develop later. If you notice any problems or new symptoms, get medical treatment right away. Follow-up care is a key part of your treatment and safety. Be sure to make and go to all appointments, and call your doctor if you are having problems. It's also a good idea to know your test results and keep a list of the medicines you take. How can you care for yourself at home? · To prevent dehydration, drink plenty of fluids, enough so that your urine is light yellow or clear like water. Choose water and other caffeine-free clear liquids until you feel better. If you have kidney, heart, or liver disease and have to limit fluids, talk with your doctor before you increase the amount of fluids you drink. · Rest in bed until you feel better. · When you are able to eat, try clear soups, mild foods, and liquids until all symptoms are gone for 12 to 48 hours. Other good choices include dry toast, crackers, cooked cereal, and gelatin dessert, such as Jell-O. When should you call for help? Call 911 anytime you think you may need emergency care. For example, call if:    · You passed out (lost consciousness).    Call your doctor now or seek immediate medical care if:    · You have symptoms of dehydration, such as:  ? Dry eyes and a dry mouth. ? Passing only a little dark urine. ?  Feeling thirstier than usual.     · You have new or worsening belly pain.     · You have a new or higher fever.     · You vomit blood or what looks like coffee grounds.    Watch closely for changes in your health, and be sure to contact your doctor if:    · You have ongoing nausea and vomiting.     · Your vomiting is getting worse.     · Your vomiting lasts longer than 2 days.     · You are not getting better as expected. Where can you learn more? Go to http://mickey-skye.info/. Enter 25 [id number] in the search box to learn more about \"Nausea and Vomiting: Care Instructions. \"  Current as of: September 23, 2018  Content Version: 11.9  © 0627-5943 [employer]. Care instructions adapted under license by [employer] (which disclaims liability or warranty for this information). If you have questions about a medical condition or this instruction, always ask your healthcare professional. [address] any warranty or liability for your use of this information.

## 2019-06-03 NOTE — ED NOTES
Pt reporting that abdominal pain has decreased to 7/10. Pt sitting in chair quietly and appears to be in no distress at this time. IV fluids continue to infuse.

## 2019-09-09 ENCOUNTER — OFFICE VISIT (OUTPATIENT)
Dept: PRIMARY CARE CLINIC | Age: 24
End: 2019-09-09

## 2019-09-09 VITALS
BODY MASS INDEX: 32.65 KG/M2 | RESPIRATION RATE: 18 BRPM | HEART RATE: 87 BPM | OXYGEN SATURATION: 97 % | WEIGHT: 196 LBS | HEIGHT: 65 IN | TEMPERATURE: 98.5 F | DIASTOLIC BLOOD PRESSURE: 78 MMHG | SYSTOLIC BLOOD PRESSURE: 122 MMHG

## 2019-09-09 DIAGNOSIS — N93.9 VAGINAL BLEEDING: Primary | ICD-10-CM

## 2019-09-09 DIAGNOSIS — R58 BLEEDING: ICD-10-CM

## 2019-09-09 LAB
HCG URINE, QL. (POC): NEGATIVE
HGB BLD-MCNC: 11.7 G/DL
VALID INTERNAL CONTROL?: YES

## 2019-09-09 NOTE — PROGRESS NOTES
Rafael Grant is a 20 y.o. female  HIPAA verified by two patient identifiers. Chief Complaint   Patient presents with    Vaginal Bleeding     bleeding for 3 weeks straight,cramping went to obgyn     Visit Vitals  Pulse 87   Temp 98.5 °F (36.9 °C)   Ht 5' 5\" (1.651 m)   Wt 196 lb (88.9 kg)   SpO2 97%   BMI 32.62 kg/m²       Pain Scale: /10  Pain Location:     1. Have you been to the ER, urgent care clinic since your last visit? Hospitalized since your last visit? No    2. Have you seen or consulted any other health care providers outside of the [address] [place] [address] since your last visit? Include any pap smears or colon screening.  No

## 2019-09-09 NOTE — PROGRESS NOTES
HISTORY OF PRESENT ILLNESS  Moira Keita is a 20 y.o. female. HPI   This 20year old lady complains of intermittent vaginal bleeding over the last 3-4 weeks. She states it is only intermittent, she uses about 1 pad a day. No other complaints. she denies abdominal pain. She has an OB /Gyn who follows her . Review of Systems   Constitutional: Negative for fever. Gastrointestinal: Negative for vomiting. Genitourinary: Negative for dysuria and urgency. Physical Exam   Constitutional: She appears well-developed and well-nourished. Abdominal: Soft. Bowel sounds are normal. She exhibits no distension. There is no tenderness. Genitourinary:   Genitourinary Comments: Pelvic deffered       ASSESSMENT and PLAN    ICD-10-CM ICD-9-CM    1. Vaginal bleeding N93.9 623.8    2.  Bleeding R58 459.0 AMB POC URINE PREGNANCY TEST, VISUAL COLOR COMPARISON      AMB POC HEMOGLOBIN (HGB)   UPT negative  Pt states she is going to make an appointment today to see her ob/gyn in the am  Precautions given

## 2019-11-14 ENCOUNTER — OFFICE VISIT (OUTPATIENT)
Dept: URGENT CARE | Age: 24
End: 2019-11-14

## 2019-11-14 VITALS
SYSTOLIC BLOOD PRESSURE: 137 MMHG | RESPIRATION RATE: 18 BRPM | OXYGEN SATURATION: 97 % | BODY MASS INDEX: 31.82 KG/M2 | TEMPERATURE: 98.9 F | HEIGHT: 65 IN | DIASTOLIC BLOOD PRESSURE: 103 MMHG | WEIGHT: 191 LBS | HEART RATE: 90 BPM

## 2019-11-14 DIAGNOSIS — H66.92 ACUTE LEFT OTITIS MEDIA: Primary | ICD-10-CM

## 2019-11-14 DIAGNOSIS — J32.9 SINUSITIS, UNSPECIFIED CHRONICITY, UNSPECIFIED LOCATION: ICD-10-CM

## 2019-11-14 RX ORDER — BENZONATATE 200 MG/1
200 CAPSULE ORAL
Qty: 30 CAP | Refills: 0 | Status: SHIPPED | OUTPATIENT
Start: 2019-11-14 | End: 2020-02-19

## 2019-11-14 RX ORDER — FLUTICASONE PROPIONATE 50 MCG
2 SPRAY, SUSPENSION (ML) NASAL DAILY
Qty: 1 BOTTLE | Refills: 0 | Status: SHIPPED | OUTPATIENT
Start: 2019-11-14 | End: 2019-11-28

## 2019-11-14 RX ORDER — AMOXICILLIN 875 MG/1
875 TABLET, FILM COATED ORAL EVERY 12 HOURS
Qty: 20 TAB | Refills: 0 | Status: SHIPPED | OUTPATIENT
Start: 2019-11-14 | End: 2019-11-24

## 2019-11-14 NOTE — PATIENT INSTRUCTIONS
Ear Infection (Otitis Media): Care Instructions Your Care Instructions An ear infection may start with a cold and affect the middle ear (otitis media). It can hurt a lot. Most ear infections clear up on their own in a couple of days. Most often you will not need antibiotics. This is because many ear infections are caused by a virus. Antibiotics don't work against a virus. Regular doses of pain medicines are the best way to reduce your fever and help you feel better. Follow-up care is a key part of your treatment and safety. Be sure to make and go to all appointments, and call your doctor if you are having problems. It's also a good idea to know your test results and keep a list of the medicines you take. How can you care for yourself at home? · Take pain medicines exactly as directed. ? If the doctor gave you a prescription medicine for pain, take it as prescribed. ? If you are not taking a prescription pain medicine, take an over-the-counter medicine, such as acetaminophen (Tylenol), ibuprofen (Advil, Motrin), or naproxen (Aleve). Read and follow all instructions on the label. ? Do not take two or more pain medicines at the same time unless the doctor told you to. Many pain medicines have acetaminophen, which is Tylenol. Too much acetaminophen (Tylenol) can be harmful. · Plan to take a full dose of pain reliever before bedtime. Getting enough sleep will help you get better. · Try a warm, moist washcloth on the ear. It may help relieve pain. · If your doctor prescribed antibiotics, take them as directed. Do not stop taking them just because you feel better. You need to take the full course of antibiotics. When should you call for help? Call your doctor now or seek immediate medical care if: 
  · You have new or increasing ear pain.  
  · You have new or increasing pus or blood draining from your ear.  
  · You have a fever with a stiff neck or a severe headache.  Watch closely for changes in your health, and be sure to contact your doctor if: 
  · You have new or worse symptoms.  
  · You are not getting better after taking an antibiotic for 2 days. Where can you learn more? Go to http://mickey-skye.info/. Enter V544 in the search box to learn more about \"Ear Infection (Otitis Media): Care Instructions. \" Current as of: October 21, 2018 Content Version: 12.2 © 8041-0469 [employer]. Care instructions adapted under license by [employer] (which disclaims liability or warranty for this information). If you have questions about a medical condition or this instruction, always ask your healthcare professional. [address] any warranty or liability for your use of this information. Sinusitis: Care Instructions Your Care Instructions Sinusitis is an infection of the lining of the sinus cavities in your head. Sinusitis often follows a cold. It causes pain and pressure in your head and face. In most cases, sinusitis gets better on its own in 1 to 2 weeks. But some mild symptoms may last for several weeks. Sometimes antibiotics are needed. Follow-up care is a key part of your treatment and safety. Be sure to make and go to all appointments, and call your doctor if you are having problems. It's also a good idea to know your test results and keep a list of the medicines you take. How can you care for yourself at home? · Take an over-the-counter pain medicine, such as acetaminophen (Tylenol), ibuprofen (Advil, Motrin), or naproxen (Aleve). Read and follow all instructions on the label. · If the doctor prescribed antibiotics, take them as directed. Do not stop taking them just because you feel better. You need to take the full course of antibiotics. · Be careful when taking over-the-counter cold or flu medicines and Tylenol at the same time.  Many of these medicines have acetaminophen, which is Tylenol. Read the labels to make sure that you are not taking more than the recommended dose. Too much acetaminophen (Tylenol) can be harmful. · Breathe warm, moist air from a steamy shower, a hot bath, or a sink filled with hot water. Avoid cold, dry air. Using a humidifier in your home may help. Follow the directions for cleaning the machine. · Use saline (saltwater) nasal washes to help keep your nasal passages open and wash out mucus and bacteria. You can buy saline nose drops at a grocery store or drugstore. Or you can make your own at home by adding 1 teaspoon of salt and 1 teaspoon of baking soda to 2 cups of distilled water. If you make your own, fill a bulb syringe with the solution, insert the tip into your nostril, and squeeze gently. Courtney Hobby your nose. · Put a hot, wet towel or a warm gel pack on your face 3 or 4 times a day for 5 to 10 minutes each time. · Try a decongestant nasal spray like oxymetazoline (Afrin). Do not use it for more than 3 days in a row. Using it for more than 3 days can make your congestion worse. When should you call for help? Call your doctor now or seek immediate medical care if: 
  · You have new or worse swelling or redness in your face or around your eyes.  
  · You have a new or higher fever.  
 Watch closely for changes in your health, and be sure to contact your doctor if: 
  · You have new or worse facial pain.  
  · The mucus from your nose becomes thicker (like pus) or has new blood in it.  
  · You are not getting better as expected. Where can you learn more? Go to http://mickey-skye.info/. Enter N497 in the search box to learn more about \"Sinusitis: Care Instructions. \" Current as of: October 21, 2018 Content Version: 12.2 © 2600-3840 [employer].  Care instructions adapted under license by [employer] (which disclaims liability or warranty for this information). If you have questions about a medical condition or this instruction, always ask your healthcare professional. [place] [address] any warranty or liability for your use of this information.

## 2019-11-14 NOTE — LETTER
[address] 
[facility] [place] [address] 
[phone] Work/School Note Date: 11/14/2019 To Whom It May concern: 
 
Doretha Granda was seen and treated today in the urgent care center. Doretha Granda may return to work on 11/18/2019. Sincerely, Rita Rashid MD

## 2019-11-14 NOTE — PROGRESS NOTES
Cold Symptoms   The history is provided by the patient. This is a new problem. Episode onset: 1 week ago. The problem occurs constantly. The problem has been gradually worsening. The cough is productive of sputum. There has been no fever. Associated symptoms include ear congestion (left), ear pain (left) and rhinorrhea. Pertinent negatives include no chest pain, no chills, no sweats, no headaches, no sore throat, no myalgias, no shortness of breath, no wheezing, no nausea and no vomiting. She has tried cough syrup (dimetapp, robitussin) for the symptoms. The treatment provided no relief. She is not a smoker. Past Medical History:   Diagnosis Date    Abnormal Pap smear of cervix     seeing OBGYN    Acne 10/7/2009    Allergic rhinitis 2009    Anemia 2009    ASCUS HPV+ 2017    H/o ASCUS HPV + with Dr. Divya Cabral 2017    CHAR III (cervical intraepithelial neoplasia III) 2017    found by LEEP     Headache     Herpes genitalia     dx by OB    History of gestational diabetes     diet controlled    Other ill-defined conditions(799.89)     scoliosis    PCB (post coital bleeding) 2014    Thoracic compression fracture ([address] 3/2014    [place]    Thyromegaly 2014    On exam     Unspecified vitamin D deficiency 2014    Varicella 1998        Past Surgical History:   Procedure Laterality Date    HX GYN           HX LEEP PROCEDURE  18    found CHAR 3         Family History   Problem Relation Age of Onset    Breast Cancer Maternal Grandmother         great, great gma, age?     Hypertension Maternal Grandmother     Breast Cancer Maternal Aunt 32        survivor    Diabetes Father     Hypertension Paternal Grandmother     Diabetes Paternal Grandmother     Osteoporosis Neg Hx         Social History     Socioeconomic History    Marital status: SINGLE     Spouse name: Not on file    Number of children: Not on file    Years of education: Not on file    Highest education level: Not on file   Occupational History    Not on file   Social Needs    Financial resource strain: Not on file    Food insecurity:     Worry: Not on file     Inability: Not on file    Transportation needs:     Medical: Not on file     Non-medical: Not on file   Tobacco Use    Smoking status: Never Smoker    Smokeless tobacco: Never Used   Substance and Sexual Activity    Alcohol use: Yes     Comment: once every 3 months, 3 drinks at a time    Drug use: No    Sexual activity: Yes     Partners: Male     Birth control/protection: Injection   Lifestyle    Physical activity:     Days per week: Not on file     Minutes per session: Not on file    Stress: Not on file   Relationships    Social connections:     Talks on phone: Not on file     Gets together: Not on file     Attends Yarsani service: Not on file     Active member of club or organization: Not on file     Attends meetings of clubs or organizations: Not on file     Relationship status: Not on file    Intimate partner violence:     Fear of current or ex partner: Not on file     Emotionally abused: Not on file     Physically abused: Not on file     Forced sexual activity: Not on file   Other Topics Concern    Not on file   Social History Narrative    Lives in [facility] with parents and 3 yo son. Works at [employer]. ALLERGIES: Patient has no known allergies. Review of Systems   Constitutional: Negative for activity change, appetite change, chills and fever. HENT: Positive for congestion, ear pain (left), rhinorrhea, sinus pressure and sinus pain. Negative for sore throat and trouble swallowing. Respiratory: Positive for cough. Negative for shortness of breath and wheezing. Cardiovascular: Negative for chest pain and palpitations. Gastrointestinal: Negative for nausea and vomiting. Musculoskeletal: Negative for myalgias.    Neurological: Negative for dizziness and headaches. Hematological: Positive for adenopathy. Vitals:    19 0853 19 0855   BP:  (!) 158/92   Pulse:  90   Resp:  18   Temp:  98.9 °F (37.2 °C)   SpO2:  97%   Weight: 191 lb (86.6 kg)    Height: 5' 5\" (1.651 m)        Physical Exam   Constitutional: She appears well-developed and well-nourished. No distress. HENT:   Right Ear: Tympanic membrane, external ear and ear canal normal.   Left Ear: External ear and ear canal normal. Tympanic membrane is erythematous and bulging. A middle ear effusion is present. Nose: Rhinorrhea present. Right sinus exhibits maxillary sinus tenderness and frontal sinus tenderness. Left sinus exhibits maxillary sinus tenderness and frontal sinus tenderness. Mouth/Throat: Oropharynx is clear and moist and mucous membranes are normal. No oropharyngeal exudate, posterior oropharyngeal edema, posterior oropharyngeal erythema or tonsillar abscesses. Cardiovascular: Normal rate, regular rhythm and normal heart sounds. Pulmonary/Chest: Effort normal and breath sounds normal. No respiratory distress. She has no wheezes. She has no rales. Lymphadenopathy:     She has no cervical adenopathy. Neurological: She is alert. Skin: She is not diaphoretic. Psychiatric: She has a normal mood and affect. Her behavior is normal. Judgment and thought content normal.   Nursing note and vitals reviewed. [facility]    ICD-10-CM ICD-9-CM   1. Acute left otitis media H66.92 382.9   2. Sinusitis, unspecified chronicity, unspecified location J32.9 473.9       Orders Placed This Encounter    amoxicillin (AMOXIL) 875 mg tablet     Sig: Take 1 Tab by mouth every twelve (12) hours for 10 days. Dispense:  20 Tab     Refill:  0    fluticasone propionate (FLONASE) 50 mcg/actuation nasal spray     Si Sprays by Both Nostrils route daily for 14 days.      Dispense:  1 Bottle     Refill:  0    benzonatate (TESSALON) 200 mg capsule     Sig: Take 1 Cap by mouth three (3) times daily as needed for Cough. Dispense:  30 Cap     Refill:  0          The patient is to follow up with PCP [facility]. If signs and symptoms become worse the pt is to go to the ER.              Procedures

## 2020-02-04 ENCOUNTER — PATIENT OUTREACH (OUTPATIENT)
Dept: OTHER | Age: 25
End: 2020-02-04

## 2020-02-04 NOTE — PROGRESS NOTES
CCM Outreach     11:00am  Verified  and address for HIPAA security. Introduced [employer] for patient. Patient does not identify any Care Management needs at this time and declines services. 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 * CM encourages her to keep my name and number to contact me if any needs arise.

## 2020-02-19 ENCOUNTER — OFFICE VISIT (OUTPATIENT)
Dept: PRIMARY CARE CLINIC | Age: 25
End: 2020-02-19

## 2020-02-19 VITALS
BODY MASS INDEX: 32.32 KG/M2 | TEMPERATURE: 98.3 F | OXYGEN SATURATION: 98 % | RESPIRATION RATE: 16 BRPM | WEIGHT: 194 LBS | HEIGHT: 65 IN | SYSTOLIC BLOOD PRESSURE: 135 MMHG | HEART RATE: 96 BPM | DIASTOLIC BLOOD PRESSURE: 90 MMHG

## 2020-02-19 DIAGNOSIS — R51.9 ACUTE INTRACTABLE HEADACHE, UNSPECIFIED HEADACHE TYPE: ICD-10-CM

## 2020-02-19 DIAGNOSIS — S06.0X0A CONCUSSION WITHOUT LOSS OF CONSCIOUSNESS, INITIAL ENCOUNTER: ICD-10-CM

## 2020-02-19 DIAGNOSIS — J01.00 ACUTE NON-RECURRENT MAXILLARY SINUSITIS: ICD-10-CM

## 2020-02-19 DIAGNOSIS — J01.10 ACUTE NON-RECURRENT FRONTAL SINUSITIS: Primary | ICD-10-CM

## 2020-02-19 RX ORDER — AMOXICILLIN AND CLAVULANATE POTASSIUM 875; 125 MG/1; MG/1
1 TABLET, FILM COATED ORAL EVERY 12 HOURS
Qty: 14 TAB | Refills: 0 | Status: SHIPPED | OUTPATIENT
Start: 2020-02-19 | End: 2020-02-26

## 2020-02-19 RX ORDER — BUTALBITAL, ACETAMINOPHEN AND CAFFEINE 50; 325; 40 MG/1; MG/1; MG/1
1 TABLET ORAL
Qty: 6 TAB | Refills: 0 | Status: SHIPPED | OUTPATIENT
Start: 2020-02-19 | End: 2020-10-01

## 2020-02-19 NOTE — PATIENT INSTRUCTIONS
Concussion: Care Instructions  Your Care Instructions    A concussion is a kind of injury to the brain. It happens when the head receives a hard blow. The impact can jar or shake the brain against the skull. This interrupts the brain's normal activities. Although you may have cuts or bruises on your head or face, you may have no other visible signs of a brain injury. In most cases, damage to the brain from a concussion can't be seen in tests such as a CT or MRI scan. For a few weeks, you may have low energy, dizziness, trouble sleeping, a headache, ringing in your ears, or nausea. You may also feel anxious, grumpy, or depressed. You may have problems with memory and concentration. These symptoms are common after a concussion. They should slowly improve over time. Sometimes this takes weeks or even months. Someone who lives with you should know how to care for you. Please share this and all information with a caregiver who will be available to help if needed. Follow-up care is a key part of your treatment and safety. Be sure to make and go to all appointments, and call your doctor if you are having problems. It's also a good idea to know your test results and keep a list of the medicines you take. How can you care for yourself at home? Pain control  · Put ice or a cold pack on the part of your head that hurts for 10 to 20 minutes at a time. Put a thin cloth between the ice and your skin. · Be safe with medicines. Read and follow all instructions on the label. ? If the doctor gave you a prescription medicine for pain, take it as prescribed. ? If you are not taking a prescription pain medicine, ask your doctor if you can take an over-the-counter medicine. Recovery  · Follow your doctor's instructions. He or she will tell you if you need someone to watch you closely for the next 24 hours or longer. · Rest is the best way to recover from a concussion.  You need to rest your body and your brain:  ? Get plenty of sleep at night. And take rest breaks during the day. ? Avoid activities that take a lot of physical or mental work. This includes housework, exercise, schoolwork, video games, text messaging, and using the computer. ? You may need to change your school or work schedule while you recover. ? Return to your normal activities slowly. Do not try to do too much at once. · Do not drink alcohol or use illegal drugs. Alcohol and illegal drugs can slow your recovery. And they can increase your risk of a second brain injury. · Avoid activities that could lead to another concussion. Follow your doctor's instructions for a gradual return to activity and sports. · Ask your doctor when it's okay for you to drive a car, ride a bike, or operate machinery. How should you return to activity? Your return to activity can begin after 1 to 2 days of physical and mental rest. After resting, you can gradually increase your activity as long as it does not cause new symptoms or worsen your symptoms. Doctors and concussion specialists suggest steps to follow for returning to sports after a concussion. Use these steps as a guide. You should slowly progress through the following levels of activity:  1. Limited activity. You can take part in daily activities as long as the activity doesn't increase your symptoms or cause new symptoms. 2. Light aerobic activity. This can include walking, swimming, or other exercise at less than 70% of maximum heart rate. No resistance training is included in this step. 3. Sport-specific exercise. This includes running drills or skating drills (depending on the sport), but no head impact. 4. Noncontact training drills. This includes more complex training drills such as passing. The athlete may also begin light resistance training. 5. Full-contact practice. The athlete can participate in normal training. 6. Return to normal game play.  This is the final step and allows the athlete to join in normal game play. Watch and keep track of your progress. It should take at least 6 days for you to go from light activity to normal game play. Make sure that you can stay at each new level of activity for at least 24 hours without symptoms, or as long as your doctor says, before doing more. If one or more symptoms come back, return to a lower level of activity for at least 24 hours. Don't move on until all symptoms are gone. When should you call for help? Call 911 anytime you think you may need emergency care. For example, call if:    · You have a seizure.     · You passed out (lost consciousness).     · You are confused or can't stay awake.    Call your doctor now or seek immediate medical care if:    · You have new or worse vomiting.     · You feel less alert.     · You have new weakness or numbness in any part of your body.    Watch closely for changes in your health, and be sure to contact your doctor if:    · You do not get better as expected.     · You have new symptoms, such as headaches, trouble concentrating, or changes in mood. Where can you learn more? Go to http://mickey-skye.info/. Enter M533 in the search box to learn more about \"Concussion: Care Instructions. \"  Current as of: March 28, 2019  Content Version: 12.2  © 7813-6784 [employer]. Care instructions adapted under license by [employer] (which disclaims liability or warranty for this information). If you have questions about a medical condition or this instruction, always ask your healthcare professional. [place] [address] any warranty or liability for your use of this information.

## 2020-02-19 NOTE — PROGRESS NOTES
RM 4    Chief Complaint   Patient presents with    Sinus Pain     sinus pressure and pain, nose bleeds, dizzy, headache  x 2 weeks    Fall     face forward on a beam hit head x 4 days       Visit Vitals  BP (!) 149/100 (BP 1 Location: Left arm, BP Patient Position: Sitting)   Pulse 96   Temp 98.3 °F (36.8 °C) (Oral)   Resp 16   Ht 5' 5\" (1.651 m)   Wt 194 lb (88 kg)   SpO2 98%   BMI 32.28 kg/m²

## 2020-02-19 NOTE — PROGRESS NOTES
Subjective:   Jackeline Harris is a 30 y.o. female who complains of congestion, dry cough and bilateral sinus pain for 14 days, gradually worsening since that time. She denies a history of chills, fevers, shortness of breath and wheezing. She also provides history of head injury. 3 days ago while at her son's [employer] party at [facility], she was running across [employer] platform, tripped, fell forward hit front of head on padded springs of VISEO. No loc but had to lay there for several minutes. Since then she has had photosensivity, nausea,  Dizziness, and constant headaches. Taking ibuprofen for the headaches which offers temporary relief but then headache returns. Has been trying to push through with work, able to drive. Evaluation to date: none. Treatment to date: OTC products. Patient does not smoke cigarettes. Relevant PMH:   Past Medical History:   Diagnosis Date    Abnormal Pap smear of cervix     seeing OBGYN    Acne 10/7/2009    Allergic rhinitis 2009    Anemia 2009    ASCUS HPV+ 2017    H/o ASCUS HPV + with Dr. Alisha Dickinson 2017    CHAR III (cervical intraepithelial neoplasia III) 2017    found by LEEP     Headache     Herpes genitalia     dx by OB    History of gestational diabetes     diet controlled    Other ill-defined conditions(799.89)     scoliosis    PCB (post coital bleeding) 2014    Thoracic compression fracture ([address] 3/2014    Coretha Lamer    Thyromegaly 2014    On exam     Unspecified vitamin D deficiency 2014    Varicella 1998     Past Surgical History:   Procedure Laterality Date    HX GYN           HX LEEP PROCEDURE  18    found CHAR 3     No Known Allergies      Review of Systems  Pertinent items are noted in HPI.     Objective:     Visit Vitals  /90 (BP 1 Location: Right arm, BP Patient Position: Sitting)   Pulse 96   Temp 98.3 °F (36.8 °C) (Oral)   Resp 16   Ht 5' 5\" (1.651 m)   Wt 194 lb (88 kg)   LMP 12/19/2019   SpO2 98%   BMI 32.28 kg/m²     General:  alert, cooperative, no distress   Eyes: negative   Ears: normal TM's and external ear canals AU   Sinuses: tenderness over bilateral maxillary & frontal   Mouth:  Lips, mucosa, and tongue normal. Teeth and gums normal and normal findings: oropharynx pink & moist without lesions or evidence of thrush   Neck: supple, symmetrical, trachea midline and no adenopathy. Heart: S1 and S2 normal, no murmurs noted. Lungs: clear to auscultation bilaterally   Neuro: CN II-XII intact, patellar DTR's 2+, gait steady, CHRISTIAN   Abdomen: soft, non-tender. Bowel sounds normal. No masses,  no organomegaly        Assessment/Plan:       ICD-10-CM ICD-9-CM    1. Acute non-recurrent frontal sinusitis J01.10 461.1    2. Acute non-recurrent maxillary sinusitis J01.00 461.0    3. Concussion without loss of consciousness, initial encounter S06.0X0A 850.0    4. Acute intractable headache, unspecified headache type R51 784.0        Concussion management reviewed w/ pt. Recommend rest next few days. May need further treatment if persistent sx's. Call or f/u prn. Discussed the dx and tx of sinusitis. Suggested symptomatic OTC remedies. Antibiotics per orders. RTC prn. Paula Crowell NP  This note will not be viewable in [address]

## 2020-02-19 NOTE — LETTER
NOTIFICATION RETURN TO WORK / SCHOOL 
 
2/19/2020 6:18 PM 
 
Ms. Junior Silva [address] [place] [id number] To Whom It May Concern: 
 
Junior Silva is currently under the care of [address] [place] [address]. She will return to work/school on 2/24/2020. If there are questions or concerns please have the patient contact our office. Sincerely, Brandon Alvarez NP

## 2020-05-15 ENCOUNTER — HOSPITAL ENCOUNTER (OUTPATIENT)
Dept: MRI IMAGING | Age: 25
Discharge: HOME | End: 2020-05-15
Attending: OBSTETRICS & GYNECOLOGY
Payer: COMMERCIAL

## 2020-05-15 DIAGNOSIS — N64.3 GALACTORRHEA NOT ASSOCIATED WITH CHILDBIRTH: ICD-10-CM

## 2020-05-15 DIAGNOSIS — R51.9 FACIAL PAIN: ICD-10-CM

## 2020-05-15 DIAGNOSIS — E28.2 POLYCYSTIC OVARIES: ICD-10-CM

## 2020-05-15 PROCEDURE — 70553 MRI BRAIN STEM W/O & W/DYE: CPT

## 2020-05-15 PROCEDURE — 74011000258 HC RX REV CODE- 258

## 2020-05-15 PROCEDURE — A9585 GADOBUTROL INJECTION: HCPCS

## 2020-05-15 PROCEDURE — 74011250636 HC RX REV CODE- 250/636

## 2020-05-15 RX ORDER — SODIUM CHLORIDE 0.9 % (FLUSH) 0.9 %
10 SYRINGE (ML) INJECTION
Status: COMPLETED | OUTPATIENT
Start: 2020-05-15 | End: 2020-05-15

## 2020-05-15 RX ORDER — GADOTERATE MEGLUMINE 376.9 MG/ML
18 INJECTION INTRAVENOUS
Status: DISCONTINUED | OUTPATIENT
Start: 2020-05-15 | End: 2020-05-15 | Stop reason: CLARIF

## 2020-05-15 RX ADMIN — SODIUM CHLORIDE 100 ML: 900 INJECTION, SOLUTION INTRAVENOUS at 19:41

## 2020-05-15 RX ADMIN — GADOBUTROL 10 ML: 604.72 INJECTION INTRAVENOUS at 20:00

## 2020-05-15 RX ADMIN — Medication 10 ML: at 19:42

## 2020-09-25 ENCOUNTER — HOSPITAL ENCOUNTER (EMERGENCY)
Age: 25
Discharge: HOME | End: 2020-09-25
Attending: EMERGENCY MEDICINE | Admitting: EMERGENCY MEDICINE
Payer: COMMERCIAL

## 2020-09-25 VITALS
RESPIRATION RATE: 16 BRPM | SYSTOLIC BLOOD PRESSURE: 162 MMHG | DIASTOLIC BLOOD PRESSURE: 119 MMHG | TEMPERATURE: 97.7 F | HEART RATE: 71 BPM | OXYGEN SATURATION: 98 %

## 2020-09-25 DIAGNOSIS — R42 VERTIGO: ICD-10-CM

## 2020-09-25 DIAGNOSIS — I10 HYPERTENSION, UNSPECIFIED TYPE: Primary | ICD-10-CM

## 2020-09-25 LAB
ALBUMIN SERPL-MCNC: 3.8 G/DL (ref 3.5–5)
ALBUMIN/GLOB SERPL: 0.7 {RATIO} (ref 1.1–2.2)
ALP SERPL-CCNC: 92 U/L (ref 45–117)
ALT SERPL-CCNC: 16 U/L (ref 12–78)
ANION GAP SERPL CALC-SCNC: 4 MMOL/L (ref 5–15)
AST SERPL-CCNC: 12 U/L (ref 15–37)
BASOPHILS # BLD: 0 K/UL (ref 0–0.1)
BASOPHILS NFR BLD: 1 % (ref 0–1)
BILIRUB SERPL-MCNC: 0.4 MG/DL (ref 0.2–1)
BUN SERPL-MCNC: 9 MG/DL (ref 6–20)
BUN/CREAT SERPL: 10 (ref 12–20)
CALCIUM SERPL-MCNC: 9.2 MG/DL (ref 8.5–10.1)
CHLORIDE SERPL-SCNC: 104 MMOL/L (ref 97–108)
CO2 SERPL-SCNC: 27 MMOL/L (ref 21–32)
COMMENT, HOLDF: NORMAL
CREAT SERPL-MCNC: 0.87 MG/DL (ref 0.55–1.02)
DIFFERENTIAL METHOD BLD: NORMAL
EOSINOPHIL # BLD: 0.1 K/UL (ref 0–0.4)
EOSINOPHIL NFR BLD: 1 % (ref 0–7)
ERYTHROCYTE [DISTWIDTH] IN BLOOD BY AUTOMATED COUNT: 12.5 % (ref 11.5–14.5)
GLOBULIN SER CALC-MCNC: 5.6 G/DL (ref 2–4)
GLUCOSE SERPL-MCNC: 104 MG/DL (ref 65–100)
HCG UR QL: NEGATIVE
HCT VFR BLD AUTO: 38.2 % (ref 35–47)
HGB BLD-MCNC: 12.3 G/DL (ref 11.5–16)
IMM GRANULOCYTES # BLD AUTO: 0 K/UL (ref 0–0.04)
IMM GRANULOCYTES NFR BLD AUTO: 0 % (ref 0–0.5)
LYMPHOCYTES # BLD: 2 K/UL (ref 0.8–3.5)
LYMPHOCYTES NFR BLD: 25 % (ref 12–49)
MCH RBC QN AUTO: 30.1 PG (ref 26–34)
MCHC RBC AUTO-ENTMCNC: 32.2 G/DL (ref 30–36.5)
MCV RBC AUTO: 93.4 FL (ref 80–99)
MONOCYTES # BLD: 0.7 K/UL (ref 0–1)
MONOCYTES NFR BLD: 8 % (ref 5–13)
NEUTS SEG # BLD: 5.3 K/UL (ref 1.8–8)
NEUTS SEG NFR BLD: 65 % (ref 32–75)
NRBC # BLD: 0 K/UL (ref 0–0.01)
NRBC BLD-RTO: 0 PER 100 WBC
PLATELET # BLD AUTO: 384 K/UL (ref 150–400)
PMV BLD AUTO: 9.2 FL (ref 8.9–12.9)
POTASSIUM SERPL-SCNC: 3.6 MMOL/L (ref 3.5–5.1)
PROT SERPL-MCNC: 9.4 G/DL (ref 6.4–8.2)
RBC # BLD AUTO: 4.09 M/UL (ref 3.8–5.2)
SAMPLES BEING HELD,HOLD: NORMAL
SODIUM SERPL-SCNC: 135 MMOL/L (ref 136–145)
TROPONIN I SERPL-MCNC: <0.05 NG/ML
WBC # BLD AUTO: 8.1 K/UL (ref 3.6–11)

## 2020-09-25 PROCEDURE — 99283 EMERGENCY DEPT VISIT LOW MDM: CPT

## 2020-09-25 PROCEDURE — 93005 ELECTROCARDIOGRAM TRACING: CPT

## 2020-09-25 PROCEDURE — 81025 URINE PREGNANCY TEST: CPT

## 2020-09-25 PROCEDURE — 80053 COMPREHEN METABOLIC PANEL: CPT

## 2020-09-25 PROCEDURE — 85025 COMPLETE CBC W/AUTO DIFF WBC: CPT

## 2020-09-25 PROCEDURE — 36415 COLL VENOUS BLD VENIPUNCTURE: CPT

## 2020-09-25 PROCEDURE — 84484 ASSAY OF TROPONIN QUANT: CPT

## 2020-09-25 RX ORDER — MECLIZINE HYDROCHLORIDE 25 MG/1
25 TABLET ORAL
Qty: 20 TAB | Refills: 0 | Status: SHIPPED | OUTPATIENT
Start: 2020-09-25 | End: 2020-10-05

## 2020-09-25 NOTE — ED PROVIDER NOTES
30year old female presents from home with a complaint of high blood pressure. When she was on birth control she had high blood pressure and was on medication. She stopped both OCP and her antihypertensive about one year ago. She was on amlodipine. She complains of \"dizziness\" without headache. No fever, cough, nausea, vomiting, chest pain, or shortness of breath. The history is provided by the patient. Hypertension    This is a new problem. The problem has not changed since onset. Associated symptoms include dizziness. Pertinent negatives include no chest pain, no palpitations, no PND, no anxiety, no confusion, no blurred vision, no headaches, no peripheral edema, no shortness of breath, no nausea and no vomiting. Past Medical History:   Diagnosis Date    Abnormal Pap smear of cervix     seeing OBGYN    Acne 10/7/2009    Allergic rhinitis 2009    Anemia 2009    ASCUS HPV+ 2017    H/o ASCUS HPV + with Dr. Esperanza Cleaning 2017    CHAR III (cervical intraepithelial neoplasia III) 2017    found by LEEP     Headache     Herpes genitalia     dx by OB    History of gestational diabetes     diet controlled    Other ill-defined conditions(799.89)     scoliosis    PCB (post coital bleeding) 2014    Thoracic compression fracture ([address] 3/2014    [place]    Thyromegaly 2014    On exam     Unspecified vitamin D deficiency 2014    Varicella 1998       Past Surgical History:   Procedure Laterality Date    HX GYN           HX LEEP PROCEDURE  18    found CHAR 3         Family History:   Problem Relation Age of Onset    Breast Cancer Maternal Grandmother         great, great gma, age?     Hypertension Maternal Grandmother     Breast Cancer Maternal Aunt 32        survivor    Diabetes Father     Hypertension Paternal Grandmother     Diabetes Paternal Grandmother     Osteoporosis Neg Hx        Social History Socioeconomic History    Marital status: SINGLE     Spouse name: Not on file    Number of children: Not on file    Years of education: Not on file    Highest education level: Not on file   Occupational History    Not on file   Social Needs    Financial resource strain: Not on file    Food insecurity     Worry: Not on file     Inability: Not on file    Transportation needs     Medical: Not on file     Non-medical: Not on file   Tobacco Use    Smoking status: Never Smoker    Smokeless tobacco: Never Used   Substance and Sexual Activity    Alcohol use: Yes     Comment: once every 3 months, 3 drinks at a time    Drug use: No    Sexual activity: Yes     Partners: Male     Birth control/protection: Injection   Lifestyle    Physical activity     Days per week: Not on file     Minutes per session: Not on file    Stress: Not on file   Relationships    Social connections     Talks on phone: Not on file     Gets together: Not on file     Attends Sikhism service: Not on file     Active member of club or organization: Not on file     Attends meetings of clubs or organizations: Not on file     Relationship status: Not on file    Intimate partner violence     Fear of current or ex partner: Not on file     Emotionally abused: Not on file     Physically abused: Not on file     Forced sexual activity: Not on file   Other Topics Concern    Not on file   Social History Narrative    Lives in [facility] with parents and 3 yo son. Works at [employer]. ALLERGIES: Patient has no known allergies. Review of Systems   Constitutional: Negative for fatigue and fever. HENT: Negative for sneezing and sore throat. Eyes: Negative for blurred vision. Respiratory: Negative for cough and shortness of breath. Cardiovascular: Negative for chest pain, palpitations, leg swelling and PND. Gastrointestinal: Negative for abdominal pain, diarrhea, nausea and vomiting.    Genitourinary: Negative for difficulty urinating and dysuria. Musculoskeletal: Negative for arthralgias and myalgias. Skin: Negative for color change and rash. Neurological: Positive for dizziness. Negative for weakness and headaches. Psychiatric/Behavioral: Negative for agitation, behavioral problems and confusion. Vitals:    09/25/20 1614   BP: (!) 162/119   Pulse: 71   Resp: 16   Temp: 97.7 °F (36.5 °C)   SpO2: 98%            Physical Exam  Vitals signs and nursing note reviewed. Constitutional:       General: She is not in acute distress. Appearance: Normal appearance. She is not ill-appearing. HENT:      Head: Normocephalic and atraumatic. Nose: Nose normal.      Mouth/Throat:      Mouth: Mucous membranes are moist.      Pharynx: Oropharynx is clear. Eyes:      Extraocular Movements: Extraocular movements intact. Pupils: Pupils are equal, round, and reactive to light. Neck:      Musculoskeletal: Normal range of motion and neck supple. Cardiovascular:      Rate and Rhythm: Normal rate and regular rhythm. Pulses: Normal pulses. Heart sounds: Normal heart sounds. Pulmonary:      Effort: Pulmonary effort is normal.      Breath sounds: Normal breath sounds. Abdominal:      Palpations: Abdomen is soft. Tenderness: There is no abdominal tenderness. Musculoskeletal: Normal range of motion. Right lower leg: No edema. Left lower leg: No edema. Skin:     General: Skin is warm and dry. Capillary Refill: Capillary refill takes less than 2 seconds. Neurological:      General: No focal deficit present. Mental Status: She is alert and oriented to person, place, and time.       Comments: Some reproduction of vertiginous symptoms with fatigable nystagmus on extraocular movements   Psychiatric:         Mood and Affect: Mood normal.         Behavior: Behavior normal.          MDM  Number of Diagnoses or Management Options  Hypertension, unspecified type:   Vertigo:   Diagnosis management comments: 22-year-old female presents as above with hypertension association with some vertiginous symptoms. She has reassuring work-up and exam.  Plan to treat with meclizine and have her keep a blood pressure log at home and follow-up with primary care in 1 week to discuss potential need for antihypertensives. Procedures          Rhythm: normal sinus rhythm. Rate (approx.): 79. Axis: normal.  ST segment:  No concerning ST elevations or depressions. This EKG was interpreted by Richard Amaya MD,ED Provider.

## 2020-09-25 NOTE — DISCHARGE INSTRUCTIONS
Please keep a blood pressure log as discussed for the next week and bring this to your next primary care appointment.

## 2020-09-25 NOTE — ED TRIAGE NOTES
Triage: Pt arrives from work with CC of hypertension and dizziness that started today. Pt reports she was placed on amlodipine for her hypertension but she felt like her BP was improving so she stopped taking it. Endorses some chest pain on inspiration.

## 2020-09-26 LAB
ATRIAL RATE: 79 BPM
CALCULATED P AXIS, ECG09: 49 DEGREES
CALCULATED R AXIS, ECG10: 5 DEGREES
CALCULATED T AXIS, ECG11: 16 DEGREES
DIAGNOSIS, 93000: NORMAL
P-R INTERVAL, ECG05: 164 MS
Q-T INTERVAL, ECG07: 388 MS
QRS DURATION, ECG06: 86 MS
QTC CALCULATION (BEZET), ECG08: 444 MS
VENTRICULAR RATE, ECG03: 79 BPM

## 2020-10-01 ENCOUNTER — HOSPITAL ENCOUNTER (EMERGENCY)
Age: 25
Discharge: HOME | End: 2020-10-01
Attending: EMERGENCY MEDICINE
Payer: COMMERCIAL

## 2020-10-01 ENCOUNTER — TELEPHONE (OUTPATIENT)
Dept: FAMILY MEDICINE CLINIC | Age: 25
End: 2020-10-01

## 2020-10-01 VITALS
SYSTOLIC BLOOD PRESSURE: 141 MMHG | BODY MASS INDEX: 30.54 KG/M2 | RESPIRATION RATE: 15 BRPM | DIASTOLIC BLOOD PRESSURE: 97 MMHG | WEIGHT: 190.04 LBS | HEIGHT: 66 IN | HEART RATE: 76 BPM | OXYGEN SATURATION: 95 % | TEMPERATURE: 99 F

## 2020-10-01 DIAGNOSIS — I10 UNCONTROLLED HYPERTENSION: Primary | ICD-10-CM

## 2020-10-01 LAB
ANION GAP SERPL CALC-SCNC: 7 MMOL/L (ref 5–15)
ATRIAL RATE: 72 BPM
BASOPHILS # BLD: 0 K/UL (ref 0–0.1)
BASOPHILS NFR BLD: 0 % (ref 0–1)
BUN SERPL-MCNC: 11 MG/DL (ref 6–20)
BUN/CREAT SERPL: 16 (ref 12–20)
CALCIUM SERPL-MCNC: 8.9 MG/DL (ref 8.5–10.1)
CALCULATED P AXIS, ECG09: 41 DEGREES
CALCULATED R AXIS, ECG10: -12 DEGREES
CALCULATED T AXIS, ECG11: 4 DEGREES
CHLORIDE SERPL-SCNC: 102 MMOL/L (ref 97–108)
CO2 SERPL-SCNC: 27 MMOL/L (ref 21–32)
COMMENT, HOLDF: NORMAL
CREAT SERPL-MCNC: 0.67 MG/DL (ref 0.55–1.02)
DIAGNOSIS, 93000: NORMAL
DIFFERENTIAL METHOD BLD: ABNORMAL
EOSINOPHIL # BLD: 0.1 K/UL (ref 0–0.4)
EOSINOPHIL NFR BLD: 1 % (ref 0–7)
ERYTHROCYTE [DISTWIDTH] IN BLOOD BY AUTOMATED COUNT: 12.7 % (ref 11.5–14.5)
GLUCOSE SERPL-MCNC: 84 MG/DL (ref 65–100)
HCT VFR BLD AUTO: 35.2 % (ref 35–47)
HGB BLD-MCNC: 11.3 G/DL (ref 11.5–16)
IMM GRANULOCYTES # BLD AUTO: 0 K/UL (ref 0–0.04)
IMM GRANULOCYTES NFR BLD AUTO: 0 % (ref 0–0.5)
LYMPHOCYTES # BLD: 1.3 K/UL (ref 0.8–3.5)
LYMPHOCYTES NFR BLD: 24 % (ref 12–49)
MCH RBC QN AUTO: 29.9 PG (ref 26–34)
MCHC RBC AUTO-ENTMCNC: 32.1 G/DL (ref 30–36.5)
MCV RBC AUTO: 93.1 FL (ref 80–99)
MONOCYTES # BLD: 0.4 K/UL (ref 0–1)
MONOCYTES NFR BLD: 7 % (ref 5–13)
NEUTS SEG # BLD: 3.7 K/UL (ref 1.8–8)
NEUTS SEG NFR BLD: 68 % (ref 32–75)
NRBC # BLD: 0 K/UL (ref 0–0.01)
NRBC BLD-RTO: 0 PER 100 WBC
P-R INTERVAL, ECG05: 174 MS
PLATELET # BLD AUTO: 382 K/UL (ref 150–400)
PMV BLD AUTO: 8.9 FL (ref 8.9–12.9)
POTASSIUM SERPL-SCNC: 3.5 MMOL/L (ref 3.5–5.1)
Q-T INTERVAL, ECG07: 412 MS
QRS DURATION, ECG06: 80 MS
QTC CALCULATION (BEZET), ECG08: 451 MS
RBC # BLD AUTO: 3.78 M/UL (ref 3.8–5.2)
SAMPLES BEING HELD,HOLD: NORMAL
SODIUM SERPL-SCNC: 136 MMOL/L (ref 136–145)
VENTRICULAR RATE, ECG03: 72 BPM
WBC # BLD AUTO: 5.6 K/UL (ref 3.6–11)

## 2020-10-01 PROCEDURE — 99285 EMERGENCY DEPT VISIT HI MDM: CPT

## 2020-10-01 PROCEDURE — 74011250637 HC RX REV CODE- 250/637: Performed by: EMERGENCY MEDICINE

## 2020-10-01 PROCEDURE — 96374 THER/PROPH/DIAG INJ IV PUSH: CPT

## 2020-10-01 PROCEDURE — 36415 COLL VENOUS BLD VENIPUNCTURE: CPT

## 2020-10-01 PROCEDURE — 93005 ELECTROCARDIOGRAM TRACING: CPT

## 2020-10-01 PROCEDURE — 80048 BASIC METABOLIC PNL TOTAL CA: CPT

## 2020-10-01 PROCEDURE — 74011000250 HC RX REV CODE- 250: Performed by: EMERGENCY MEDICINE

## 2020-10-01 PROCEDURE — 85025 COMPLETE CBC W/AUTO DIFF WBC: CPT

## 2020-10-01 RX ORDER — AMLODIPINE BESYLATE 5 MG/1
5 TABLET ORAL
Status: COMPLETED | OUTPATIENT
Start: 2020-10-01 | End: 2020-10-01

## 2020-10-01 RX ORDER — HYDROCHLOROTHIAZIDE 25 MG/1
25 TABLET ORAL
Status: COMPLETED | OUTPATIENT
Start: 2020-10-01 | End: 2020-10-01

## 2020-10-01 RX ORDER — HYDROCHLOROTHIAZIDE 25 MG/1
25 TABLET ORAL DAILY
Qty: 30 TAB | Refills: 0 | Status: SHIPPED | OUTPATIENT
Start: 2020-10-01 | End: 2021-06-04 | Stop reason: SDUPTHER

## 2020-10-01 RX ORDER — ACETAMINOPHEN 325 MG/1
650 TABLET ORAL
Status: COMPLETED | OUTPATIENT
Start: 2020-10-01 | End: 2020-10-01

## 2020-10-01 RX ORDER — LABETALOL HYDROCHLORIDE 5 MG/ML
20 INJECTION, SOLUTION INTRAVENOUS
Status: COMPLETED | OUTPATIENT
Start: 2020-10-01 | End: 2020-10-01

## 2020-10-01 RX ORDER — AMLODIPINE BESYLATE 5 MG/1
5 TABLET ORAL DAILY
Qty: 30 TAB | Refills: 0 | Status: SHIPPED | OUTPATIENT
Start: 2020-10-01 | End: 2021-06-14 | Stop reason: SINTOL

## 2020-10-01 RX ADMIN — AMLODIPINE BESYLATE 5 MG: 5 TABLET ORAL at 12:58

## 2020-10-01 RX ADMIN — HYDROCHLOROTHIAZIDE 25 MG: 25 TABLET ORAL at 12:58

## 2020-10-01 RX ADMIN — LABETALOL HYDROCHLORIDE 20 MG: 5 INJECTION INTRAVENOUS at 12:59

## 2020-10-01 RX ADMIN — ACETAMINOPHEN 650 MG: 325 TABLET ORAL at 12:55

## 2020-10-01 NOTE — ED TRIAGE NOTES
Pt c/o of headache, dizziness, and high blood pressure. Seen at [facility] on Friday and told to follow up with PCP for BP med. Unable to get into PCP for 3 weeks, here for symptoms and bp med prescription.

## 2020-10-01 NOTE — ED NOTES
Pt ambulatory out of ED with discharge instructions and prescriptions in hand given by Dr. Maverick Fortune; pt verbalized understanding of discharge paperwork and time allotted for questions. VSS. Pt alert and oriented.

## 2020-10-01 NOTE — LETTER
[facility] EMERGENCY [address] [id number] 
[phone] Work/School Note Date: 10/1/2020 To Whom It May concern: 
 
Dimitris Hutchison was seen and treated today in the emergency room by the following provider(s): 
Attending Provider: Jessica Slater MD.   
 
Dimitris Hutchison may return to work on 10/3/2020.  
 
Sincerely, 
 
 
 
 
Abdullahi Mae RN

## 2020-10-01 NOTE — DISCHARGE INSTRUCTIONS
Patient Education        High Blood Pressure: Care Instructions  Overview     It's normal for blood pressure to go up and down throughout the day. But if it stays up, you have high blood pressure. Another name for high blood pressure is hypertension. Despite what a lot of people think, high blood pressure usually doesn't cause headaches or make you feel dizzy or lightheaded. It usually has no symptoms. But it does increase your risk of stroke, heart attack, and other problems. You and your doctor will talk about your risks of these problems based on your blood pressure. Your doctor will give you a goal for your blood pressure. Your goal will be based on your health and your age. Lifestyle changes, such as eating healthy and being active, are always important to help lower blood pressure. You might also take medicine to reach your blood pressure goal.  Follow-up care is a key part of your treatment and safety. Be sure to make and go to all appointments, and call your doctor if you are having problems. It's also a good idea to know your test results and keep a list of the medicines you take. How can you care for yourself at home? Medical treatment  · If you stop taking your medicine, your blood pressure will go back up. You may take one or more types of medicine to lower your blood pressure. Be safe with medicines. Take your medicine exactly as prescribed. Call your doctor if you think you are having a problem with your medicine. · Talk to your doctor before you start taking aspirin every day. Aspirin can help certain people lower their risk of a heart attack or stroke. But taking aspirin isn't right for everyone, because it can cause serious bleeding. · See your doctor regularly. You may need to see the doctor more often at first or until your blood pressure comes down.   · If you are taking blood pressure medicine, talk to your doctor before you take decongestants or anti-inflammatory medicine, such as ibuprofen. Some of these medicines can raise blood pressure. · Learn how to check your blood pressure at home. Lifestyle changes  · Stay at a healthy weight. This is especially important if you put on weight around the waist. Losing even 10 pounds can help you lower your blood pressure. · If your doctor recommends it, get more exercise. Walking is a good choice. Bit by bit, increase the amount you walk every day. Try for at least 30 minutes on most days of the week. You also may want to swim, bike, or do other activities. · Avoid or limit alcohol. Talk to your doctor about whether you can drink any alcohol. · Try to limit how much sodium you eat to less than 2,300 milligrams (mg) a day. Your doctor may ask you to try to eat less than 1,500 mg a day. · Eat plenty of fruits (such as bananas and oranges), vegetables, legumes, whole grains, and low-fat dairy products. · Lower the amount of saturated fat in your diet. Saturated fat is found in animal products such as milk, cheese, and meat. Limiting these foods may help you lose weight and also lower your risk for heart disease. · Do not smoke. Smoking increases your risk for heart attack and stroke. If you need help quitting, talk to your doctor about stop-smoking programs and medicines. These can increase your chances of quitting for good. When should you call for help? Call  911 anytime you think you may need emergency care. This may mean having symptoms that suggest that your blood pressure is causing a serious heart or blood vessel problem. Your blood pressure may be over 180/120. For example, call 911 if:    · You have symptoms of a heart attack. These may include:  ? Chest pain or pressure, or a strange feeling in the chest.  ? Sweating. ? Shortness of breath. ? Nausea or vomiting. ? Pain, pressure, or a strange feeling in the back, neck, jaw, or upper belly or in one or both shoulders or arms. ? Lightheadedness or sudden weakness.   ? A fast or irregular heartbeat.     · You have symptoms of a stroke. These may include:  ? Sudden numbness, tingling, weakness, or loss of movement in your face, arm, or leg, especially on only one side of your body. ? Sudden vision changes. ? Sudden trouble speaking. ? Sudden confusion or trouble understanding simple statements. ? Sudden problems with walking or balance. ? A sudden, severe headache that is different from past headaches.     · You have severe back or belly pain. Do not wait until your blood pressure comes down on its own. Get help right away. Call your doctor now or seek immediate care if:    · Your blood pressure is much higher than normal (such as 180/120 or higher), but you don't have symptoms.     · You think high blood pressure is causing symptoms, such as:  ? Severe headache.  ? Blurry vision. Watch closely for changes in your health, and be sure to contact your doctor if:    · Your blood pressure measures higher than your doctor recommends at least 2 times. That means the top number is higher or the bottom number is higher, or both.     · You think you may be having side effects from your blood pressure medicine. Where can you learn more? Go to http://www.gray.com/  Enter [id number] in the search box to learn more about \"High Blood Pressure: Care Instructions. \"  Current as of: December 16, 2019               Content Version: 12.6  © 1494-6910 [employer]. Care instructions adapted under license by [employer] (which disclaims liability or warranty for this information). If you have questions about a medical condition or this instruction, always ask your healthcare professional. [place] [address] any warranty or liability for your use of this information. Patient Education        Acute High Blood Pressure: Care Instructions  Your Care Instructions     Acute high blood pressure is very high blood pressure.  It's a serious problem. Very high blood pressure can damage your brain, heart, eyes, and kidneys. You may have been given medicines to lower your blood pressure. You may have gotten them as pills or through a needle in one of your veins. This is called an IV. And maybe you were given other medicines too. These can be needed when high blood pressure causes other problems. To keep your blood pressure at a lower level, you may need to make healthy lifestyle changes. And you will probably need to take medicines. Be sure to follow up with your doctor about your blood pressure and what you can do about it. Follow-up care is a key part of your treatment and safety. Be sure to make and go to all appointments, and call your doctor if you are having problems. It's also a good idea to know your test results and keep a list of the medicines you take. How can you care for yourself at home? · See your doctor as often as he or she recommends. This is to make sure your blood pressure is under control. · Take your blood pressure medicine exactly as prescribed. You may take one or more types. They include diuretics, beta-blockers, ACE inhibitors, calcium channel blockers, and angiotensin II receptor blockers. Call your doctor if you think you are having a problem with your medicine. · If you take blood pressure medicine, talk to your doctor before you take decongestants or anti-inflammatory medicine, such as ibuprofen. These can raise blood pressure. · Learn how to check your blood pressure at home. Check it often. · Ask your doctor if it's okay to drink alcohol. · Talk to your doctor about lifestyle changes that can help blood pressure. These include being active and managing your weight. · Don't smoke. Smoking increases your risk for heart attack and stroke. When should you call for help? Call  911 anytime you think you may need emergency care.  This may mean having symptoms that suggest that your blood pressure is causing a serious heart or blood vessel problem. Your blood pressure may be over 180/120. For example, call 911 if:    · You have symptoms of a heart attack. These may include:  ? Chest pain or pressure, or a strange feeling in the chest.  ? Sweating. ? Shortness of breath. ? Nausea or vomiting. ? Pain, pressure, or a strange feeling in the back, neck, jaw, or upper belly or in one or both shoulders or arms. ? Lightheadedness or sudden weakness. ? A fast or irregular heartbeat.     · You have symptoms of a stroke. These may include:  ? Sudden numbness, tingling, weakness, or loss of movement in your face, arm, or leg, especially on only one side of your body. ? Sudden vision changes. ? Sudden trouble speaking. ? Sudden confusion or trouble understanding simple statements. ? Sudden problems with walking or balance. ? A sudden, severe headache that is different from past headaches.     · You have severe back or belly pain. Do not wait until your blood pressure comes down on its own. Get help right away. Call your doctor now or seek immediate care if:    · Your blood pressure is much higher than normal (such as 180/120 or higher), but you don't have symptoms.     · You think high blood pressure is causing symptoms, such as:  ? Severe headache.  ? Blurry vision. Watch closely for changes in your health, and be sure to contact your doctor if:    · Your blood pressure measures higher than your doctor recommends at least 2 times. That means the top number is higher or the bottom number is higher, or both.     · You think you may be having side effects from your blood pressure medicine. Where can you learn more? Go to http://www.gray.com/  Enter H919 in the search box to learn more about \"Acute High Blood Pressure: Care Instructions. \"  Current as of: December 16, 2019               Content Version: 12.6  © 5027-4695 [employer].    Care instructions adapted under license by [employer] (which disclaims liability or warranty for this information). If you have questions about a medical condition or this instruction, always ask your healthcare professional. [address] any warranty or liability for your use of this information.

## 2020-10-01 NOTE — TELEPHONE ENCOUNTER
Returned phone call to pt. Pt states that her BP reading this morning was 169/123. After speaking with NP David Cannon, pt was advised to go to Urgent Care or [facility] to be assessed. Pt verified understanding.

## 2020-10-01 NOTE — ED PROVIDER NOTES
- Home dose senispar restarted  - will continue to monitor     57-year-old female with history of hypertension presenting to the ED for hypertension. Patient reports she has had high blood pressure for a number of years. She was previously on amlodipine and HCTZ however she was also on birth control at the time and thought that the birth control was leading to her high blood pressure. She stopped taking all 3 medications and was fine for roughly 1 year. Recently however she has noted increase in her blood pressure. She reports intermittent headaches which seem associated with stress. She denies any vision changes. She does report occasionally feeling lightheaded as well but denies any falls or syncopal episodes. She was unable to get into see her primary care doctor, and she went to the ER but they refused to prescribe her any antihypertensives. She comes in requesting prescriptions for antihypertensives. Past Medical History:   Diagnosis Date    Abnormal Pap smear of cervix     seeing OBGYN    Acne 10/7/2009    Allergic rhinitis 2009    Anemia 2009    ASCUS HPV+ 2017    H/o ASCUS HPV + with Dr. Osmin Dior 2017    CHAR III (cervical intraepithelial neoplasia III) 2017    found by LEEP     Headache     Herpes genitalia     dx by OB    History of gestational diabetes     diet controlled    Other ill-defined conditions(799.89)     scoliosis    PCB (post coital bleeding) 2014    Thoracic compression fracture ([address] 3/2014    [place]    Thyromegaly 2014    On exam     Unspecified vitamin D deficiency 2014    Varicella 1998       Past Surgical History:   Procedure Laterality Date    HX GYN           HX LEEP PROCEDURE  16    found CHAR 3         Family History:   Problem Relation Age of Onset    Breast Cancer Maternal Grandmother         great, great gma, age?     Hypertension Maternal Grandmother     Breast Cancer Maternal Aunt 32        survivor    Diabetes Father     Hypertension Paternal Grandmother     Diabetes Paternal Grandmother     Osteoporosis Neg Hx        Social History     Socioeconomic History    Marital status: SINGLE     Spouse name: Not on file    Number of children: Not on file    Years of education: Not on file    Highest education level: Not on file   Occupational History    Not on file   Social Needs    Financial resource strain: Not on file    Food insecurity     Worry: Not on file     Inability: Not on file    Transportation needs     Medical: Not on file     Non-medical: Not on file   Tobacco Use    Smoking status: Never Smoker    Smokeless tobacco: Never Used   Substance and Sexual Activity    Alcohol use: Yes     Comment: once every 3 months, 3 drinks at a time    Drug use: No    Sexual activity: Yes     Partners: Male     Birth control/protection: Injection   Lifestyle    Physical activity     Days per week: Not on file     Minutes per session: Not on file    Stress: Not on file   Relationships    Social connections     Talks on phone: Not on file     Gets together: Not on file     Attends [employer] service: Not on file     Active member of club or organization: Not on file     Attends meetings of clubs or organizations: Not on file     Relationship status: Not on file    Intimate partner violence     Fear of current or ex partner: Not on file     Emotionally abused: Not on file     Physically abused: Not on file     Forced sexual activity: Not on file   Other Topics Concern    Not on file   Social History Narrative    Lives in [facility] with parents and 3 yo son. Works at [employer]. ALLERGIES: Patient has no known allergies. Review of Systems   Constitutional: Negative for chills and fever. HENT: Negative for congestion. Eyes: Negative for visual disturbance. Respiratory: Negative for shortness of breath. Cardiovascular: Negative for chest pain.    Gastrointestinal: Negative for abdominal pain, nausea and vomiting. Genitourinary: Negative for dysuria and hematuria. Musculoskeletal: Negative for back pain. Skin: Negative for rash. Allergic/Immunologic: Negative for immunocompromised state. Neurological: Positive for light-headedness and headaches. Negative for seizures, syncope and weakness. Psychiatric/Behavioral: Negative for confusion. Vitals:    10/01/20 1209   BP: (!) 162/123   Pulse: 81   Resp: 16   Temp: 99 °F (37.2 °C)   SpO2: 98%   Weight: 86.2 kg (190 lb 0.6 oz)   Height: 5' 5.5\" (1.664 m)            Physical Exam  Vitals signs and nursing note reviewed. Constitutional:       General: She is not in acute distress. Appearance: She is well-developed. She is obese. HENT:      Head: Normocephalic and atraumatic. Eyes:      General: No scleral icterus. Neck:      Trachea: No tracheal deviation. Cardiovascular:      Rate and Rhythm: Normal rate and regular rhythm. Heart sounds: Normal heart sounds. Pulmonary:      Effort: Pulmonary effort is normal. No respiratory distress. Breath sounds: Normal breath sounds. Abdominal:      General: There is no distension. Palpations: Abdomen is soft. Tenderness: There is no abdominal tenderness. Musculoskeletal: Normal range of motion. Skin:     General: Skin is warm and dry. Neurological:      Mental Status: She is alert and oriented to person, place, and time. GCS: GCS eye subscore is 4. GCS verbal subscore is 5. GCS motor subscore is 6. Cranial Nerves: Cranial nerves are intact. No cranial nerve deficit. Sensory: Sensation is intact. Motor: Motor function is intact. Gait: Gait is intact.           MDM  Number of Diagnoses or Management Options  Uncontrolled hypertension:   Diagnosis management comments: 12-year-old female with history of essential hypertension, off of medication x1 year, presenting for evaluation of chronic hypertension, intermittently symptomatic, hypertensive but with normal neurologic exam at this time. Will obtain CBC and BMP to evaluate for change in renal function given untreated hypertension. Will control headache with Tylenol and mild lowering of blood pressure here in ED. Based on JNC 8 guidelines, the patient should be started on a calcium channel blocker and thiazide diuretic. We will give her a dose of amlodipine and HCTZ while here in the ED and I have written her prescriptions for the same. SHe understands that she needs to follow-up with her primary care doctor as there may be needed adjustments to doses and/or medications to achieve better control of her hypertension. ED EKG interpretation:  Rhythm: normal sinus rhythm; and regular . Rate (approx.): 72; Axis: normal; P wave: normal; QRS interval: normal ; ST/T wave: normal; in  Lead: ALL; Other findings: left ventricular hypertrophy.    EKG interpreted by Susi Go ED MD.    Procedures        Signed By: Lonnie Mcginnis MD     October 2, 2020

## 2020-10-02 ENCOUNTER — PATIENT OUTREACH (OUTPATIENT)
Dept: OTHER | Age: 25
End: 2020-10-02

## 2020-10-02 NOTE — LETTER
10/2/2020 9:32 AM 
 
Ms. Melany Chaves [facility] [address] [facility] [address] Welcome Letter and Visit Checklist 
 
Congratulations for taking a step towards managing your health by participating in the Employee Care Management (ECM) program. ECM is a new model of care designed to improve the coordination of your health care with an emphasis on your overall well-being. This confidential program is offered free of charge to [employer] members and their covered family members. [facility] now partners with [facility]. If you are a qualifying employee, you may receive an additional 10 wellness incentive points for every month of active participation with an Employee Care Manager. BEFORE YOUR NEXT ECM NURSE ASSESSMENT CALL PLEASE USE THIS HANDY CHECKLIST: 
 
o Make a list of any questions you have about your health including questions about dietary recommendations, lifestyle, and disease management. o Inform the [facility] nurse of any other health care providers that you have visited in the last month and the reason why you visited them. This includes urgent care or the ED. 
o Have a list of all of your prescribed medications ready, over-the counter, herbal and dietary supplements. Inform the [facility] nurse of any refills that you require. o Inform the ECM nurse of any new problems that may have developed in the last month. 
o Confirm the date of your next [facility] nurse assessment call. 
o [place] for our patient portal [employer] if you have not already. This is a good way to communicate with your Care Team, including your doctor and [facility] nurse, as well as to view your care plan. 
o If you want to designate a caregiver to have access to your record, please ask your doctor's office for the forms to sign. o Think about any goals you want to begin working on towards a goal of better health.  
 
 
With continued partnership in the [facility] program, we hope to optimize your health, increase your quality of life, and prevent hospitalization. We look forward to continuing to serve you. If you have any health concerns prior to you next [facility] outreach, please contact your primary care doctor. Your ECM nurse contact information is listed below for non-urgent questions: 
 
Thao Douglas RN, BSN  Employee Care Manager [address] [place] [address] [facility] [phone]   [phone]  [email or web address] Juanito Altamirano [facility] [email or web address] Sincerely, Teodora Esquivel RN

## 2020-10-02 NOTE — PROGRESS NOTES
HPRP progress note    Patient eligible for Melinda Mccain 994 care management    Received notification of discharge from [facility] ED on 10/1 for hypertension, headache, dizziness. Contacted patient to discuss post discharge needs and offer care management services. Two patient identifiers verified. Discussed the care management program.  Patient agrees to care management services at this time.      PMH:   Past Medical History:   Diagnosis Date    Abnormal Pap smear of cervix 2017    seeing OBGYN    Acne 10/7/2009    Allergic rhinitis 9/2/2009    Anemia 9/2/2009    ASCUS HPV+ 09/14/2017    H/o ASCUS HPV + with Dr. Brii Hay 9/14/2017    CHAR III (cervical intraepithelial neoplasia III) 11/30/2017    found by LEEP     Headache     Herpes genitalia 2017    dx by OB    History of gestational diabetes     diet controlled    Other ill-defined conditions(799.89)     scoliosis    PCB (post coital bleeding) 11/25/2014    Thoracic compression fracture ([address] 3/2014    Yousif Lopez    Thyromegaly 11/25/2014    On exam     Unspecified vitamin D deficiency 6/26/2014    Varicella 5/1/1998       Social History:   Social History     Socioeconomic History    Marital status: SINGLE     Spouse name: Not on file    Number of children: Not on file    Years of education: Not on file    Highest education level: Not on file   Occupational History    Not on file   Social Needs    Financial resource strain: Not on file    Food insecurity     Worry: Not on file     Inability: Not on file    Transportation needs     Medical: Not on file     Non-medical: Not on file   Tobacco Use    Smoking status: Never Smoker    Smokeless tobacco: Never Used   Substance and Sexual Activity    Alcohol use: Yes     Comment: once every 3 months, 3 drinks at a time    Drug use: No    Sexual activity: Yes     Partners: Male     Birth control/protection: Injection   Lifestyle    Physical activity     Days per week: Not on file Minutes per session: Not on file    Stress: Not on file   Relationships    Social connections     Talks on phone: Not on file     Gets together: Not on file     Attends Taoism service: Not on file     Active member of club or organization: Not on file     Attends meetings of clubs or organizations: Not on file     Relationship status: Not on file    Intimate partner violence     Fear of current or ex partner: Not on file     Emotionally abused: Not on file     Physically abused: Not on file     Forced sexual activity: Not on file   Other Topics Concern    Not on file   Social History Narrative    Lives in [place] with parents and 3 yo son. Works at [employer]. Patient's primary care provider relationship reviewed with patient and modified, as applicable. Care management assessment completed:    Cardiac Condition Focused Assessment  Skin- any open wounds or incisions? no  Description of wound- n/a  Edema or swelling? no   If yes, where? n/a  Change in weight >3 lbs in one day, or > 10lbs without explanation? no   If yes, was this reported to provider? no  Recommended goal weight in lbs n/a  Weight at discharge in lbs 190 lbs    Patient reported vital signs and important numbers to know:  Last BP 130s/80s (monitoring at home)       Temp 99   Pain 0-10 7/10   Location/pain characteristics Headache, tight, congested feeling   Last daily weight in lbs 190 lbs     Activity level- moving several times a day, or as recommended? yes   Abnormal activity level reported: lying down  Does patient have incentive spirometer? no  If yes, how frequently is patient using incentive spirometer? n/a  Nutrition- prescribed diet? no    Diet type: regular  Last reported BM: 9/30  Urinating regularly? yes  clear, yellow? yes   Abnormal urine characteristics reported: n/a  Was aspirin or other anticoagulant prescribed? no   Betablocker? no   ACE/ARB?  Yes lisinopril   Is patient on anticoagulant therapy? no   If so, any needed lab monitoring needed, but not ordered? no       Red Flags:  Call  911 anytime you think you may need emergency care. This may mean having symptoms that suggest that your blood pressure is causing a serious heart or blood vessel problem. Your blood pressure may be over 180/120. For example, call 911 if:    · You have symptoms of a heart attack. These may include:  ? Chest pain or pressure, or a strange feeling in the chest.  ? Sweating. ? Shortness of breath. ? Nausea or vomiting. ? Pain, pressure, or a strange feeling in the back, neck, jaw, or upper belly or in one or both shoulders or arms. ? Lightheadedness or sudden weakness. ? A fast or irregular heartbeat. · You have symptoms of a stroke. These may include:  ? Sudden numbness, tingling, weakness, or loss of movement in your face, arm, or leg, especially on only one side of your body. ? Sudden vision changes. ? Sudden trouble speaking. ? Sudden confusion or trouble understanding simple statements. ? Sudden problems with walking or balance. ? A sudden, severe headache that is different from past headaches. · You have severe back or belly pain. Do not wait until your blood pressure comes down on its own. Get help right away. Call your doctor now or seek immediate care if:    · Your blood pressure is much higher than normal (such as 180/120 or higher), but you don't have symptoms. · You think high blood pressure is causing symptoms, such as:  ? Severe headache.  ? Blurry vision. Watch closely for changes in your health, and be sure to contact your doctor if:    · Your blood pressure measures higher than your doctor recommends at least 2 times. That means the top number is higher or the bottom number is higher, or both. · You think you may be having side effects from your blood pressure medicine.            Medications:  New Medications at Discharge: Norvasc 5 mg daily, HCTZ 25 mg daily  Changed Medications at Discharge: none  Discontinued Medications at Discharge: none  Current Outpatient Medications   Medication Sig    amLODIPine (NORVASC) 5 mg tablet Take 1 Tab by mouth daily.  hydroCHLOROthiazide (HYDRODIURIL) 25 mg tablet Take 1 Tab by mouth daily for 30 days.  meclizine (ANTIVERT) 25 mg tablet Take 1 Tab by mouth three (3) times daily as needed for Dizziness for up to 10 days.  acyclovir (ZOVIRAX) 200 mg capsule Take 200 mg by mouth daily. No current facility-administered medications for this visit. There are no discontinued medications. Performed medication reconciliation with patient, and patient verbalizes understanding of administration of home medications. There were no barriers to obtaining medications identified at this time. Preventive Care     Health Maintenance   Topic Date Due    Flu Vaccine (1) 09/01/2020    PAP AKA CERVICAL CYTOLOGY  10/03/2020    DTaP/Tdap/Td series (6 - Td) 02/15/2021    Pneumococcal 0-64 years  Aged Out     Healthy Habits  Nutrition: Regular Diet  Movement: not right now    CM Identified  Problems   1. Potential Knowledge Deficit  2. Risk for impaired mobility  3. Risk for ineffective health maintenance    Barriers/Challenges to Care: []  Decline in memory    []  Language barrier     []  Emotional                  []  Limited mobility  []  Lack of motivation     [] Vision, hearing or cognitive impairment []  Knowledge [] Financial Barriers []  Lack of support  []  Pain []  Other [x]  None    PCP/Specialist follow up: Patient scheduled to follow up with Yoni Staley MD on 10/12. Future Appointments   Date Time Provider Ana Thorpe   10/12/2020  2:30 PM Elvia Wellington PA-C [facility]      Reviewed red flags with patient, and patient verbalizes understanding. Patient given an opportunity to ask questions. No other clinical/social/functional needs noted.    The patient agrees to contact the PCP office for questions related to their healthcare. The patient expressed thanks, offered no additional questions and ended the call. Plan for next call:  Call on 10/5. Symptoms better?

## 2020-10-05 ENCOUNTER — PATIENT OUTREACH (OUTPATIENT)
Dept: OTHER | Age: 25
End: 2020-10-05

## 2020-10-05 NOTE — PROGRESS NOTES
Attempt to reach patient for follow up. Discreet VM left with contact information. Will try back in one week, 10/12.

## 2020-10-12 ENCOUNTER — PATIENT OUTREACH (OUTPATIENT)
Dept: OTHER | Age: 25
End: 2020-10-12

## 2020-10-12 NOTE — PROGRESS NOTES
Attempt to reach patient for follow up. Discreet VM left with contact information. Will try back in one week, 10/19. Patient had appointment with Dr. Miller Estevez today.

## 2020-10-23 ENCOUNTER — PATIENT OUTREACH (OUTPATIENT)
Dept: OTHER | Age: 25
End: 2020-10-23

## 2020-10-23 NOTE — PROGRESS NOTES
Attempt to reach patient for follow up. Discreet VM left with contact information. Will resolve if I don't hear back from her in the next two weeks, 11/6.

## 2020-11-06 ENCOUNTER — PATIENT OUTREACH (OUTPATIENT)
Dept: OTHER | Age: 25
End: 2020-11-06

## 2020-11-06 NOTE — LETTER
11/6/2020 9:44 AM 
 
Ms. [place] Orta [address] [facility] [address] [address] Dear Ms. Leyda Brown, My name is Michael Valerio, Associate Care Manager for [address] [place] [address], and I have been trying to reach you. The Associate Care Management [facility]) program is a free-of-charge, confidential service provided to our employees and their family members covered by the [facility]. I can help you with care transitions such as when you come home from the hospital, when help is needed to manage your disease, or when you need assistance coordinating services or appointments. As healthcare providers, we know that patients do better when they have close follow up with a primary care provider (PCP). I can help you find one that is convenient to you and covered by your insurance. I can also help you understand any after visit instructions, such as what symptoms to watch out for, or any new or changed medications. We can work together using your preferred communication -- telephone, email, MyChart. If you do not have a [employer] account, I can help you request access. Our program is designed to provide you with the opportunity to have a [address] [place] [address] FOR CHILDREN partner with you for your healthcare needs. Due to not being able to reach you, I am closing out the current program, but will remain available to you should you have any questions. Please contact me at the below number if I can provide you with assistance for any of the above services. ILENE Moncada  Associate Care Manager [address] [place] [address]  [phone] [address] [phone]  F [phone]  [email or web address] [address] [place] [address] AC email: [email or web address]

## 2020-11-09 ENCOUNTER — HOSPITAL ENCOUNTER (EMERGENCY)
Age: 25
Discharge: LEFT BEFORE DISCHARGE | End: 2020-11-09
Attending: EMERGENCY MEDICINE

## 2020-11-09 VITALS
BODY MASS INDEX: 31.14 KG/M2 | HEART RATE: 84 BPM | HEIGHT: 66 IN | TEMPERATURE: 97.8 F | DIASTOLIC BLOOD PRESSURE: 114 MMHG | SYSTOLIC BLOOD PRESSURE: 162 MMHG | OXYGEN SATURATION: 100 % | RESPIRATION RATE: 16 BRPM

## 2020-11-09 DIAGNOSIS — N89.8 VAGINAL DISCHARGE: Primary | ICD-10-CM

## 2020-11-09 RX ORDER — AZITHROMYCIN 250 MG/1
1000 TABLET, FILM COATED ORAL
Status: DISCONTINUED | OUTPATIENT
Start: 2020-11-09 | End: 2020-11-09 | Stop reason: HOSPADM

## 2020-11-09 RX ORDER — ONDANSETRON 4 MG/1
4 TABLET, ORALLY DISINTEGRATING ORAL
Status: DISCONTINUED | OUTPATIENT
Start: 2020-11-09 | End: 2020-11-09 | Stop reason: HOSPADM

## 2020-11-09 NOTE — ED PROVIDER NOTES
30 old female with history of anemia, headaches, presents from home with chief complaint of vaginal discharge for approximately 2 weeks with some urinary urgency with out dysuria or frequency. No hematuria. No vaginal bleeding. She is concerned about potential STI. The history is provided by the patient and medical records. Vaginal Discharge    This is a new problem. The current episode started more than 1 week ago. The problem occurs constantly. The problem has not changed since onset. Pertinent negatives include no fever, no abdominal pain, no diarrhea, no nausea, no vomiting and no dysuria. She has tried nothing for the symptoms. Past Medical History:   Diagnosis Date    Abnormal Pap smear of cervix     seeing OBGYN    Acne 10/7/2009    Allergic rhinitis 2009    Anemia 2009    ASCUS HPV+ 2017    H/o ASCUS HPV + with Dr. Allan Laurier 2017    CHAR III (cervical intraepithelial neoplasia III) 2017    found by LEEP     Headache     Herpes genitalia     dx by OB    History of gestational diabetes     diet controlled    Other ill-defined conditions(799.89)     scoliosis    PCB (post coital bleeding) 2014    Thoracic compression fracture ([address] 3/2014    John Pottier    Thyromegaly 2014    On exam     Unspecified vitamin D deficiency 2014    Varicella 1998       Past Surgical History:   Procedure Laterality Date    HX GYN           HX LEEP PROCEDURE  18    found CHAR 3         Family History:   Problem Relation Age of Onset    Breast Cancer Maternal Grandmother         great, great gma, age?     Hypertension Maternal Grandmother     Breast Cancer Maternal Aunt 32        survivor    Diabetes Father     Hypertension Paternal Grandmother     Diabetes Paternal Grandmother     Osteoporosis Neg Hx        Social History     Socioeconomic History    Marital status: SINGLE     Spouse name: Not on file    Number of children: Not on file    Years of education: Not on file    Highest education level: Not on file   Occupational History    Not on file   Social Needs    Financial resource strain: Not on file    Food insecurity     Worry: Not on file     Inability: Not on file    Transportation needs     Medical: Not on file     Non-medical: Not on file   Tobacco Use    Smoking status: Never Smoker    Smokeless tobacco: Never Used   Substance and Sexual Activity    Alcohol use: Yes     Comment: once every 3 months, 3 drinks at a time    Drug use: No    Sexual activity: Yes     Partners: Male     Birth control/protection: Injection   Lifestyle    Physical activity     Days per week: Not on file     Minutes per session: Not on file    Stress: Not on file   Relationships    Social connections     Talks on phone: Not on file     Gets together: Not on file     Attends Gnosticism service: Not on file     Active member of club or organization: Not on file     Attends meetings of clubs or organizations: Not on file     Relationship status: Not on file    Intimate partner violence     Fear of current or ex partner: Not on file     Emotionally abused: Not on file     Physically abused: Not on file     Forced sexual activity: Not on file   Other Topics Concern    Not on file   Social History Narrative    Lives in [facility] with parents and 3 yo son. Works at [employer]. ALLERGIES: Patient has no known allergies. Review of Systems   Constitutional: Negative for fatigue and fever. HENT: Negative for sneezing and sore throat. Respiratory: Negative for cough and shortness of breath. Cardiovascular: Negative for chest pain and leg swelling. Gastrointestinal: Negative for abdominal pain, diarrhea, nausea and vomiting. Genitourinary: Positive for urgency and vaginal discharge. Negative for difficulty urinating, dysuria and hematuria. Musculoskeletal: Negative for arthralgias and myalgias.    Skin: Negative for color change and rash. Neurological: Negative for weakness and headaches. Psychiatric/Behavioral: Negative for agitation and behavioral problems. Vitals:    11/09/20 1632   BP: (!) 162/114   Pulse: 84   Resp: 16   Temp: 97.8 °F (36.6 °C)   SpO2: 100%   Height: 5' 5.5\" (1.664 m)            Physical Exam  Vitals signs and nursing note reviewed. Constitutional:       General: She is not in acute distress. Appearance: Normal appearance. She is not ill-appearing, toxic-appearing or diaphoretic. HENT:      Head: Normocephalic and atraumatic. Nose: Nose normal.      Mouth/Throat:      Mouth: Mucous membranes are moist.      Pharynx: Oropharynx is clear. Eyes:      Extraocular Movements: Extraocular movements intact. Conjunctiva/sclera: Conjunctivae normal.      Pupils: Pupils are equal, round, and reactive to light. Neck:      Musculoskeletal: Normal range of motion and neck supple. Cardiovascular:      Rate and Rhythm: Normal rate and regular rhythm. Pulses: Normal pulses. Heart sounds: Normal heart sounds. No murmur. Pulmonary:      Effort: Pulmonary effort is normal. No respiratory distress. Breath sounds: Normal breath sounds. Abdominal:      General: Abdomen is flat. There is no distension. Palpations: Abdomen is soft. There is no mass. Tenderness: There is no abdominal tenderness. There is no right CVA tenderness, left CVA tenderness, guarding or rebound. Musculoskeletal: Normal range of motion. General: No swelling, tenderness, deformity or signs of injury. Skin:     General: Skin is warm and dry. Capillary Refill: Capillary refill takes less than 2 seconds. Neurological:      General: No focal deficit present. Mental Status: She is alert and oriented to person, place, and time.    Psychiatric:         Mood and Affect: Mood normal.         Behavior: Behavior normal.          MDM  Number of Diagnoses or Management Options  Diagnosis management comments: 30 old female presents as above. She eloped prior to completion of her work-up.          Procedures

## 2020-11-09 NOTE — ED TRIAGE NOTES
Triage: pt c/o clear vaginal discharge x1 month and concerned for STD - per pt, prone to bacterial vaginosis. Denies fever. +urinary frequency.

## 2020-11-10 ENCOUNTER — PATIENT OUTREACH (OUTPATIENT)
Dept: OTHER | Age: 25
End: 2020-11-10

## 2020-11-10 NOTE — PROGRESS NOTES
Briefly spoke with patient, verified  and zip code. Told her about the program.  She requested to call me back, as she was unable to talk at this time due to being at work. Will await call back.

## 2020-11-13 ENCOUNTER — PATIENT OUTREACH (OUTPATIENT)
Dept: OTHER | Age: 25
End: 2020-11-13

## 2020-11-13 ENCOUNTER — OFFICE VISIT (OUTPATIENT)
Dept: FAMILY MEDICINE CLINIC | Age: 25
End: 2020-11-13
Payer: COMMERCIAL

## 2020-11-13 VITALS
SYSTOLIC BLOOD PRESSURE: 143 MMHG | HEIGHT: 66 IN | RESPIRATION RATE: 17 BRPM | WEIGHT: 184.6 LBS | BODY MASS INDEX: 29.67 KG/M2 | OXYGEN SATURATION: 97 % | DIASTOLIC BLOOD PRESSURE: 112 MMHG | HEART RATE: 94 BPM | TEMPERATURE: 97.5 F

## 2020-11-13 DIAGNOSIS — Z11.3 SCREENING FOR STD (SEXUALLY TRANSMITTED DISEASE): ICD-10-CM

## 2020-11-13 DIAGNOSIS — I10 ESSENTIAL HYPERTENSION: Primary | ICD-10-CM

## 2020-11-13 LAB
HIV 1+2 AB+HIV1 P24 AG SERPL QL IA: NONREACTIVE
HIV12 RESULT COMMENT, HHIVC: NORMAL

## 2020-11-13 PROCEDURE — 99213 OFFICE O/P EST LOW 20 MIN: CPT | Performed by: NURSE PRACTITIONER

## 2020-11-13 RX ORDER — SULFAMETHOXAZOLE AND TRIMETHOPRIM 800; 160 MG/1; MG/1
1 TABLET ORAL 2 TIMES DAILY
Qty: 6 TAB | Refills: 0 | Status: SHIPPED | OUTPATIENT
Start: 2020-11-13 | End: 2020-11-16

## 2020-11-13 RX ORDER — LISINOPRIL 20 MG/1
TABLET ORAL
COMMUNITY
End: 2021-06-14

## 2020-11-13 NOTE — PROGRESS NOTES
Briefly spoke with patient, states she cannot talk at this time. She will call me back at her convenience. Will await call back and follow up in one week, 11/20.

## 2020-11-13 NOTE — PROGRESS NOTES
Sebastian Emanuel is a 30 y.o. female    HIPAA verified by two patient identifiers. Chief Complaint   Patient presents with    Blood Pressure Check    Labs     STD testing     Urinary Frequency     Pt states that she thinks she may have a possible UTI      1. Have you been to the ER, urgent care clinic since your last visit? Hospitalized since your last visit? No    2. Have you seen or consulted any other health care providers outside of the [address] [place] [address] since your last visit? Include any pap smears or colon screening.  No    Visit Vitals  BP (!) 143/112 (BP 1 Location: Left arm, BP Patient Position: Sitting)   Pulse 94   Temp 97.5 °F (36.4 °C) (Temporal)   Resp 17   Ht 5' 5.5\" (1.664 m)   Wt 184 lb 9.6 oz (83.7 kg)   LMP 10/26/2020   SpO2 97%   BMI 30.25 kg/m²       Pain Scale: 0 - No pain/10  Pain Location:

## 2020-11-13 NOTE — PATIENT INSTRUCTIONS
1) Blood pressure is high today, but you state you didn't take your medication today. Please check your blood pressure through the week at staggered times in the day. (If you check in the morning, it should be at least one hour after your morning blood pressure medications.)     Your goal is to be under 130/80, but as long as BP is under 140/90, won't change meds. Arm monitors are most accurate. If you use a wrist monitor, make sure your wrist is at heart level. You can bring your home monitor to your next visit and have it calibrated with the machine in the office to gauge your readings. Sit  with your feet uncrossed and relax for 5 minutes before taking your BP. Keep a written record of your blood pressure readings and bring it to each appointment. If your systolic (top) blood pressure is consistently greater than 140mmHg or less than [id number], OR the diastolic (bottom) number is consistently greater than 90mmHg or less than 60mmHg, then please schedule an office appointment. Work on healthy eating - no salt diet, more potassium (helps flush out sodium,making healthier heart and arteries) - and incorporating daily exercise into your routine. Losing weight can help reduce your blood pressure! Cardiac symptoms that would need immediate attention include: uncomfortable pressure, squeezing, fullness or pain in the center of your chest. Pain or discomfort in one or both arms, the back, neck, jaw or stomach. Shortness of breath with or without chest discomfort, breaking out in a cold sweat, nausea or lightheadedness. 2) Urinalysis - white blood cells. You have symptoms of UTI, so will treat with antibiotic to prevent escalation of symptoms. Please take the bactrim twice a day for 3 days. This is an antibiotic and you should take all of it as directed until it is complete.  If you have any kind of reaction - itching, shortness of breath, etc, please stop the antibiotic immediately and call the office. 3) Sexually Transmitted Infections    Trichomoniasis  Trichomoniasis (or \"trich\") is a common sexually transmitted disease. It is caused by infection with a protozoan parasite called Trichomonas vaginalis. The parasite passes from an infected person to an uninfected person during sex. In women, the most commonly infected part of the body is the lower genital tract (vulva, vagina, cervix, or urethra). In men, the most commonly infected body part is the inside of the penis (urethra). During sex, the parasite usually spreads from a penis to a vagina, or from a vagina to a penis. It can also spread from a vagina to another vagina. It is not common for the parasite to infect other body parts, like the hands, mouth, or anus. Although symptoms of the disease vary, most people who have the parasite cannot tell they are infected. Chlamydia  This is a common STD that infects both men and women. Chlamydia is spread through anal, vaginal and oral sex with someone who has chlamydia. If you test positive for chlamydia, you and your partner(s) need to be treated as, if left untreated, it can cause permanent damage to a woman's reproductive system. Gonorrhea  This is a common STD that infects both men and women. Gonorrhea can infect the genitals, rectum and throat. It is spread through anal, vaginal and oral sex with someone who has gonorrhea. If you test positive for gonorrhea, you and your partner(s) need to be treated as, if left untreated, it can cause permanent damage. Syphilis  Syphilis is a sexually transmitted infection that you can get by direct contact with a syphilis sore during vaginal, anal, or oral sex. You can find sores on or around the penis, vagina, or anus, or in the rectum, on the lips, or in the mouth. Syphilis can spread from an infected mother to her unborn baby. Syphilis can cause serious health problems if it is not treated.     Human Immunodeficiency Virus (HIV)   HIV is a virus spread through certain body fluids (blood semen, pre-seminal fluid, rectal fluid, vaginal fluid and breast milk) that attacks the immune system and can lead to Acquired Immunodeficiency Syndrome (AIDS). Risky behaviors, such as anal or vaginal intercourse without using a condom or sharing needles or syringes, increase the chance of HIV transmission. The test for HIV may not reflect exposures in the last 3-6 months. If you have had new partners or exposures in that time, an additional test for HIV is recommended in 6 months. The only way to prevent STD infection is to abstain from oral, anal and vaginal sex. Using condoms correctly and/or being in a mutually monogamous relationship with a STD-free partner lowers your chances of getting STDs. The NuSwab is a test used to screen for yeast and bacterial vaginosis, which are not considered sexually transmitted diseases. Yeast  Yeast is considered part of the normal vaginal cathleen, but overgrowth can result in vulvovaginitis. Candida albicans accounts for 80 to 92 percent of episodes of vulvovaginal candidiasis; Candida glabrata is the next most common species. Bacterial Vaginosis   Bacterial vaginosis (BV) is an imbalance of bacteria normally found in the vagina. Although BV can be spread by sexual contact, it is NOT a sexually transmitted disease. BV is not usually treated unless you are pregnant or having symptoms such as gray, white discharge, pain or burning, strong fish-like odor or itching. Urinary Tract Infection in Women: Care Instructions  Your Care Instructions     A urinary tract infection, or UTI, is a general term for an infection anywhere between the kidneys and the urethra (where urine comes out). Most UTIs are bladder infections. They often cause pain or burning when you urinate. UTIs are caused by bacteria and can be cured with antibiotics. Be sure to complete your treatment so that the infection goes away.   Follow-up care is a key part of your treatment and safety. Be sure to make and go to all appointments, and call your doctor if you are having problems. It's also a good idea to know your test results and keep a list of the medicines you take. How can you care for yourself at home? · Take your antibiotics as directed. Do not stop taking them just because you feel better. You need to take the full course of antibiotics. · Drink extra water and other fluids for the next day or two. This may help wash out the bacteria that are causing the infection. (If you have kidney, heart, or liver disease and have to limit fluids, talk with your doctor before you increase your fluid intake.)  · Avoid drinks that are carbonated or have caffeine. They can irritate the bladder. · Urinate often. Try to empty your bladder each time. · To relieve pain, take a hot bath or lay a heating pad set on low over your lower belly or genital area. Never go to sleep with a heating pad in place. To prevent UTIs  · Drink plenty of water each day. This helps you urinate often, which clears bacteria from your system. (If you have kidney, heart, or liver disease and have to limit fluids, talk with your doctor before you increase your fluid intake.)  · Urinate when you need to. · Urinate right after you have sex. · Change sanitary pads often. · Avoid douches, bubble baths, feminine hygiene sprays, and other feminine hygiene products that have deodorants. · After going to the bathroom, wipe from front to back. When should you call for help? Call your doctor now or seek immediate medical care if:    · Symptoms such as fever, chills, nausea, or vomiting get worse or appear for the first time.     · You have new pain in your back just below your rib cage. This is called flank pain.     · There is new blood or pus in your urine.     · You have any problems with your antibiotic medicine.    Watch closely for changes in your health, and be sure to contact your doctor if:    · You are not getting better after taking an antibiotic for 2 days.     · Your symptoms go away but then come back. Where can you learn more? Go to http://www.gray.com/  Enter X835 in the search box to learn more about \"Urinary Tract Infection in Women: Care Instructions. \"  Current as of: June 29, 2020               Content Version: 12.6  © 8704-4798 [employer]. Care instructions adapted under license by [employer] (which disclaims liability or warranty for this information). If you have questions about a medical condition or this instruction, always ask your healthcare professional. [place] [address] any warranty or liability for your use of this information.

## 2020-11-13 NOTE — PROGRESS NOTES
S: Sweta Cao is a 30 y.o. BLACK OR  female who presents for HTN/blood pressure follow up. Assessment/Plan:    1. Essential hypertension  -current therapy: lisinopril 20mg + amlodipine 5mg   -BP today: 143/112 - didn't take meds ths am; checking BP at working >140/90  -no change in meds today  -advised to monitor BP at home and keep log; goal<130/80 make OV if consistently above goal or <110/60    2. Pyuria  -UA = +leuks, protein  -rx: bactrim bid x3 days    3. Screening for STD (sexually transmitted disease)  -stopped birth control, discussed safe sex, condom use (limited discussion d/t 7yo son in room)  - NUSWAB VAGINITIS PLUS; Future  - HIV 1/2 AG/AB, 4TH GENERATION,W RFLX CONFIRM; Future  - RPR; Future  - RPR  - HIV 1/2 AG/AB, 4TH GENERATION,W RFLX CONFIRM  - NUSWAB VAGINITIS PLUS    RTC 4 weeks for HTN/med check      Sweta Cao has lost 6bs since   Current therapy: lisinopril 20mg + amlodipine 5mg   BP today: 143/112 - didn't take meds ths am   Checking BP at working   No chest pain or discomfort, elevated heart rate,  palpitations or edema in extremities  No SOB  No Fatigue  No Dizziness  Non- smoker   FH of cardiac dx - dad's side with HTN     11/9/20 - ED visit for vag d/c- eloped before exam,   BP = 162/114  10/1/20 - [facility]   BMP = wnl, including glucose  CBC = hgb 11.3    Off birth control  +HPV - needs colopolscopy - seeing gyn next week for procedure  Having menses that are heavier after d/c [facility]   Sexually active - uses condoms \"most times\" - discussed importance of every time    Review of Systems:  - Constitutional symptoms: no fevers/chills  - Eyes: no blurry vision or double vision   - Respiratory: no SOB, difficulty or pain with breathing, wheezes, or cough. - Gastrointestinal: no dysphagia or abdominal pain  ROS is negative otherwise.     Social History:  Nutrition: overall healthy, fruits, veggies, eating out   Physical: walking   Social: accompanied by son Occupation: [employer]     Social History     Tobacco Use   Smoking Status Never Smoker   Smokeless Tobacco Never Used     Social History     Substance and Sexual Activity   Alcohol Use Yes    Comment: once every 3 months, 3 drinks at a time     Social History     Substance and Sexual Activity   Drug Use No       3 most recent PHQ Screens 9/9/2019   Little interest or pleasure in doing things Several days   Feeling down, depressed, irritable, or hopeless Several days   Total Score PHQ 2 2       I reviewed the following:  Past Medical History:   Diagnosis Date    Abnormal Pap smear of cervix 2017    seeing OBGYN    Acne 10/7/2009    Allergic rhinitis 9/2/2009    Anemia 9/2/2009    ASCUS HPV+ 09/14/2017    H/o ASCUS HPV + with Dr. Naresh Easton 9/14/2017    CHAR III (cervical intraepithelial neoplasia III) 11/30/2017    found by LEEP     Headache     Herpes genitalia 2017    dx by OB    History of gestational diabetes     diet controlled    Other ill-defined conditions(799.89)     scoliosis    PCB (post coital bleeding) 11/25/2014    Thoracic compression fracture ([address] 3/2014    Yousif Lopez    Thyromegaly 11/25/2014    On exam     Unspecified vitamin D deficiency 6/26/2014    Varicella 5/1/1998       Current Outpatient Medications   Medication Sig Dispense Refill    amLODIPine (NORVASC) 5 mg tablet Take 1 Tab by mouth daily. 30 Tab 0    acyclovir (ZOVIRAX) 200 mg capsule Take 200 mg by mouth daily.          No Known Allergies    O: VS:   Visit Vitals  BP (!) 143/112 (BP 1 Location: Left arm, BP Patient Position: Sitting)   Pulse 94   Temp 97.5 °F (36.4 °C) (Temporal)   Resp 17   Ht 5' 5.5\" (1.664 m)   Wt 184 lb 9.6 oz (83.7 kg)   LMP 10/26/2020   SpO2 97%   BMI 30.25 kg/m²     Data Reviewed:     · A1C:      Lab Results   Component Value Date/Time    Hemoglobin A1c 5.2 12/22/2016 11:10 AM     · Lipids:  Lab Results   Component Value Date/Time    Cholesterol, total 144 12/22/2016 11:10 AM    HDL Cholesterol 56 12/22/2016 11:10 AM    LDL, calculated 78 12/22/2016 11:10 AM    VLDL, calculated 10 12/22/2016 11:10 AM    Triglyceride 50 12/22/2016 11:10 AM       GENERAL: [place] is in no acute distress. EYE: PERRLA. EOMs intact. Sclera anicteric without injection. RESP: Unlabored without SOB. Speaking in full sentences. Breath sounds are symmetrical bilaterally. Clear to auscultation to all fields. No wheezes. No rales or rhonchi. CV: Normal rate. Regular rhythm. S1, S2 audible. No murmur noted. No rubs, clicks or gallops noted. NEURO:  awake, alert and oriented to person, place, and time and event. Clear speech. Steady gait. HEME/LYMPH: Pedal pulses palpable 2+ x 4 extremities. No peripheral edema is noted. SKIN: Skin is warm and dry. Turgor is normal. No petechiae, no purpura, no rash. No cyanosis. No mottling, jaundice or pallor. ____________________________________________________________________  Reviewed medications and side effects. Reviewed warning signs of hypertension, stroke and heart attack. Advised on nutrition, physical activity, weight management, tobacco, and alcohol.     Patient verbalized understanding and agreed to plan of care. Follow up in 4 weeks as directed.

## 2020-11-16 LAB — RPR SER QL: NONREACTIVE

## 2020-11-17 LAB
A VAGINAE DNA VAG QL NAA+PROBE: ABNORMAL SCORE
BVAB2 DNA VAG QL NAA+PROBE: ABNORMAL SCORE
C ALBICANS DNA VAG QL NAA+PROBE: NEGATIVE
C GLABRATA DNA VAG QL NAA+PROBE: NEGATIVE
C TRACH DNA VAG QL NAA+PROBE: NEGATIVE
MEGA1 DNA VAG QL NAA+PROBE: ABNORMAL SCORE
N GONORRHOEA DNA VAG QL NAA+PROBE: NEGATIVE
SPECIMEN STATUS REPORT, ROLRST: NORMAL
T VAGINALIS DNA VAG QL NAA+PROBE: NEGATIVE

## 2020-11-17 RX ORDER — METRONIDAZOLE 500 MG/1
500 TABLET ORAL 3 TIMES DAILY
Qty: 21 TAB | Refills: 0 | Status: SHIPPED | OUTPATIENT
Start: 2020-11-17 | End: 2020-11-24

## 2020-11-20 ENCOUNTER — PATIENT OUTREACH (OUTPATIENT)
Dept: OTHER | Age: 25
End: 2020-11-20

## 2020-11-20 NOTE — LETTER
11/20/2020 9:21 AM 
 
Ms. Tyler Santiago [address] [address] Dear Quentin Casey, My name is Luz Villanueva, Associate Care Manager for [place] [address] and I have been trying to reach you. The Associate Care Management (ACM) program is a free-of-charge confidential service provided to our associates and their family members covered by the [address] [place] [address]. The program will provide an associate and his/her family with the [facility] expertise to assist in navigating the health care delivery system, provider services, and their overall care needsso as to assure and improve health care interactions and enhance the quality of life. This program is designed to provide you with the opportunity to have a [address] partner with you for the following services: 
 
 1) when you come home from the hospital or emergency room 2) when help is needed to manage your disease 3) when you need assistance coordinating services or appointments 4) when you need additional education, resources or assistance reaching your Be Well Health Program goals/requirements such as Be Well With Diabetes [facility] is dedicated to empowering the good health of its community and improving the quality of care and care experiences for associates and their families. We are committed to safeguarding patient confidentiality and privacy, assuring that every associate has the respect he or she deserves in managing their health. The information shared with your care manager will not be shared with anyone else aside from those you identify as part of your care team, and will only be used to assist you with any identified care needs. Please contact me if you would like this service provided to you. Sincerely, Thao Machuca, ERICAN  Associate Care Manager [facility], [address] [place] [address] W [phone]  F [phone]  [email or web address] [email or web address] email: [email or web address]

## 2020-11-20 NOTE — PROGRESS NOTES
Patient identified as eligible for [address] [place] [address] services. Third telephone outreach attempted. Left discreet voicemail with this CM confidential contact information. Will send UTR letter. Will resolve if I don't hear from her in the next three weeks, 12/11.

## 2020-12-11 ENCOUNTER — PATIENT OUTREACH (OUTPATIENT)
Dept: OTHER | Age: 25
End: 2020-12-11

## 2020-12-11 NOTE — LETTER
12/11/2020 8:59 AM 
 
Ms. Arnel Castro [address] [address] Re: Arnel Castro 1995 Dear Ms. Felisa Cachorro, My name is Paz Greenfield, Associate Care Manager for [facility], and I have been trying to reach you. The Associate Care Management [facility]) program is a free-of-charge, confidential service provided to our employees and their family members covered by the [facility]. I can help you with care transitions such as when you come home from the hospital, when help is needed to manage your disease, or when you need assistance coordinating services or appointments. As healthcare providers, we know that patients do better when they have close follow up with a primary care provider (PCP). I can help you find one that is convenient to you and covered by your insurance. I can also help you understand any after visit instructions, such as what symptoms to watch out for, or any new or changed medications. We can work together using your preferred communication -- telephone, email, Next Glasshart. If you do not have a [employer]allyCause account, I can help you request access. Our program is designed to provide you with the opportunity to have a [facility] partner with you for your healthcare needs. Due to not being able to reach you, I am closing out the current program, but will remain available to you should you have any questions. Please contact me at the below number if I can provide you with assistance for any of the above services. Sincerely, ILENE Yoa  Associate Care Manager [address] [place] [address]  [address] [address] [phone]  F [phone]  [email or web address] [facility] ACM email: [email or web address]

## 2020-12-29 NOTE — MR AVS SNAPSHOT
Visit Information Date & Time Provider Department Dept. Phone Encounter #  
 10/17/2017  3:20 PM Jackelyn Stallings. Lorenza Gil MD [facility] [phone] [id number] Upcoming Health Maintenance Date Due INFLUENZA AGE 9 TO ADULT 8/1/2017 PAP AKA CERVICAL CYTOLOGY 11/25/2017 DTaP/Tdap/Td series (6 - Td) 2/15/2021 Allergies as of 10/17/2017  Review Complete On: 10/17/2017 By: Ida Proctor No Known Allergies Current Immunizations  Reviewed on 11/12/2015 Name Date DTAP Vaccine 9/1/2005, 2/18/1998, 1/21/1998, 1995 HIB Vaccine 1995 Hepatitis A Vaccine 2/15/2011, 8/28/2007 Hepatitis B Vaccine 8/28/2007, 8/23/2006, 9/1/2005 Human Papillomavirus 4/7/2008, 10/31/2007, 8/28/2007 IPV 2/18/1998, 1/21/1998, 1993 Influenza Vaccine 10/24/2014 Influenza Vaccine (Quad) PF 10/6/2016 MMR Vaccine 2/18/1998, 1/21/1998 Meningococcal Vaccine 2/15/2011 TDAP Vaccine 2/15/2011 Not reviewed this visit You Were Diagnosed With   
  
 Codes Comments Chest pain, unspecified type    -  Primary ICD-10-CM: R07.9 ICD-9-CM: 786.50 Viral upper respiratory tract infection     ICD-10-CM: J06.9, B97.89 ICD-9-CM: 465.9 Costochondritis     ICD-10-CM: M94.0 ICD-9-CM: 733.6 Nipple discharge     ICD-10-CM: N64.52 
ICD-9-CM: 611.79 Vitals OB Status Smoking Status Injection Never Smoker Preferred Pharmacy Pharmacy Name Phone [id number] [place] [id number] [phone] Your Updated Medication List  
  
   
This list is accurate as of: 10/17/17  4:54 PM.  Always use your most recent med list.  
  
  
  
  
 acyclovir 200 mg capsule Commonly known as:  ZOVIRAX  
  
 diclofenac 1 % Gel Commonly known as:  VOLTAREN Apply 4 g to affected area four (4) times daily. For chest pain  
  
 medroxyPROGESTERone 150 mg/mL Syrg [FreeTextEntry1] : will observe. to return earlier if any change Commonly known as:  DEPO-PROVERA  
1 mL by IntraMUSCular route every three (3) months. Prescriptions Sent to Pharmacy Refills  
 diclofenac (VOLTAREN) 1 % gel 0 Sig: Apply 4 g to affected area four (4) times daily. For chest pain  
 Class: Normal  
 Pharmacy: [address] [place] [address], RosalbaaStefano Vasquez  #: [phone] Route: Topical  
  
We Performed the Following AMB POC EKG ROUTINE W/ 12 LEADS, INTER & REP [62553 CPT(R)] REFERRAL TO BREAST SURGERY [REF10 Custom] Comments:  
 F/u nipple discharge and breast pain Referral Information Referral ID Referred By Referred To  
  
 [id number] Arturo Leger MD   
   [address] [facility] [address] [facility], [address] Phone: [phone] Fax: [phone] Visits Status Start Date End Date 1 New Request 10/17/17 10/17/18 If your referral has a status of pending review or denied, additional information will be sent to support the outcome of this decision. Patient Instructions TODAY, please go to: CHECK OUT Show the  your Referral Requisition Please schedule the following appointments at [facility] OUT: 
Bleeding follow up with Dr. Sameer Valdez in 2 weeks Today's Plan: 
 
 
- voltaren, acetaminophen, heat or ice or chest 
 
 
 
  
Costochondritis: Care Instructions Your Care Instructions You have chest pain because the cartilage of your rib cage is inflamed. This problem is called costochondritis. This type of chest wall pain may last from days to weeks. It is not a heart problem. Sometimes costochondritis occurs with a cold or the flu, and other times the exact cause is not known. Follow-up care is a key part of your treatment and safety. Be sure to make and go to all appointments, and call your doctor if you are having problems.  Its also a good idea to know your test results and keep a list of the medicines you take. How can you care for yourself at home? · Take medicines for pain and inflammation exactly as directed. ¨ If the doctor gave you a prescription medicine, take it as prescribed. ¨ If you are not taking a prescription pain medicine, ask your doctor if you can take an over-the-counter medicine. ¨ Do not take two or more pain medicines at the same time unless the doctor told you to. Many pain medicines have acetaminophen, which is Tylenol. Too much acetaminophen (Tylenol) can be harmful. · It may help to use a warm compress or heating pad (set on low) on your chest. You can also try alternating heat and ice. Put ice or a cold pack on the area for 10 to 20 minutes at a time. Put a thin cloth between the ice and your skin. · Avoid any activity that strains the chest area. As your pain gets better, you can slowly return to your normal activities. · Do not use tape, an elastic bandage, a \"rib belt,\" or anything else that restricts your chest wall motion. When should you call for help? Call 911 anytime you think you may need emergency care. For example, call if: 
· You have new or different chest pain or pressure. This may occur with: ¨ Sweating. ¨ Shortness of breath. ¨ Nausea or vomiting. ¨ Pain that spreads from the chest to the neck, jaw, or one or both shoulders or arms. ¨ Dizziness or lightheadedness. ¨ A fast or uneven pulse. After calling 911, chew 1 adult-strength aspirin. Wait for an ambulance. Do not try to drive yourself. · You have severe trouble breathing. Call your doctor now or seek immediate medical care if: 
· You have a fever or cough. · You have any trouble breathing. · Your chest pain gets worse. Watch closely for changes in your health, and be sure to contact your doctor if: 
· Your chest pain continues even though you are taking anti-inflammatory medicine. · Your chest wall pain has not improved after 5 to 7 days. Where can you learn more? Go to http://mickey-skye.info/. Enter P456 in the search box to learn more about \"Costochondritis: Care Instructions. \" Current as of: March 20, 2017 Content Version: 11.3 © 9374-7729 [employer]. Care instructions adapted under license by [employer] (which disclaims liability or warranty for this information). If you have questions about a medical condition or this instruction, always ask your healthcare professional. [address] any warranty or liability for your use of this information. Introducing [facility]! Dear Sherri Crum: 
Thank you for requesting a BoxFox account. Our records indicate that you already have an active BoxFox account. You can access your account anytime at [email or web address] Did you know that you can access your hospital and ER discharge instructions at any time in BoxFox? You can also review all of your test results from your hospital stay or ER visit. Additional Information If you have questions, please visit the Frequently Asked Questions section of the BoxFox website at [email or web address] Remember, BoxFox is NOT to be used for urgent needs. For medical emergencies, dial 911. Now available from your iPhone and Android! Please provide this summary of care documentation to your next provider. Your primary care clinician is listed as Alonso Edgar. If you have any questions after today's visit, please call [phone].

## 2021-01-15 ENCOUNTER — HOSPITAL ENCOUNTER (EMERGENCY)
Age: 26
Discharge: HOME | End: 2021-01-15
Attending: EMERGENCY MEDICINE
Payer: COMMERCIAL

## 2021-01-15 VITALS
RESPIRATION RATE: 18 BRPM | TEMPERATURE: 98.5 F | BODY MASS INDEX: 31 KG/M2 | SYSTOLIC BLOOD PRESSURE: 155 MMHG | HEART RATE: 82 BPM | DIASTOLIC BLOOD PRESSURE: 118 MMHG | WEIGHT: 189.15 LBS | OXYGEN SATURATION: 100 %

## 2021-01-15 DIAGNOSIS — R82.71 BACTERIURIA: ICD-10-CM

## 2021-01-15 DIAGNOSIS — R10.84 ABDOMINAL PAIN, GENERALIZED: Primary | ICD-10-CM

## 2021-01-15 LAB
ANION GAP BLD CALC-SCNC: 15 MMOL/L (ref 10–20)
APPEARANCE UR: ABNORMAL
BACTERIA URNS QL MICRO: ABNORMAL /HPF
BASOPHILS # BLD: 0 K/UL (ref 0–0.1)
BASOPHILS NFR BLD: 1 % (ref 0–1)
BILIRUB UR QL: NEGATIVE
BUN BLD-MCNC: 7 MG/DL (ref 9–20)
CA-I BLD-MCNC: 1.26 MMOL/L (ref 1.12–1.32)
CHLORIDE BLD-SCNC: 103 MMOL/L (ref 98–107)
CO2 BLD-SCNC: 24 MMOL/L (ref 21–32)
COLOR UR: ABNORMAL
CREAT BLD-MCNC: 0.6 MG/DL (ref 0.6–1.3)
DIFFERENTIAL METHOD BLD: ABNORMAL
EOSINOPHIL # BLD: 0.1 K/UL (ref 0–0.4)
EOSINOPHIL NFR BLD: 1 % (ref 0–7)
EPITH CASTS URNS QL MICRO: ABNORMAL /LPF
ERYTHROCYTE [DISTWIDTH] IN BLOOD BY AUTOMATED COUNT: 12.7 % (ref 11.5–14.5)
GLUCOSE BLD-MCNC: 89 MG/DL (ref 65–100)
GLUCOSE UR STRIP.AUTO-MCNC: NEGATIVE MG/DL
HCG UR QL: NEGATIVE
HCT VFR BLD AUTO: 35.2 % (ref 35–47)
HCT VFR BLD CALC: 37 % (ref 35–47)
HGB BLD-MCNC: 11.3 G/DL (ref 11.5–16)
HGB UR QL STRIP: NEGATIVE
IMM GRANULOCYTES # BLD AUTO: 0 K/UL (ref 0–0.04)
IMM GRANULOCYTES NFR BLD AUTO: 0 % (ref 0–0.5)
KETONES UR QL STRIP.AUTO: NEGATIVE MG/DL
LEUKOCYTE ESTERASE UR QL STRIP.AUTO: ABNORMAL
LYMPHOCYTES # BLD: 2 K/UL (ref 0.8–3.5)
LYMPHOCYTES NFR BLD: 25 % (ref 12–49)
MCH RBC QN AUTO: 30.1 PG (ref 26–34)
MCHC RBC AUTO-ENTMCNC: 32.1 G/DL (ref 30–36.5)
MCV RBC AUTO: 93.6 FL (ref 80–99)
MONOCYTES # BLD: 0.6 K/UL (ref 0–1)
MONOCYTES NFR BLD: 7 % (ref 5–13)
NEUTS SEG # BLD: 5.2 K/UL (ref 1.8–8)
NEUTS SEG NFR BLD: 66 % (ref 32–75)
NITRITE UR QL STRIP.AUTO: NEGATIVE
NRBC # BLD: 0 K/UL (ref 0–0.01)
NRBC BLD-RTO: 0 PER 100 WBC
PH UR STRIP: 7.5 [PH] (ref 5–8)
PLATELET # BLD AUTO: 381 K/UL (ref 150–400)
PMV BLD AUTO: 9.2 FL (ref 8.9–12.9)
POTASSIUM BLD-SCNC: 3.8 MMOL/L (ref 3.5–5.1)
PROT UR STRIP-MCNC: NEGATIVE MG/DL
RBC # BLD AUTO: 3.76 M/UL (ref 3.8–5.2)
RBC #/AREA URNS HPF: ABNORMAL /HPF (ref 0–5)
SERVICE CMNT-IMP: ABNORMAL
SODIUM BLD-SCNC: 137 MMOL/L (ref 136–145)
SP GR UR REFRACTOMETRY: 1.01 (ref 1–1.03)
UR CULT HOLD, URHOLD: NORMAL
UROBILINOGEN UR QL STRIP.AUTO: 0.2 EU/DL (ref 0.2–1)
WBC # BLD AUTO: 7.9 K/UL (ref 3.6–11)
WBC URNS QL MICRO: ABNORMAL /HPF (ref 0–4)

## 2021-01-15 PROCEDURE — 99284 EMERGENCY DEPT VISIT MOD MDM: CPT

## 2021-01-15 PROCEDURE — 74011250636 HC RX REV CODE- 250/636: Performed by: EMERGENCY MEDICINE

## 2021-01-15 PROCEDURE — 74011250637 HC RX REV CODE- 250/637: Performed by: EMERGENCY MEDICINE

## 2021-01-15 PROCEDURE — 96372 THER/PROPH/DIAG INJ SC/IM: CPT

## 2021-01-15 PROCEDURE — 85025 COMPLETE CBC W/AUTO DIFF WBC: CPT

## 2021-01-15 PROCEDURE — 80047 BASIC METABLC PNL IONIZED CA: CPT

## 2021-01-15 PROCEDURE — 81001 URINALYSIS AUTO W/SCOPE: CPT

## 2021-01-15 PROCEDURE — 81025 URINE PREGNANCY TEST: CPT

## 2021-01-15 RX ORDER — DICYCLOMINE HYDROCHLORIDE 10 MG/ML
20 INJECTION INTRAMUSCULAR
Status: COMPLETED | OUTPATIENT
Start: 2021-01-15 | End: 2021-01-15

## 2021-01-15 RX ORDER — ONDANSETRON 4 MG/1
4 TABLET, ORALLY DISINTEGRATING ORAL
Qty: 20 TAB | Refills: 0 | Status: SHIPPED | OUTPATIENT
Start: 2021-01-15 | End: 2021-06-14 | Stop reason: ALTCHOICE

## 2021-01-15 RX ORDER — DICYCLOMINE HYDROCHLORIDE 10 MG/5ML
20 SOLUTION ORAL
Qty: 400 ML | Refills: 0 | Status: SHIPPED | OUTPATIENT
Start: 2021-01-15 | End: 2021-01-25

## 2021-01-15 RX ORDER — ONDANSETRON 4 MG/1
4 TABLET, ORALLY DISINTEGRATING ORAL
Status: COMPLETED | OUTPATIENT
Start: 2021-01-15 | End: 2021-01-15

## 2021-01-15 RX ADMIN — ONDANSETRON 4 MG: 4 TABLET, ORALLY DISINTEGRATING ORAL at 11:38

## 2021-01-15 RX ADMIN — DICYCLOMINE HYDROCHLORIDE 20 MG: 10 INJECTION INTRAMUSCULAR at 11:38

## 2021-01-15 NOTE — ED TRIAGE NOTES
TRIAGE NOTE: Pt arrives for gastro symptoms thinks she's lactose intolerant. Weds had heavy cream, Thurs Cheese sauce and ice cream.Took pepto bismol yesterday and ginger ale. C/o general abd pain, denies diarrhea but endorses frequency in bms.

## 2021-01-15 NOTE — ED PROVIDER NOTES
The history is provided by the patient. No  was used. Abdominal Pain   This is a new problem. The current episode started more than 2 days ago. The problem occurs constantly. The problem has not changed since onset. The pain is associated with diet changes. The pain is located in the periumbilical region. The quality of the pain is aching and cramping. The pain is moderate. Associated symptoms include nausea. Pertinent negatives include no anorexia, no fever, no belching, no diarrhea, no flatus, no hematochezia, no melena, no vomiting, no constipation, no dysuria, no frequency, no hematuria, no headaches, no arthralgias, no myalgias, no trauma, no chest pain and no back pain. The pain is worsened by eating. The pain is relieved by nothing. Past workup includes CT scan. The patient's surgical history non-contributory. Past Medical History:   Diagnosis Date    Abnormal Pap smear of cervix     seeing OBGYN    Acne 10/7/2009    Allergic rhinitis 2009    Anemia 2009    ASCUS HPV+ 2017    H/o ASCUS HPV + with Dr. Latrell Parra 2017    CHAR III (cervical intraepithelial neoplasia III) 2017    found by LEEP     Headache     Herpes genitalia     dx by OB    History of gestational diabetes     diet controlled    Other ill-defined conditions(799.89)     scoliosis    PCB (post coital bleeding) 2014    Thoracic compression fracture ([facility] [address] 3/2014    Harolyn Coad    Thyromegaly 2014    On exam     Unspecified vitamin D deficiency 2014    Varicella 1998       Past Surgical History:   Procedure Laterality Date    HX GYN           HX LEEP PROCEDURE  18    found CHAR 3         Family History:   Problem Relation Age of Onset    Breast Cancer Maternal Grandmother         great, great gma, age?     Hypertension Maternal Grandmother     Breast Cancer Maternal Aunt 34        survivor    Diabetes Father     Hypertension Paternal Grandmother     Diabetes Paternal Grandmother     Osteoporosis Neg Hx        Social History     Socioeconomic History    Marital status: SINGLE     Spouse name: Not on file    Number of children: Not on file    Years of education: Not on file    Highest education level: Not on file   Occupational History    Not on file   Social Needs    Financial resource strain: Not on file    Food insecurity     Worry: Not on file     Inability: Not on file    Transportation needs     Medical: Not on file     Non-medical: Not on file   Tobacco Use    Smoking status: Never Smoker    Smokeless tobacco: Never Used   Substance and Sexual Activity    Alcohol use: Yes     Comment: once every 3 months, 3 drinks at a time    Drug use: No    Sexual activity: Yes     Partners: Male     Birth control/protection: Injection   Lifestyle    Physical activity     Days per week: Not on file     Minutes per session: Not on file    Stress: Not on file   Relationships    Social connections     Talks on phone: Not on file     Gets together: Not on file     Attends Catholic service: Not on file     Active member of club or organization: Not on file     Attends meetings of clubs or organizations: Not on file     Relationship status: Not on file    Intimate partner violence     Fear of current or ex partner: Not on file     Emotionally abused: Not on file     Physically abused: Not on file     Forced sexual activity: Not on file   Other Topics Concern    Not on file   Social History Narrative    Lives in [place] [facility] with parents and 3 yo son. Works at [employer]. ALLERGIES: Patient has no known allergies. Review of Systems   Constitutional: Negative for activity change, chills and fever. HENT: Negative for nosebleeds, sore throat, trouble swallowing and voice change. Eyes: Negative for visual disturbance. Respiratory: Negative for shortness of breath.     Cardiovascular: Negative for chest pain and palpitations. Gastrointestinal: Positive for abdominal pain and nausea. Negative for anorexia, constipation, diarrhea, flatus, hematochezia, melena and vomiting. Genitourinary: Negative for difficulty urinating, dysuria, frequency, hematuria and urgency. Musculoskeletal: Negative for arthralgias, back pain, myalgias, neck pain and neck stiffness. Skin: Negative for color change. Allergic/Immunologic: Negative for immunocompromised state. Neurological: Negative for dizziness, seizures, syncope, weakness, light-headedness, numbness and headaches. Psychiatric/Behavioral: Negative for behavioral problems, confusion, hallucinations, self-injury and suicidal ideas. Vitals:    01/15/21 1058   BP: (!) 166/127   Pulse: 82   Resp: 18   Temp: 98.5 °F (36.9 °C)   SpO2: 100%   Weight: 85.8 kg (189 lb 2.5 oz)            Physical Exam  Vitals signs and nursing note reviewed. Constitutional:       General: She is not in acute distress. Appearance: She is well-developed. She is not diaphoretic. HENT:      Head: Normocephalic and atraumatic. Eyes:      Pupils: Pupils are equal, round, and reactive to light. Neck:      Musculoskeletal: Normal range of motion and neck supple. Cardiovascular:      Rate and Rhythm: Normal rate and regular rhythm. Heart sounds: Normal heart sounds. No murmur. No friction rub. No gallop. Pulmonary:      Effort: Pulmonary effort is normal. No respiratory distress. Breath sounds: Normal breath sounds. No wheezing. Abdominal:      General: Bowel sounds are normal. There is no distension. Palpations: Abdomen is soft. Tenderness: There is abdominal tenderness in the periumbilical area. There is no guarding or rebound. Musculoskeletal: Normal range of motion. Skin:     General: Skin is warm. Findings: No rash. Neurological:      Mental Status: She is alert and oriented to person, place, and time.    Psychiatric:         Behavior: Behavior normal.         Thought Content: Thought content normal.         Judgment: Judgment normal.          MDM     This is a 68-year-old female with past medical history, review of systems, physical exam as above, presenting with complaints of abdominal pain. Patient states she typically avoids milk-based foods, however consumed 3 days of cheese, and heavy creams this week, with subsequent periumbilical, abdominal pain and cramping, bloating. Patient states passing bowels without difficulty, denies diarrhea or blood, endorses nausea without vomiting. She denies fevers or chills, recent travel or known sick contacts. Patient denies a history of GI issues denies previous endoscopy or colonoscopy. Physical exam is remarkable for a well-appearing young female, in no acute distress, with mild periumbilical pain, otherwise benign abdomen, clear breath sounds, regular rate and rhythm without murmurs gallops or rubs. She is noted to be hypertensive. Discussed with patient lack of diagnostic testing in the emergency department for lactose intolerance however would pull blood work to evaluate for alternative process, provide symptomatic relief, reassess, and make a disposition. Procedures    Update:  Patient states discomfort improved significantly with treatment. Unremarkable lab work, urine with bacteriuria without signs of urinary tract infection. Plan to discharge home with Bentyl and Zofran, GI follow-up, return precautions given.

## 2021-01-15 NOTE — Clinical Note
[address] 
[facility] 
[address] [address] [phone] Work/School Note Date: 1/15/2021 To Whom It May concern: 
 
 
Heydi Cuadra was seen and treated today in the emergency room by the following provider(s): 
Attending Provider: Sabine Basilio MD.   
 
Heydi Cuadra is excused from work/school on 01/15/21. She is clear to return to work/school on 01/16/21.    
 
 
Sincerely, 
 
 
 
 
Isaiah Riojas MD

## 2021-01-15 NOTE — ED NOTES
Patient discharged with vital signs stable, AVS and E prescriptions, in no acute distress. PT advised to take her blood pressure medication.

## 2021-04-19 ENCOUNTER — HOSPITAL ENCOUNTER (EMERGENCY)
Age: 26
Discharge: HOME | End: 2021-04-19
Attending: EMERGENCY MEDICINE
Payer: COMMERCIAL

## 2021-04-19 ENCOUNTER — APPOINTMENT (OUTPATIENT)
Dept: CT IMAGING | Age: 26
End: 2021-04-19
Attending: PHYSICIAN ASSISTANT
Payer: COMMERCIAL

## 2021-04-19 VITALS
OXYGEN SATURATION: 100 % | DIASTOLIC BLOOD PRESSURE: 100 MMHG | WEIGHT: 168 LBS | HEIGHT: 65 IN | TEMPERATURE: 97 F | SYSTOLIC BLOOD PRESSURE: 159 MMHG | RESPIRATION RATE: 16 BRPM | BODY MASS INDEX: 27.99 KG/M2 | HEART RATE: 88 BPM

## 2021-04-19 DIAGNOSIS — R06.02 SOB (SHORTNESS OF BREATH): Primary | ICD-10-CM

## 2021-04-19 LAB
ATRIAL RATE: 87 BPM
CALCULATED P AXIS, ECG09: 48 DEGREES
CALCULATED R AXIS, ECG10: 1 DEGREES
CALCULATED T AXIS, ECG11: 10 DEGREES
COMMENT, HOLDF: NORMAL
DIAGNOSIS, 93000: NORMAL
HCG SERPL QL: NEGATIVE
HCG UR QL: NEGATIVE
P-R INTERVAL, ECG05: 170 MS
Q-T INTERVAL, ECG07: 382 MS
QRS DURATION, ECG06: 78 MS
QTC CALCULATION (BEZET), ECG08: 459 MS
SAMPLES BEING HELD,HOLD: NORMAL
TROPONIN I SERPL-MCNC: <0.05 NG/ML
VENTRICULAR RATE, ECG03: 87 BPM

## 2021-04-19 PROCEDURE — 84703 CHORIONIC GONADOTROPIN ASSAY: CPT

## 2021-04-19 PROCEDURE — 93005 ELECTROCARDIOGRAM TRACING: CPT

## 2021-04-19 PROCEDURE — 81025 URINE PREGNANCY TEST: CPT

## 2021-04-19 PROCEDURE — 84484 ASSAY OF TROPONIN QUANT: CPT

## 2021-04-19 PROCEDURE — 74011000636 HC RX REV CODE- 636: Performed by: RADIOLOGY

## 2021-04-19 PROCEDURE — 71275 CT ANGIOGRAPHY CHEST: CPT

## 2021-04-19 PROCEDURE — 99284 EMERGENCY DEPT VISIT MOD MDM: CPT

## 2021-04-19 PROCEDURE — 36415 COLL VENOUS BLD VENIPUNCTURE: CPT

## 2021-04-19 RX ADMIN — IOPAMIDOL 75 ML: 755 INJECTION, SOLUTION INTRAVENOUS at 20:37

## 2021-04-19 NOTE — ED TRIAGE NOTES
Triage: Pt arrives ambulatory from Urgent Care who referred her for chest pain and shortness of breath. They took blood work and referred her possible PE. She denies use of birth control, smoking, or recent travel. She denies pain on inspiration.

## 2021-04-19 NOTE — ED PROVIDER NOTES
30year old female presenting for SOB. Pt notes that she had had a brief episode of lightheadedness earlier that lasted 5-10 seconds. Also feels a little SOB. States that her mom has told her that she \"breathes heavy\" for years. Symptoms started while sitting at her computer at work. Denies chest pain, but does note some tightness. Patient denies hx VTE, recent immobilization, exogenous estrogen use, hemoptysis, unilateral leg pain/swelling. Said better med sent her over for an elevated dimer. Patient states that she has not been compliant with her hypertension meds, notes that she has the medications at home, counseled on taking them as directed. PMHx: HTN, list reviewed with patient           Past Medical History:   Diagnosis Date    Abnormal Pap smear of cervix     seeing OBGYN    Acne 10/7/2009    Allergic rhinitis 2009    Anemia 2009    ASCUS HPV+ 2017    H/o ASCUS HPV + with Dr. Nazanin Sanders 2017    CHAR III (cervical intraepithelial neoplasia III) 2017    found by LEEP     Headache     Herpes genitalia     dx by OB    History of gestational diabetes     diet controlled    Other ill-defined conditions(799.89)     scoliosis    PCB (post coital bleeding) 2014    Thoracic compression fracture ([address] 3/2014    Dimitri Medley    Thyromegaly 2014    On exam     Unspecified vitamin D deficiency 2014    Varicella 1998       Past Surgical History:   Procedure Laterality Date    HX GYN           HX LEEP PROCEDURE  18    found CHAR 3         Family History:   Problem Relation Age of Onset    Breast Cancer Maternal Grandmother         great, great gma, age?     Hypertension Maternal Grandmother     Breast Cancer Maternal Aunt 32        survivor    Diabetes Father     Hypertension Paternal Grandmother     Diabetes Paternal Grandmother     Osteoporosis Neg Hx        Social History     Socioeconomic History    Marital status: SINGLE     Spouse name: Not on file    Number of children: Not on file    Years of education: Not on file    Highest education level: Not on file   Occupational History    Not on file   Social Needs    Financial resource strain: Not on file    Food insecurity     Worry: Not on file     Inability: Not on file    Transportation needs     Medical: Not on file     Non-medical: Not on file   Tobacco Use    Smoking status: Never Smoker    Smokeless tobacco: Never Used   Substance and Sexual Activity    Alcohol use: Yes     Comment: once every 3 months, 3 drinks at a time    Drug use: No    Sexual activity: Yes     Partners: Male     Birth control/protection: Injection   Lifestyle    Physical activity     Days per week: Not on file     Minutes per session: Not on file    Stress: Not on file   Relationships    Social connections     Talks on phone: Not on file     Gets together: Not on file     Attends Sikh service: Not on file     Active member of club or organization: Not on file     Attends meetings of clubs or organizations: Not on file     Relationship status: Not on file    Intimate partner violence     Fear of current or ex partner: Not on file     Emotionally abused: Not on file     Physically abused: Not on file     Forced sexual activity: Not on file   Other Topics Concern    Not on file   Social History Narrative    Lives in [facility] [place] with parents and 3 yo son. Works at [employer]. ALLERGIES: Patient has no known allergies. Review of Systems   Constitutional: Negative for fever. HENT: Negative for facial swelling. Respiratory: Positive for chest tightness and shortness of breath. Cardiovascular: Negative for leg swelling. Gastrointestinal: Negative for vomiting. Skin: Negative for wound. Neurological: Negative for syncope. All other systems reviewed and are negative.       Vitals:    04/19/21 1650   BP: (!) 181/67   Pulse: 91   Resp: 16   Temp: 97 °F (36.1 °C)   SpO2: 99%   Weight: 76.2 kg (168 lb)   Height: 5' 5\" (1.651 m)            Physical Exam  Vitals signs and nursing note reviewed. Constitutional:       General: She is not in acute distress. Appearance: She is well-developed. Comments: Pleasant, well-appearing black female   HENT:      Right Ear: External ear normal.      Left Ear: External ear normal.   Eyes:      Pupils: Pupils are equal, round, and reactive to light. Neck:      Musculoskeletal: Normal range of motion and neck supple. Cardiovascular:      Rate and Rhythm: Normal rate and regular rhythm. Heart sounds: Normal heart sounds. No murmur. No friction rub. No gallop. Pulmonary:      Effort: Pulmonary effort is normal. No respiratory distress. Breath sounds: Normal breath sounds. No wheezing. Abdominal:      Palpations: Abdomen is soft. Tenderness: There is no abdominal tenderness. There is no guarding. Musculoskeletal: Normal range of motion. Skin:     General: Skin is warm and dry. Neurological:      Mental Status: She is alert. MDM  Number of Diagnoses or Management Options  Diagnosis management comments: 24-year-old female presenting to the ED for shortness of breath, sent over from urgent care for an elevated D-dimer of 0.529. Will check CTA of the chest.  We will also check EKG, troponin, normal basic labs urgent care. Reassuring work-up in the ED, reassuring vital signs, encourage close primary care follow-up, return precautions given.         Amount and/or Complexity of Data Reviewed  Clinical lab tests: ordered and reviewed  Tests in the radiology section of CPT®: ordered and reviewed  Discuss the patient with other providers: yes (Dr. Cesar Hernandez ED attending)           Procedures

## 2021-04-19 NOTE — LETTER
Sophie Vo was seen and treated in our emergency department on 4/19/2021. She may return to work on 4/21 If you have any questions or concerns, please don't hesitate to call.  
 
Presley KEITA

## 2021-05-24 ENCOUNTER — HOSPITAL ENCOUNTER (EMERGENCY)
Age: 26
Discharge: HOME | End: 2021-05-24
Attending: EMERGENCY MEDICINE
Payer: COMMERCIAL

## 2021-05-24 VITALS
WEIGHT: 190.7 LBS | OXYGEN SATURATION: 100 % | DIASTOLIC BLOOD PRESSURE: 100 MMHG | TEMPERATURE: 98.7 F | BODY MASS INDEX: 31.77 KG/M2 | HEART RATE: 75 BPM | SYSTOLIC BLOOD PRESSURE: 140 MMHG | HEIGHT: 65 IN | RESPIRATION RATE: 17 BRPM

## 2021-05-24 DIAGNOSIS — I10 HYPERTENSION, UNSPECIFIED TYPE: ICD-10-CM

## 2021-05-24 DIAGNOSIS — J01.90 ACUTE SINUSITIS, RECURRENCE NOT SPECIFIED, UNSPECIFIED LOCATION: Primary | ICD-10-CM

## 2021-05-24 DIAGNOSIS — R51.9 ACUTE NONINTRACTABLE HEADACHE, UNSPECIFIED HEADACHE TYPE: ICD-10-CM

## 2021-05-24 PROCEDURE — 99284 EMERGENCY DEPT VISIT MOD MDM: CPT

## 2021-05-24 PROCEDURE — 74011250637 HC RX REV CODE- 250/637: Performed by: EMERGENCY MEDICINE

## 2021-05-24 RX ORDER — AMLODIPINE BESYLATE 5 MG/1
5 TABLET ORAL
Status: COMPLETED | OUTPATIENT
Start: 2021-05-24 | End: 2021-05-24

## 2021-05-24 RX ORDER — LISINOPRIL 5 MG/1
5 TABLET ORAL
Status: COMPLETED | OUTPATIENT
Start: 2021-05-24 | End: 2021-05-24

## 2021-05-24 RX ORDER — AMOXICILLIN AND CLAVULANATE POTASSIUM 875; 125 MG/1; MG/1
1 TABLET, FILM COATED ORAL
Status: COMPLETED | OUTPATIENT
Start: 2021-05-24 | End: 2021-05-24

## 2021-05-24 RX ORDER — AMOXICILLIN AND CLAVULANATE POTASSIUM 875; 125 MG/1; MG/1
1 TABLET, FILM COATED ORAL 2 TIMES DAILY
Qty: 20 TABLET | Refills: 0 | Status: SHIPPED | OUTPATIENT
Start: 2021-05-24 | End: 2021-06-03

## 2021-05-24 RX ORDER — ACETAMINOPHEN 325 MG/1
650 TABLET ORAL
Status: COMPLETED | OUTPATIENT
Start: 2021-05-24 | End: 2021-05-24

## 2021-05-24 RX ORDER — CLINDAMYCIN HYDROCHLORIDE 150 MG/1
300 CAPSULE ORAL
Status: DISCONTINUED | OUTPATIENT
Start: 2021-05-24 | End: 2021-05-24

## 2021-05-24 RX ADMIN — ACETAMINOPHEN 650 MG: 325 TABLET ORAL at 15:07

## 2021-05-24 RX ADMIN — AMOXICILLIN AND CLAVULANATE POTASSIUM 1 TABLET: 875; 125 TABLET, FILM COATED ORAL at 15:06

## 2021-05-24 RX ADMIN — LISINOPRIL 5 MG: 5 TABLET ORAL at 15:07

## 2021-05-24 RX ADMIN — AMLODIPINE BESYLATE 5 MG: 5 TABLET ORAL at 15:07

## 2021-05-24 NOTE — ED PROVIDER NOTES
HPI     Please note that this dictation was completed with [employer], the computer voice recognition software. Quite often unanticipated grammatical, syntax, homophones, and other interpretive errors are inadvertently transcribed by the computer software. Please disregard these errors. Please excuse any errors that have escaped final proofreading. 53-year-old female with a history of anemia, headaches, hypertension, and others here with nasal congestion and frontal headaches. Patient reports a longstanding history of severe hypertension. She states her blood pressure can be similar to today at 160s over 120s. Patient complains of ear pain, fullness in the ears, fullness in the sinus area. Symptoms worse today but have been going on for months. She has tried Sudafed at 8 AM with limited to no relief. She is also tried Mucinex. Patient currently on Flonase. She has tried over-the-counter antihistamines as well. She states that nasal saline does not help her. Patient denies any fevers, sick contacts, nausea, vomiting, focal numbness, focal weakness or other complaints. She states that she has not taken her blood pressure medications yet today. Social history: Non-smoker. No drug use.     Past Medical History:   Diagnosis Date    Abnormal Pap smear of cervix 2017    seeing OBGYN    Acne 10/7/2009    Allergic rhinitis 9/2/2009    Anemia 9/2/2009    ASCUS HPV+ 09/14/2017    H/o ASCUS HPV + with Dr. Anusha Copeland 9/14/2017    CHAR III (cervical intraepithelial neoplasia III) 11/30/2017    found by LEEP     Headache     Herpes genitalia 2017    dx by OB    History of gestational diabetes     diet controlled    Other ill-defined conditions(799.89)     scoliosis    PCB (post coital bleeding) 11/25/2014    Thoracic compression fracture ([facility] [address] 3/2014    Ulysses Maidens    Thyromegaly 11/25/2014    On exam     Unspecified vitamin D deficiency 6/26/2014    Varicella 5/1/1998       Past Surgical History:   Procedure Laterality Date    HX GYN           HX LEEP PROCEDURE  18    found CHAR 3         Family History:   Problem Relation Age of Onset    Breast Cancer Maternal Grandmother         great, great gma, age?  Hypertension Maternal Grandmother     Breast Cancer Maternal Aunt 32        survivor    Diabetes Father     Hypertension Paternal Grandmother     Diabetes Paternal Grandmother     Osteoporosis Neg Hx        Social History     Socioeconomic History    Marital status: SINGLE     Spouse name: Not on file    Number of children: Not on file    Years of education: Not on file    Highest education level: Not on file   Occupational History    Not on file   Tobacco Use    Smoking status: Never Smoker    Smokeless tobacco: Never Used   Substance and Sexual Activity    Alcohol use: Yes     Comment: once every 3 months, 3 drinks at a time    Drug use: No    Sexual activity: Yes     Partners: Male     Birth control/protection: Injection   Other Topics Concern    Not on file   Social History Narrative    Lives in [facility] with parents and 3 yo son. Works at [employer]. Social Determinants of Health     Financial Resource Strain:     Difficulty of Paying Living Expenses:    Food Insecurity:     Worried About Running Out of Food in the Last Year:     [address] in the Last Year:    Transportation Needs:     Lack of Transportation (Medical):      Lack of Transportation (Non-Medical):    Physical Activity:     Days of Exercise per Week:     Minutes of Exercise per Session:    Stress:     Feeling of Stress :    Social Connections:     Frequency of Communication with Friends and Family:     Frequency of Social Gatherings with Friends and Family:     Attends Zoroast[employer] Services:     Active Member of Clubs or Organizations:     Attends Club or Organization Meetings:     Marital Status:    Intimate Partner Violence:     Fear of Current or Ex-Partner:     Emotionally Abused:     Physically Abused:     Sexually Abused: ALLERGIES: Patient has no known allergies. Review of Systems   Constitutional: Negative for chills and fever. HENT: Positive for congestion, ear pain, rhinorrhea, sinus pressure and sinus pain. Eyes: Positive for visual disturbance. Respiratory: Negative for chest tightness and shortness of breath. Cardiovascular: Negative for chest pain. Gastrointestinal: Negative for diarrhea, nausea and vomiting. Neurological: Positive for headaches. Negative for weakness and numbness. Vitals:    05/24/21 1442   BP: (!) 167/121   Pulse: 79   Resp: 19   Temp: 98.7 °F (37.1 °C)   SpO2: 100%   Weight: 86.5 kg (190 lb 11.2 oz)   Height: 5' 5\" (1.651 m)            Physical Exam  Vitals and nursing note reviewed. Constitutional:       Appearance: She is well-developed. HENT:      Head: Normocephalic and atraumatic. Right Ear: Tympanic membrane normal.      Left Ear: Tympanic membrane normal.      Nose: Congestion and rhinorrhea present. Mouth/Throat:      Mouth: Mucous membranes are moist.      Pharynx: No oropharyngeal exudate. Eyes:      General: No scleral icterus. Right eye: No discharge. Left eye: No discharge. Extraocular Movements: Extraocular movements intact. Conjunctiva/sclera: Conjunctivae normal.      Pupils: Pupils are equal, round, and reactive to light. Cardiovascular:      Rate and Rhythm: Normal rate and regular rhythm. Heart sounds: Normal heart sounds. No murmur heard. No friction rub. No gallop. Pulmonary:      Effort: Pulmonary effort is normal. No respiratory distress. Breath sounds: Normal breath sounds. No wheezing or rales. Abdominal:      General: There is no distension. Palpations: Abdomen is soft. Tenderness: There is no abdominal tenderness. There is no guarding. Musculoskeletal:         General: No tenderness. Normal range of motion. Cervical back: Normal range of motion and neck supple. Lymphadenopathy:      Cervical: No cervical adenopathy. Skin:     General: Skin is warm and dry. Coloration: Skin is not pale. Findings: No rash. Neurological:      Mental Status: She is alert and oriented to person, place, and time. Cranial Nerves: No cranial nerve deficit. Sensory: No sensory deficit. Motor: No weakness. Coordination: Coordination normal.          [facility]  ED Course as of May 24 1612   Mon May 24, 2021   1603 Repeat bp for me 162/120. Rechecking manual bp. Pt had similar bp when in ED in 1/2021. [RG]   [address] and blurred vision improved. [RG]   1610 Manual bp checked and was 140/100. Johnathan Fan MD      [RG]      ED Course User Index  [RG] Denny Kauffman MD     78-year-old female here with nasal congestion, frontal headache and reported blurred vision. Otherwise nonfocal neurologic exam.  Patient most concerned that she has a sinus infection. She has tried multiple over-the-counter medications including saline nasally, antihistamines, nasal steroids, Sudafed, Mucinex. Patient has tried multiple decongestants without relief raising the likelihood of sinusitis as the cause of her symptoms. Patient with significant hypertension. She reports not taking her blood pressure medications today. We will give her home medications for blood pressure. Procedures             4:12 PM  Patient's results have been reviewed with them. Patient and/or family have verbally conveyed their understanding and agreement of the patient's signs, symptoms, diagnosis, treatment and prognosis and additionally agree to follow up as recommended or return to the Emergency Room should their condition change prior to follow-up.   Discharge instructions have also been provided to the patient with some educational information regarding their diagnosis as well a list of reasons why they would want to return to the ER prior to their follow-up appointment should their condition change.

## 2021-05-24 NOTE — DISCHARGE INSTRUCTIONS
Take your blood pressure medications as prescribed. Do not skip any doses. Take the antibiotic as prescribed for sinusitis. Avoid Sudafed-containing products or sinus medications that contain it as this may make your blood pressure worse. Given the severity of your blood pressure, follow-up with your primary care doctor in 1 to 2 days.

## 2021-05-24 NOTE — Clinical Note
[address] 
[facility] 
[address] [id number] [phone] Work/School Note Date: 5/24/2021 To Whom It May concern: 
 
 
Carmen Berg was seen and treated today in the emergency room by the following provider(s): 
Attending Provider: Clay Russell MD.   
 
Carmen Berg is excused from work/school on 05/24/21. She is clear to return to work/school on 05/25/21. Sincerely, Yuridia Mueller MD

## 2021-05-24 NOTE — ED TRIAGE NOTES
TRIAGE NOTE: Patient arrived from home with c/o ear pain, fullness, blurry vision, nasal congestion and dizziness while eating lunch.  Mucinex taken at 7:30am.

## 2021-06-02 NOTE — TELEPHONE ENCOUNTER
Patient coming from [facility]. .  States she was discharged from the Hospital this past weekend for an extremely high blood pressure. .    Patient would like a call back from the nurse.  [phone]    PCP: Sabra Pang MD    Last appt: Visit date not found  No future appointments. Requested Prescriptions     Pending Prescriptions Disp Refills    HYDROcodone-acetaminophen (NORCO) 5-325 mg per tablet  0     Sig: Take  by mouth.        Prior labs and Blood pressures:  BP Readings from Last 3 Encounters:   05/24/21 (!) 140/100   04/19/21 (!) 159/100   01/15/21 (!) 155/118     Lab Results   Component Value Date/Time    Sodium 136 10/01/2020 12:54 PM    Potassium 3.5 10/01/2020 12:54 PM    Chloride 102 10/01/2020 12:54 PM    CO2 27 10/01/2020 12:54 PM    Anion gap 7 10/01/2020 12:54 PM    Glucose 84 10/01/2020 12:54 PM    BUN 11 10/01/2020 12:54 PM    Creatinine 0.67 10/01/2020 12:54 PM    BUN/Creatinine ratio 16 10/01/2020 12:54 PM    GFR est AA >60 10/01/2020 12:54 PM    GFR est non-AA >60 10/01/2020 12:54 PM    Calcium 8.9 10/01/2020 12:54 PM     Lab Results   Component Value Date/Time    Hemoglobin A1c 5.2 12/22/2016 11:10 AM     Lab Results   Component Value Date/Time    Cholesterol, total 144 12/22/2016 11:10 AM    HDL Cholesterol 56 12/22/2016 11:10 AM    LDL, calculated 78 12/22/2016 11:10 AM    VLDL, calculated 10 12/22/2016 11:10 AM    Triglyceride 50 12/22/2016 11:10 AM     Lab Results   Component Value Date/Time    VITAMIN D, 25-HYDROXY 18.7 (L) 07/10/2018 01:39 PM       Lab Results   Component Value Date/Time    TSH 1.31 05/20/2019 06:05 PM

## 2021-06-04 RX ORDER — HYDROCODONE BITARTRATE AND ACETAMINOPHEN 5; 325 MG/1; MG/1
TABLET ORAL
Refills: 0 | OUTPATIENT
Start: 2021-06-04 | End: 2021-06-07

## 2021-06-04 RX ORDER — HYDROCHLOROTHIAZIDE 25 MG/1
25 TABLET ORAL DAILY
Qty: 30 TABLET | Refills: 0 | Status: SHIPPED | OUTPATIENT
Start: 2021-06-04 | End: 2021-06-14 | Stop reason: SDUPTHER

## 2021-06-04 NOTE — TELEPHONE ENCOUNTER
Last Refill: 10/02/2020  Last Visit: Visit date not found   Next Visit: 06/17/2021    Requested Prescriptions     Pending Prescriptions Disp Refills    hydroCHLOROthiazide (HYDRODIURIL) 25 mg tablet 30 Tablet 0     Sig: Take 1 Tablet by mouth daily for 30 days. Refused Prescriptions Disp Refills    HYDROcodone-acetaminophen (NORCO) 5-325 mg per tablet  0     Sig: Take  by mouth. Refused By: Alex Long     Reason for Refusal: Not the prescriber of this medication       Ms. Jaswant Long does not see Dr. Elizabeth Olmstead no more and she has not seen Dr. Robb Quiroz either. I was able to get her onto our schedule and she was completely okay with seeing you Elvia. She did not need the HYDROcodone refilled it was Hydrchlorothiazide. She stated she is down to 1 BP pill and she really needs a refill. I stated that she would need an appointment and it could possibly be filled until her appointment. She has recently went to the ED for her BP.

## 2021-06-04 NOTE — TELEPHONE ENCOUNTER
Thank you for reaching out to her. Let her know I sent in refill of HCTZ, I look forward to meeting her on 6/17.

## 2021-06-04 NOTE — TELEPHONE ENCOUNTER
Please reach out to pt for triage  Her PCP listed as Dr Yohana Robles, who left [facility] over 2 yrs ago, she last saw Luly Morrissey NP at [facility] 11/2020  She will need to establish PCP for HTN management, I see she reached out to Dr Samara Granda office to schedule an appt and they told her to f/u with Dr Mirian Stallings  Has she been seeing Dr Mirian Stallings at her new location?   Hydrocodone refill not appropriate

## 2021-06-07 ENCOUNTER — HOSPITAL ENCOUNTER (EMERGENCY)
Age: 26
Discharge: HOME | End: 2021-06-07
Attending: EMERGENCY MEDICINE
Payer: COMMERCIAL

## 2021-06-07 VITALS
WEIGHT: 190.92 LBS | HEART RATE: 92 BPM | DIASTOLIC BLOOD PRESSURE: 97 MMHG | SYSTOLIC BLOOD PRESSURE: 143 MMHG | HEIGHT: 65 IN | OXYGEN SATURATION: 100 % | TEMPERATURE: 98 F | BODY MASS INDEX: 31.81 KG/M2 | RESPIRATION RATE: 16 BRPM

## 2021-06-07 DIAGNOSIS — R06.00 DYSPNEA, UNSPECIFIED TYPE: ICD-10-CM

## 2021-06-07 DIAGNOSIS — R07.89 OTHER CHEST PAIN: Primary | ICD-10-CM

## 2021-06-07 LAB
ANION GAP SERPL CALC-SCNC: 5 MMOL/L (ref 5–15)
APPEARANCE UR: CLEAR
BACTERIA URNS QL MICRO: NEGATIVE /HPF
BASOPHILS # BLD: 0 K/UL (ref 0–0.1)
BASOPHILS NFR BLD: 1 % (ref 0–1)
BILIRUB UR QL: NEGATIVE
BUN SERPL-MCNC: 14 MG/DL (ref 6–20)
BUN/CREAT SERPL: 20 (ref 12–20)
CALCIUM SERPL-MCNC: 9.4 MG/DL (ref 8.5–10.1)
CHLORIDE SERPL-SCNC: 100 MMOL/L (ref 97–108)
CO2 SERPL-SCNC: 28 MMOL/L (ref 21–32)
COLOR UR: NORMAL
CREAT SERPL-MCNC: 0.7 MG/DL (ref 0.55–1.02)
D DIMER PPP FEU-MCNC: 0.32 MG/L FEU (ref 0–0.65)
DIFFERENTIAL METHOD BLD: ABNORMAL
EOSINOPHIL # BLD: 0.1 K/UL (ref 0–0.4)
EOSINOPHIL NFR BLD: 2 % (ref 0–7)
EPITH CASTS URNS QL MICRO: NORMAL /LPF
ERYTHROCYTE [DISTWIDTH] IN BLOOD BY AUTOMATED COUNT: 12.4 % (ref 11.5–14.5)
GLUCOSE SERPL-MCNC: 119 MG/DL (ref 65–100)
GLUCOSE UR STRIP.AUTO-MCNC: NEGATIVE MG/DL
HCG SERPL QL: NEGATIVE
HCT VFR BLD AUTO: 35.5 % (ref 35–47)
HGB BLD-MCNC: 11.2 G/DL (ref 11.5–16)
HGB UR QL STRIP: NEGATIVE
IMM GRANULOCYTES # BLD AUTO: 0 K/UL (ref 0–0.04)
IMM GRANULOCYTES NFR BLD AUTO: 0 % (ref 0–0.5)
KETONES UR QL STRIP.AUTO: NEGATIVE MG/DL
LEUKOCYTE ESTERASE UR QL STRIP.AUTO: NEGATIVE
LIPASE SERPL-CCNC: 96 U/L (ref 73–393)
LYMPHOCYTES # BLD: 2 K/UL (ref 0.8–3.5)
LYMPHOCYTES NFR BLD: 25 % (ref 12–49)
MCH RBC QN AUTO: 29.6 PG (ref 26–34)
MCHC RBC AUTO-ENTMCNC: 31.5 G/DL (ref 30–36.5)
MCV RBC AUTO: 93.7 FL (ref 80–99)
MONOCYTES # BLD: 0.6 K/UL (ref 0–1)
MONOCYTES NFR BLD: 7 % (ref 5–13)
NEUTS SEG # BLD: 5.4 K/UL (ref 1.8–8)
NEUTS SEG NFR BLD: 65 % (ref 32–75)
NITRITE UR QL STRIP.AUTO: NEGATIVE
NRBC # BLD: 0 K/UL (ref 0–0.01)
NRBC BLD-RTO: 0 PER 100 WBC
PH UR STRIP: 6.5 [PH] (ref 5–8)
PLATELET # BLD AUTO: 422 K/UL (ref 150–400)
PMV BLD AUTO: 9.9 FL (ref 8.9–12.9)
POTASSIUM SERPL-SCNC: 2.8 MMOL/L (ref 3.5–5.1)
PROT UR STRIP-MCNC: NEGATIVE MG/DL
RBC # BLD AUTO: 3.79 M/UL (ref 3.8–5.2)
RBC #/AREA URNS HPF: NORMAL /HPF (ref 0–5)
SODIUM SERPL-SCNC: 133 MMOL/L (ref 136–145)
SP GR UR REFRACTOMETRY: <1.005 (ref 1–1.03)
TROPONIN I SERPL-MCNC: <0.05 NG/ML
UR CULT HOLD, URHOLD: NORMAL
UROBILINOGEN UR QL STRIP.AUTO: 0.2 EU/DL (ref 0.2–1)
WBC # BLD AUTO: 8.2 K/UL (ref 3.6–11)
WBC URNS QL MICRO: NORMAL /HPF (ref 0–4)

## 2021-06-07 PROCEDURE — 83690 ASSAY OF LIPASE: CPT

## 2021-06-07 PROCEDURE — 84703 CHORIONIC GONADOTROPIN ASSAY: CPT

## 2021-06-07 PROCEDURE — 36415 COLL VENOUS BLD VENIPUNCTURE: CPT

## 2021-06-07 PROCEDURE — 99283 EMERGENCY DEPT VISIT LOW MDM: CPT

## 2021-06-07 PROCEDURE — 84484 ASSAY OF TROPONIN QUANT: CPT

## 2021-06-07 PROCEDURE — 93005 ELECTROCARDIOGRAM TRACING: CPT

## 2021-06-07 PROCEDURE — 81001 URINALYSIS AUTO W/SCOPE: CPT

## 2021-06-07 PROCEDURE — 80048 BASIC METABOLIC PNL TOTAL CA: CPT

## 2021-06-07 PROCEDURE — 85025 COMPLETE CBC W/AUTO DIFF WBC: CPT

## 2021-06-07 PROCEDURE — 85379 FIBRIN DEGRADATION QUANT: CPT

## 2021-06-07 NOTE — ED TRIAGE NOTES
Patient arrives to the ED with c/o chest tightness, which started @ 2 hrs ago, no cardiac history noted, patient states pressure radiated toward her left arm, no nausea or vomiting noted.

## 2021-06-07 NOTE — DISCHARGE INSTRUCTIONS
Thank you for allowing us to provide you with medical care today. We realize that you have many choices for your emergency care needs. We thank you for choosing [facility].  Please choose us in the future for any continued health care needs. We hope we addressed all of your medical concerns. We strive to provide excellent quality care in the Emergency Department. Anything less than excellent does not meet our expectations. The exam and treatment you received in the Emergency Department were for an emergent problem and are not intended as complete care. It is important that you follow up with a doctor, nurse practitioner, or [address] [place] [address] assistant for ongoing care. If your symptoms worsen or you do not improve as expected and you are unable to reach your usual health care provider, you should return to the Emergency Department. We are available 24 hours a day. Take this sheet with you when you go to your follow-up visit. If you have any problem arranging the follow-up visit, contact the Emergency Department immediately. Make an appointment your family doctor for follow up of this visit. Return to the ER if you are unable to be seen in a timely manner.

## 2021-06-07 NOTE — LETTER
[address] 
[address] [facility] [address] [id number] [phone] Work/School Note Date: 6/7/2021 To Whom It May concern: 
 
Fan Washburn was seen and treated today in the urgent care center by the following provider(s): 
Attending Provider: Irene Hrady MD.   
 
 
 
Sincerely, 
 
 
 
 
Buddy Laureano MD

## 2021-06-08 LAB
ATRIAL RATE: 89 BPM
CALCULATED P AXIS, ECG09: 50 DEGREES
CALCULATED R AXIS, ECG10: 8 DEGREES
CALCULATED T AXIS, ECG11: 2 DEGREES
DIAGNOSIS, 93000: NORMAL
P-R INTERVAL, ECG05: 178 MS
Q-T INTERVAL, ECG07: 368 MS
QRS DURATION, ECG06: 86 MS
QTC CALCULATION (BEZET), ECG08: 447 MS
VENTRICULAR RATE, ECG03: 89 BPM

## 2021-06-14 ENCOUNTER — OFFICE VISIT (OUTPATIENT)
Dept: INTERNAL MEDICINE CLINIC | Age: 26
End: 2021-06-14
Payer: COMMERCIAL

## 2021-06-14 VITALS
SYSTOLIC BLOOD PRESSURE: 116 MMHG | RESPIRATION RATE: 16 BRPM | OXYGEN SATURATION: 99 % | TEMPERATURE: 97.8 F | DIASTOLIC BLOOD PRESSURE: 68 MMHG | HEIGHT: 66 IN | BODY MASS INDEX: 30.76 KG/M2 | HEART RATE: 88 BPM | WEIGHT: 191.4 LBS

## 2021-06-14 DIAGNOSIS — R00.2 PALPITATIONS: ICD-10-CM

## 2021-06-14 DIAGNOSIS — E87.6 HYPOKALEMIA: ICD-10-CM

## 2021-06-14 DIAGNOSIS — R07.89 CHEST TIGHTNESS: ICD-10-CM

## 2021-06-14 DIAGNOSIS — R73.9 HYPERGLYCEMIA: ICD-10-CM

## 2021-06-14 DIAGNOSIS — E87.1 HYPONATREMIA: ICD-10-CM

## 2021-06-14 DIAGNOSIS — F41.9 ANXIETY: ICD-10-CM

## 2021-06-14 DIAGNOSIS — R06.09 DYSPNEA ON EXERTION: ICD-10-CM

## 2021-06-14 DIAGNOSIS — I10 ESSENTIAL HYPERTENSION: Primary | ICD-10-CM

## 2021-06-14 LAB
ALBUMIN SERPL-MCNC: 3.8 G/DL (ref 3.5–5)
ALBUMIN/GLOB SERPL: 0.7 {RATIO} (ref 1.1–2.2)
ALP SERPL-CCNC: 67 U/L (ref 45–117)
ALT SERPL-CCNC: 15 U/L (ref 12–78)
ANION GAP SERPL CALC-SCNC: 7 MMOL/L (ref 5–15)
AST SERPL-CCNC: 17 U/L (ref 15–37)
BILIRUB SERPL-MCNC: 0.3 MG/DL (ref 0.2–1)
BUN SERPL-MCNC: 13 MG/DL (ref 6–20)
BUN/CREAT SERPL: 19 (ref 12–20)
CALCIUM SERPL-MCNC: 9.9 MG/DL (ref 8.5–10.1)
CHLORIDE SERPL-SCNC: 100 MMOL/L (ref 97–108)
CO2 SERPL-SCNC: 30 MMOL/L (ref 21–32)
CREAT SERPL-MCNC: 0.69 MG/DL (ref 0.55–1.02)
EST. AVERAGE GLUCOSE BLD GHB EST-MCNC: 108 MG/DL
GLOBULIN SER CALC-MCNC: 5.2 G/DL (ref 2–4)
GLUCOSE SERPL-MCNC: 90 MG/DL (ref 65–100)
HBA1C MFR BLD: 5.4 % (ref 4–5.6)
MAGNESIUM SERPL-MCNC: 2 MG/DL (ref 1.6–2.4)
POTASSIUM SERPL-SCNC: 4.1 MMOL/L (ref 3.5–5.1)
PROT SERPL-MCNC: 9 G/DL (ref 6.4–8.2)
SODIUM SERPL-SCNC: 137 MMOL/L (ref 136–145)
TSH SERPL DL<=0.05 MIU/L-ACNC: 0.66 UIU/ML (ref 0.36–3.74)

## 2021-06-14 PROCEDURE — 99214 OFFICE O/P EST MOD 30 MIN: CPT | Performed by: PHYSICIAN ASSISTANT

## 2021-06-14 RX ORDER — HYDROCHLOROTHIAZIDE 25 MG/1
25 TABLET ORAL DAILY
Qty: 90 TABLET | Refills: 1 | Status: SHIPPED | OUTPATIENT
Start: 2021-06-14 | End: 2021-12-20

## 2021-06-14 RX ORDER — HYDROXYZINE PAMOATE 25 MG/1
25-50 CAPSULE ORAL
Qty: 60 CAPSULE | Refills: 0 | Status: SHIPPED | OUTPATIENT
Start: 2021-06-14 | End: 2021-07-07 | Stop reason: SDUPTHER

## 2021-06-14 NOTE — PROGRESS NOTES
Luis Enrique Camejo is a 30 y.o. female     Chief Complaint   Patient presents with   [facility]     new patient       Visit Vitals  /84 (BP 1 Location: Left arm, BP Patient Position: Sitting, BP Cuff Size: Adult)   Pulse 88   Temp 97.8 °F (36.6 °C) (Oral)   Resp 16   Ht 5' 5.5\" (1.664 m)   Wt 191 lb 6.4 oz (86.8 kg)   SpO2 99%   BMI 31.37 kg/m²       Health Maintenance Due   Topic Date Due    COVID-19 Vaccine (1) Never done    PAP AKA CERVICAL CYTOLOGY  10/03/2020    DTaP/Tdap/Td series (5 - Td or Tdap) 02/15/2021       1. Have you been to the ER, urgent care clinic since your last visit? Hospitalized since your last visit? No    2. Have you seen or consulted any other health care providers outside of the [address] [place] [address] since your last visit? Include any pap smears or colon screening. No    Patient decline covid vaccine. No recent pap has appointment scheduled at [facility] on 6/22/21. Last pap done at [address] [place] [address] records are being requested. No recent Immunizations.

## 2021-06-14 NOTE — PATIENT INSTRUCTIONS
DASH Diet: Care Instructions  Your Care Instructions     The DASH diet is an eating plan that can help lower your blood pressure. DASH stands for Dietary Approaches to Stop Hypertension. Hypertension is high blood pressure. The DASH diet focuses on eating foods that are high in calcium, potassium, and magnesium. These nutrients can lower blood pressure. The foods that are highest in these nutrients are fruits, vegetables, low-fat dairy products, nuts, seeds, and legumes. But taking calcium, potassium, and magnesium supplements instead of eating foods that are high in those nutrients does not have the same effect. The DASH diet also includes whole grains, fish, and poultry. The DASH diet is one of several lifestyle changes your doctor may recommend to lower your high blood pressure. Your doctor may also want you to decrease the amount of sodium in your diet. Lowering sodium while following the DASH diet can lower blood pressure even further than just the DASH diet alone. Follow-up care is a key part of your treatment and safety. Be sure to make and go to all appointments, and call your doctor if you are having problems. It's also a good idea to know your test results and keep a list of the medicines you take. How can you care for yourself at home? Following the DASH diet  · Eat 4 to 5 servings of fruit each day. A serving is 1 medium-sized piece of fruit, ½ cup chopped or canned fruit, 1/4 cup dried fruit, or 4 ounces (½ cup) of fruit juice. Choose fruit more often than fruit juice. · Eat 4 to 5 servings of vegetables each day. A serving is 1 cup of lettuce or raw leafy vegetables, ½ cup of chopped or cooked vegetables, or 4 ounces (½ cup) of vegetable juice. Choose vegetables more often than vegetable juice. · Get 2 to 3 servings of low-fat and fat-free dairy each day. A serving is 8 ounces of milk, 1 cup of yogurt, or 1 ½ ounces of cheese. · Eat 6 to 8 servings of grains each day.  A serving is 1 slice of bread, 1 ounce of dry cereal, or ½ cup of cooked rice, pasta, or cooked cereal. Try to choose whole-grain products as much as possible. · Limit lean meat, poultry, and fish to 2 servings each day. A serving is 3 ounces, about the size of a deck of cards. · Eat 4 to 5 servings of nuts, seeds, and legumes (cooked dried beans, lentils, and split peas) each week. A serving is 1/3 cup of nuts, 2 tablespoons of seeds, or ½ cup of cooked beans or peas. · Limit fats and oils to 2 to 3 servings each day. A serving is 1 teaspoon of vegetable oil or 2 tablespoons of salad dressing. · Limit sweets and added sugars to 5 servings or less a week. A serving is 1 tablespoon jelly or jam, ½ cup sorbet, or 1 cup of lemonade. · Eat less than 2,300 milligrams (mg) of sodium a day. If you limit your sodium to 1,500 mg a day, you can lower your blood pressure even more. · Be aware that all of these are the suggested number of servings for people who eat 1,800 to 2,000 calories a day. Your recommended number of servings may be different if you need more or fewer calories. Tips for success  · Start small. Do not try to make dramatic changes to your diet all at once. You might feel that you are missing out on your favorite foods and then be more likely to not follow the plan. Make small changes, and stick with them. Once those changes become habit, add a few more changes. · Try some of the following:  ? Make it a goal to eat a fruit or vegetable at every meal and at snacks. This will make it easy to get the recommended amount of fruits and vegetables each day. ? Try yogurt topped with fruit and nuts for a snack or healthy dessert. ? Add lettuce, tomato, cucumber, and onion to sandwiches. ? Combine a ready-made pizza crust with low-fat mozzarella cheese and lots of vegetable toppings. Try using tomatoes, squash, spinach, broccoli, carrots, cauliflower, and onions. ?  Have a variety of cut-up vegetables with a low-fat dip as an appetizer instead of chips and dip. ? Sprinkle sunflower seeds or chopped almonds over salads. Or try adding chopped walnuts or almonds to cooked vegetables. ? Try some vegetarian meals using beans and peas. Add garbanzo or kidney beans to salads. Make burritos and tacos with mashed cutler beans or black beans. Where can you learn more? Go to http://www.gold.com/  Enter H967 in the search box to learn more about \"DASH Diet: Care Instructions. \"  Current as of: August 31, 2020               Content Version: 12.8  © 6292-6616 [employer]. Care instructions adapted under license by [employer] (which disclaims liability or warranty for this information). If you have questions about a medical condition or this instruction, always ask your healthcare professional. [address] any warranty or liability for your use of this information.

## 2021-06-14 NOTE — PROGRESS NOTES
HPI:  30 y.o.  presents as new patient to establish care. She had previously seen Brii Rubio NP at [facility], records in [facility]. 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 No history of heart disease or stroke.   No chest pain, no shortness of breath, no headaches, no lower extremity swelling.    -she gets HA on amlodipine  -she had been on lisinopril but reports it was not effective        ER visit 6/7/21 for CP, palpitations, dyspnea  EKG normal  Labs showed K+ 2.8, Na 133 elevated   -ER has referred her to cardiology but she has not scheduled yet    Previously seen in ER 4/19/21, Negative CTA chest for dyspnea and elevated D dimer    -She reports frequent dyspnea, with walking across the room she will notice she is out of breath or breathing fast and heavy, occurs during the middle of the day when at work  -However, she has started walking in the evenings to establish an exercise routine, walks for 30 minutes each evening for the last 2-3 weeks and feels very well without symptoms, no CHILD or CP during these exercise walks  -Will have nocturnal awakening with palpitations, heart racing, and chest tightness  -Denies having a bad dream at the time  -No wheezing or h/o asthma  -No known h/o anxiety/panic attack    She has never had a Holter monitor or other monitor evaluation    She wonders if her nocturnal palpitations are due to stress, she has never been treated for anxiety, denies insomnia    She recently switched from regular to decaf coffee (has 16 oz daily) about 1 week ago; has not had the occurrences since she switched to decaf    She recently had not been taking her antihypertensives, and would get dizziness when her BP was high, but reports has been compliant with her meds since the last ER visit, and no dizzy spells since taking her HCTZ daily    (+)Fam Hx HTN  No fam hx of premature CAD or dysrhythmias     Genital herpes - on acyclovir 200 mg once daily for suppression  She see Gyn    Last cholesterol check 2016 normal    Lab Results   Component Value Date/Time    Cholesterol, total 144 12/22/2016 11:10 AM    HDL Cholesterol 56 12/22/2016 11:10 AM    LDL, calculated 78 12/22/2016 11:10 AM    VLDL, calculated 10 12/22/2016 11:10 AM    Triglyceride 50 12/22/2016 11:10 AM         Patient Active Problem List    Diagnosis    Hypermobility syndrome    Arthralgia    Costochondritis    Carpal tunnel syndrome of right wrist    NANCI positive    SS-A antibody positive    Rheumatoid factor positive    Acute bacterial sinusitis    Acute bronchitis due to other specified organisms    Essential hypertension    History of gestational diabetes     diet controlled      Mastodynia     Nl b/l breast US in 2016.    Prolactin pending 12/22/2016   With galactorrhea 2/2 depo      Thyromegaly     On exam      Atypical mole     Noted at vaginal introitus (7 o'clock position)      Vitamin D deficiency    History of compression fracture of spine, thoracic with chronic back pain    Allergic rhinitis    Anemia         Past Medical History:   Diagnosis Date    Abnormal Pap smear of cervix 2017    seeing OBGYN    Acne 10/7/2009    Allergic rhinitis 9/2/2009    Anemia 9/2/2009    ASCUS HPV+ 09/14/2017    H/o ASCUS HPV + with Dr. Patricia Faye 9/14/2017    CHAR III (cervical intraepithelial neoplasia III) 11/30/2017    found by LEEP     Headache     Herpes genitalia 2017    dx by OB    History of gestational diabetes     diet controlled    Other ill-defined conditions(799.89)     scoliosis    PCB (post coital bleeding) 11/25/2014    Thoracic compression fracture ([address] 3/2014    Yousif Kiarrarn    Thyromegaly 11/25/2014    On exam     Unspecified vitamin D deficiency 6/26/2014    Varicella 5/1/1998       Social History     Tobacco Use    Smoking status: Never Smoker    Smokeless tobacco: Never Used   Vaping Use    Vaping Use: Never used   Substance Use Topics    Alcohol use: Yes     Comment: once every 3 months, 3 drinks at a time    Drug use: No       Outpatient Medications Marked as Taking for the 6/14/21 encounter (Office Visit) with Elvia Robert PA-C   Medication Sig Dispense Refill    hydroCHLOROthiazide (HYDRODIURIL) 25 mg tablet Take 1 Tablet by mouth daily for 30 days. 30 Tablet 0    acyclovir (ZOVIRAX) 200 mg capsule Take 200 mg by mouth daily. No Known Allergies    ROS:  ROS negative except as per HPI. PE:  Visit Vitals  /84 (BP 1 Location: Left arm, BP Patient Position: Sitting, BP Cuff Size: Adult)   Pulse 88   Temp 97.8 °F (36.6 °C) (Oral)   Resp 16   Ht 5' 5.5\" (1.664 m)   Wt 191 lb 6.4 oz (86.8 kg)   SpO2 99%   BMI 31.37 kg/m²     Gen: alert, oriented, no acute distress  Head: normocephalic, atraumatic  Eyes: pupils equal round reactive to light, sclera clear, conjunctiva clear  Neck: symmetric normal sized thyroid, no lymphadenopathy  Resp: no increase work of breathing, lungs clear to ausculation bilaterally, no wheezing, rales or rhonchi  CV: S1, S2 normal.  No murmurs, rubs, or gallops. Abd: soft, not tender, not distended. Normal bowel sounds. Neuro: grossly not focal.  Skin: no lesion or rash  Extremities: no cyanosis or edema    No results found for this visit on 06/14/21. Assessment/Plan:      ICD-10-CM ICD-9-CM    1.  Essential hypertension  J56 787.2 METABOLIC PANEL, COMPREHENSIVE      TSH 3RD GENERATION      MAGNESIUM      MAGNESIUM      TSH 3RD GENERATION      METABOLIC PANEL, COMPREHENSIVE      hydroCHLOROthiazide (HYDRODIURIL) 25 mg tablet   2. Hypokalemia  L38.5 428.5 METABOLIC PANEL, COMPREHENSIVE      TSH 3RD GENERATION      MAGNESIUM      MAGNESIUM      TSH 3RD GENERATION      METABOLIC PANEL, COMPREHENSIVE   3. Hyponatremia  V93.5 887.1 METABOLIC PANEL, COMPREHENSIVE      TSH 3RD GENERATION      MAGNESIUM      MAGNESIUM      TSH 3RD GENERATION      METABOLIC PANEL, COMPREHENSIVE   4. Hyperglycemia  R73.9 790.29 HEMOGLOBIN A1C WITH EAG      HEMOGLOBIN A1C WITH EAG   5. Palpitations  Z46.0 233.9 METABOLIC PANEL, COMPREHENSIVE      TSH 3RD GENERATION      MAGNESIUM      MAGNESIUM      TSH 3RD GENERATION      METABOLIC PANEL, COMPREHENSIVE      REFERRAL TO CARDIOLOGY   6. Dyspnea on exertion  R06.00 786.09 REFERRAL TO CARDIOLOGY   7. Chest tightness  R07.89 786.59 REFERRAL TO CARDIOLOGY   8. 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     Orders Placed This Encounter    METABOLIC PANEL, COMPREHENSIVE     Standing Status:   Future     Number of Occurrences:   1     Standing Expiration Date:   6/14/2022    TSH 3RD GENERATION     Standing Status:   Future     Number of Occurrences:   1     Standing Expiration Date:   6/14/2022    MAGNESIUM Standing Status:   Future     Number of Occurrences:   1     Standing Expiration Date:   6/14/2022    HEMOGLOBIN A1C WITH EAG     Standing Status:   Future     Number of Occurrences:   1     Standing Expiration Date:   6/14/2022    EMPL [facility]     Referral Priority:   Routine     Referral Type:   Consultation     Referral Reason:   Specialty Services Required     Referred to Provider:   Vladimir Olsen MD     Number of Visits Requested:   1    hydroCHLOROthiazide (HYDRODIURIL) 25 mg tablet     Sig: Take 1 Tablet by mouth daily. Dispense:  90 Tablet     Refill:  1    hydrOXYzine pamoate (VISTARIL) 25 mg capsule     Sig: Take 1-2 Capsules by mouth nightly as needed for Anxiety. Dispense:  60 Capsule     Refill:  0       Medications Discontinued During This Encounter   Medication Reason    amLODIPine (NORVASC) 5 mg tablet Side Effects    lisinopriL (PRINIVIL, ZESTRIL) 20 mg tablet Clinically Ineffective    ondansetron (Zofran ODT) 4 mg disintegrating tablet Therapy Completed    hydroCHLOROthiazide (HYDRODIURIL) 25 mg tablet REORDER       Current Outpatient Medications   Medication Sig Dispense Refill    hydroCHLOROthiazide (HYDRODIURIL) 25 mg tablet Take 1 Tablet by mouth daily. 90 Tablet 1    hydrOXYzine pamoate (VISTARIL) 25 mg capsule Take 1-2 Capsules by mouth nightly as needed for Anxiety. 60 Capsule 0    acyclovir (ZOVIRAX) 200 mg capsule Take 200 mg by mouth daily. Recommended healthy diet low in carbohydrates, fats, sodium and cholesterol. Recommended regular cardiovascular exercise 3-6 times per week for 30-60 minutes daily. Verbal and written instructions (see AVS) provided. Patient expresses understanding of diagnosis and treatment plan. Follow-up and Dispositions    · Return in about 3 months (around 9/14/2021) for HTN.        Future Appointments   Date Time Provider Ana Thorpe   9/23/2021  2:30 PM Elvia Wellington, [facility]       Greater than 40 min was spent with her, of which more than 50% was spent on counseling.

## 2021-06-21 NOTE — PROGRESS NOTES
Damaris msg sent  Your labs look good. The electrolytes are normal. Eat plenty of fresh fruits and vegetables each day to maintain this. Continue plan as discussed at visit.

## 2021-07-07 DIAGNOSIS — F41.9 ANXIETY: ICD-10-CM

## 2021-07-07 NOTE — TELEPHONE ENCOUNTER
Last Refill: 06/14/21  Last Visit: 6/14/2021   Next Visit: 9/23/2021    Requested Prescriptions     Pending Prescriptions Disp Refills    hydrOXYzine pamoate (VISTARIL) 25 mg capsule 60 Capsule 0     Sig: Take 1-2 Capsules by mouth nightly as needed for Anxiety.

## 2021-07-09 RX ORDER — HYDROXYZINE PAMOATE 25 MG/1
25-50 CAPSULE ORAL
Qty: 60 CAPSULE | Refills: 2 | Status: SHIPPED | OUTPATIENT
Start: 2021-07-09 | End: 2021-07-12 | Stop reason: SDUPTHER

## 2021-07-12 DIAGNOSIS — F41.9 ANXIETY: ICD-10-CM

## 2021-07-12 NOTE — TELEPHONE ENCOUNTER
PCP: Jignesh Dawson PA-C    Last appt: 6/14/2021  Future Appointments   Date Time Provider Ana Thorpe   9/23/2021  2:30 PM Elvia Wellington PA-C [facility]       Requested Prescriptions     Pending Prescriptions Disp Refills    hydrOXYzine pamoate (VISTARIL) 25 mg capsule 180 Capsule 0     Sig: Take 1-2 Capsules by mouth nightly as needed for Anxiety.        Prior labs and Blood pressures:  BP Readings from Last 3 Encounters:   06/14/21 116/68   06/07/21 (!) 143/97   05/24/21 (!) 140/100     Lab Results   Component Value Date/Time    Sodium 137 06/14/2021 04:33 PM    Potassium 4.1 06/14/2021 04:33 PM    Chloride 100 06/14/2021 04:33 PM    CO2 30 06/14/2021 04:33 PM    Anion gap 7 06/14/2021 04:33 PM    Glucose 90 06/14/2021 04:33 PM    BUN 13 06/14/2021 04:33 PM    Creatinine 0.69 06/14/2021 04:33 PM    BUN/Creatinine ratio 19 06/14/2021 04:33 PM    GFR est AA >60 06/14/2021 04:33 PM    GFR est non-AA >60 06/14/2021 04:33 PM    Calcium 9.9 06/14/2021 04:33 PM     Lab Results   Component Value Date/Time    Hemoglobin A1c 5.4 06/14/2021 04:33 PM     Lab Results   Component Value Date/Time    Cholesterol, total 144 12/22/2016 11:10 AM    HDL Cholesterol 56 12/22/2016 11:10 AM    LDL, calculated 78 12/22/2016 11:10 AM    VLDL, calculated 10 12/22/2016 11:10 AM    Triglyceride 50 12/22/2016 11:10 AM     Lab Results   Component Value Date/Time    VITAMIN D, 25-HYDROXY 18.7 (L) 07/10/2018 01:39 PM       Lab Results   Component Value Date/Time    TSH 0.66 06/14/2021 04:33 PM

## 2021-07-13 RX ORDER — HYDROXYZINE PAMOATE 25 MG/1
25-50 CAPSULE ORAL
Qty: 180 CAPSULE | Refills: 0 | Status: SHIPPED | OUTPATIENT
Start: 2021-07-13 | End: 2021-12-06

## 2021-09-08 ENCOUNTER — TELEPHONE (OUTPATIENT)
Dept: INTERNAL MEDICINE CLINIC | Age: 26
End: 2021-09-08

## 2021-09-08 NOTE — TELEPHONE ENCOUNTER
Spoke to patient and she is not experiencing any symptoms. She is  getting covid tested done today at 4pm and is going to quarantine until she gets her results in a few days.

## 2021-09-08 NOTE — TELEPHONE ENCOUNTER
Patient calling stating her father tested positive on Monday for covid. She brought her dad home today from the hospital both wearing mask. .  Then she followed her stepmother to the hospital with her brother today both running a fever. They both were wearing mask. She did touch her stepmother. She states she was wearing about 4 mask. What should she do? Also should she wait to get her covid vaccine.      SYLVAIN#[phone]

## 2021-09-09 NOTE — TELEPHONE ENCOUNTER
If her initial test is negative, and she remains asymptomatic, then I recommend re-testing in 5-7 days. If she remains negative throughout her quarantine, then she may get the vaccine. If she turns up (+)Covid, then she waits 3 months after diagnosis to get vaccine.

## 2021-09-28 ENCOUNTER — HOSPITAL ENCOUNTER (EMERGENCY)
Age: 26
Discharge: HOME | End: 2021-09-28
Attending: EMERGENCY MEDICINE
Payer: COMMERCIAL

## 2021-09-28 ENCOUNTER — TELEPHONE (OUTPATIENT)
Dept: INTERNAL MEDICINE CLINIC | Age: 26
End: 2021-09-28

## 2021-09-28 VITALS
HEART RATE: 92 BPM | SYSTOLIC BLOOD PRESSURE: 133 MMHG | TEMPERATURE: 99.8 F | OXYGEN SATURATION: 100 % | RESPIRATION RATE: 18 BRPM | WEIGHT: 197.97 LBS | BODY MASS INDEX: 32.98 KG/M2 | DIASTOLIC BLOOD PRESSURE: 105 MMHG | HEIGHT: 65 IN

## 2021-09-28 DIAGNOSIS — J01.00 ACUTE NON-RECURRENT MAXILLARY SINUSITIS: Primary | ICD-10-CM

## 2021-09-28 DIAGNOSIS — Z20.822 EXPOSURE TO COVID-19 VIRUS: ICD-10-CM

## 2021-09-28 LAB
APPEARANCE UR: ABNORMAL
BACTERIA URNS QL MICRO: ABNORMAL /HPF
BILIRUB UR QL: NEGATIVE
COLOR UR: ABNORMAL
EPITH CASTS URNS QL MICRO: ABNORMAL /LPF
GLUCOSE UR STRIP.AUTO-MCNC: NEGATIVE MG/DL
HCG UR QL: NEGATIVE
HGB UR QL STRIP: NEGATIVE
KETONES UR QL STRIP.AUTO: NEGATIVE MG/DL
LEUKOCYTE ESTERASE UR QL STRIP.AUTO: ABNORMAL
NITRITE UR QL STRIP.AUTO: NEGATIVE
PH UR STRIP: 7 [PH] (ref 5–8)
PROT UR STRIP-MCNC: NEGATIVE MG/DL
RBC #/AREA URNS HPF: ABNORMAL /HPF (ref 0–5)
SARS-COV-2, COV2: NORMAL
SP GR UR REFRACTOMETRY: 1.02 (ref 1–1.03)
UR CULT HOLD, URHOLD: NORMAL
UROBILINOGEN UR QL STRIP.AUTO: 1 EU/DL (ref 0.2–1)
WBC URNS QL MICRO: ABNORMAL /HPF (ref 0–4)

## 2021-09-28 PROCEDURE — 81001 URINALYSIS AUTO W/SCOPE: CPT

## 2021-09-28 PROCEDURE — 81025 URINE PREGNANCY TEST: CPT

## 2021-09-28 PROCEDURE — U0005 INFEC AGEN DETEC AMPLI PROBE: HCPCS

## 2021-09-28 PROCEDURE — 99283 EMERGENCY DEPT VISIT LOW MDM: CPT

## 2021-09-28 RX ORDER — BENZOCAINE .13; .15; .5; 2 G/100G; G/100G; G/100G; G/100G
2 GEL ORAL DAILY
Qty: 8.6 G | Refills: 0 | Status: SHIPPED | OUTPATIENT
Start: 2021-09-28 | End: 2022-03-10

## 2021-09-28 RX ORDER — AMOXICILLIN AND CLAVULANATE POTASSIUM 875; 125 MG/1; MG/1
1 TABLET, FILM COATED ORAL 2 TIMES DAILY
Qty: 14 TABLET | Refills: 0 | Status: SHIPPED | OUTPATIENT
Start: 2021-09-28 | End: 2021-10-05

## 2021-09-28 NOTE — ED PROVIDER NOTES
Patient is a 27year old female who presents to ED with multiple complaints. Patient c/o sinus congestion, sinus pressure, ear pain, post nasal drip, headache, low grade fever and urinary frequency. Patient reports cold symptoms started a few weeks ago and greatly increased yesterday and that she has been trying to stay hydrated to help her symptoms with no improvement. Patient denies prior COVID-19 vaccination. She also denies chest pain, shortness of breath, cough, sore throat, loss of sense of taste and smell, ear pain, abdominal pain, N/V/D. Past Medical History:   Diagnosis Date    Abnormal Pap smear of cervix     seeing OBGYN    Acne 10/7/2009    Allergic rhinitis 2009    Anemia 2009    ASCUS HPV+ 2017    H/o ASCUS HPV + with Dr. Pati Chang 2017    CHAR III (cervical intraepithelial neoplasia III) 2017    found by LEEP     Headache     Herpes genitalia     dx by OB    History of gestational diabetes     diet controlled    Other ill-defined conditions(799.89)     scoliosis    PCB (post coital bleeding) 2014    Thoracic compression fracture ([address] 3/2014    Emanuel Marc    Thyromegaly 2014    On exam     Unspecified vitamin D deficiency 2014    Varicella 1998       Past Surgical History:   Procedure Laterality Date    HX GYN           HX LEEP PROCEDURE  18    found CHAR 3         Family History:   Problem Relation Age of Onset    Breast Cancer Maternal Grandmother         great, great gma, age?     Hypertension Maternal Grandmother     Breast Cancer Maternal Aunt 32        survivor    Diabetes Father     Hypertension Father     Hypertension Paternal Grandmother     Diabetes Paternal Grandmother     No Known Problems Brother     Osteoporosis Neg Hx        Social History     Socioeconomic History    Marital status: SINGLE     Spouse name: Not on file    Number of children: Not on file    Years of education: Not on file    Highest education level: Not on file   Occupational History    Not on file   Tobacco Use    Smoking status: Never Smoker    Smokeless tobacco: Never Used   Vaping Use    Vaping Use: Never used   Substance and Sexual Activity    Alcohol use: Yes     Comment: once every 3 months, 3 drinks at a time    Drug use: No    Sexual activity: Yes     Partners: Male     Birth control/protection: Injection   Other Topics Concern    Not on file   Social History Narrative    Lives in [place] with parents and 3 yo son. Works at [employer]. Social Determinants of Health     Financial Resource Strain:     Difficulty of Paying Living Expenses:    Food Insecurity:     Worried About Running Out of Food in the Last Year:     [address] in the Last Year:    Transportation Needs:     Lack of Transportation (Medical):  Lack of Transportation (Non-Medical):    Physical Activity:     Days of Exercise per Week:     Minutes of Exercise per Session:    Stress:     Feeling of Stress :    Social Connections:     Frequency of Communication with Friends and Family:     Frequency of Social Gatherings with Friends and Family:     Attends Jainism Services:     Active Member of Clubs or Organizations:     Attends Club or Organization Meetings:     Marital Status:    Intimate Partner Violence:     Fear of Current or Ex-Partner:     Emotionally Abused:     Physically Abused:     Sexually Abused: ALLERGIES: Patient has no known allergies. Review of Systems   Constitutional: Positive for chills and fever. Negative for activity change and appetite change. HENT: Positive for congestion, sinus pressure and sinus pain. Negative for drooling, ear pain, facial swelling, sore throat and trouble swallowing. Eyes: Negative for pain and visual disturbance. Respiratory: Negative for cough and shortness of breath.     Cardiovascular: Negative for chest pain, palpitations and leg swelling. Gastrointestinal: Negative for abdominal distention, abdominal pain, constipation, diarrhea, nausea and vomiting. Genitourinary: Negative for decreased urine volume, dysuria, flank pain, frequency and urgency. Musculoskeletal: Negative for back pain and neck pain. Skin: Negative for rash and wound. Allergic/Immunologic: Negative for immunocompromised state. Neurological: Positive for headaches. Negative for dizziness, syncope, weakness, light-headedness and numbness. Psychiatric/Behavioral: Negative for confusion. All other systems reviewed and are negative. Vitals:    09/28/21 1756   BP: (!) 147/105   Pulse: 92   Resp: 18   Temp: 99.8 °F (37.7 °C)   SpO2: 100%   Weight: 89.8 kg (197 lb 15.6 oz)   Height: 5' 5\" (1.651 m)            Physical Exam  Vitals and nursing note reviewed. Constitutional:       General: She is not in acute distress. Appearance: Normal appearance. She is well-developed. She is not toxic-appearing. HENT:      Head: Normocephalic. Right Ear: Tympanic membrane, ear canal and external ear normal.      Left Ear: Tympanic membrane, ear canal and external ear normal.      Nose: Congestion present. Right Sinus: Maxillary sinus tenderness present. Left Sinus: Maxillary sinus tenderness present. Mouth/Throat:      Mouth: Mucous membranes are moist.   Eyes:      General: Lids are normal.      Extraocular Movements: Extraocular movements intact. Conjunctiva/sclera: Conjunctivae normal.   Cardiovascular:      Rate and Rhythm: Normal rate and regular rhythm. Pulses: Normal pulses. Heart sounds: Normal heart sounds, S1 normal and S2 normal.   Pulmonary:      Effort: Pulmonary effort is normal. No accessory muscle usage. Breath sounds: Normal breath sounds. Abdominal:      Palpations: Abdomen is soft. Musculoskeletal:         General: Normal range of motion. Cervical back: Normal range of motion and neck supple.    Skin: General: Skin is warm and dry. Capillary Refill: Capillary refill takes less than 2 seconds. Neurological:      General: No focal deficit present. Mental Status: She is alert and oriented to person, place, and time. Mental status is at baseline. Psychiatric:         Attention and Perception: Attention normal.         Mood and Affect: Mood and affect normal.         Speech: Speech normal.         Behavior: Behavior is cooperative. Thought Content: Thought content normal.         Cognition and Memory: Cognition normal.         Judgment: Judgment normal.          MDM  Number of Diagnoses or Management Options  Acute non-recurrent maxillary sinusitis  Diagnosis management comments: UA negative. covid testing ordered. Will treat patient for acute sinusitis given she has tried multiple other OTC meds without improvement of symptoms. Advised follow-up with PCP. Return to ER warnings discussed in detail.        Amount and/or Complexity of Data Reviewed  Clinical lab tests: ordered  Discuss the patient with other providers: yes (Dr. Corbin Ramirez, ED attending )           Procedures

## 2021-09-28 NOTE — ED TRIAGE NOTES
Patient arrives reporting yesterday she started with chills, sinus drainage, congestion, and \"a clicking noise in my nose\". Denies cough. Pt also endorses urinary frequency x3 days.

## 2021-09-28 NOTE — TELEPHONE ENCOUNTER
Ms. Georgeeen Her called stating she thinks it's sinuses but she has a low grade fever, congestion and her ears are bothering her. She stated she doesn't believe it's Covid,  But she wanted to know if [facility] could give her some advice before she goes to an urgent care. Please call and advise.     Thank you,  Jarrod Camp

## 2021-09-28 NOTE — ED NOTES
Patient  given copy of dc instructions and  script(s). Patient  verbalized understanding of instructions and script (s). Patient alert and oriented and in no acute distress.   Patient discharged home ambulatory with self

## 2021-09-29 ENCOUNTER — PATIENT OUTREACH (OUTPATIENT)
Dept: CASE MANAGEMENT | Age: 26
End: 2021-09-29

## 2021-09-29 LAB
SARS-COV-2, XPLCVT: NOT DETECTED
SOURCE, COVRS: NORMAL

## 2021-09-29 NOTE — PROGRESS NOTES
2021  10:27 AM    Patient contacted regarding COVID-19 risk. Discussed COVID-19 related testing which was pending at this time. Test results were pending. Patient informed of results, if available? Result Pending. Ambulatory Care Manager contacted the patient by telephone to perform post discharge assessment. Call within 2 business days of discharge: Yes Verified name and  with patient as identifiers. Provided introduction to self, and explanation of the CTN/ACM role, and reason for call due to risk factors for infection and/or exposure to COVID-19.      Patient is currently working (from home) and unable to follow-up with ACM at this time; patient requests ACM outreach this afternoon around 5:00 PM.

## 2021-09-29 NOTE — PROGRESS NOTES
9/29/2021  4:51 PM    Patient contacted regarding COVID-19 risk. Discussed COVID-19 related testing which was available at this time. Test results were negative. Patient informed of results, if available? no.       Second patient outreach attempt by this AC today to perform initial post-discharge assessment. Unable to reach patient. [employer] message routed to patient with COVID-19 resources.

## 2021-09-30 NOTE — PROGRESS NOTES
2021  4:38 PM    Patient contacted regarding COVID-19 risk. Discussed COVID-19 related testing which was available at this time. Test results were negative. Patient informed of results, if available? yes. Ambulatory Care Manager contacted the patient by telephone to perform post discharge assessment. Call within 2 business days of discharge: Yes Verified name and  with patient as identifiers. Provided introduction to self, and explanation of the CTN/ACM role, and reason for call due to risk factors for infection and/or exposure to COVID-19. Patient denies known exposure to COVID-19. Patient states, \"My head is still congested. \"     Patient is unable to follow-up with ACM by telephone at this time; she confirms that she received the MyChart communication from this ACM. Patient reports that she has PCP follow-up scheduled. Patient was given an opportunity to verbalize any questions and concerns and agrees to contact ACM or health care provider for questions related to their healthcare. ACM provided contact information. Plan for follow-up call in 5-7 days based on severity of symptoms and risk factors.

## 2021-10-07 ENCOUNTER — PATIENT OUTREACH (OUTPATIENT)
Dept: CASE MANAGEMENT | Age: 26
End: 2021-10-07

## 2021-10-07 NOTE — PROGRESS NOTES
10/7/2021  11:17 AM       Patient contacted regarding COVID-19 risk. Discussed COVID-19 related testing which was available at this time. Test results were negative. Patient informed of results, if available? Yes; previously notified. Ambulatory Care Manager contacted the patient by telephone to perform follow-up assessment. Verified name and  with patient as identifiers. Patient has following risk factors of: no known risk factors. Symptoms reviewed with patient who verbalized the following symptoms: no new symptoms and no worsening symptoms. Due to no new or worsening symptoms encounter was not routed to provider for escalation. Interventions to address risk factors: Patient reports that she has an appointment scheduled for Monday, 10/11/21, to receive her first COVID-19 vaccination. Patient will contact her PCP office to schedule ED follow-up. Educated patient about risk for severe COVID-19 due to risk factors according to CDC guidelines. ACM reviewed discharge instructions, medical action plan and red flag symptoms with the patient who verbalized understanding. Discussed COVID vaccination status: no. Education provided on COVID-19 vaccination as appropriate. Discussed exposure protocols and quarantine with CDC Guidelines. Patient was given an opportunity to verbalize any questions and concerns and agrees to contact ACM or health care provider for questions related to their healthcare. ACM provided contact information. No further follow-up call identified based on severity of symptoms and risk factors.

## 2021-10-13 ENCOUNTER — HOSPITAL ENCOUNTER (EMERGENCY)
Age: 26
Discharge: HOME | End: 2021-10-13
Attending: EMERGENCY MEDICINE
Payer: COMMERCIAL

## 2021-10-13 VITALS
TEMPERATURE: 98.2 F | SYSTOLIC BLOOD PRESSURE: 138 MMHG | DIASTOLIC BLOOD PRESSURE: 89 MMHG | HEART RATE: 89 BPM | RESPIRATION RATE: 16 BRPM | OXYGEN SATURATION: 98 %

## 2021-10-13 DIAGNOSIS — N39.0 URINARY TRACT INFECTION WITHOUT HEMATURIA, SITE UNSPECIFIED: Primary | ICD-10-CM

## 2021-10-13 LAB
APPEARANCE UR: ABNORMAL
BACTERIA URNS QL MICRO: ABNORMAL /HPF
BILIRUB UR QL: NEGATIVE
COLOR UR: ABNORMAL
EPITH CASTS URNS QL MICRO: ABNORMAL /LPF
GLUCOSE UR STRIP.AUTO-MCNC: NEGATIVE MG/DL
HCG UR QL: NEGATIVE
HGB UR QL STRIP: ABNORMAL
KETONES UR QL STRIP.AUTO: NEGATIVE MG/DL
LEUKOCYTE ESTERASE UR QL STRIP.AUTO: ABNORMAL
NITRITE UR QL STRIP.AUTO: POSITIVE
PH UR STRIP: 7 [PH] (ref 5–8)
PROT UR STRIP-MCNC: NEGATIVE MG/DL
RBC #/AREA URNS HPF: ABNORMAL /HPF (ref 0–5)
SP GR UR REFRACTOMETRY: 1.02 (ref 1–1.03)
UR CULT HOLD, URHOLD: NORMAL
UROBILINOGEN UR QL STRIP.AUTO: 1 EU/DL (ref 0.2–1)
WBC URNS QL MICRO: ABNORMAL /HPF (ref 0–4)

## 2021-10-13 PROCEDURE — 87086 URINE CULTURE/COLONY COUNT: CPT

## 2021-10-13 PROCEDURE — 74011250637 HC RX REV CODE- 250/637: Performed by: EMERGENCY MEDICINE

## 2021-10-13 PROCEDURE — 81025 URINE PREGNANCY TEST: CPT

## 2021-10-13 PROCEDURE — 81001 URINALYSIS AUTO W/SCOPE: CPT

## 2021-10-13 PROCEDURE — 99284 EMERGENCY DEPT VISIT MOD MDM: CPT

## 2021-10-13 PROCEDURE — 87186 SC STD MICRODIL/AGAR DIL: CPT

## 2021-10-13 PROCEDURE — 87077 CULTURE AEROBIC IDENTIFY: CPT

## 2021-10-13 RX ORDER — CEPHALEXIN 250 MG/1
500 CAPSULE ORAL
Status: COMPLETED | OUTPATIENT
Start: 2021-10-13 | End: 2021-10-13

## 2021-10-13 RX ORDER — PHENAZOPYRIDINE HYDROCHLORIDE 200 MG/1
200 TABLET, FILM COATED ORAL 3 TIMES DAILY
Qty: 6 TABLET | Refills: 0 | Status: SHIPPED | OUTPATIENT
Start: 2021-10-13 | End: 2021-10-15

## 2021-10-13 RX ORDER — CEPHALEXIN 500 MG/1
500 CAPSULE ORAL 3 TIMES DAILY
Qty: 30 CAPSULE | Refills: 0 | Status: SHIPPED | OUTPATIENT
Start: 2021-10-13 | End: 2021-10-23

## 2021-10-13 RX ORDER — NAPROXEN 500 MG/1
500 TABLET ORAL 2 TIMES DAILY WITH MEALS
Qty: 20 TABLET | Refills: 0 | Status: SHIPPED | OUTPATIENT
Start: 2021-10-13 | End: 2021-12-06

## 2021-10-13 RX ORDER — PHENAZOPYRIDINE HYDROCHLORIDE 100 MG/1
200 TABLET, FILM COATED ORAL
Status: COMPLETED | OUTPATIENT
Start: 2021-10-13 | End: 2021-10-13

## 2021-10-13 RX ADMIN — CEPHALEXIN 500 MG: 250 CAPSULE ORAL at 20:15

## 2021-10-13 RX ADMIN — PHENAZOPYRIDINE HYDROCHLORIDE 200 MG: 100 TABLET ORAL at 20:15

## 2021-10-13 NOTE — ED TRIAGE NOTES
TRIAGE NOTE: has abdominal cramping, recently finished amoxicillin. Noticed on Sunday urine smells strong and dark.       Denies frequency, cramping

## 2021-10-13 NOTE — ED PROVIDER NOTES
57-year-old female who presents to the ER for evaluation for urinary frequency, urgency and pain that began in 2 days ago has been getting progressively worse. She does complain of abdominal cramp. The patient was just seen evaluated for sinusitis 2 weeks ago in this emergency department. She denies any fever chills, headache, nausea, vomiting, diarrhea, constipation, dysuria,. Past Medical History:   Diagnosis Date    Abnormal Pap smear of cervix     seeing OBGYN    Acne 10/7/2009    Allergic rhinitis 2009    Anemia 2009    ASCUS HPV+ 2017    H/o ASCUS HPV + with Dr. Franco Chris 2017    CHAR III (cervical intraepithelial neoplasia III) 2017    found by LEEP     Headache     Herpes genitalia     dx by OB    History of gestational diabetes     diet controlled    Other ill-defined conditions(799.89)     scoliosis    PCB (post coital bleeding) 2014    Thoracic compression fracture ([address] 3/2014    Alise Ruiz    Thyromegaly 2014    On exam     Unspecified vitamin D deficiency 2014    Varicella 1998       Past Surgical History:   Procedure Laterality Date    HX GYN           HX LEEP PROCEDURE  18    found CHAR 3         Family History:   Problem Relation Age of Onset    Breast Cancer Maternal Grandmother         great, great gma, age?     Hypertension Maternal Grandmother     Breast Cancer Maternal Aunt 32        survivor    Diabetes Father     Hypertension Father     Hypertension Paternal Grandmother     Diabetes Paternal Grandmother     No Known Problems Brother     Osteoporosis Neg Hx        Social History     Socioeconomic History    Marital status: SINGLE     Spouse name: Not on file    Number of children: Not on file    Years of education: Not on file    Highest education level: Not on file   Occupational History    Not on file   Tobacco Use    Smoking status: Never Smoker    Smokeless tobacco: Never Used   Vaping Use    Vaping Use: Never used   Substance and Sexual Activity    Alcohol use: Yes     Comment: once every 3 months, 3 drinks at a time    Drug use: No    Sexual activity: Yes     Partners: Male     Birth control/protection: Injection   Other Topics Concern    Not on file   Social History Narrative    Lives in [place] with parents and 3 yo son. Works at [employer]. Social Determinants of Health     Financial Resource Strain:     Difficulty of Paying Living Expenses:    Food Insecurity:     Worried About Running Out of Food in the Last Year:     [address] in the Last Year:    Transportation Needs:     Lack of Transportation (Medical):  Lack of Transportation (Non-Medical):    Physical Activity:     Days of Exercise per Week:     Minutes of Exercise per Session:    Stress:     Feeling of Stress :    Social Connections:     Frequency of Communication with Friends and Family:     Frequency of Social Gatherings with Friends and Family:     Attends Restoration Services:     Active Member of Clubs or Organizations:     Attends Club or Organization Meetings:     Marital Status:    Intimate Partner Violence:     Fear of Current or Ex-Partner:     Emotionally Abused:     Physically Abused:     Sexually Abused: ALLERGIES: Metronidazole    Review of Systems   All other systems reviewed and are negative. Vitals:    10/13/21 1817   BP: (!) 136/109   Pulse: 84   Resp: 18   Temp: 98.8 °F (37.1 °C)   SpO2: 96%            Physical Exam  Vitals and nursing note reviewed. Exam conducted with a chaperone present. CONSTITUTIONAL: Well-appearing; well-nourished; in no apparent distress  HEAD: Normocephalic; atraumatic  EYES: PERRL; EOM intact; conjunctiva and sclera are clear bilaterally. ENT: No rhinorrhea; normal pharynx with no tonsillar hypertrophy; mucous membranes pink/moist, no erythema, no exudate.   NECK: Supple; non-tender; no cervical lymphadenopathy  CARD: Normal S1, S2; no murmurs, rubs, or gallops. Regular rate and rhythm. RESP: Normal respiratory effort; breath sounds clear and equal bilaterally; no wheezes, rhonchi, or rales. ABD: Normal bowel sounds; non-distended; non-tender; no palpable organomegaly, no masses, no bruits. Back Exam: Normal inspection; no vertebral point tenderness, no CVA tenderness. Normal range of motion. EXT: Normal ROM in all four extremities; non-tender to palpation; no swelling or deformity; distal pulses are normal, no edema. SKIN: Warm; dry; no rash. NEURO:Alert and oriented x 3, coherent, MARLYS-XII grossly intact, sensory and motor are non-focal.        MDM  Number of Diagnoses or Management Options  Urinary tract infection without hematuria, site unspecified  Diagnosis management comments: Assessment: 25-year-old female with dysuria rule out UTI    Plan: Urinalysis/point-of-care pregnancy /education, reassurance,symptomatic treatment/ Monitor and Reevaluate. Amount and/or Complexity of Data Reviewed  Clinical lab tests: ordered and reviewed  Tests in the radiology section of CPT®: ordered and reviewed  Tests in the medicine section of CPT®: reviewed and ordered  Discussion of test results with the performing providers: yes  Decide to obtain previous medical records or to obtain history from someone other than the patient: yes  Obtain history from someone other than the patient: yes  Review and summarize past medical records: yes  Discuss the patient with other providers: yes  Independent visualization of images, tracings, or specimens: yes    Risk of Complications, Morbidity, and/or Mortality  Presenting problems: low  Diagnostic procedures: low  Management options: low        Progress Note:   Pt has been reexamined by Adalberto Fleming MD. Pt is feeling much better. Symptoms have improved. All available results have been reviewed with pt and any available family. Pt understands sx, dx, and tx in ED. Care plan has been outlined and questions have been answered. Pt is ready to go home. Will send home on dysuria and UTI instruction. Prescription of Keflex, naproxen and Pyridium. . Outpatient referral with PCP as needed. Written by Cuate Winter MD,5:00 AM    .   . Procedures          . Roberto Oneill

## 2021-10-13 NOTE — LETTER
[facility]  [address]  [address]  [phone]    Work/School Note    Date: 10/13/2021    To Whom It May concern:    Felisa Soto was seen and treated today in the emergency room by the following provider(s):  No providers found. Felisa Soto may return to work on 10/16/2021.     Sincerely,          Haydee Hood RN

## 2021-10-17 LAB
BACTERIA SPEC CULT: ABNORMAL
BACTERIA SPEC CULT: ABNORMAL
CC UR VC: ABNORMAL
SERVICE CMNT-IMP: ABNORMAL

## 2021-10-18 ENCOUNTER — OFFICE VISIT (OUTPATIENT)
Dept: INTERNAL MEDICINE CLINIC | Age: 26
End: 2021-10-18
Payer: COMMERCIAL

## 2021-10-18 VITALS
WEIGHT: 189.5 LBS | TEMPERATURE: 97.3 F | RESPIRATION RATE: 16 BRPM | DIASTOLIC BLOOD PRESSURE: 72 MMHG | OXYGEN SATURATION: 98 % | BODY MASS INDEX: 31.57 KG/M2 | HEART RATE: 93 BPM | SYSTOLIC BLOOD PRESSURE: 128 MMHG | HEIGHT: 65 IN

## 2021-10-18 DIAGNOSIS — F51.05 INSOMNIA DUE TO OTHER MENTAL DISORDER: ICD-10-CM

## 2021-10-18 DIAGNOSIS — F99 INSOMNIA DUE TO OTHER MENTAL DISORDER: ICD-10-CM

## 2021-10-18 DIAGNOSIS — J00 ACUTE RHINITIS: ICD-10-CM

## 2021-10-18 DIAGNOSIS — F41.8 ANXIETY WITH DEPRESSION: Primary | ICD-10-CM

## 2021-10-18 PROCEDURE — 99213 OFFICE O/P EST LOW 20 MIN: CPT | Performed by: PHYSICIAN ASSISTANT

## 2021-10-18 RX ORDER — ESCITALOPRAM OXALATE 10 MG/1
TABLET ORAL
Qty: 30 TABLET | Refills: 3 | Status: SHIPPED | OUTPATIENT
Start: 2021-10-18 | End: 2021-11-01 | Stop reason: SDUPTHER

## 2021-10-18 RX ORDER — BUSPIRONE HYDROCHLORIDE 5 MG/1
5 TABLET ORAL 2 TIMES DAILY
Qty: 60 TABLET | Refills: 3 | Status: SHIPPED | OUTPATIENT
Start: 2021-10-18 | End: 2021-11-01 | Stop reason: SDUPTHER

## 2021-10-18 RX ORDER — IPRATROPIUM BROMIDE 42 UG/1
2 SPRAY, METERED NASAL 3 TIMES DAILY
Qty: 15 ML | Refills: 1 | Status: SHIPPED | OUTPATIENT
Start: 2021-10-18 | End: 2021-11-11

## 2021-10-18 NOTE — PATIENT INSTRUCTIONS

## 2021-10-18 NOTE — PROGRESS NOTES
Sharonda Stokes is a 27 y.o. female     Chief Complaint   Patient presents with    Insomnia     unable to sleep    Anxiety     panic attacks at night       Visit Vitals  /72 (BP 1 Location: Left arm, BP Patient Position: Sitting, BP Cuff Size: Adult)   Pulse 93   Temp 97.3 °F (36.3 °C) (Temporal)   Resp 16   Ht 5' 5\" (1.651 m)   Wt 189 lb 8 oz (86 kg)   SpO2 98%   BMI 31.53 kg/m²       Health Maintenance Due   Topic Date Due    Pap Smear  10/03/2020    DTaP/Tdap/Td series (5 - Td or Tdap) 02/15/2021    Flu Vaccine (1) 09/01/2021       1. Have you been to the ER, urgent care clinic since your last visit? Hospitalized since your last visit? Yes seen at [facility] ED on 10/13/21    2. Have you seen or consulted any other health care providers outside of the [address] [place] [address] since your last visit? Include any pap smears or colon screening.  No

## 2021-10-18 NOTE — PROGRESS NOTES
HPI:  27 y.o.  presents for acute visit due to panic attacks. Anxiety  Noted at visit 21, had new onset of palpitations, CP, CHILD  She did not follow up with cardiology  Hydroxyzine given at that time  She reports it helped her sleep initially, but now does not seem to help at all  She reports waking up at night in a panic, her heart is racing and feels anxious but is able to calm down and start deep breathing and symptoms go away  Will awaken 3-6 times at night now  She does not feel anxious during the day by her report  Reports coming fatigued during day now  Only has 1 cup decaf coffee qam      PHQ-9 Score: 11  Over the past 2 weeks, how often have you been bothered by any of the following problems? (Not at all = 0; several days = 1; More than 1/2 the days = 2; nearly every day = 3)  1) Little interest or pleasure in doing things:  3  2) Feeling down, depressed or hopeless:  1  3) Trouble falling asleep, staying asleep or sleeping too much:  3  4) Feeling tired or having little energy:  3  5) Poor appetite or overeatin  6) Feeling bad about yourself - or that you're a failure or have let yourself or your family down:  0  7) Trouble concentrating on things, such as reading the newspaper or watching TV:  1  8) Moving or speaking so slowly that other people could have noticed.  Or, the opposite - being so fidgety or restless that you have been moving around a lot more than usual:  0  9) Thoughts that you would be better off dead or of hurting yourself in some way:  0    GAD7 score: 16  Feeling nervous, anxious or on edge:  3   Not being able to stop or control worrying:  3  Worrying too much about different things:  3  Trouble relaxing:  3  Being so restless that it is hard to sit still:  0  Becoming easily annoyed or irritable:  3   Feeling afraid as if something awful might happen:  0  If any of the above were scored more than 0, how difficult have these problems made it for you to do your work, take care of things at home, or get along with other people? 1  Not at all             Somewhat difficult            Very difficult             Extremely difficult        Reports continued congestion after treatment for sinusitis  Is taking cephalexin  Was previously on Flonase for her allergies, but was switched to Rhinocort, no relief  Had chills, aches, low grade fever  (-)Covid test 9/28/21    Normal QT interval    Patient Active Problem List    Diagnosis    Hypermobility syndrome    Arthralgia    Costochondritis    Carpal tunnel syndrome of right wrist    NANCI positive    SS-A antibody positive    Rheumatoid factor positive    Acute bacterial sinusitis    Acute bronchitis due to other specified organisms    Essential hypertension    History of gestational diabetes     diet controlled      Mastodynia     Nl b/l breast US in 2016.    Prolactin pending 12/22/2016   With galactorrhea 2/2 depo      Thyromegaly     On exam      Atypical mole     Noted at vaginal introitus (7 o'clock position)      Vitamin D deficiency    History of compression fracture of spine, thoracic with chronic back pain    Allergic rhinitis    Anemia         Past Medical History:   Diagnosis Date    Abnormal Pap smear of cervix 2017    seeing OBGYN    Acne 10/7/2009    Allergic rhinitis 9/2/2009    Anemia 9/2/2009    ASCUS HPV+ 09/14/2017    H/o ASCUS HPV + with Dr. Rivka Foote 9/14/2017    CHAR III (cervical intraepithelial neoplasia III) 11/30/2017    found by LEEP     Headache     Herpes genitalia 2017    dx by OB    History of gestational diabetes     diet controlled    Other ill-defined conditions(799.89)     scoliosis    PCB (post coital bleeding) 11/25/2014    Thoracic compression fracture ([address] 3/2014    Yousif Lopez    Thyromegaly 11/25/2014    On exam     Unspecified vitamin D deficiency 6/26/2014    Varicella 5/1/1998       Social History     Tobacco Use    Smoking status: Never Smoker    Smokeless tobacco: Never Used   Vaping Use    Vaping Use: Never used   Substance Use Topics    Alcohol use: Yes     Comment: once every 3 months, 3 drinks at a time    Drug use: No       Outpatient Medications Marked as Taking for the 10/18/21 encounter (Office Visit) with Elvia Rlodan PA-C   Medication Sig Dispense Refill    cephALEXin (Keflex) 500 mg capsule Take 1 Capsule by mouth three (3) times daily for 10 days. 30 Capsule 0    budesonide (Rhinocort Allergy) 32 mcg/actuation nasal spray 2 Sprays by Both Nostrils route daily. 8.6 g 0    hydrOXYzine pamoate (VISTARIL) 25 mg capsule Take 1-2 Capsules by mouth nightly as needed for Anxiety. 180 Capsule 0    hydroCHLOROthiazide (HYDRODIURIL) 25 mg tablet Take 1 Tablet by mouth daily. 90 Tablet 1    acyclovir (ZOVIRAX) 200 mg capsule Take 200 mg by mouth daily. Allergies   Allergen Reactions    Metronidazole Itching       ROS:  ROS negative except as per HPI. PE:  Visit Vitals  /72 (BP 1 Location: Left arm, BP Patient Position: Sitting, BP Cuff Size: Adult)   Pulse 93   Temp 97.3 °F (36.3 °C) (Temporal)   Resp 16   Ht 5' 5\" (1.651 m)   Wt 189 lb 8 oz (86 kg)   SpO2 98%   BMI 31.53 kg/m²     Gen: alert, oriented, no acute distress  Head: normocephalic, atraumatic  Eyes: pupils equal round reactive to light, sclera clear, conjunctiva clear  Oral: masked  Neck: no lymphadenopathy  Resp: no increase work of breathing, lungs clear to ausculation bilaterally, no wheezing, rales or rhonchi  CV: S1, S2 normal.  No murmurs, rubs, or gallops. Neuro: grossly intact. Skin: no lesion or rash  Extremities: no cyanosis or edema  Psych:  alert, oriented to person, place, and time, normal mood, behavior, speech, dress, motor activity, and thought processes, no suicidal ideations      No results found for this visit on 10/18/21.     Lab Results   Component Value Date/Time    WBC 8.2 06/07/2021 02:41 AM    Hemoglobin (POC) 11.7 09/09/2019 01:56 PM HGB 11.2 (L) 06/07/2021 02:41 AM    Hematocrit (POC) 37 01/15/2021 11:54 AM    HCT 35.5 06/07/2021 02:41 AM    PLATELET 635 (H) 76/97/1827 02:41 AM    MCV 93.7 06/07/2021 02:41 AM     Lab Results   Component Value Date/Time    Sodium 137 06/14/2021 04:33 PM    Potassium 4.1 06/14/2021 04:33 PM    Chloride 100 06/14/2021 04:33 PM    CO2 30 06/14/2021 04:33 PM    Anion gap 7 06/14/2021 04:33 PM    Glucose 90 06/14/2021 04:33 PM    BUN 13 06/14/2021 04:33 PM    Creatinine 0.69 06/14/2021 04:33 PM    BUN/Creatinine ratio 19 06/14/2021 04:33 PM    GFR est AA >60 06/14/2021 04:33 PM    GFR est non-AA >60 06/14/2021 04:33 PM    Calcium 9.9 06/14/2021 04:33 PM    Bilirubin, total 0.3 06/14/2021 04:33 PM    Alk. phosphatase 67 06/14/2021 04:33 PM    Protein, total 9.0 (H) 06/14/2021 04:33 PM    Albumin 3.8 06/14/2021 04:33 PM    Globulin 5.2 (H) 06/14/2021 04:33 PM    A-G Ratio 0.7 (L) 06/14/2021 04:33 PM    ALT (SGPT) 15 06/14/2021 04:33 PM    AST (SGOT) 17 06/14/2021 04:33 PM     Lab Results   Component Value Date/Time    TSH 0.66 06/14/2021 04:33 PM     Lab Results   Component Value Date/Time    Cholesterol, total 144 12/22/2016 11:10 AM    HDL Cholesterol 56 12/22/2016 11:10 AM    LDL, calculated 78 12/22/2016 11:10 AM    VLDL, calculated 10 12/22/2016 11:10 AM    Triglyceride 50 12/22/2016 11:10 AM         Assessment/Plan:      ICD-10-CM ICD-9-CM    1. Anxiety with depression  F41.8 300.4 escitalopram oxalate (LEXAPRO) 10 mg tablet      busPIRone (BUSPAR) 5 mg tablet   2. Acute rhinitis  J00 460 ipratropium (ATROVENT) 42 mcg (0.06 %) nasal spray   3.  Insomnia due to other mental disorder  F51.05 300.9     F99 327.02        PHQ9 = 11, GAD7 = 16  Mixed anxiety with depression and insomnia  Prior relief for sleep with hydroxyzine, now waking several times in panic  New start lexapro 10 mg daily, buspar 5 mg BID  May continue to use hydroxyzine  Sleep hygiene discussed, no caffeine or alcohol  Consider melatonin    Recent viral infection and sinusitis improved on cephalexin, but persistent rhinitis  Continue rhinocort  Add atrovent nasal spray  Avoid sudafed due to HTN      Health Maintenance reviewed - updated. Orders Placed This Encounter    escitalopram oxalate (LEXAPRO) 10 mg tablet     Sig: Take 1/2 tablet (5 mg) daily x 6 days, then increase to 1 tab (10 mg) daily and stay at this dose     Dispense:  30 Tablet     Refill:  3    busPIRone (BUSPAR) 5 mg tablet     Sig: Take 1 Tablet by mouth two (2) times a day. For anxiety and panics     Dispense:  60 Tablet     Refill:  3    ipratropium (ATROVENT) 42 mcg (0.06 %) nasal spray     Si Sprays by Both Nostrils route three (3) times daily. Dispense:  15 mL     Refill:  1       There are no discontinued medications. Current Outpatient Medications   Medication Sig Dispense Refill    escitalopram oxalate (LEXAPRO) 10 mg tablet Take 1/2 tablet (5 mg) daily x 6 days, then increase to 1 tab (10 mg) daily and stay at this dose 30 Tablet 3    busPIRone (BUSPAR) 5 mg tablet Take 1 Tablet by mouth two (2) times a day. For anxiety and panics 60 Tablet 3    ipratropium (ATROVENT) 42 mcg (0.06 %) nasal spray 2 Sprays by Both Nostrils route three (3) times daily. 15 mL 1    cephALEXin (Keflex) 500 mg capsule Take 1 Capsule by mouth three (3) times daily for 10 days. 30 Capsule 0    budesonide (Rhinocort Allergy) 32 mcg/actuation nasal spray 2 Sprays by Both Nostrils route daily. 8.6 g 0    hydrOXYzine pamoate (VISTARIL) 25 mg capsule Take 1-2 Capsules by mouth nightly as needed for Anxiety. 180 Capsule 0    hydroCHLOROthiazide (HYDRODIURIL) 25 mg tablet Take 1 Tablet by mouth daily. 90 Tablet 1    acyclovir (ZOVIRAX) 200 mg capsule Take 200 mg by mouth daily.  naproxen (NAPROSYN) 500 mg tablet Take 1 Tablet by mouth two (2) times daily (with meals).  (Patient not taking: Reported on 10/18/2021) 20 Tablet 0       Recommended healthy diet low in carbohydrates, fats, sodium and cholesterol. Recommended regular cardiovascular exercise 3-6 times per week for 30-60 minutes daily. Verbal and written instructions (see AVS) provided. Patient expresses understanding of diagnosis and treatment plan. Follow-up and Dispositions    · Return in about 6 weeks (around 11/29/2021) for anxiety. No future appointments.

## 2021-11-01 DIAGNOSIS — F41.8 ANXIETY WITH DEPRESSION: ICD-10-CM

## 2021-11-01 NOTE — TELEPHONE ENCOUNTER
Last Refill: 10/18/2021  Last Visit: 10/18/2021   Next Visit: 12/6/2021    Requested Prescriptions     Pending Prescriptions Disp Refills    escitalopram oxalate (LEXAPRO) 10 mg tablet 30 Tablet 3     Sig: Take 1/2 tablet (5 mg) daily x 6 days, then increase to 1 tab (10 mg) daily and stay at this dose    busPIRone (BUSPAR) 5 mg tablet 60 Tablet 3     Sig: Take 1 Tablet by mouth two (2) times a day.  For anxiety and panics

## 2021-11-02 RX ORDER — ESCITALOPRAM OXALATE 10 MG/1
TABLET ORAL
Qty: 30 TABLET | Refills: 3 | Status: SHIPPED | OUTPATIENT
Start: 2021-11-02 | End: 2021-11-05 | Stop reason: SDUPTHER

## 2021-11-02 RX ORDER — BUSPIRONE HYDROCHLORIDE 5 MG/1
5 TABLET ORAL 2 TIMES DAILY
Qty: 60 TABLET | Refills: 3 | Status: SHIPPED | OUTPATIENT
Start: 2021-11-02 | End: 2021-11-05 | Stop reason: SDUPTHER

## 2021-11-05 DIAGNOSIS — F41.8 ANXIETY WITH DEPRESSION: ICD-10-CM

## 2021-11-05 NOTE — TELEPHONE ENCOUNTER
Last Refill: 10/18/21  Last Visit: 10/18/2021   Next Visit: 12/6/2021    Requested Prescriptions     Pending Prescriptions Disp Refills    escitalopram oxalate (LEXAPRO) 10 mg tablet 30 Tablet 3     Sig: Take 1/2 tablet (5 mg) daily x 6 days, then increase to 1 tab (10 mg) daily and stay at this dose

## 2021-11-05 NOTE — TELEPHONE ENCOUNTER
Last Refill: 11/02/21  Last Visit: 10/18/2021   Next Visit: 12/6/2021    Requested Prescriptions     Pending Prescriptions Disp Refills    busPIRone (BUSPAR) 5 mg tablet 60 Tablet 3     Sig: Take 1 Tablet by mouth two (2) times a day.  For anxiety and panics

## 2021-11-07 RX ORDER — BUSPIRONE HYDROCHLORIDE 5 MG/1
5 TABLET ORAL 2 TIMES DAILY
Qty: 60 TABLET | Refills: 3 | Status: SHIPPED | OUTPATIENT
Start: 2021-11-07 | End: 2021-12-06

## 2021-11-07 RX ORDER — ESCITALOPRAM OXALATE 10 MG/1
TABLET ORAL
Qty: 30 TABLET | Refills: 3 | Status: SHIPPED | OUTPATIENT
Start: 2021-11-07 | End: 2021-12-06

## 2021-11-07 NOTE — TELEPHONE ENCOUNTER
This is the 3rd time I'm sending in this Rx in the last 2 wks, can we see what the problem is at the pharmacy? ?

## 2021-11-10 ENCOUNTER — TELEPHONE (OUTPATIENT)
Dept: INTERNAL MEDICINE CLINIC | Age: 26
End: 2021-11-10

## 2021-11-10 DIAGNOSIS — R77.9 ELEVATED BLOOD PROTEIN: Primary | ICD-10-CM

## 2021-11-10 DIAGNOSIS — R76.8 SS-A ANTIBODY POSITIVE: ICD-10-CM

## 2021-11-10 NOTE — TELEPHONE ENCOUNTER
Rodolfo Espinosainas would like labs done because her protein levels are 9.4 and she couldn't donate plasma. She would like to know why her levels were never discussed. Please call and advise.     Thank you,  Latisha Frazier

## 2021-11-12 NOTE — TELEPHONE ENCOUNTER
I called pt to review protein levels. Chart review shows she has had upper normal to mildly elevated protein levels since 2009, with approximate range of 8.2-9.5, fluctuating and stable. No family h/o known protein abnormalities. I will check SPEP to further evaluate. She will need a lab appointment, and prefers a phone call to schedule. She does not need to be fasting. I advised her that my team will reach out in the morning to schedule. On chart review after the call, she has SSA+ Ab suggesting Sjogren's syndrome, saw rheumatology for one visit in 2018 and did not follow up.

## 2021-11-29 DIAGNOSIS — J00 ACUTE RHINITIS: ICD-10-CM

## 2021-11-29 NOTE — TELEPHONE ENCOUNTER
Last Refill: 11/11/2021  Last Visit: 10/18/2021   Next Visit: 12/6/2021    Requested Prescriptions     Pending Prescriptions Disp Refills    ipratropium (ATROVENT) 42 mcg (0.06 %) nasal spray 15 mL 1

## 2021-12-01 RX ORDER — IPRATROPIUM BROMIDE 42 UG/1
SPRAY, METERED NASAL
Qty: 15 ML | Refills: 1 | Status: SHIPPED | OUTPATIENT
Start: 2021-12-01 | End: 2022-03-10 | Stop reason: SINTOL

## 2021-12-06 ENCOUNTER — VIRTUAL VISIT (OUTPATIENT)
Dept: INTERNAL MEDICINE CLINIC | Age: 26
End: 2021-12-06
Payer: COMMERCIAL

## 2021-12-06 DIAGNOSIS — F51.05 INSOMNIA DUE TO OTHER MENTAL DISORDER: ICD-10-CM

## 2021-12-06 DIAGNOSIS — F99 INSOMNIA DUE TO OTHER MENTAL DISORDER: ICD-10-CM

## 2021-12-06 DIAGNOSIS — F41.8 ANXIETY WITH DEPRESSION: Primary | ICD-10-CM

## 2021-12-06 DIAGNOSIS — R77.9 ELEVATED BLOOD PROTEIN: ICD-10-CM

## 2021-12-06 DIAGNOSIS — R76.8 SS-A ANTIBODY POSITIVE: ICD-10-CM

## 2021-12-06 PROCEDURE — 99213 OFFICE O/P EST LOW 20 MIN: CPT | Performed by: PHYSICIAN ASSISTANT

## 2021-12-06 NOTE — PROGRESS NOTES
Carmen Lainez is a 27 y.o. female     Chief Complaint   Patient presents with    Anxiety     6 wk follow up       There were no vitals taken for this visit. Health Maintenance Due   Topic Date Due    Pap Smear  10/03/2020    DTaP/Tdap/Td series (5 - Td or Tdap) 02/15/2021    Flu Vaccine (1) 09/01/2021       1. Have you been to the ER, urgent care clinic since your last visit? Hospitalized since your last visit? No     2. Have you seen or consulted any other health care providers outside of the [address] [place] [address] since your last visit? Include any pap smears or colon screening.  No

## 2021-12-06 NOTE — PROGRESS NOTES
Sharonda Stokes is a 27 y.o. female who was seen by synchronous (real-time) audio-video technology on 12/6/2021 for Anxiety (6 wk follow up)        Assessment & Plan:         ICD-10-CM ICD-9-CM    1. Anxiety with depression  F41.8 300.4    2. Insomnia due to other mental disorder  F51.05 300.9     F99 327.02    3. Elevated blood protein  R77.9 790.99    4. SS-A antibody positive  R76.8 795.79          Anxiety, depression and insomnia have resolved  She is not on any medications  She is sleeping well, exercising, and following healthy diet  I praised her efforts and she will continue with lifestyle changes    Elevated protein noted when went to donate plasma  She has h/o (+)SSA, suspected Sjogren's, denies symptoms of sicca syndrome  Will check SPEP, complement, previously ordered, she will schedule lab appt    Further recommendations pending lab results    712  Subjective:     Last visit 10/18/21    Mixed anxiety with depression and insomnia  Recap from last visit: PHQ9 = 11, GAD7 = 16  Prior relief for sleep with hydroxyzine, now waking several times in panic  New start lexapro 10 mg daily, buspar 5 mg BID  May continue to use hydroxyzine  Sleep hygiene discussed, no caffeine or alcohol  Consider melatonin    TODAY - she states she never tried the new medicines since she is sleeping better since last visit, has been able to fall asleep naturally  -she is feeling much better, which she attributes to better sleep and healthier lifestyle  She has cut out all caffeine  She has been eating heart healthy diet   Drinking gallon of water daily, having more salads, having protein smoothie for breakfast    Protein level was elevated when she went to donate plasma  We reviewed in phone call 11/11/21  I placed labs 11/11/21, and she is reminded to schedule lab appt to have those done.   She saw rheumatology 8/2018 Dr Mouna Rothman (note on chart but she has left [facility]), (+)NANCI 1:640 and (+)SSA noted, felt to be Sjogren's and lupus was ruled out, she did not have salivary biopsy done since she did not have dry mouth or dry eyes, has not had follow up since    Prior to Admission medications    Medication Sig Start Date End Date Taking? Authorizing Provider   hydroCHLOROthiazide (HYDRODIURIL) 25 mg tablet Take 1 Tablet by mouth daily. 6/14/21  Yes Elvia Wellington PA-C   ipratropium (ATROVENT) 42 mcg (0.06 %) nasal spray 2 SPRAYS INTO EACH NOSTRIL 3 TIMES DAILY, PRN CONGESTION  Patient not taking: Reported on 12/6/2021 12/1/21   Elvia Wellington PA-C   escitalopram oxalate (LEXAPRO) 10 mg tablet Take 1/2 tablet (5 mg) daily x 6 days, then increase to 1 tab (10 mg) daily and stay at this dose  Patient not taking: Reported on 12/6/2021 11/7/21   Elvia Wellington PA-C   busPIRone (BUSPAR) 5 mg tablet Take 1 Tablet by mouth two (2) times a day. For anxiety and panics  Patient not taking: Reported on 12/6/2021 11/7/21   Elvia Wellington PA-C   naproxen (NAPROSYN) 500 mg tablet Take 1 Tablet by mouth two (2) times daily (with meals). Patient not taking: Reported on 10/18/2021 10/13/21   Varun Trevino MD   budesonide (Rhinocort Allergy) 32 mcg/actuation nasal spray 2 Sprays by Both Nostrils route daily. 9/28/21   BOSSMAN Gallo   hydrOXYzine pamoate (VISTARIL) 25 mg capsule Take 1-2 Capsules by mouth nightly as needed for Anxiety. Patient not taking: Reported on 12/6/2021 7/13/21   Elvia Wellington PA-C   acyclovir (ZOVIRAX) 200 mg capsule Take 200 mg by mouth daily.   Patient not taking: Reported on 12/6/2021 10/13/17   Provider, Historical     Patient Active Problem List    Diagnosis Date Noted    Hypermobility syndrome 08/21/2018    Arthralgia 08/21/2018    Costochondritis 08/21/2018    Carpal tunnel syndrome of right wrist 08/21/2018    NANCI positive 07/16/2018    SS-A antibody positive 07/16/2018    Rheumatoid factor positive 07/16/2018    Acute bacterial sinusitis 02/26/2018    Acute bronchitis due to other specified organisms 02/26/2018    Essential hypertension 2016    History of gestational diabetes     Mastodynia 2016    Thyromegaly 2014    Atypical mole 2014    Vitamin D deficiency 2014    History of compression fracture of spine, thoracic with chronic back pain     Allergic rhinitis 2009    Anemia 2009     Current Outpatient Medications   Medication Sig Dispense Refill    hydroCHLOROthiazide (HYDRODIURIL) 25 mg tablet Take 1 Tablet by mouth daily. 90 Tablet 1    ipratropium (ATROVENT) 42 mcg (0.06 %) nasal spray 2 SPRAYS INTO EACH NOSTRIL 3 TIMES DAILY, PRN CONGESTION (Patient not taking: Reported on 2021) 15 mL 1    budesonide (Rhinocort Allergy) 32 mcg/actuation nasal spray 2 Sprays by Both Nostrils route daily. 8.6 g 0     Allergies   Allergen Reactions    Metronidazole Itching     Past Medical History:   Diagnosis Date    Abnormal Pap smear of cervix     seeing OBGYN    Acne 10/7/2009    Allergic rhinitis 2009    Anemia 2009    ASCUS HPV+ 2017    H/o ASCUS HPV + with Dr. Latrell Parra 2017    CHAR III (cervical intraepithelial neoplasia III) 2017    found by LEEP     Headache     Herpes genitalia     dx by OB    History of gestational diabetes     diet controlled    Other ill-defined conditions(799.89)     scoliosis    PCB (post coital bleeding) 2014    Thoracic compression fracture ([address] 3/2014    Harolyn Coad    Thyromegaly 2014    On exam     Unspecified vitamin D deficiency 2014    Varicella 1998     Past Surgical History:   Procedure Laterality Date    HX GYN           HX LEEP PROCEDURE  18    found CHAR 3     Family History   Problem Relation Age of Onset    Breast Cancer Maternal Grandmother         great, great gma, age?     Hypertension Maternal Grandmother     Breast Cancer Maternal Aunt 34        survivor    Diabetes Father     Hypertension Father     Hypertension Paternal Grandmother     Diabetes Paternal Grandmother     No Known Problems Brother     Osteoporosis Neg Hx      Social History     Tobacco Use    Smoking status: Never Smoker    Smokeless tobacco: Never Used   Substance Use Topics    Alcohol use: Yes     Comment: once every 3 months, 3 drinks at a time       ROS  Per HPI    Objective:   No flowsheet data found. General: alert, cooperative, no distress   Mental  status: normal mood, behavior, speech, dress, motor activity, and thought processes, able to follow commands   HENT: NCAT   Neck: no visualized mass   Resp: no respiratory distress   Neuro: no gross deficits   Skin: no discoloration or lesions of concern on visible areas   Psychiatric: normal affect, consistent with stated mood, no evidence of hallucinations     Additional exam findings: none      Current Outpatient Medications   Medication Sig Dispense Refill    hydroCHLOROthiazide (HYDRODIURIL) 25 mg tablet Take 1 Tablet by mouth daily. 90 Tablet 1    ipratropium (ATROVENT) 42 mcg (0.06 %) nasal spray 2 SPRAYS INTO EACH NOSTRIL 3 TIMES DAILY, PRN CONGESTION (Patient not taking: Reported on 12/6/2021) 15 mL 1    budesonide (Rhinocort Allergy) 32 mcg/actuation nasal spray 2 Sprays by Both Nostrils route daily. 8.6 g 0         We discussed the expected course, resolution and complications of the diagnosis(es) in detail. Medication risks, benefits, costs, interactions, and alternatives were discussed as indicated. I advised her to contact the office if her condition worsens, changes or fails to improve as anticipated. She expressed understanding with the diagnosis(es) and plan. Carmen Lainez, was evaluated through a synchronous (real-time) audio-video encounter. The patient (or guardian if applicable) is aware that this is a billable service. Verbal consent to proceed has been obtained within the past 12 months.  The visit was conducted pursuant to the emergency declaration under the [address] and the [employer] Act, [facility] waiver authority and the [employer] General Act. Patient identification was verified, and a caregiver was present when appropriate. The patient was located in a state where the provider was credentialed to provide care.     Elvia Plaza PA-C

## 2021-12-08 DIAGNOSIS — F41.8 ANXIETY WITH DEPRESSION: ICD-10-CM

## 2021-12-08 RX ORDER — ESCITALOPRAM OXALATE 10 MG/1
TABLET ORAL
Qty: 30 TABLET | Refills: 3 | OUTPATIENT
Start: 2021-12-08

## 2021-12-09 DIAGNOSIS — F41.8 ANXIETY WITH DEPRESSION: ICD-10-CM

## 2021-12-09 NOTE — TELEPHONE ENCOUNTER
PCP: Navneet Dudley PA-C    Last appt: 12/6/2021  No future appointments. Requested Prescriptions     Pending Prescriptions Disp Refills    busPIRone (BUSPAR) 5 mg tablet 60 Tablet 3     Sig: Take 1 Tablet by mouth two (2) times a day.  For anxiety and panics       Prior labs and Blood pressures:  BP Readings from Last 3 Encounters:   10/18/21 128/72   10/13/21 138/89   09/28/21 (!) 133/105     Lab Results   Component Value Date/Time    Sodium 137 06/14/2021 04:33 PM    Potassium 4.1 06/14/2021 04:33 PM    Chloride 100 06/14/2021 04:33 PM    CO2 30 06/14/2021 04:33 PM    Anion gap 7 06/14/2021 04:33 PM    Glucose 90 06/14/2021 04:33 PM    BUN 13 06/14/2021 04:33 PM    Creatinine 0.69 06/14/2021 04:33 PM    BUN/Creatinine ratio 19 06/14/2021 04:33 PM    GFR est AA >60 06/14/2021 04:33 PM    GFR est non-AA >60 06/14/2021 04:33 PM    Calcium 9.9 06/14/2021 04:33 PM     Lab Results   Component Value Date/Time    Hemoglobin A1c 5.4 06/14/2021 04:33 PM     Lab Results   Component Value Date/Time    Cholesterol, total 144 12/22/2016 11:10 AM    HDL Cholesterol 56 12/22/2016 11:10 AM    LDL, calculated 78 12/22/2016 11:10 AM    VLDL, calculated 10 12/22/2016 11:10 AM    Triglyceride 50 12/22/2016 11:10 AM     Lab Results   Component Value Date/Time    VITAMIN D, 25-HYDROXY 18.7 (L) 07/10/2018 01:39 PM       Lab Results   Component Value Date/Time    TSH 0.66 06/14/2021 04:33 PM

## 2021-12-10 RX ORDER — BUSPIRONE HYDROCHLORIDE 5 MG/1
5 TABLET ORAL 2 TIMES DAILY
Qty: 60 TABLET | Refills: 3 | OUTPATIENT
Start: 2021-12-10

## 2021-12-19 DIAGNOSIS — I10 ESSENTIAL HYPERTENSION: ICD-10-CM

## 2021-12-20 RX ORDER — HYDROCHLOROTHIAZIDE 25 MG/1
TABLET ORAL
Qty: 90 TABLET | Refills: 1 | Status: SHIPPED | OUTPATIENT
Start: 2021-12-20 | End: 2022-03-10 | Stop reason: ALTCHOICE

## 2021-12-22 ENCOUNTER — HOSPITAL ENCOUNTER (EMERGENCY)
Age: 26
Discharge: HOME | End: 2021-12-22
Attending: STUDENT IN AN ORGANIZED HEALTH CARE EDUCATION/TRAINING PROGRAM
Payer: COMMERCIAL

## 2021-12-22 ENCOUNTER — APPOINTMENT (OUTPATIENT)
Dept: GENERAL RADIOLOGY | Age: 26
End: 2021-12-22
Attending: STUDENT IN AN ORGANIZED HEALTH CARE EDUCATION/TRAINING PROGRAM
Payer: COMMERCIAL

## 2021-12-22 VITALS
BODY MASS INDEX: 30.79 KG/M2 | RESPIRATION RATE: 16 BRPM | TEMPERATURE: 99.6 F | DIASTOLIC BLOOD PRESSURE: 88 MMHG | SYSTOLIC BLOOD PRESSURE: 125 MMHG | HEART RATE: 102 BPM | OXYGEN SATURATION: 96 % | WEIGHT: 185 LBS

## 2021-12-22 DIAGNOSIS — U07.1 COVID-19 VIRUS INFECTION: ICD-10-CM

## 2021-12-22 DIAGNOSIS — M54.6 ACUTE RIGHT-SIDED THORACIC BACK PAIN: Primary | ICD-10-CM

## 2021-12-22 DIAGNOSIS — R51.9 ACUTE NONINTRACTABLE HEADACHE, UNSPECIFIED HEADACHE TYPE: ICD-10-CM

## 2021-12-22 LAB — SARS-COV-2, COV2: NORMAL

## 2021-12-22 PROCEDURE — U0005 INFEC AGEN DETEC AMPLI PROBE: HCPCS

## 2021-12-22 PROCEDURE — 74011250637 HC RX REV CODE- 250/637: Performed by: STUDENT IN AN ORGANIZED HEALTH CARE EDUCATION/TRAINING PROGRAM

## 2021-12-22 PROCEDURE — 99282 EMERGENCY DEPT VISIT SF MDM: CPT

## 2021-12-22 PROCEDURE — 71045 X-RAY EXAM CHEST 1 VIEW: CPT

## 2021-12-22 RX ORDER — IBUPROFEN 600 MG/1
600 TABLET ORAL
Qty: 20 TABLET | Refills: 0 | Status: SHIPPED | OUTPATIENT
Start: 2021-12-22 | End: 2022-05-24

## 2021-12-22 RX ORDER — ACETAMINOPHEN AND CODEINE PHOSPHATE 300; 30 MG/1; MG/1
1 TABLET ORAL
Qty: 12 TABLET | Refills: 0 | Status: SHIPPED | OUTPATIENT
Start: 2021-12-22 | End: 2021-12-25

## 2021-12-22 RX ORDER — IBUPROFEN 600 MG/1
600 TABLET ORAL
Status: COMPLETED | OUTPATIENT
Start: 2021-12-22 | End: 2021-12-22

## 2021-12-22 RX ADMIN — IBUPROFEN 600 MG: 600 TABLET, FILM COATED ORAL at 14:12

## 2021-12-22 NOTE — ED PROVIDER NOTES
Chief Complaint   Patient presents with    Positive For Covid-19     This is a 64-year-old female presenting with headache, right upper back pain, nasal congestion. Pain in her back radiates to her right upper chest and she had a positive home test for COVID-19 today, was seeking confirmation of the positive test here. Reports being fully vaccinated. No vomiting or diarrhea, no fevers on arrival.  No cough. Denies any history of structural heart or lung disease. Symptoms are moderate in nature without alleviating factors. Past Medical History:   Diagnosis Date    Abnormal Pap smear of cervix     seeing OBGYN    Acne 10/7/2009    Allergic rhinitis 2009    Anemia 2009    ASCUS HPV+ 2017    H/o ASCUS HPV + with Dr. Davi Latham 2017    CHAR III (cervical intraepithelial neoplasia III) 2017    found by LEEP     Headache     Herpes genitalia     dx by OB    History of gestational diabetes     diet controlled    Other ill-defined conditions(799.89)     scoliosis    PCB (post coital bleeding) 2014    Thoracic compression fracture ([address] 3/2014    Georgie Beecham    Thyromegaly 2014    On exam     Unspecified vitamin D deficiency 2014    Varicella 1998       Past Surgical History:   Procedure Laterality Date    HX GYN           HX LEEP PROCEDURE  18    found CHAR 3         Family History:   Problem Relation Age of Onset    Breast Cancer Maternal Grandmother         great, great gma, age?     Hypertension Maternal Grandmother     Breast Cancer Maternal Aunt 32        survivor    Diabetes Father     Hypertension Father     Hypertension Paternal Grandmother     Diabetes Paternal Grandmother     No Known Problems Brother     Osteoporosis Neg Hx        Social History     Socioeconomic History    Marital status: SINGLE     Spouse name: Not on file    Number of children: Not on file    Years of education: Not on file   [facility] Highest education level: Not on file   Occupational History    Not on file   Tobacco Use    Smoking status: Never Smoker    Smokeless tobacco: Never Used   Vaping Use    Vaping Use: Never used   Substance and Sexual Activity    Alcohol use: Yes     Comment: once every 3 months, 3 drinks at a time    Drug use: No    Sexual activity: Yes     Partners: Male     Birth control/protection: Injection   Other Topics Concern    Not on file   Social History Narrative    Lives in [facility] with parents and 3 yo son. Works at [employer]. Social Determinants of Health     Financial Resource Strain:     Difficulty of Paying Living Expenses: Not on file   Food Insecurity:     Worried About Running Out of Food in the Last Year: Not on file    [employer] in the Last Year: Not on file   Transportation Needs:     Lack of Transportation (Medical): Not on file    Lack of Transportation (Non-Medical): Not on file   Physical Activity:     Days of Exercise per Week: Not on file    Minutes of Exercise per Session: Not on file   Stress:     Feeling of Stress : Not on file   Social Connections:     Frequency of Communication with Friends and Family: Not on file    Frequency of Social Gatherings with Friends and Family: Not on file    Attends Nondenominational Services: Not on file    Active Member of [address] [place] [address] [employer] or Organizations: Not on file    Attends Club or Organization Meetings: Not on file    Marital Status: Not on file   Intimate Partner Violence:     Fear of Current or Ex-Partner: Not on file    Emotionally Abused: Not on file    Physically Abused: Not on file    Sexually Abused: Not on file   Housing Stability:     Unable to Pay for Housing in the Last Year: Not on file    Number of [place] in the Last Year: Not on file    Unstable Housing in the Last Year: Not on file         ALLERGIES: Metronidazole    Review of Systems   Constitutional: Positive for fatigue. Negative for fever.    HENT: Positive for congestion. Respiratory: Positive for cough. Negative for shortness of breath. Cardiovascular: Negative for chest pain. Gastrointestinal: Negative for abdominal pain. Musculoskeletal: Positive for back pain. Neurological: Positive for headaches. Psychiatric/Behavioral: Negative for confusion. Vitals:    12/22/21 1244   BP: (!) 127/99   Pulse: (!) 102   Resp: 16   Temp: 99.6 °F (37.6 °C)   SpO2: 97%   Weight: 83.9 kg (185 lb)            Physical Exam  General:  Awake and alert, NAD  HEENT:  NC/AT, equal pupils, moist mucous membranes  Neck:   Normal inspection, full range of motion  Cardiac:  RRR, no murmurs  Respiratory:  Clear bilaterally, no wheezes, rales, rhonchi  Abdomen:  Soft and nontender, nondistended  Extremities: Warm and well perfused, no peripheral edema  Neuro:  Moving all extremities symmetrically without gross motor deficit  Skin:   No rashes or pallor    RESULTS  No results found for this or any previous visit (from the past 12 hour(s)). IMAGING  XR CHEST PORT    Result Date: 12/22/2021  No acute process. Procedures - none unless documented below    ED course: CXR clear, lungs clear on exam, normal oxygen saturations. Recommend alternating Tylenol 3 and Motrin, quarantine for 10 days, COVID-19 PCR test sent and pending.     Impression: Headache, myalgias, suspected COVID-19 infection  Disposition: Discharge home

## 2021-12-22 NOTE — ED TRIAGE NOTES
Pt tested positive for covid today with rapid test.  Here for repeat test for work.   C/o congestion, HA  Body ache

## 2021-12-22 NOTE — DISCHARGE INSTRUCTIONS
- Alternate Motrin and Tylenol 3 as needed for body aches, use Motrin as needed for headache  - Quarantine for 10 days  - Return for increased difficulty breathing, oxygen saturations below 92% at home, passing out, persistent vomiting, severe or worsening symptoms

## 2021-12-23 LAB
SARS-COV-2, XPLCVT: DETECTED
SOURCE, COVRS: ABNORMAL

## 2021-12-30 ENCOUNTER — APPOINTMENT (OUTPATIENT)
Dept: CT IMAGING | Age: 26
End: 2021-12-30
Attending: EMERGENCY MEDICINE
Payer: COMMERCIAL

## 2021-12-30 ENCOUNTER — HOSPITAL ENCOUNTER (EMERGENCY)
Age: 26
Discharge: HOME | End: 2021-12-30
Attending: EMERGENCY MEDICINE
Payer: COMMERCIAL

## 2021-12-30 VITALS
HEART RATE: 80 BPM | RESPIRATION RATE: 16 BRPM | OXYGEN SATURATION: 98 % | TEMPERATURE: 98.1 F | DIASTOLIC BLOOD PRESSURE: 98 MMHG | SYSTOLIC BLOOD PRESSURE: 132 MMHG

## 2021-12-30 DIAGNOSIS — R07.9 CHEST PAIN, UNSPECIFIED TYPE: ICD-10-CM

## 2021-12-30 DIAGNOSIS — U07.1 COVID: Primary | ICD-10-CM

## 2021-12-30 LAB
ALBUMIN SERPL-MCNC: 3.7 G/DL (ref 3.5–5)
ALBUMIN/GLOB SERPL: 0.7 {RATIO} (ref 1.1–2.2)
ALP SERPL-CCNC: 50 U/L (ref 45–117)
ALT SERPL-CCNC: 18 U/L (ref 12–78)
ANION GAP SERPL CALC-SCNC: 11 MMOL/L (ref 5–15)
AST SERPL-CCNC: 16 U/L (ref 15–37)
ATRIAL RATE: 78 BPM
BASOPHILS # BLD: 0 K/UL (ref 0–0.1)
BASOPHILS NFR BLD: 0 % (ref 0–1)
BILIRUB SERPL-MCNC: 0.5 MG/DL (ref 0.2–1)
BUN SERPL-MCNC: 9 MG/DL (ref 6–20)
BUN/CREAT SERPL: 11 (ref 12–20)
CALCIUM SERPL-MCNC: 9.4 MG/DL (ref 8.5–10.1)
CALCULATED P AXIS, ECG09: 75 DEGREES
CALCULATED R AXIS, ECG10: 60 DEGREES
CALCULATED T AXIS, ECG11: 24 DEGREES
CHLORIDE SERPL-SCNC: 100 MMOL/L (ref 97–108)
CO2 SERPL-SCNC: 22 MMOL/L (ref 21–32)
COMMENT, HOLDF: NORMAL
CREAT SERPL-MCNC: 0.81 MG/DL (ref 0.55–1.02)
DIAGNOSIS, 93000: NORMAL
DIFFERENTIAL METHOD BLD: ABNORMAL
EOSINOPHIL # BLD: 0.2 K/UL (ref 0–0.4)
EOSINOPHIL NFR BLD: 2 % (ref 0–7)
ERYTHROCYTE [DISTWIDTH] IN BLOOD BY AUTOMATED COUNT: 13.6 % (ref 11.5–14.5)
GLOBULIN SER CALC-MCNC: 5.3 G/DL (ref 2–4)
GLUCOSE SERPL-MCNC: 100 MG/DL (ref 65–100)
HCT VFR BLD AUTO: 33.4 % (ref 35–47)
HGB BLD-MCNC: 10.7 G/DL (ref 11.5–16)
IMM GRANULOCYTES # BLD AUTO: 0 K/UL (ref 0–0.04)
IMM GRANULOCYTES NFR BLD AUTO: 0 % (ref 0–0.5)
LYMPHOCYTES # BLD: 2.1 K/UL (ref 0.8–3.5)
LYMPHOCYTES NFR BLD: 26 % (ref 12–49)
MCH RBC QN AUTO: 28.8 PG (ref 26–34)
MCHC RBC AUTO-ENTMCNC: 32 G/DL (ref 30–36.5)
MCV RBC AUTO: 89.8 FL (ref 80–99)
MONOCYTES # BLD: 0.5 K/UL (ref 0–1)
MONOCYTES NFR BLD: 6 % (ref 5–13)
NEUTS SEG # BLD: 5 K/UL (ref 1.8–8)
NEUTS SEG NFR BLD: 66 % (ref 32–75)
NRBC # BLD: 0 K/UL (ref 0–0.01)
NRBC BLD-RTO: 0 PER 100 WBC
P-R INTERVAL, ECG05: 180 MS
PLATELET # BLD AUTO: 476 K/UL (ref 150–400)
PMV BLD AUTO: 9.1 FL (ref 8.9–12.9)
POTASSIUM SERPL-SCNC: 3.1 MMOL/L (ref 3.5–5.1)
PROT SERPL-MCNC: 9 G/DL (ref 6.4–8.2)
Q-T INTERVAL, ECG07: 388 MS
QRS DURATION, ECG06: 86 MS
QTC CALCULATION (BEZET), ECG08: 442 MS
RBC # BLD AUTO: 3.72 M/UL (ref 3.8–5.2)
SAMPLES BEING HELD,HOLD: NORMAL
SODIUM SERPL-SCNC: 133 MMOL/L (ref 136–145)
TROPONIN-HIGH SENSITIVITY: 5 NG/L (ref 0–51)
TROPONIN-HIGH SENSITIVITY: 5 NG/L (ref 0–51)
VENTRICULAR RATE, ECG03: 78 BPM
WBC # BLD AUTO: 7.8 K/UL (ref 3.6–11)

## 2021-12-30 PROCEDURE — 74011250636 HC RX REV CODE- 250/636: Performed by: EMERGENCY MEDICINE

## 2021-12-30 PROCEDURE — 93005 ELECTROCARDIOGRAM TRACING: CPT

## 2021-12-30 PROCEDURE — 84484 ASSAY OF TROPONIN QUANT: CPT

## 2021-12-30 PROCEDURE — 85025 COMPLETE CBC W/AUTO DIFF WBC: CPT

## 2021-12-30 PROCEDURE — 74011250637 HC RX REV CODE- 250/637: Performed by: EMERGENCY MEDICINE

## 2021-12-30 PROCEDURE — 99283 EMERGENCY DEPT VISIT LOW MDM: CPT

## 2021-12-30 PROCEDURE — 71275 CT ANGIOGRAPHY CHEST: CPT

## 2021-12-30 PROCEDURE — 74011000636 HC RX REV CODE- 636: Performed by: RADIOLOGY

## 2021-12-30 PROCEDURE — 80053 COMPREHEN METABOLIC PANEL: CPT

## 2021-12-30 PROCEDURE — 36415 COLL VENOUS BLD VENIPUNCTURE: CPT

## 2021-12-30 RX ORDER — POTASSIUM CHLORIDE 750 MG/1
40 TABLET, FILM COATED, EXTENDED RELEASE ORAL
Status: COMPLETED | OUTPATIENT
Start: 2021-12-30 | End: 2021-12-30

## 2021-12-30 RX ADMIN — SODIUM CHLORIDE 1000 ML: 900 INJECTION, SOLUTION INTRAVENOUS at 09:02

## 2021-12-30 RX ADMIN — IOPAMIDOL 80 ML: 755 INJECTION, SOLUTION INTRAVENOUS at 07:42

## 2021-12-30 RX ADMIN — POTASSIUM CHLORIDE 40 MEQ: 750 TABLET, FILM COATED, EXTENDED RELEASE ORAL at 09:01

## 2021-12-30 NOTE — ED PROVIDER NOTES
Ms. Alden Cruz is a 31yo female who presents to the ER with complaints of chest pain. She was diagnosed with Covid 19 8 days ago. She became symptomatic 9 days ago. She has had chest pain for the last 4 days. This morning, at approximately 5 AM, she woke up feeling like her heart was racing. She had tightness in her chest and numbness in her left bicep. She said that she has pain in her back. Her pain in her chest and back are worse when she takes a deep breath when she coughs. She reports that she been vaccinated for COVID-19 but has not yet received a booster. She denies any other complaints. -Leg swelling           Past Medical History:   Diagnosis Date    Abnormal Pap smear of cervix     seeing OBGYN    Acne 10/7/2009    Allergic rhinitis 2009    Anemia 2009    ASCUS HPV+ 2017    H/o ASCUS HPV + with Dr. Dee Rocha 2017    CHAR III (cervical intraepithelial neoplasia III) 2017    found by LEEP     Headache     Herpes genitalia     dx by OB    History of gestational diabetes     diet controlled    Other ill-defined conditions(799.89)     scoliosis    PCB (post coital bleeding) 2014    Thoracic compression fracture ([address] 3/2014    Evie Push    Thyromegaly 2014    On exam     Unspecified vitamin D deficiency 2014    Varicella 1998       Past Surgical History:   Procedure Laterality Date    HX GYN           HX LEEP PROCEDURE  18    found CHAR 3         Family History:   Problem Relation Age of Onset    Breast Cancer Maternal Grandmother         great, great gma, age?     Hypertension Maternal Grandmother     Breast Cancer Maternal Aunt 32        survivor    Diabetes Father     Hypertension Father     Hypertension Paternal Grandmother     Diabetes Paternal Grandmother     No Known Problems Brother     Osteoporosis Neg Hx        Social History     Socioeconomic History    Marital status: SINGLE     Spouse name: Not on file    Number of children: Not on file    Years of education: Not on file    Highest education level: Not on file   Occupational History    Not on file   Tobacco Use    Smoking status: Never Smoker    Smokeless tobacco: Never Used   Vaping Use    Vaping Use: Never used   Substance and Sexual Activity    Alcohol use: Yes     Comment: once every 3 months, 3 drinks at a time    Drug use: No    Sexual activity: Yes     Partners: Male     Birth control/protection: Injection   Other Topics Concern    Not on file   Social History Narrative    Lives in [facility] with parents and 3 yo son. Works at "[employer] Social Determinants of Health     Financial Resource Strain:     Difficulty of Paying Living Expenses: Not on file   Food Insecurity:     Worried About Running Out of Food in the Last Year: Not on file    [employer] in the Last Year: Not on file   Transportation Needs:     Lack of Transportation (Medical): Not on file    Lack of Transportation (Non-Medical):  Not on file   Physical Activity:     Days of Exercise per Week: Not on file    Minutes of Exercise per Session: Not on file   Stress:     Feeling of Stress : Not on file   Social Connections:     Frequency of Communication with Friends and Family: Not on file    Frequency of Social Gatherings with Friends and Family: Not on file    Attends Episcopalian Services: Not on file    Active Member of [address] [place] [address] or Organizations: Not on file    Attends Club or Organization Meetings: Not on file    Marital Status: Not on file   Intimate Partner Violence:     Fear of Current or Ex-Partner: Not on file    Emotionally Abused: Not on file    Physically Abused: Not on file    Sexually Abused: Not on file   Housing Stability:     Unable to Pay for Housing in the Last Year: Not on file    Number of [place] in the Last Year: Not on file    Unstable Housing in the Last Year: Not on file         ALLERGIES: Metronidazole    Review of Systems   Constitutional: Negative for chills and fever. HENT: Negative for rhinorrhea and sore throat. Respiratory: Positive for cough and shortness of breath. Cardiovascular: Positive for chest pain. Gastrointestinal: Negative for abdominal pain, diarrhea, nausea and vomiting. Genitourinary: Negative for dysuria and urgency. Musculoskeletal: Positive for back pain. Skin: Negative for rash. Neurological: Negative for dizziness, weakness and light-headedness. Vitals:    12/30/21 0640   BP: (!) 132/98   Pulse: 80   Resp: 16   Temp: 98.1 °F (36.7 °C)   SpO2: 98%            Physical Exam     Vital signs reviewed. Nursing notes reviewed. Const:  No acute distress, well developed, well nourished  Head:  Atraumatic, normocephalic  Eyes:  PERRL, conjunctiva normal, no scleral icterus  Neck:  Supple, trachea midline  Cardiovascular: Regular rate   resp:  No resp distress, no increased work of breathing,  Abd:  Soft, non-tender, non-distended  MSK:  No pedal edema, normal ROM  Neuro:  Alert and oriented x3, no cranial nerve defect  Skin:  Warm, dry, intact  Psych: normal mood and affect, behavior is normal, judgement and thought content is normal          MDM  Number of Diagnoses or Management Options     Amount and/or Complexity of Data Reviewed  Clinical lab tests: ordered and reviewed  Tests in the radiology section of CPT®: ordered and reviewed  Review and summarize past medical records: yes    Patient Progress  Patient progress: stable          Ms. Sophia Pineda is a 31yo female who presents to the ER with complaints of chest pain. Pt. Has  Known covid. No PE or other acute process on CT. Negative troponins. Pt. To f/u with his PCP or return to the ER with new or worsening sx.       Procedures

## 2021-12-30 NOTE — ED TRIAGE NOTES
Patient arrives via EMS from home. Patient states she woke up from sleep feeling like her heart was racing.  +chest tightness and shortness of breath. Tested positive for Covid last Wednesday.

## 2022-01-11 ENCOUNTER — TELEPHONE (OUTPATIENT)
Dept: INTERNAL MEDICINE CLINIC | Age: 27
End: 2022-01-11

## 2022-01-11 NOTE — TELEPHONE ENCOUNTER
Please notify pt I would like her to return to urgent care for recheck of oxygen level and CXR, since she is having chest heaviness and feeling worse. It could still have progressed to pneumonia, since her last CXR was about 12 days ago. Otherwise, for treatment to loosen mucus, I recommend maximum strength Mucinex DM.

## 2022-01-11 NOTE — TELEPHONE ENCOUNTER
Spoke to patient and she stated that she feels like someone is stilting on her chest. She stated she has tried over the counter med's and nothing has help.

## 2022-01-11 NOTE — TELEPHONE ENCOUNTER
----- Message from Latisha Cronin sent at 1/11/2022  2:48 PM EST -----  Subject: Message to Provider    QUESTIONS  Information for Provider? Patient is asking that a nurse call her back   asap. She was diagnosed with Covid 12/22 and her chest is heavy she said   she feels like she is full of mucus in the chest. patient royal has a dry   cough had chest x-ray on 12/30 and that looked fine but patient doesn't   feel fine. patient also sent a message over two weeks ago and no one has   responded yet.   ---------------------------------------------------------------------------  --------------  CALL BACK INFO  What is the best way for the office to contact you? OK to leave message on   voicemail  Preferred Call Back Phone Number? [phone]  ---------------------------------------------------------------------------  --------------  SCRIPT ANSWERS  Relationship to Patient?  Self

## 2022-01-11 NOTE — TELEPHONE ENCOUNTER
Patient called in requesting medication to help with her ongoing chest congestion and mucous build up from Covid. Patient stated it is worsening. Advised patient that the [employer] messages she sent were sent to her provider who is out of the office today but would hopefully be reaching out with her advice as soon as she's able.

## 2022-01-12 ENCOUNTER — TELEPHONE (OUTPATIENT)
Dept: INTERNAL MEDICINE CLINIC | Age: 27
End: 2022-01-12

## 2022-01-12 ENCOUNTER — APPOINTMENT (OUTPATIENT)
Dept: GENERAL RADIOLOGY | Age: 27
End: 2022-01-12
Attending: EMERGENCY MEDICINE
Payer: COMMERCIAL

## 2022-01-12 ENCOUNTER — HOSPITAL ENCOUNTER (EMERGENCY)
Age: 27
Discharge: HOME | End: 2022-01-12
Attending: EMERGENCY MEDICINE
Payer: COMMERCIAL

## 2022-01-12 ENCOUNTER — PATIENT MESSAGE (OUTPATIENT)
Dept: INTERNAL MEDICINE CLINIC | Age: 27
End: 2022-01-12

## 2022-01-12 VITALS
SYSTOLIC BLOOD PRESSURE: 124 MMHG | DIASTOLIC BLOOD PRESSURE: 91 MMHG | OXYGEN SATURATION: 100 % | HEART RATE: 87 BPM | RESPIRATION RATE: 18 BRPM | TEMPERATURE: 98.2 F

## 2022-01-12 DIAGNOSIS — R07.89 CHEST WALL PAIN: Primary | ICD-10-CM

## 2022-01-12 PROCEDURE — 99281 EMR DPT VST MAYX REQ PHY/QHP: CPT

## 2022-01-12 PROCEDURE — 71046 X-RAY EXAM CHEST 2 VIEWS: CPT

## 2022-01-12 PROCEDURE — 74011250636 HC RX REV CODE- 250/636: Performed by: EMERGENCY MEDICINE

## 2022-01-12 PROCEDURE — 96372 THER/PROPH/DIAG INJ SC/IM: CPT

## 2022-01-12 RX ORDER — KETOROLAC TROMETHAMINE 30 MG/ML
30 INJECTION, SOLUTION INTRAMUSCULAR; INTRAVENOUS
Status: COMPLETED | OUTPATIENT
Start: 2022-01-12 | End: 2022-01-12

## 2022-01-12 RX ORDER — BENZONATATE 100 MG/1
200 CAPSULE ORAL
Qty: 30 CAPSULE | Refills: 0 | Status: SHIPPED | OUTPATIENT
Start: 2022-01-12 | End: 2022-01-19

## 2022-01-12 RX ORDER — ALBUTEROL SULFATE 90 UG/1
2 AEROSOL, METERED RESPIRATORY (INHALATION)
Qty: 18 G | Refills: 0 | Status: SHIPPED | OUTPATIENT
Start: 2022-01-12 | End: 2022-03-10

## 2022-01-12 RX ADMIN — KETOROLAC TROMETHAMINE 30 MG: 30 INJECTION, SOLUTION INTRAMUSCULAR; INTRAVENOUS at 09:26

## 2022-01-12 NOTE — TELEPHONE ENCOUNTER
Called patient regarding Elvia's recommendations that she come in for a follow up visit to assess for asthma. Provided patient with first available date, patient stated that she would call back to schedule.

## 2022-01-12 NOTE — ED TRIAGE NOTES
[facility] NTE: dx'd covid 12/22, symptoms got better, but now has lots of mucus. Tried mucinex, robotussin    Had xray 12/30 that was clear. Wants something for congestion or a steroid.

## 2022-01-12 NOTE — ED PROVIDER NOTES
Date of Service:  22    Patient:  Manjit Muse    Chief Complaint:  Sinus Infection       HPI:  Manjit Muse is a 27 y.o.  female who presents for evaluation of cough and chest wall discomfort. Patient had COVID back in December. She since been back to work. Over the last several days she has developed chest wall pain with coughing, and the pain is reproducible. No more shortness of breath but mostly cough. She has been using expectorants at home and feels like she has a lot of phlegm in her chest.  She was sent here for chest x-ray. She denies current fevers chills abdominal pain nausea vomiting diarrhea or other acute complaints. Patient just notes frustration with the lack of return to baseline           Past Medical History:   Diagnosis Date    Abnormal Pap smear of cervix     seeing OBGYN    Acne 10/7/2009    Allergic rhinitis 2009    Anemia 2009    ASCUS HPV+ 2017    H/o ASCUS HPV + with Dr. Chandrakant Vidales 2017    CHAR III (cervical intraepithelial neoplasia III) 2017    found by LEEP     Headache     Herpes genitalia     dx by OB    History of gestational diabetes     diet controlled    Other ill-defined conditions(799.89)     scoliosis    PCB (post coital bleeding) 2014    Thoracic compression fracture ([address] 3/2014    Sheral People    Thyromegaly 2014    On exam     Unspecified vitamin D deficiency 2014    Varicella 1998       Past Surgical History:   Procedure Laterality Date    HX GYN           HX LEEP PROCEDURE  18    found CHAR 3         Family History:   Problem Relation Age of Onset    Breast Cancer Maternal Grandmother         great, great gma, age?     Hypertension Maternal Grandmother     Breast Cancer Maternal Aunt 32        survivor    Diabetes Father     Hypertension Father     Hypertension Paternal Grandmother     Diabetes Paternal Grandmother     No Known Problems Brother     Osteoporosis Neg Hx        Social History     Socioeconomic History    Marital status: SINGLE     Spouse name: Not on file    Number of children: Not on file    Years of education: Not on file    Highest education level: Not on file   Occupational History    Not on file   Tobacco Use    Smoking status: Never Smoker    Smokeless tobacco: Never Used   Vaping Use    Vaping Use: Never used   Substance and Sexual Activity    Alcohol use: Yes     Comment: once every 3 months, 3 drinks at a time    Drug use: No    Sexual activity: Yes     Partners: Male     Birth control/protection: Injection   Other Topics Concern    Not on file   Social History Narrative    Lives in [facility] with parents and 3 yo son. Works at [employer]. Social Determinants of Health     Financial Resource Strain:     Difficulty of Paying Living Expenses: Not on file   Food Insecurity:     Worried About Running Out of Food in the Last Year: Not on file    Yani of Food in the Last Year: Not on file   Transportation Needs:     Lack of Transportation (Medical): Not on file    Lack of Transportation (Non-Medical):  Not on file   Physical Activity:     Days of Exercise per Week: Not on file    Minutes of Exercise per Session: Not on file   Stress:     Feeling of Stress : Not on file   Social Connections:     Frequency of Communication with Friends and Family: Not on file    Frequency of Social Gatherings with Friends and Family: Not on file    Attends Jewish Services: Not on file    Active Member of Clubs or Organizations: Not on file    Attends Club or Organization Meetings: Not on file    Marital Status: Not on file   Intimate Partner Violence:     Fear of Current or Ex-Partner: Not on file    Emotionally Abused: Not on file    Physically Abused: Not on file    Sexually Abused: Not on file   Housing Stability:     Unable to Pay for Housing in the Last Year: Not on file    Number of [place] in the Last Year: Not on file    Unstable Housing in the Last Year: Not on file         ALLERGIES: Metronidazole    Review of Systems   HENT: Positive for congestion. Respiratory: Positive for cough and chest tightness. All other systems reviewed and are negative. Vitals:    01/12/22 0913   BP: (!) 124/91   Pulse: 87   Resp: 18   Temp: 98.2 °F (36.8 °C)   SpO2: 100%            Physical Exam  Vitals and nursing note reviewed. Exam conducted with a chaperone present. Constitutional:       General: She is not in acute distress. Appearance: Normal appearance. She is not ill-appearing, toxic-appearing or diaphoretic. HENT:      Head: Normocephalic and atraumatic. Nose: Nose normal.      Mouth/Throat:      Mouth: Mucous membranes are moist.   Eyes:      General: No scleral icterus. Cardiovascular:      Rate and Rhythm: Normal rate. Pulses: Normal pulses. Heart sounds: No murmur heard. Pulmonary:      Effort: Pulmonary effort is normal. No respiratory distress. Breath sounds: Normal breath sounds. Chest:      Chest wall: Tenderness present. Comments: Reproducible chest wall tenderness with palpation over strenum  Abdominal:      General: Abdomen is flat. Musculoskeletal:         General: No deformity. Skin:     General: Skin is warm. Capillary Refill: Capillary refill takes less than 2 seconds. Neurological:      Mental Status: She is alert and oriented to person, place, and time. Psychiatric:         Mood and Affect: Mood normal.          MDM     VITAL SIGNS:  No data found. LABS:  No results found for this or any previous visit (from the past 6 hour(s)). IMAGING:  XR CHEST PA LAT   Final Result   1. No acute cardiopulmonary disease               Medications During Visit:  Medications   ketorolac (TORADOL) injection 30 mg (30 mg IntraMUSCular Given 1/12/22 0911)         DECISION MAKING:  Manjit Muse is a 27 y.o. female who comes in as above.   Chest wall pain most likely from coughing. Reproducible discomfort. Will discharge home with medicines as below and have the patient follow-up with her PCP. IMPRESSION:  1. Chest wall pain        DISPOSITION:  Discharged      Discharge Medication List as of 1/12/2022 10:21 AM      START taking these medications    Details   albuterol (ProAir HFA) 90 mcg/actuation inhaler Take 2 Puffs by inhalation every four (4) hours as needed for Wheezing., Normal, Disp-18 g, R-0      benzonatate (Tessalon Perles) 100 mg capsule Take 2 Capsules by mouth three (3) times daily as needed for Cough for up to 7 days. , Normal, Disp-30 Capsule, R-0         CONTINUE these medications which have NOT CHANGED    Details   ascorbic acid (MAINOR-C PO) Take  by mouth., Historical Med      VITAMIN B COMPLEX PO Take  by mouth., Historical Med      ZINC PO Take  by mouth., Historical Med      ibuprofen (MOTRIN) 600 mg tablet Take 1 Tablet by mouth every six (6) hours as needed (pain). Take with food or milk each time. , Print, Disp-20 Tablet, R-0      hydroCHLOROthiazide (HYDRODIURIL) 25 mg tablet TAKE 1 TABLET BY MOUTH EVERY DAY, Normal, Disp-90 Tablet, R-1      ipratropium (ATROVENT) 42 mcg (0.06 %) nasal spray 2 SPRAYS INTO EACH NOSTRIL 3 TIMES DAILY, PRN CONGESTION, Normal, Disp-15 mL, R-1      budesonide (Rhinocort Allergy) 32 mcg/actuation nasal spray 2 Sprays by Both Nostrils route daily. , Normal, Disp-8.6 g, R-0              Follow-up Information     Follow up With Specialties Details Why Contact Erica Will PA-C Physician Assistant Schedule an appointment as soon as possible for a visit   [address] [place] [id number]  [phone]              The patient is asked to follow-up with their primary care provider in the next several days. They are to call tomorrow for an appointment. The patient is asked to return promptly for any increased concerns or worsening of symptoms.   They can return to this emergency department or any other emergency department.     Procedures

## 2022-01-12 NOTE — TELEPHONE ENCOUNTER
From: Julita Gaines  To: Elvia Childs PA-C  Sent: 1/12/2022 8:45 AM EST  Subject: Asthma     Is it any possible way I can get an appointment with you this week. Im really starting to think I have asthma. Ive been breathing hard basically all my life with shortness of breath. I dont think its no longer COVID related but I do plan on going today to get an X-ray today but this would be my 3rd X-ray. I was fine on up until yesterday, Id had phlegm months before COVID that I couldnt get rid of and the Musinex isnt working for me. I woke up this morning with shortness of breath and I had to drink water to control my breathing but Im afraid it might be more serious and I dont think ita still COVID related.

## 2022-02-07 ENCOUNTER — APPOINTMENT (OUTPATIENT)
Dept: CT IMAGING | Age: 27
End: 2022-02-07
Payer: COMMERCIAL

## 2022-02-07 ENCOUNTER — HOSPITAL ENCOUNTER (EMERGENCY)
Age: 27
Discharge: HOME | End: 2022-02-07
Attending: EMERGENCY MEDICINE
Payer: COMMERCIAL

## 2022-02-07 VITALS
DIASTOLIC BLOOD PRESSURE: 104 MMHG | OXYGEN SATURATION: 91 % | HEIGHT: 65 IN | BODY MASS INDEX: 29.16 KG/M2 | HEART RATE: 86 BPM | WEIGHT: 175 LBS | TEMPERATURE: 98.2 F | SYSTOLIC BLOOD PRESSURE: 144 MMHG | RESPIRATION RATE: 20 BRPM

## 2022-02-07 DIAGNOSIS — R11.2 NON-INTRACTABLE VOMITING WITH NAUSEA, UNSPECIFIED VOMITING TYPE: ICD-10-CM

## 2022-02-07 DIAGNOSIS — R10.9 ACUTE ABDOMINAL PAIN: Primary | ICD-10-CM

## 2022-02-07 LAB
ALBUMIN SERPL-MCNC: 3.9 G/DL (ref 3.5–5)
ALBUMIN/GLOB SERPL: 0.7 {RATIO} (ref 1.1–2.2)
ALP SERPL-CCNC: 54 U/L (ref 45–117)
ALT SERPL-CCNC: 18 U/L (ref 12–78)
ANION GAP SERPL CALC-SCNC: 11 MMOL/L (ref 5–15)
APPEARANCE UR: ABNORMAL
AST SERPL-CCNC: 21 U/L (ref 15–37)
BACTERIA URNS QL MICRO: ABNORMAL /HPF
BASOPHILS # BLD: 0 K/UL (ref 0–0.1)
BASOPHILS NFR BLD: 0 % (ref 0–1)
BILIRUB SERPL-MCNC: 0.5 MG/DL (ref 0.2–1)
BILIRUB UR QL: NEGATIVE
BUN SERPL-MCNC: 10 MG/DL (ref 6–20)
BUN/CREAT SERPL: 12 (ref 12–20)
CALCIUM SERPL-MCNC: 9.1 MG/DL (ref 8.5–10.1)
CHLORIDE SERPL-SCNC: 96 MMOL/L (ref 97–108)
CO2 SERPL-SCNC: 28 MMOL/L (ref 21–32)
COLOR UR: ABNORMAL
CREAT SERPL-MCNC: 0.83 MG/DL (ref 0.55–1.02)
DIFFERENTIAL METHOD BLD: ABNORMAL
EOSINOPHIL # BLD: 0.1 K/UL (ref 0–0.4)
EOSINOPHIL NFR BLD: 1 % (ref 0–7)
EPITH CASTS URNS QL MICRO: ABNORMAL /LPF
ERYTHROCYTE [DISTWIDTH] IN BLOOD BY AUTOMATED COUNT: 13.2 % (ref 11.5–14.5)
GLOBULIN SER CALC-MCNC: 6 G/DL (ref 2–4)
GLUCOSE SERPL-MCNC: 103 MG/DL (ref 65–100)
GLUCOSE UR STRIP.AUTO-MCNC: NEGATIVE MG/DL
HCG UR QL: NEGATIVE
HCT VFR BLD AUTO: 34.2 % (ref 35–47)
HGB BLD-MCNC: 10.6 G/DL (ref 11.5–16)
HGB UR QL STRIP: NEGATIVE
IMM GRANULOCYTES # BLD AUTO: 0 K/UL (ref 0–0.04)
IMM GRANULOCYTES NFR BLD AUTO: 0 % (ref 0–0.5)
KETONES UR QL STRIP.AUTO: NEGATIVE MG/DL
LEUKOCYTE ESTERASE UR QL STRIP.AUTO: NEGATIVE
LIPASE SERPL-CCNC: 78 U/L (ref 73–393)
LYMPHOCYTES # BLD: 1.6 K/UL (ref 0.8–3.5)
LYMPHOCYTES NFR BLD: 18 % (ref 12–49)
MCH RBC QN AUTO: 28 PG (ref 26–34)
MCHC RBC AUTO-ENTMCNC: 31 G/DL (ref 30–36.5)
MCV RBC AUTO: 90.2 FL (ref 80–99)
MONOCYTES # BLD: 0.5 K/UL (ref 0–1)
MONOCYTES NFR BLD: 6 % (ref 5–13)
NEUTS SEG # BLD: 6.7 K/UL (ref 1.8–8)
NEUTS SEG NFR BLD: 75 % (ref 32–75)
NITRITE UR QL STRIP.AUTO: NEGATIVE
NRBC # BLD: 0 K/UL (ref 0–0.01)
NRBC BLD-RTO: 0 PER 100 WBC
PH UR STRIP: 6.5 [PH] (ref 5–8)
PLATELET # BLD AUTO: 501 K/UL (ref 150–400)
PMV BLD AUTO: 8.8 FL (ref 8.9–12.9)
POTASSIUM SERPL-SCNC: 3 MMOL/L (ref 3.5–5.1)
PROT SERPL-MCNC: 9.9 G/DL (ref 6.4–8.2)
PROT UR STRIP-MCNC: NEGATIVE MG/DL
RBC # BLD AUTO: 3.79 M/UL (ref 3.8–5.2)
RBC #/AREA URNS HPF: ABNORMAL /HPF (ref 0–5)
SODIUM SERPL-SCNC: 135 MMOL/L (ref 136–145)
SP GR UR REFRACTOMETRY: <1.005 (ref 1–1.03)
UR CULT HOLD, URHOLD: NORMAL
UROBILINOGEN UR QL STRIP.AUTO: 0.2 EU/DL (ref 0.2–1)
WBC # BLD AUTO: 8.9 K/UL (ref 3.6–11)
WBC URNS QL MICRO: ABNORMAL /HPF (ref 0–4)

## 2022-02-07 PROCEDURE — 85025 COMPLETE CBC W/AUTO DIFF WBC: CPT

## 2022-02-07 PROCEDURE — 99284 EMERGENCY DEPT VISIT MOD MDM: CPT

## 2022-02-07 PROCEDURE — 74011250636 HC RX REV CODE- 250/636: Performed by: PHYSICIAN ASSISTANT

## 2022-02-07 PROCEDURE — 80053 COMPREHEN METABOLIC PANEL: CPT

## 2022-02-07 PROCEDURE — 81001 URINALYSIS AUTO W/SCOPE: CPT

## 2022-02-07 PROCEDURE — 96374 THER/PROPH/DIAG INJ IV PUSH: CPT

## 2022-02-07 PROCEDURE — 74011000636 HC RX REV CODE- 636: Performed by: EMERGENCY MEDICINE

## 2022-02-07 PROCEDURE — 96375 TX/PRO/DX INJ NEW DRUG ADDON: CPT

## 2022-02-07 PROCEDURE — 74177 CT ABD & PELVIS W/CONTRAST: CPT

## 2022-02-07 PROCEDURE — 81025 URINE PREGNANCY TEST: CPT

## 2022-02-07 PROCEDURE — 83690 ASSAY OF LIPASE: CPT

## 2022-02-07 PROCEDURE — 36415 COLL VENOUS BLD VENIPUNCTURE: CPT

## 2022-02-07 RX ORDER — ONDANSETRON 2 MG/ML
4 INJECTION INTRAMUSCULAR; INTRAVENOUS
Status: COMPLETED | OUTPATIENT
Start: 2022-02-07 | End: 2022-02-07

## 2022-02-07 RX ORDER — KETOROLAC TROMETHAMINE 30 MG/ML
30 INJECTION, SOLUTION INTRAMUSCULAR; INTRAVENOUS
Status: COMPLETED | OUTPATIENT
Start: 2022-02-07 | End: 2022-02-07

## 2022-02-07 RX ORDER — ONDANSETRON 4 MG/1
4 TABLET, FILM COATED ORAL
Qty: 12 TABLET | Refills: 0 | Status: SHIPPED | OUTPATIENT
Start: 2022-02-07 | End: 2022-02-11

## 2022-02-07 RX ADMIN — SODIUM CHLORIDE 1000 ML: 9 INJECTION, SOLUTION INTRAVENOUS at 17:25

## 2022-02-07 RX ADMIN — IOPAMIDOL 100 ML: 755 INJECTION, SOLUTION INTRAVENOUS at 18:21

## 2022-02-07 RX ADMIN — ONDANSETRON 4 MG: 2 INJECTION INTRAMUSCULAR; INTRAVENOUS at 17:26

## 2022-02-07 RX ADMIN — KETOROLAC TROMETHAMINE 30 MG: 30 INJECTION, SOLUTION INTRAMUSCULAR; INTRAVENOUS at 17:26

## 2022-02-07 NOTE — ED PROVIDER NOTES
Date of Service:  2022    Patient:  Julita Gaines    Chief Complaint:  Abdominal Pain and Vomiting       HPI:  Julita Gaines is a 27 y.o.  female who presents for evaluation of lower abdominal pain, nausea, and vomiting. Patient states that started 1600 hrs today. Patient states that she thinks her symptoms are related to \"food poisoning. \"  Patient states that she ate Fátima Gomez earlier today at 9 AM.  Patient denies any urinary symptoms. Abdominal Pain  This is a new problem. The current episode started 1 to 2 hours ago. The problem has not changed since onset. Associated symptoms include abdominal pain. Pertinent negatives include no chest pain, no headaches and no shortness of breath. Nothing aggravates the symptoms. Nothing relieves the symptoms. She has tried nothing for the symptoms. Vomiting  Associated symptoms include abdominal pain. Pertinent negatives include no chest pain, no headaches and no shortness of breath. Past Medical History:   Diagnosis Date    Abnormal Pap smear of cervix     seeing OBGYN    Acne 10/7/2009    Allergic rhinitis 2009    Anemia 2009    ASCUS HPV+ 2017    H/o ASCUS HPV + with Dr. Massimo Viera 2017    CHAR III (cervical intraepithelial neoplasia III) 2017    found by LEEP     Headache     Herpes genitalia     dx by OB    History of gestational diabetes     diet controlled    Other ill-defined conditions(799.89)     scoliosis    PCB (post coital bleeding) 2014    Thoracic compression fracture ([address] 3/2014    Onnie Knows    Thyromegaly 2014    On exam     Unspecified vitamin D deficiency 2014    Varicella 1998       Past Surgical History:   Procedure Laterality Date    HX GYN           HX LEEP PROCEDURE  18    found CHAR 3         Family History:   Problem Relation Age of Onset    Breast Cancer Maternal Grandmother         great, great gma, age?     Hypertension Maternal Grandmother     Breast Cancer Maternal Aunt 32        survivor    Diabetes Father     Hypertension Father     Hypertension Paternal Grandmother     Diabetes Paternal Grandmother     No Known Problems Brother     Osteoporosis Neg Hx        Social History     Socioeconomic History    Marital status: SINGLE     Spouse name: Not on file    Number of children: Not on file    Years of education: Not on file    Highest education level: Not on file   Occupational History    Not on file   Tobacco Use    Smoking status: Never Smoker    Smokeless tobacco: Never Used   Vaping Use    Vaping Use: Never used   Substance and Sexual Activity    Alcohol use: Yes     Comment: once every 3 months, 3 drinks at a time    Drug use: No    Sexual activity: Yes     Partners: Male     Birth control/protection: Injection   Other Topics Concern    Not on file   Social History Narrative    Lives in [place] with parents and 3 yo son. Works at [employer]. Social Determinants of Health     Financial Resource Strain:     Difficulty of Paying Living Expenses: Not on file   Food Insecurity:     Worried About Running Out of Food in the Last Year: Not on file    Yani of Food in the Last Year: Not on file   Transportation Needs:     Lack of Transportation (Medical): Not on file    Lack of Transportation (Non-Medical):  Not on file   Physical Activity:     Days of Exercise per Week: Not on file    Minutes of Exercise per Session: Not on file   Stress:     Feeling of Stress : Not on file   Social Connections:     Frequency of Communication with Friends and Family: Not on file    Frequency of Social Gatherings with Friends and Family: Not on file    Attends Jehovah's witness Services: Not on file    Active Member of Clubs or Organizations: Not on file    Attends Club or Organization Meetings: Not on file    Marital Status: Not on file   Intimate Partner Violence:     Fear of Current or Ex-Partner: Not on file  Emotionally Abused: Not on file    Physically Abused: Not on file    Sexually Abused: Not on file   Housing Stability:     Unable to Pay for Housing in the Last Year: Not on file    Number of Places Lived in the Last Year: Not on file    Unstable Housing in the Last Year: Not on file         ALLERGIES: Metronidazole    Review of Systems   Constitutional: Negative for chills and fever. HENT: Negative for facial swelling. Eyes: Negative for visual disturbance. Respiratory: Negative for cough and shortness of breath. Cardiovascular: Negative for chest pain. Gastrointestinal: Positive for abdominal pain, nausea and vomiting. Negative for blood in stool, constipation and diarrhea. Genitourinary: Negative for difficulty urinating, dysuria, flank pain and frequency. Musculoskeletal: Negative for neck stiffness. Skin: Negative for rash. Allergic/Immunologic: Negative for immunocompromised state. Neurological: Negative for headaches. Psychiatric/Behavioral: Negative for agitation. All other systems reviewed and are negative. Vitals:    02/07/22 1800 02/07/22 1819 02/07/22 1843 02/07/22 1845   BP: (!) 141/102 (!) 144/104     Pulse:       Resp:       Temp:       SpO2: 100% 100% 100% 91%   Weight:       Height:                Physical Exam  Vitals and nursing note reviewed. Constitutional:       General: She is not in acute distress. HENT:      Head: Atraumatic. Right Ear: External ear normal.      Left Ear: External ear normal.      Mouth/Throat:      Mouth: Mucous membranes are moist.   Eyes:      Conjunctiva/sclera: Conjunctivae normal.   Cardiovascular:      Rate and Rhythm: Normal rate and regular rhythm. Heart sounds: No murmur heard. Pulmonary:      Effort: Pulmonary effort is normal.      Breath sounds: Normal breath sounds. Abdominal:      General: Bowel sounds are normal.      Palpations: Abdomen is soft. Tenderness:  There is abdominal tenderness in the suprapubic area. There is no right CVA tenderness or left CVA tenderness. Comments: TTP to lower abdomen   Musculoskeletal:         General: Normal range of motion. Cervical back: Normal range of motion and neck supple. Skin:     General: Skin is warm and dry. Neurological:      Mental Status: She is alert and oriented to person, place, and time. Mental status is at baseline. MDM       Procedures      VITAL SIGNS:  Patient Vitals for the past 4 hrs:   Temp Pulse Resp BP SpO2   02/07/22 1845 -- -- -- -- 91 %   02/07/22 1843 -- -- -- -- 100 %   02/07/22 1819 -- -- -- (!) 144/104 100 %   02/07/22 1800 -- -- -- (!) 141/102 100 %   02/07/22 1741 -- -- -- -- 98 %   02/07/22 1720 -- -- -- (!) 149/111 100 %   02/07/22 1651 98.2 °F (36.8 °C) 86 20 (!) 152/122 99 %         LABS:  Recent Results (from the past 6 hour(s))   CBC WITH AUTOMATED DIFF    Collection Time: 02/07/22  5:23 PM   Result Value Ref Range    WBC 8.9 3.6 - 11.0 K/uL    RBC 3.79 (L) 3.80 - 5.20 M/uL    HGB 10.6 (L) 11.5 - 16.0 g/dL    HCT 34.2 (L) 35.0 - 47.0 %    MCV 90.2 80.0 - 99.0 FL    MCH 28.0 26.0 - 34.0 PG    MCHC 31.0 30.0 - 36.5 g/dL    RDW 13.2 11.5 - 14.5 %    PLATELET 211 (H) 094 - 400 K/uL    MPV 8.8 (L) 8.9 - 12.9 FL    NRBC 0.0 0  WBC    ABSOLUTE NRBC 0.00 0.00 - 0.01 K/uL    NEUTROPHILS 75 32 - 75 %    LYMPHOCYTES 18 12 - 49 %    MONOCYTES 6 5 - 13 %    EOSINOPHILS 1 0 - 7 %    BASOPHILS 0 0 - 1 %    IMMATURE GRANULOCYTES 0 0.0 - 0.5 %    ABS. NEUTROPHILS 6.7 1.8 - 8.0 K/UL    ABS. LYMPHOCYTES 1.6 0.8 - 3.5 K/UL    ABS. MONOCYTES 0.5 0.0 - 1.0 K/UL    ABS. EOSINOPHILS 0.1 0.0 - 0.4 K/UL    ABS. BASOPHILS 0.0 0.0 - 0.1 K/UL    ABS. IMM.  GRANS. 0.0 0.00 - 0.04 K/UL    DF AUTOMATED     METABOLIC PANEL, COMPREHENSIVE    Collection Time: 02/07/22  5:23 PM   Result Value Ref Range    Sodium 135 (L) 136 - 145 mmol/L    Potassium 3.0 (L) 3.5 - 5.1 mmol/L    Chloride 96 (L) 97 - 108 mmol/L    CO2 28 21 - 32 mmol/L Anion gap 11 5 - 15 mmol/L    Glucose 103 (H) 65 - 100 mg/dL    BUN 10 6 - 20 MG/DL    Creatinine 0.83 0.55 - 1.02 MG/DL    BUN/Creatinine ratio 12 12 - 20      GFR est AA >60 >60 ml/min/1.73m2    GFR est non-AA >60 >60 ml/min/1.73m2    Calcium 9.1 8.5 - 10.1 MG/DL    Bilirubin, total 0.5 0.2 - 1.0 MG/DL    ALT (SGPT) 18 12 - 78 U/L    AST (SGOT) 21 15 - 37 U/L    Alk. phosphatase 54 45 - 117 U/L    Protein, total 9.9 (H) 6.4 - 8.2 g/dL    Albumin 3.9 3.5 - 5.0 g/dL    Globulin 6.0 (H) 2.0 - 4.0 g/dL    A-G Ratio 0.7 (L) 1.1 - 2.2     LIPASE    Collection Time: 02/07/22  5:23 PM   Result Value Ref Range    Lipase 78 73 - 393 U/L   URINALYSIS W/MICROSCOPIC    Collection Time: 02/07/22  5:23 PM   Result Value Ref Range    Color YELLOW/STRAW      Appearance HAZY (A) CLEAR      Specific gravity <1.005 1.003 - 1.030    pH (UA) 6.5 5.0 - 8.0      Protein Negative NEG mg/dL    Glucose Negative NEG mg/dL    Ketone Negative NEG mg/dL    Bilirubin Negative NEG      Blood Negative NEG      Urobilinogen 0.2 0.2 - 1.0 EU/dL    Nitrites Negative NEG      Leukocyte Esterase Negative NEG      WBC 0-4 0 - 4 /hpf    RBC 0-5 0 - 5 /hpf    Epithelial cells FEW FEW /lpf    Bacteria 2+ (A) NEG /hpf   URINE CULTURE HOLD SAMPLE    Collection Time: 02/07/22  5:23 PM    Specimen: Serum; Urine   Result Value Ref Range    Urine culture hold        Urine on hold in Microbiology dept for 2 days. If unpreserved urine is submitted, it cannot be used for addtional testing after 24 hours, recollection will be required. HCG URINE, QL. - POC    Collection Time: 02/07/22  5:25 PM   Result Value Ref Range    Pregnancy test,urine (POC) Negative NEG          IMAGING:  CT ABD PELV W CONT   Final Result   No acute intra-abdominal pathology.  Large amount of stool in the rectum            Medications During Visit:  Medications   sodium chloride 0.9 % bolus infusion 1,000 mL (0 mL IntraVENous IV Completed 2/7/22 1903)   ondansetron (ZOFRAN) injection 4 mg (4 mg IntraVENous Given 2/7/22 1726)   ketorolac (TORADOL) injection 30 mg (30 mg IntraVENous Given 2/7/22 1726)   iopamidoL (ISOVUE-370) 76 % injection 100 mL (100 mL IntraVENous Given 2/7/22 1821)         DECISION MAKING:  Suzi Kessler is a 27 y.o. female who comes in as above. Pt states improvement of nausea/vomiting and lower abdominal pain. Exam reassuring. Patient should follow-up with PCP      IMPRESSION:  1. Acute abdominal pain    2. Non-intractable vomiting with nausea, unspecified vomiting type        DISPOSITION:        Discharge Medication List as of 2/7/2022  6:54 PM      START taking these medications    Details   ondansetron hcl (Zofran) 4 mg tablet Take 1 Tablet by mouth every eight (8) hours as needed for Nausea or Vomiting for up to 4 days. , Normal, Disp-12 Tablet, R-0         CONTINUE these medications which have NOT CHANGED    Details   albuterol (ProAir HFA) 90 mcg/actuation inhaler Take 2 Puffs by inhalation every four (4) hours as needed for Wheezing., Normal, Disp-18 g, R-0      ascorbic acid (MAINOR-C PO) Take  by mouth., Historical Med      VITAMIN B COMPLEX PO Take  by mouth., Historical Med      ZINC PO Take  by mouth., Historical Med      ibuprofen (MOTRIN) 600 mg tablet Take 1 Tablet by mouth every six (6) hours as needed (pain). Take with food or milk each time. , Print, Disp-20 Tablet, R-0      hydroCHLOROthiazide (HYDRODIURIL) 25 mg tablet TAKE 1 TABLET BY MOUTH EVERY DAY, Normal, Disp-90 Tablet, R-1      ipratropium (ATROVENT) 42 mcg (0.06 %) nasal spray 2 SPRAYS INTO EACH NOSTRIL 3 TIMES DAILY, PRN CONGESTION, Normal, Disp-15 mL, R-1      budesonide (Rhinocort Allergy) 32 mcg/actuation nasal spray 2 Sprays by Both Nostrils route daily. , Normal, Disp-8.6 g, R-0              Follow-up Information     Follow up With Specialties Details Why Contact Info    Kiera Wellington PA-C Physician Assistant Schedule an appointment as soon as possible for a visit   [address] [address] [facility]  [phone]      [facility] Emergency Medicine  If symptoms worsen [address] [facility] [phone]            The patient is asked to follow-up with their primary care provider in the next several days. They are to call tomorrow for an appointment. The patient is asked to return promptly for any increased concerns or worsening of symptoms. They can return to this emergency department or any other emergency department.

## 2022-02-07 NOTE — ED TRIAGE NOTES
Patient presents ambulatory to triage. Pt states \"I think I have food poisoning. \" Pt complains of abdominal cramps, nausea, vomiting. Denies diarrhea. Pt believes she got food poisoning from [address] that she ate around 0900. Symptoms started at 1600. A&Ox4. Denies sick contacts. Covid vaccinated, no booster.

## 2022-02-08 NOTE — ED NOTES
Pt ambulatory out of ED with discharge instructions and prescriptions in hand given by Grant Zapien., PA; pt verbalized understanding of discharge paperwork and time allotted for questions. VSS. Pt alert and oriented.

## 2022-03-07 ENCOUNTER — HOSPITAL ENCOUNTER (OUTPATIENT)
Age: 27
Setting detail: OBSERVATION
Discharge: HOME | End: 2022-03-08
Attending: EMERGENCY MEDICINE | Admitting: FAMILY MEDICINE
Payer: COMMERCIAL

## 2022-03-07 ENCOUNTER — APPOINTMENT (OUTPATIENT)
Dept: CT IMAGING | Age: 27
End: 2022-03-07
Attending: EMERGENCY MEDICINE
Payer: COMMERCIAL

## 2022-03-07 DIAGNOSIS — E87.6 HYPOKALEMIA: ICD-10-CM

## 2022-03-07 DIAGNOSIS — R20.2 PARESTHESIA: Primary | ICD-10-CM

## 2022-03-07 PROBLEM — I63.9 CVA (CEREBRAL VASCULAR ACCIDENT) (HCC): Status: ACTIVE | Noted: 2022-03-07

## 2022-03-07 LAB
ALBUMIN SERPL-MCNC: 3.7 G/DL (ref 3.5–5)
ALBUMIN/GLOB SERPL: 0.6 {RATIO} (ref 1.1–2.2)
ALP SERPL-CCNC: 52 U/L (ref 45–117)
ALT SERPL-CCNC: 23 U/L (ref 12–78)
ANION GAP SERPL CALC-SCNC: 9 MMOL/L (ref 5–15)
AST SERPL-CCNC: 23 U/L (ref 15–37)
BASOPHILS # BLD: 0 K/UL (ref 0–0.1)
BASOPHILS NFR BLD: 0 % (ref 0–1)
BILIRUB SERPL-MCNC: 0.3 MG/DL (ref 0.2–1)
BUN SERPL-MCNC: 10 MG/DL (ref 6–20)
BUN/CREAT SERPL: 12 (ref 12–20)
CALCIUM SERPL-MCNC: 9.1 MG/DL (ref 8.5–10.1)
CHLORIDE SERPL-SCNC: 97 MMOL/L (ref 97–108)
CO2 SERPL-SCNC: 29 MMOL/L (ref 21–32)
CREAT SERPL-MCNC: 0.84 MG/DL (ref 0.55–1.02)
DIFFERENTIAL METHOD BLD: ABNORMAL
EOSINOPHIL # BLD: 0.2 K/UL (ref 0–0.4)
EOSINOPHIL NFR BLD: 3 % (ref 0–7)
ERYTHROCYTE [DISTWIDTH] IN BLOOD BY AUTOMATED COUNT: 13.8 % (ref 11.5–14.5)
GLOBULIN SER CALC-MCNC: 5.9 G/DL (ref 2–4)
GLUCOSE BLD STRIP.AUTO-MCNC: 92 MG/DL (ref 65–117)
GLUCOSE SERPL-MCNC: 89 MG/DL (ref 65–100)
HCT VFR BLD AUTO: 32.7 % (ref 35–47)
HGB BLD-MCNC: 10.4 G/DL (ref 11.5–16)
IMM GRANULOCYTES # BLD AUTO: 0 K/UL (ref 0–0.04)
IMM GRANULOCYTES NFR BLD AUTO: 0 % (ref 0–0.5)
INR PPP: 1 (ref 0.9–1.1)
LYMPHOCYTES # BLD: 2.6 K/UL (ref 0.8–3.5)
LYMPHOCYTES NFR BLD: 34 % (ref 12–49)
MCH RBC QN AUTO: 28.8 PG (ref 26–34)
MCHC RBC AUTO-ENTMCNC: 31.8 G/DL (ref 30–36.5)
MCV RBC AUTO: 90.6 FL (ref 80–99)
MONOCYTES # BLD: 0.7 K/UL (ref 0–1)
MONOCYTES NFR BLD: 9 % (ref 5–13)
NEUTS SEG # BLD: 4 K/UL (ref 1.8–8)
NEUTS SEG NFR BLD: 54 % (ref 32–75)
NRBC # BLD: 0 K/UL (ref 0–0.01)
NRBC BLD-RTO: 0 PER 100 WBC
PLATELET # BLD AUTO: 478 K/UL (ref 150–400)
PMV BLD AUTO: 8.9 FL (ref 8.9–12.9)
POTASSIUM SERPL-SCNC: 2.7 MMOL/L (ref 3.5–5.1)
PROT SERPL-MCNC: 9.6 G/DL (ref 6.4–8.2)
PROTHROMBIN TIME: 10.2 SEC (ref 9–11.1)
RBC # BLD AUTO: 3.61 M/UL (ref 3.8–5.2)
SERVICE CMNT-IMP: NORMAL
SODIUM SERPL-SCNC: 135 MMOL/L (ref 136–145)
WBC # BLD AUTO: 7.6 K/UL (ref 3.6–11)

## 2022-03-07 PROCEDURE — 74011000636 HC RX REV CODE- 636: Performed by: EMERGENCY MEDICINE

## 2022-03-07 PROCEDURE — G0378 HOSPITAL OBSERVATION PER HR: HCPCS

## 2022-03-07 PROCEDURE — 80053 COMPREHEN METABOLIC PANEL: CPT

## 2022-03-07 PROCEDURE — 74011250637 HC RX REV CODE- 250/637: Performed by: EMERGENCY MEDICINE

## 2022-03-07 PROCEDURE — 85610 PROTHROMBIN TIME: CPT

## 2022-03-07 PROCEDURE — 96374 THER/PROPH/DIAG INJ IV PUSH: CPT

## 2022-03-07 PROCEDURE — 36415 COLL VENOUS BLD VENIPUNCTURE: CPT

## 2022-03-07 PROCEDURE — 70496 CT ANGIOGRAPHY HEAD: CPT

## 2022-03-07 PROCEDURE — 74011250636 HC RX REV CODE- 250/636: Performed by: EMERGENCY MEDICINE

## 2022-03-07 PROCEDURE — 70450 CT HEAD/BRAIN W/O DYE: CPT

## 2022-03-07 PROCEDURE — 96376 TX/PRO/DX INJ SAME DRUG ADON: CPT

## 2022-03-07 PROCEDURE — 93005 ELECTROCARDIOGRAM TRACING: CPT

## 2022-03-07 PROCEDURE — 82962 GLUCOSE BLOOD TEST: CPT

## 2022-03-07 PROCEDURE — 85025 COMPLETE CBC W/AUTO DIFF WBC: CPT

## 2022-03-07 PROCEDURE — 99285 EMERGENCY DEPT VISIT HI MDM: CPT

## 2022-03-07 RX ORDER — GUAIFENESIN 100 MG/5ML
324 LIQUID (ML) ORAL
Status: COMPLETED | OUTPATIENT
Start: 2022-03-07 | End: 2022-03-07

## 2022-03-07 RX ORDER — POTASSIUM CHLORIDE 14.9 MG/ML
10 INJECTION INTRAVENOUS
Status: DISCONTINUED | OUTPATIENT
Start: 2022-03-07 | End: 2022-03-07 | Stop reason: SDUPTHER

## 2022-03-07 RX ORDER — POTASSIUM CHLORIDE 750 MG/1
20 TABLET, FILM COATED, EXTENDED RELEASE ORAL
Status: COMPLETED | OUTPATIENT
Start: 2022-03-07 | End: 2022-03-07

## 2022-03-07 RX ORDER — POTASSIUM CHLORIDE 7.45 MG/ML
10 INJECTION INTRAVENOUS
Status: DISPENSED | OUTPATIENT
Start: 2022-03-07 | End: 2022-03-07

## 2022-03-07 RX ADMIN — ASPIRIN 81 MG 324 MG: 81 TABLET ORAL at 18:43

## 2022-03-07 RX ADMIN — IOPAMIDOL 100 ML: 755 INJECTION, SOLUTION INTRAVENOUS at 17:22

## 2022-03-07 RX ADMIN — POTASSIUM CHLORIDE 10 MEQ: 7.46 INJECTION, SOLUTION INTRAVENOUS at 18:14

## 2022-03-07 RX ADMIN — POTASSIUM CHLORIDE 10 MEQ: 7.46 INJECTION, SOLUTION INTRAVENOUS at 19:29

## 2022-03-07 RX ADMIN — POTASSIUM CHLORIDE 20 MEQ: 750 TABLET, EXTENDED RELEASE ORAL at 18:14

## 2022-03-07 NOTE — ED TRIAGE NOTES
Triage: pt was at work sitting at desk and then developed dizziness at 1600, drove home and started having right sided facial, arm and leg numbness at 1630. Denies SOB, chest pain, N/V/D, fever, blurred vision.

## 2022-03-07 NOTE — ED PROVIDER NOTES
68-year-old female with past medical history significant for hypertension presents with complaints of dizziness sensation starting at 4 PM with subsequent right sided face, right upper extremity and lower extremity numbness and weakness sensation starting at 4:30 PM.  Denies any recent trauma. Denies any recent illness. Denies blood thinner use. Denies pregnancy. Denies hormonal birth control. Denies history of blood clots. Denies any bleeding or clotting disorders. Patient reports she is compliant with her medications. Denies fever, chills, nausea, vomiting, chest pain, shortness of breath. Denies tobacco use, drug use  Occasional alcohol use  [facility]           Past Medical History:   Diagnosis Date    Abnormal Pap smear of cervix     seeing OBGYN    Acne 10/7/2009    Allergic rhinitis 2009    Anemia 2009    ASCUS HPV+ 2017    H/o ASCUS HPV + with Dr. Eugenia Aragon 2017    CHAR III (cervical intraepithelial neoplasia III) 2017    found by LEEP     Headache     Herpes genitalia     dx by OB    History of gestational diabetes     diet controlled    Other ill-defined conditions(799.89)     scoliosis    PCB (post coital bleeding) 2014    Thoracic compression fracture ([address] 3/2014    [facility]    Thyromegaly 2014    On exam     Unspecified vitamin D deficiency 2014    Varicella 1998       Past Surgical History:   Procedure Laterality Date    HX GYN           HX LEEP PROCEDURE  16    found CHAR 3         Family History:   Problem Relation Age of Onset    Breast Cancer Maternal Grandmother         great, great gma, age?     Hypertension Maternal Grandmother     Breast Cancer Maternal Aunt 34        survivor    Diabetes Father     Hypertension Father     Hypertension Paternal Grandmother     Diabetes Paternal Grandmother     No Known Problems Brother     Osteoporosis Neg Hx        Social History Socioeconomic History    Marital status: SINGLE     Spouse name: Not on file    Number of children: Not on file    Years of education: Not on file    Highest education level: Not on file   Occupational History    Not on file   Tobacco Use    Smoking status: Never Smoker    Smokeless tobacco: Never Used   Vaping Use    Vaping Use: Never used   Substance and Sexual Activity    Alcohol use: Yes     Comment: once every 3 months, 3 drinks at a time    Drug use: No    Sexual activity: Yes     Partners: Male     Birth control/protection: Injection   Other Topics Concern    Not on file   Social History Narrative    Lives in [facility] with parents and 3 yo son. Works at [employer]. Social Determinants of Health     Financial Resource Strain:     Difficulty of Paying Living Expenses: Not on file   Food Insecurity:     Worried About Running Out of Food in the Last Year: Not on file    Ayni of Food in the Last Year: Not on file   Transportation Needs:     Lack of Transportation (Medical): Not on file    Lack of Transportation (Non-Medical):  Not on file   Physical Activity:     Days of Exercise per Week: Not on file    Minutes of Exercise per Session: Not on file   Stress:     Feeling of Stress : Not on file   Social Connections:     Frequency of Communication with Friends and Family: Not on file    Frequency of Social Gatherings with Friends and Family: Not on file    Attends Catholic Services: Not on file    Active Member of Clubs or Organizations: Not on file    Attends Club or Organization Meetings: Not on file    Marital Status: Not on file   Intimate Partner Violence:     Fear of Current or Ex-Partner: Not on file    Emotionally Abused: Not on file    Physically Abused: Not on file    Sexually Abused: Not on file   Housing Stability:     Unable to Pay for Housing in the Last Year: Not on file    Number of [place] in the Last Year: Not on file    Unstable Housing in the Last Year: Not on file         ALLERGIES: Metronidazole    Review of Systems   Constitutional: Negative for chills and fever. HENT: Negative for congestion, nosebleeds and rhinorrhea. Eyes: Negative for pain and redness. Respiratory: Negative for cough and shortness of breath. Cardiovascular: Negative for chest pain and palpitations. Gastrointestinal: Negative for abdominal pain, nausea and vomiting. Genitourinary: Negative for dysuria, frequency, vaginal bleeding and vaginal pain. Musculoskeletal: Negative for myalgias. Skin: Negative for rash and wound. Neurological: Positive for weakness and numbness. Negative for seizures and syncope. Hematological: Does not bruise/bleed easily. Psychiatric/Behavioral: Negative for agitation, confusion, dysphoric mood and suicidal ideas. The patient is not nervous/anxious. Vitals:    03/07/22 1734   BP: (!) 139/109   Pulse: 87   Resp: 15   Temp: 98.4 °F (36.9 °C)   SpO2: 100%   Weight: 91.5 kg (201 lb 11.5 oz)            Physical Exam  Vitals and nursing note reviewed. Constitutional:       Appearance: She is well-developed. HENT:      Head: Normocephalic and atraumatic. Eyes:      Pupils: Pupils are equal, round, and reactive to light. Neck:      Trachea: No tracheal deviation. Cardiovascular:      Rate and Rhythm: Normal rate and regular rhythm. Heart sounds: Normal heart sounds. Pulmonary:      Effort: Pulmonary effort is normal. No respiratory distress. Breath sounds: Normal breath sounds. No stridor. No wheezing or rales. Chest:      Chest wall: No tenderness. Abdominal:      General: Bowel sounds are normal. There is no distension. Palpations: Abdomen is soft. Tenderness: There is no abdominal tenderness. There is no rebound. Musculoskeletal:         General: No tenderness. Normal range of motion. Cervical back: Normal range of motion and neck supple. Skin:     General: Skin is warm and dry. Coloration: Skin is not pale. Findings: No rash. Neurological:      Mental Status: She is alert and oriented to person, place, and time. GCS: GCS eye subscore is 4. GCS verbal subscore is 5. GCS motor subscore is 6. Cranial Nerves: No cranial nerve deficit. Coordination: Coordination is intact. Finger-Nose-Finger Test and Heel to [facility] Test normal.      Comments: Subjective R face, UE, LE numbness. RLE started feeling head pressure drift. NIHSS=2 for right lower extremity drift and subjective numbness. MDM  Number of Diagnoses or Management Options  Diagnosis management comments: 17-year-old female with history of hypertension presents with complaints of right-sided numbness and weakness sensation starting at 4:30 PM.  Level One code stroke. Initial NIH stroke score 2. Patient well-appearing, no acute distress, hemodynamically stable. Plan-initiate code stroke protocol. Amount and/or Complexity of Data Reviewed  Clinical lab tests: ordered and reviewed  Tests in the radiology section of CPT®: ordered and reviewed  Independent visualization of images, tracings, or specimens: yes           Procedures      5:35 PM  Deejay Vann MD spoke with Dr. Brijesh Calderon, Consult for Neurology. Discussed available diagnostic tests and clinical findings. He/She is in agreement with care plans as outlined. He/she will examine patient and review imaging. 5:54 PM  Patient evaluated by teleneurology. Advised admission for MRI, EEG, starting aspirin 81 mg, thrombophilia evaluation. Patient reports she has recurrent events like this in the past.       ED EKG interpretation:  Rhythm: normal sinus rhythm; and regular . Rate (approx.): 76; Axis: normal; P wave: normal; QRS interval: normal ; ST/T wave: normal; Other findings:unchanged from 6/7/21, no acute ischemia. This EKG was interpreted by Deejay Vann MD,ED Provider. 6:32 PM  Patient reports feeling much improved now.   Weakness resolving. Right-sided numbness continues. Discussed results with patient. Agreeable with plan for admission. [facility] Consult for Admission  6:31 PM    ED Room Number: SER09/09  Patient Name and age:  Carolina Armstrong 27 y.o.  female  Working Diagnosis:   1. Paresthesia    2.  Hypokalemia        COVID-19 Suspicion:  no  Sepsis present:  no  Reassessment needed: no  Code Status:  Full Code  Readmission: no  Isolation Requirements:  no  Recommended Level of Care:  telemetry  Department:[place] [facility] - [phone]

## 2022-03-08 ENCOUNTER — APPOINTMENT (OUTPATIENT)
Dept: MRI IMAGING | Age: 27
End: 2022-03-08
Attending: STUDENT IN AN ORGANIZED HEALTH CARE EDUCATION/TRAINING PROGRAM
Payer: COMMERCIAL

## 2022-03-08 VITALS
HEART RATE: 92 BPM | RESPIRATION RATE: 13 BRPM | WEIGHT: 201.72 LBS | HEIGHT: 65 IN | DIASTOLIC BLOOD PRESSURE: 95 MMHG | OXYGEN SATURATION: 98 % | BODY MASS INDEX: 33.61 KG/M2 | SYSTOLIC BLOOD PRESSURE: 127 MMHG | TEMPERATURE: 98.4 F

## 2022-03-08 LAB
ANION GAP SERPL CALC-SCNC: 3 MMOL/L (ref 5–15)
ATRIAL RATE: 76 BPM
BUN SERPL-MCNC: 7 MG/DL (ref 6–20)
BUN/CREAT SERPL: 10 (ref 12–20)
CALCIUM SERPL-MCNC: 8.8 MG/DL (ref 8.5–10.1)
CALCULATED P AXIS, ECG09: 51 DEGREES
CALCULATED T AXIS, ECG11: 13 DEGREES
CHLORIDE SERPL-SCNC: 106 MMOL/L (ref 97–108)
CO2 SERPL-SCNC: 27 MMOL/L (ref 21–32)
CREAT SERPL-MCNC: 0.72 MG/DL (ref 0.55–1.02)
CRP SERPL-MCNC: 0.32 MG/DL (ref 0–0.6)
DIAGNOSIS, 93000: NORMAL
ERYTHROCYTE [SEDIMENTATION RATE] IN BLOOD: 60 MM/HR (ref 0–20)
EST. AVERAGE GLUCOSE BLD GHB EST-MCNC: 128 MG/DL
GLUCOSE SERPL-MCNC: 92 MG/DL (ref 65–100)
HBA1C MFR BLD: 6.1 % (ref 4–5.6)
MAGNESIUM SERPL-MCNC: 2.1 MG/DL (ref 1.6–2.4)
P-R INTERVAL, ECG05: 172 MS
PHOSPHATE SERPL-MCNC: 3.4 MG/DL (ref 2.6–4.7)
POTASSIUM SERPL-SCNC: 3 MMOL/L (ref 3.5–5.1)
Q-T INTERVAL, ECG07: 406 MS
QRS DURATION, ECG06: 90 MS
QTC CALCULATION (BEZET), ECG08: 456 MS
SODIUM SERPL-SCNC: 136 MMOL/L (ref 136–145)
TSH SERPL DL<=0.05 MIU/L-ACNC: 2.64 UIU/ML (ref 0.36–3.74)
VENTRICULAR RATE, ECG03: 76 BPM

## 2022-03-08 PROCEDURE — 97116 GAIT TRAINING THERAPY: CPT

## 2022-03-08 PROCEDURE — 84443 ASSAY THYROID STIM HORMONE: CPT

## 2022-03-08 PROCEDURE — 99204 OFFICE O/P NEW MOD 45 MIN: CPT | Performed by: PSYCHIATRY & NEUROLOGY

## 2022-03-08 PROCEDURE — 84100 ASSAY OF PHOSPHORUS: CPT

## 2022-03-08 PROCEDURE — 80048 BASIC METABOLIC PNL TOTAL CA: CPT

## 2022-03-08 PROCEDURE — 86225 DNA ANTIBODY NATIVE: CPT

## 2022-03-08 PROCEDURE — 83735 ASSAY OF MAGNESIUM: CPT

## 2022-03-08 PROCEDURE — 97161 PT EVAL LOW COMPLEX 20 MIN: CPT

## 2022-03-08 PROCEDURE — 86140 C-REACTIVE PROTEIN: CPT

## 2022-03-08 PROCEDURE — G0378 HOSPITAL OBSERVATION PER HR: HCPCS

## 2022-03-08 PROCEDURE — 70551 MRI BRAIN STEM W/O DYE: CPT

## 2022-03-08 PROCEDURE — 85652 RBC SED RATE AUTOMATED: CPT

## 2022-03-08 PROCEDURE — 86038 ANTINUCLEAR ANTIBODIES: CPT

## 2022-03-08 PROCEDURE — 36415 COLL VENOUS BLD VENIPUNCTURE: CPT

## 2022-03-08 PROCEDURE — 83036 HEMOGLOBIN GLYCOSYLATED A1C: CPT

## 2022-03-08 RX ORDER — POTASSIUM CHLORIDE 1500 MG/1
20 TABLET, FILM COATED, EXTENDED RELEASE ORAL DAILY
Qty: 5 TABLET | Refills: 0 | Status: SHIPPED | OUTPATIENT
Start: 2022-03-08 | End: 2022-04-21 | Stop reason: ALTCHOICE

## 2022-03-08 RX ORDER — ACETAMINOPHEN 650 MG/1
650 SUPPOSITORY RECTAL
Status: DISCONTINUED | OUTPATIENT
Start: 2022-03-08 | End: 2022-03-08 | Stop reason: HOSPADM

## 2022-03-08 RX ORDER — ONDANSETRON 4 MG/1
4 TABLET, ORALLY DISINTEGRATING ORAL
Status: DISCONTINUED | OUTPATIENT
Start: 2022-03-08 | End: 2022-03-08 | Stop reason: HOSPADM

## 2022-03-08 RX ORDER — HYDRALAZINE HYDROCHLORIDE 20 MG/ML
10 INJECTION INTRAMUSCULAR; INTRAVENOUS
Status: DISCONTINUED | OUTPATIENT
Start: 2022-03-08 | End: 2022-03-08 | Stop reason: HOSPADM

## 2022-03-08 RX ORDER — POLYETHYLENE GLYCOL 3350 17 G/17G
17 POWDER, FOR SOLUTION ORAL DAILY PRN
Status: DISCONTINUED | OUTPATIENT
Start: 2022-03-08 | End: 2022-03-08 | Stop reason: HOSPADM

## 2022-03-08 RX ORDER — ACETAMINOPHEN 325 MG/1
650 TABLET ORAL
Status: DISCONTINUED | OUTPATIENT
Start: 2022-03-08 | End: 2022-03-08 | Stop reason: HOSPADM

## 2022-03-08 RX ORDER — SODIUM CHLORIDE 0.9 % (FLUSH) 0.9 %
5-40 SYRINGE (ML) INJECTION EVERY 8 HOURS
Status: DISCONTINUED | OUTPATIENT
Start: 2022-03-08 | End: 2022-03-08 | Stop reason: HOSPADM

## 2022-03-08 RX ORDER — ONDANSETRON 2 MG/ML
4 INJECTION INTRAMUSCULAR; INTRAVENOUS
Status: DISCONTINUED | OUTPATIENT
Start: 2022-03-08 | End: 2022-03-08 | Stop reason: HOSPADM

## 2022-03-08 RX ORDER — SODIUM CHLORIDE 0.9 % (FLUSH) 0.9 %
5-40 SYRINGE (ML) INJECTION AS NEEDED
Status: DISCONTINUED | OUTPATIENT
Start: 2022-03-08 | End: 2022-03-08 | Stop reason: HOSPADM

## 2022-03-08 NOTE — PROGRESS NOTES
Problem: Patient Education: Go to Patient Education Activity  Goal: Patient/Family Education  Outcome: Resolved/Met     Problem: TIA/CVA Stroke: 0-24 hours  Goal: Off Pathway (Use only if patient is Off Pathway)  Outcome: Resolved/Met  Goal: Activity/Safety  3/8/2022 1417 by Petr Alicia RN  Outcome: Resolved/Met  3/8/2022 1046 by Petr Alicia RN  Outcome: Progressing Towards Goal  Goal: Consults, if ordered  3/8/2022 1417 by Petr Alicia RN  Outcome: Resolved/Met  3/8/2022 1046 by Petr Alicia RN  Outcome: Progressing Towards Goal  Goal: Diagnostic Test/Procedures  3/8/2022 1417 by Petr Alicia RN  Outcome: Resolved/Met  3/8/2022 1046 by Petr Alicia RN  Outcome: Progressing Towards Goal  Goal: Nutrition/Diet  Outcome: Resolved/Met  Goal: Discharge Planning  3/8/2022 1417 by Petr Alicia RN  Outcome: Resolved/Met  3/8/2022 1046 by Petr Alicia RN  Outcome: Progressing Towards Goal  Goal: Medications  Outcome: Resolved/Met  Goal: Respiratory  Outcome: Resolved/Met  Goal: Treatments/Interventions/Procedures  Outcome: Resolved/Met  Goal: Minimize risk of bleeding post-thrombolytic infusion  Outcome: Resolved/Met  Goal: Monitor for complications post-thrombolytic infusion  Outcome: Resolved/Met  Goal: Psychosocial  Outcome: Resolved/Met  Goal: *Hemodynamically stable  Outcome: Resolved/Met  Goal: *Neurologically stable  Description: Absence of additional neurological deficits    Outcome: Resolved/Met  Goal: *Verbalizes anxiety and depression are reduced or absent  Outcome: Resolved/Met  Goal: *Absence of Signs of Aspiration on Current Diet  Outcome: Resolved/Met  Goal: *Absence of deep venous thrombosis signs and symptoms(Stroke Metric)  Outcome: Resolved/Met  Goal: *Ability to perform ADLs and demonstrates progressive mobility and function  Outcome: Resolved/Met  Goal: *Stroke education started(Stroke Metric)  Outcome: Resolved/Met  Goal: *Dysphagia screen performed(Stroke Metric)  Outcome: Resolved/Met  Goal: *Rehab consulted(Stroke Metric)  Outcome: Resolved/Met     Problem: TIA/CVA Stroke: Day 2 Until Discharge  Goal: Off Pathway (Use only if patient is Off Pathway)  Outcome: Resolved/Met  Goal: Activity/Safety  Outcome: Resolved/Met  Goal: Diagnostic Test/Procedures  Outcome: Resolved/Met  Goal: Nutrition/Diet  Outcome: Resolved/Met  Goal: Discharge Planning  Outcome: Resolved/Met  Goal: Medications  Outcome: Resolved/Met  Goal: Respiratory  Outcome: Resolved/Met  Goal: Treatments/Interventions/Procedures  Outcome: Resolved/Met  Goal: Psychosocial  Outcome: Resolved/Met  Goal: *Verbalizes anxiety and depression are reduced or absent  Outcome: Resolved/Met  Goal: *Absence of aspiration  Outcome: Resolved/Met  Goal: *Absence of deep venous thrombosis signs and symptoms(Stroke Metric)  Outcome: Resolved/Met  Goal: *Optimal pain control at patient's stated goal  Outcome: Resolved/Met  Goal: *Tolerating diet  Outcome: Resolved/Met  Goal: *Ability to perform ADLs and demonstrates progressive mobility and function  Outcome: Resolved/Met  Goal: *Stroke education continued(Stroke Metric)  Outcome: Resolved/Met     Problem: Ischemic Stroke: Discharge Outcomes  Goal: *Verbalizes anxiety and depression are reduced or absent  Outcome: Resolved/Met  Goal: *Verbalize understanding of risk factor modification(Stroke Metric)  Outcome: Resolved/Met  Goal: *Hemodynamically stable  Outcome: Resolved/Met  Goal: *Absence of aspiration pneumonia  Outcome: Resolved/Met  Goal: *Aware of needed dietary changes  Outcome: Resolved/Met  Goal: *Verbalize understanding of prescribed medications including anti-coagulants, anti-lipid, and/or anti-platelets(Stroke Metric)  Outcome: Resolved/Met  Goal: *Tolerating diet  Outcome: Resolved/Met  Goal: *Aware of follow-up diagnostics related to anticoagulants  Outcome: Resolved/Met  Goal: *Ability to perform ADLs and demonstrates progressive mobility and function  Outcome: Resolved/Met  Goal: *Absence of DVT(Stroke Metric)  Outcome: Resolved/Met  Goal: *Absence of aspiration  Outcome: Resolved/Met  Goal: *Optimal pain control at patient's stated goal  Outcome: Resolved/Met  Goal: *Home safety concerns addressed  Outcome: Resolved/Met  Goal: *Describes available resources and support systems  Outcome: Resolved/Met  Goal: *Verbalizes understanding of activation of EMS(911) for stroke symptoms(Stroke Metric)  Outcome: Resolved/Met  Goal: *Understands and describes signs and symptoms to report to providers(Stroke Metric)  Outcome: Resolved/Met  Goal: *Neurolgocially stable (absence of additional neurological deficits)  Outcome: Resolved/Met  Goal: *Verbalizes importance of follow-up with primary care physician(Stroke Metric)  Outcome: Resolved/Met  Goal: *Smoking cessation discussed,if applicable(Stroke Metric)  Outcome: Resolved/Met  Goal: *Depression screening completed(Stroke Metric)  Outcome: Resolved/Met

## 2022-03-08 NOTE — PROGRESS NOTES
Occupational Therapy  03/08/22    Order acknowledged, chart reviewed, discussed with PT who reports patient is IND, returned to functional baseline with no acute OT needs. Will complete order, safe to d/c home once medically cleared.      Thank you,   MARLEN Bo, OTR/L

## 2022-03-08 NOTE — PROGRESS NOTES
Problem: TIA/CVA Stroke: 0-24 hours  Goal: Activity/Safety  Outcome: Progressing Towards Goal  Goal: Consults, if ordered  Outcome: Progressing Towards Goal  Goal: Diagnostic Test/Procedures  Outcome: Progressing Towards Goal  Goal: Discharge Planning  Outcome: Progressing Towards Goal

## 2022-03-08 NOTE — PROGRESS NOTES
PHYSICAL THERAPY EVALUATION WITH DISCHARGE  Patient: Delia Morales (42 y.o. female)  Date: 3/8/2022  Primary Diagnosis: CVA (cerebral vascular accident) [facility]) [I63.9]       Precautions:          ASSESSMENT  Based on the objective data described below, the patient presents with reports of dizziness and R sided numbness s/p admission on 3/7 for CVA work up. Brain MRI negative for acute infarct. Pt received supine in bed and agreeable to therapy. Pt reported numbness has resolved and intact sensation. Pt demonstrated 5/5 strength in bilateral LEs and UEs; intact coordination. Pt completed bed mobility and transfers independently. Pt tolerated gait training x 200 ft with SBA demonstrating intact balance. Noted guarded posture. Pt reported feeling a little lightheaded; BP elevated. Pt reported she has not taken her BP meds this morning. Pt was educated on BEFAST acronym on sign and symptoms of a stroke as well as stress management techniques. Pt does not require any further acute PT needs. Functional Outcome Measure: The patient scored Total: 56/56 on the Baig Balance Assessment which is indicative of low fall risk. Other factors to consider for discharge:      Further skilled acute physical therapy is not indicated at this time. PLAN :  Recommendation for discharge: (in order for the patient to meet his/her long term goals)  No skilled physical therapy/ follow up rehabilitation needs identified at this time. This discharge recommendation:  Has been made in collaboration with the attending provider and/or case management    IF patient discharges home will need the following DME: none         SUBJECTIVE:   Patient stated I'd like to get out of here today.     OBJECTIVE DATA SUMMARY:   HISTORY:    Past Medical History:   Diagnosis Date    Abnormal Pap smear of cervix 2017    seeing OBGYN    Acne 10/7/2009    Allergic rhinitis 9/2/2009    Anemia 9/2/2009    ASCUS HPV+ 09/14/2017    H/o ASCUS HPV + with Dr. Abdiel Huynh 2017    CHAR III (cervical intraepithelial neoplasia III) 2017    found by LEEP     Headache     Herpes genitalia     dx by OB    History of gestational diabetes     diet controlled    Other ill-defined conditions(799.89)     scoliosis    PCB (post coital bleeding) 2014    Thoracic compression fracture ([address] 3/2014    Tedra Oar    Thyromegaly 2014    On exam     Unspecified vitamin D deficiency 2014    Varicella 1998     Past Surgical History:   Procedure Laterality Date    HX GYN           HX LEEP PROCEDURE  18    found CHAR 3       Prior level of function: independent, ambulatory without AD, lives alone, working full time at a bank, enjoys going to the gym  Personal factors and/or comorbidities impacting plan of care:     Home Situation  Home Environment: Private residence  # Steps to Enter: 3  Rails to Enter: Yes  Hand Rails : Left  One/Two Story Residence: Two story  Living Alone: Yes  Support Systems: Other Family Member(s)  Current DME Used/Available at Home: None    EXAMINATION/PRESENTATION/DECISION MAKING:   Critical Behavior:  Neurologic State: Alert,Eyes open spontaneously  Orientation Level: Oriented X4  Cognition: Follows commands,Appropriate for age attention/concentration     Hearing: Auditory  Auditory Impairment: None  Skin:    Edema:   Range Of Motion:  AROM: Within functional limits           PROM: Within functional limits           Strength:    Strength:  Within functional limits                    Tone & Sensation:   Tone: Normal              Sensation: Intact               Coordination:  Coordination: Within functional limits  Vision:      Functional Mobility:  Bed Mobility:     Supine to Sit: Independent  Sit to Supine: Independent  Scooting: Independent  Transfers:  Sit to Stand: Independent  Stand to Sit: Independent                       Balance:   Sitting: Intact  Standing: Intact  Ambulation/Gait Training:  Distance (ft): 200 Feet (ft)  Assistive Device: Gait belt  Ambulation - Level of Assistance: Independent        Gait Abnormalities: Decreased step clearance        Base of Support: Narrowed      Functional Measure  Baig Balance Test:    Sitting to Standin  Standing Unsupported: 4  Sitting with Back Unsupported: 4  Standing to Sittin  Transfers: 4  Standing Unsupported with Eyes Closed: 4  Standing Unsupported with Feet Together: 4  Reach Forward with Outstretched Arm: 4   Object: 4  Turn to Look Over Shoulders: 4  Turn 360 Degrees: 4  Alternate Foot on Step/Stool: 4  Standing Unsupported One Foot in Front: 4  Stand on One Le  Total: 56/56         56=Maximum possible score;   0-20=High fall risk  21-40=Moderate fall risk   41-56=Low fall risk         Based on the above components, the patient evaluation is determined to be of the following complexity level: LOW     Pain Rating:  Pt denied pain    Activity Tolerance:   Good    After treatment patient left in no apparent distress:   Supine in bed, Call bell within reach and Side rails x 3    COMMUNICATION/EDUCATION:   The patients plan of care was discussed with: Occupational therapist, Registered nurse and Case management. Patient was educated regarding Her deficit(s) of impaired sensation (resolved) as this relates to Her diagnosis of CVA r/o. She demonstrated Good understanding    Patient and/or family was verbally educated on the BE FAST acronym for signs/symptoms of CVA and TIA. BE FAST was written on patient's communication board  for visual education and reinforcement. All questions answered with patient indicating good understanding. Fall prevention education was provided and the patient/caregiver indicated understanding., Patient/family have participated as able in goal setting and plan of care. and Patient/family agree to work toward stated goals and plan of care.     Thank you for this referral.  Trey Carvajal, PT, DPT   Time Calculation: 15 mins

## 2022-03-08 NOTE — ED NOTES
Bedside and Verbal shift change report given to Olivia Walter RN (oncoming nurse) by Lindsey Anguiano RN (offgoing nurse). Report included the following information ED Summary, MAR, Recent Results and Med Rec Status.

## 2022-03-08 NOTE — DISCHARGE SUMMARY
Discharge Summary       PATIENT ID: Ammon Nicolas  MRN: [id number]   YOB: 1995    DATE OF ADMISSION: 3/7/2022  5:01 PM    DATE OF DISCHARGE: 3/8/2022   PRIMARY CARE PROVIDER: Priti Bernard PA-C     ATTENDING PHYSICIAN: Abraham Loco  DISCHARGING PROVIDER: Nicole Conley MD    To contact this individual call [phone] and ask the  to page. If unavailable ask to be transferred the Adult Hospitalist Department. CONSULTATIONS: IP CONSULT TO NEUROLOGY    PROCEDURES/SURGERIES: * No surgery found *    DISCHARGE DIAGNOSES:     Paresthesia  Hypokalemia  Hypertension    ADMISSION SUMMARY AND HOSPITAL COURSE:     HPI  Ammon Nicolas is a 27 y.o. female w/ hx of obesity, htn who presented to er with complaints of dizziness and right facial, upper extremity and lower extremity weakness and numbness. Patient denies any history of migraine headaches but does endorse some intermittent headaches. She states an hour prior to ER presentation, she experienced some dizziness which she reports as a sensation of room spinning as well as her paresthesia symptoms. Hospital Course  Patient presents with dizziness, right facial, upper extremity and lower extremity weakness as well as numbness. Work-up includes CT of the head which shows no acute pathology. CTA of the head and neck shows no large vessel occlusion. MRI of the brain also shows no acute pathology. Neurology evaluated the patient. Exact etiology of her symptoms unclear, vestibular/baseline migraine possible. Current stereotypical symptoms are atypical for TIA. Suspicion for seizure is also low but recommend checking EEG as an outpatient. Patient has history of hypertension and takes hydrochlorothiazide. Patient was hypokalemic on admission but improving with replacement. Did discuss with patient, patient states that she has recurrent hypoglycemia. I suspect that HCTZ is causing her hypokalemia.   However patient does not want to change her antihypertensives at this time. She will follow up with her PCP as scheduled this week to discuss about it. I have given her potassium supplements in the interim. DISCHARGE DIAGNOSES / PLAN:        BMI: Body mass index is 33.57 kg/m². . This patient: Meets criteria for obesity given BMI >/= 30 and < 40 due to excess calories/nutritional. Weight loss and lifestyle modifications should be encouraged as an outpatient. PENDING TEST RESULTS:   At the time of discharge the following test results are still pending: None     ADDITIONAL CARE RECOMMENDATIONS:     Follow-up with PCP as scheduled. Recommend outpatient EEG per neurology. NOTIFY YOUR PHYSICIAN FOR ANY OF THE FOLLOWING:   Fever over 101 degrees for 24 hours. Chest pain, shortness of breath, fever, chills, nausea, vomiting, diarrhea, change in mentation, falling, weakness, bleeding. Severe pain or pain not relieved by medications, as well as any other signs or symptoms that you may have questions about. FOLLOW UP APPOINTMENTS:    Follow-up Information     Follow up With Specialties Details Why Contact Info    Vanda Morris PA-C Physician Assistant   [address]  [address] [address] [phone]               DIET: Cardiac Diet    ACTIVITY: Activity as tolerated    EQUIPMENT needed: None    DISCHARGE MEDICATIONS:  Current Discharge Medication List      START taking these medications    Details   potassium chloride SR (K-TAB) 20 mEq tablet Take 1 Tablet by mouth daily. Qty: 5 Tablet, Refills: 0  Start date: 3/8/2022         CONTINUE these medications which have NOT CHANGED    Details   albuterol (ProAir HFA) 90 mcg/actuation inhaler Take 2 Puffs by inhalation every four (4) hours as needed for Wheezing. Qty: 18 g, Refills: 0      ascorbic acid (MAINOR-C PO) Take  by mouth. VITAMIN B COMPLEX PO Take  by mouth. ZINC PO Take  by mouth. ibuprofen (MOTRIN) 600 mg tablet Take 1 Tablet by mouth every six (6) hours as needed (pain). Take with food or milk each time. Qty: 20 Tablet, Refills: 0      hydroCHLOROthiazide (HYDRODIURIL) 25 mg tablet TAKE 1 TABLET BY MOUTH EVERY DAY  Qty: 90 Tablet, Refills: 1    Associated Diagnoses: Essential hypertension      ipratropium (ATROVENT) 42 mcg (0.06 %) nasal spray 2 SPRAYS INTO EACH NOSTRIL 3 TIMES DAILY, PRN CONGESTION  Qty: 15 mL, Refills: 1    Associated Diagnoses: Acute rhinitis      budesonide (Rhinocort Allergy) 32 mcg/actuation nasal spray 2 Sprays by Both Nostrils route daily. Qty: 8.6 g, Refills: 0             DISPOSITION:    Home With:   OT  PT  HH  RN       Long term SNF/Inpatient Rehab    Independent/assisted living    Hospice    Other:       PATIENT CONDITION AT DISCHARGE:     Functional status    Poor     Deconditioned     Independent      Cognition     Lucid     Forgetful     Dementia      Catheters/lines (plus indication)    Mirza     PICC     PEG     None      Code status     Full code     DNR      PHYSICAL EXAMINATION AT DISCHARGE:  General:          Alert, cooperative, no distress, appears stated age. HEENT:           Atraumatic, anicteric sclerae, pink conjunctivae                          No oral ulcers, mucosa moist, throat clear, dentition fair  Neck:               Supple, symmetrical  Lungs:             Clear to auscultation bilaterally. No Wheezing or Rhonchi. No rales. Chest wall:      No tenderness  No Accessory muscle use. Heart:              Regular  rhythm,  No  murmur   No edema  Abdomen:        Soft, non-tender. Not distended. Bowel sounds normal  Extremities:     No cyanosis. No clubbing,                            Skin turgor normal, Capillary refill normal  Skin:                Not pale. Not Jaundiced  No rashes   Psych:             Not anxious or agitated.   Neurologic:      Alert, moves all extremities, answers questions appropriately and responds to commands       CHRONIC MEDICAL DIAGNOSES:  Problem List as of 3/8/2022 Date Reviewed: 12/6/2021 Codes Class Noted - Resolved    CVA (cerebral vascular accident) [facility]) ICD-10-CM: I63.9  ICD-9-CM: 434.91  3/7/2022 - Present        Hypermobility syndrome ICD-10-CM: M35.7  ICD-9-CM: 728.5  8/21/2018 - Present        Arthralgia ICD-10-CM: M25.50  ICD-9-CM: 719.40  8/21/2018 - Present        Costochondritis ICD-10-CM: M94.0  ICD-9-CM: 733.6  8/21/2018 - Present        Carpal tunnel syndrome of right wrist ICD-10-CM: G56.01  ICD-9-CM: 354.0  8/21/2018 - Present        NANCI positive ICD-10-CM: R76.8  ICD-9-CM: 795.79  7/16/2018 - Present        SS-A antibody positive ICD-10-CM: R76.8  ICD-9-CM: 795.79  7/16/2018 - Present        Rheumatoid factor positive ICD-10-CM: R76.8  ICD-9-CM: 795.79  7/16/2018 - Present        Acute bacterial sinusitis ICD-10-CM: J01.90, B96.89  ICD-9-CM: 461.9  2/26/2018 - Present        Acute bronchitis due to other specified organisms ICD-10-CM: J20.8  ICD-9-CM: 466.0  2/26/2018 - Present        History of gestational diabetes ICD-10-CM: Z86.32  ICD-9-CM: V12.21  Unknown - Present    Overview Signed 12/22/2016 10:21 AM by Lea Alvarado MD     diet controlled             Essential hypertension ICD-10-CM: I10  ICD-9-CM: 401.9  12/22/2016 - Present        Mastodynia ICD-10-CM: N64.4  ICD-9-CM: 611.71  1/18/2016 - Present    Overview Addendum 12/22/2016 11:09 AM by Lea Alvarado MD     Nl b/l breast US in 2016.    Prolactin pending 12/22/2016   With galactorrhea 2/2 depo             Thyromegaly ICD-10-CM: E01.0  ICD-9-CM: 240.9  11/25/2014 - Present    Overview Signed 11/25/2014 10:36 AM by Viraj Bullard     On exam             Atypical mole ICD-10-CM: D22.9  ICD-9-CM: 216.9  11/25/2014 - Present    Overview Signed 11/25/2014 10:37 AM by Viraj Bullard     Noted at vaginal introitus (7 o'clock position)             Vitamin D deficiency ICD-10-CM: E55.9  ICD-9-CM: 268.9  6/26/2014 - Present        History of compression fracture of spine, thoracic with chronic back pain ICD-10-CM: Z87.81  ICD-9-CM: V15.51  Unknown - Present        Allergic rhinitis ICD-10-CM: J30.9  ICD-9-CM: 477.9  9/2/2009 - Present        Anemia ICD-10-CM: D64.9  ICD-9-CM: 285.9  9/2/2009 - Present        RESOLVED: PCB (post coital bleeding) ICD-10-CM: N93.0  ICD-9-CM: 626.7  11/25/2014 - 12/22/2016        RESOLVED: Acne ICD-10-CM: L70.9  ICD-9-CM: 706.1  10/7/2009 - 12/22/2016        RESOLVED: Varicella ICD-10-CM: B01.9  ICD-9-CM: 052.9  5/1/1998 - 5/14/1998              Greater than 60 minutes were spent with the patient on counseling and coordination of care    Signed:   Nicole Conley MD  3/8/2022  1:45 PM

## 2022-03-08 NOTE — PROGRESS NOTES
Transition of Care Plan   RUR- Observation    DISPOSITION: Home with family support   F/U with PCP/Specialist     Transport: Sister    [place] [facility] Observation Notice (Aretha Backers) provided to patient/representative with verbal explanation of the notice. Time allotted for questions regarding the notice. Patient /representative provided a completed copy of the MOON/VOON notice. Copy placed on bedside chart. PCP: Barber Abreu PA-C. Patient has appointment scheduled this Thursday, 3/10/22. Transport: Patient's sister is expected to transport today at discharge. DC likely today. PT/OT do not have any recommendations for discharge. No further CM needs identified. Spencer Meckel) [place] Screen, M.S.EUNICE.

## 2022-03-08 NOTE — CONSULTS
Consult dictated. 18-year-old presenting with sudden onset of dizziness/vertigo, right face arm and leg tingling that lasted 10 to 15 minutes. She has had 3 other similar episodes over the past 6 months. Her work-up is negative. Exact etiology is unclear. Vestibular/basilar migraines possible. Recurrent stereotypical symptoms are atypical for a TIA. Suspicion for seizures is also low but recommend checking EEG as outpatient. Okay to discharge home.   Aria Childs MD

## 2022-03-08 NOTE — DISCHARGE INSTRUCTIONS
Patient Education        Hypokalemia: Care Instructions  Your Care Instructions     Hypokalemia (say \"un-zu-sei-JOSE F-chloe-uh\") is a low level of potassium. The heart, muscles, kidneys, and nervous system all need potassium to work well. This problem has many different causes. Kidney problems, diet, and medicines like diuretics and laxatives can cause it. So can vomiting or diarrhea. In some cases, cancer is the cause. Your doctor may do tests to find the cause of your low potassium levels. You may need medicines to bring your potassium levels back to normal. You may also need regular blood tests to check your potassium. If you have very low potassium, you may need intravenous (IV) medicines. You also may need tests to check the electrical activity of your heart. Heart problems caused by low potassium levels can be very serious. Follow-up care is a key part of your treatment and safety. Be sure to make and go to all appointments, and call your doctor if you are having problems. It's also a good idea to know your test results and keep a list of the medicines you take. How can you care for yourself at home? · If your doctor recommends it, eat foods that have a lot of potassium. These include fresh fruits, juices, and vegetables. They also include nuts, beans, and milk. · Be safe with medicines. If your doctor prescribes medicines or potassium supplements, take them exactly as directed. Call your doctor if you have any problems with your medicines. · Get your potassium levels tested as often as your doctor tells you. When should you call for help? Call 911 anytime you think you may need emergency care. For example, call if:    · You feel like your heart is missing beats. Heart problems caused by low potassium can cause death.     · You passed out (lost consciousness).     · You have a seizure.    Call your doctor now or seek immediate medical care if:    · You feel weak or unusually tired.     · You have severe arm or leg cramps.     · You have tingling or numbness.     · You feel sick to your stomach, or you vomit.     · You have belly cramps.     · You feel bloated or constipated.     · You have to urinate a lot.     · You feel very thirsty most of the time.     · You are dizzy or lightheaded, or you feel like you may faint.     · You feel depressed, or you lose touch with reality. Watch closely for changes in your health, and be sure to contact your doctor if:    · You do not get better as expected. Where can you learn more? Go to http://www.gray.com/  Enter G358 in the search box to learn more about \"Hypokalemia: Care Instructions. \"  Current as of: July 28, 2021               Content Version: 13.2  © 1693-7837 [employer], [employer]. Care instructions adapted under license by [employer] (which disclaims liability or warranty for this information). If you have questions about a medical condition or this instruction, always ask your healthcare professional. [address] any warranty or liability for your use of this information.

## 2022-03-08 NOTE — ED NOTES
TRANSFER - OUT REPORT:    Verbal report given to Castro Betancur RN (name) sin Earl  being transferred to [facility] (unit) for routine progression of care       Report consisted of patients Situation, Background, Assessment and Recommendations(SBAR). Information from the following report(s) SBAR, ED Summary, MAR, Recent Results and Med Rec Status was reviewed with the receiving nurse. Lines:   Peripheral IV 03/07/22 Right Antecubital (Active)   Site Assessment Clean, dry, & intact 03/07/22 1716   Phlebitis Assessment 0 03/07/22 1716   Infiltration Assessment 0 03/07/22 1716   Dressing Status Clean, dry, & intact 03/07/22 1716   Dressing Type Transparent;Tape 03/07/22 1716   Hub Color/Line Status Green;Flushed 03/07/22 1716   Action Taken Blood drawn 03/07/22 1716        Opportunity for questions and clarification was provided.       Patient transported with:  Monitor

## 2022-03-08 NOTE — H&P
History & Physical    Primary Care Provider: Vincent Duque PA-C  Source of Information: Patient and chart review    History of Presenting Illness:   Jaye Ordonez is a 27 y.o. female w/ hx of obesity, htn who presented to er with complaints of dizziness and right facial, upper extremity and lower extremity weakness and numbness. Patient denies any history of migraine headaches but does endorse some intermittent headaches. She states an hour prior to ER presentation, she experienced some dizziness which she reports as a sensation of room spinning as well as her paresthesia symptoms. The patient denies any fever, chills, chest or abdominal pain, nausea, vomiting, cough, congestion, recent illness, palpitations, or dysuria. Remarkable vitals on ER Presentations: BP to 147/118  Labs Remarkable for: Hemoglobin 10.4, k-2.7  ER Images: CT head and CTA Head and Neck: no acute process. ER Rx: potassium 40meq total     Review of Systems:  A comprehensive review of systems was negative except for that written in the History of Present Illness.      Past Medical History:   Diagnosis Date    Abnormal Pap smear of cervix     seeing OBGYN    Acne 10/7/2009    Allergic rhinitis 2009    Anemia 2009    ASCUS HPV+ 2017    H/o ASCUS HPV + with Dr. Vero Wakefield 2017    CHAR III (cervical intraepithelial neoplasia III) 2017    found by LEEP     Headache     Herpes genitalia     dx by OB    History of gestational diabetes     diet controlled    Other ill-defined conditions(799.89)     scoliosis    PCB (post coital bleeding) 2014    Thoracic compression fracture ([address] 3/2014    Duayne Alireza    Thyromegaly 2014    On exam     Unspecified vitamin D deficiency 2014    Varicella 1998      Past Surgical History:   Procedure Laterality Date    HX GYN           HX LEEP PROCEDURE  18    found CHAR 3     Prior to Admission medications    Medication Sig Start Date End Date Taking? Authorizing Provider   albuterol (ProAir HFA) 90 mcg/actuation inhaler Take 2 Puffs by inhalation every four (4) hours as needed for Wheezing. 1/12/22   Mario Guerrero DO   ascorbic acid (MAINOR-C PO) Take  by mouth. Padma Smith MD   VITAMIN B COMPLEX PO Take  by mouth. Padma Smith MD   ZINC PO Take  by mouth. Padma Smith MD   ibuprofen (MOTRIN) 600 mg tablet Take 1 Tablet by mouth every six (6) hours as needed (pain). Take with food or milk each time. 12/22/21   Jesus Patterson MD   hydroCHLOROthiazide (HYDRODIURIL) 25 mg tablet TAKE 1 TABLET BY MOUTH EVERY DAY 12/20/21   Elvia Wellington PA-C   ipratropium (ATROVENT) 42 mcg (0.06 %) nasal spray 2 SPRAYS INTO EACH NOSTRIL 3 TIMES DAILY, PRN CONGESTION  Patient not taking: Reported on 12/6/2021 12/1/21   Elvia Wellington PA-C   budesonide (Rhinocort Allergy) 32 mcg/actuation nasal spray 2 Sprays by Both Nostrils route daily. 9/28/21   BOSSMAN Steward     Allergies   Allergen Reactions    Metronidazole Itching      Family History   Problem Relation Age of Onset    Breast Cancer Maternal Grandmother         great, great gma, age?     Hypertension Maternal Grandmother     Breast Cancer Maternal Aunt 32        survivor    Diabetes Father     Hypertension Father     Hypertension Paternal Grandmother     Diabetes Paternal Grandmother     No Known Problems Brother     Osteoporosis Neg Hx         SOCIAL HISTORY:  Patient resides:  Independently x   Assisted Living    SNF    With family care       Smoking history:   None x   Former    Chronic      Alcohol history:   None x   Social    Chronic      Ambulates:   Independently x   w/cane    w/walker    w/wc    CODE STATUS:  DNR    Full x   Other      Objective:     Physical Exam:     Visit Vitals  BP (!) 131/96   Pulse 75   Temp 98.1 °F (36.7 °C)   Resp 12   Ht 5' 5\" (1.651 m)   Wt 91.5 kg (201 lb 11.5 oz)   SpO2 100%   BMI 33.57 kg/m² O2 Device: None (Room air)    General:  Alert, cooperative, no distress, appears stated age. Head:  Normocephalic, without obvious abnormality, atraumatic. Eyes:  Conjunctivae/corneas clear. PERRL, EOMs intact. Nose: Nares normal. Septum midline. Mucosa normal.        Neck: Supple, symmetrical, trachea midline, no carotid bruit and no JVD. Lungs:   Clear to auscultation bilaterally. Chest wall:  No tenderness or deformity. Heart:  Regular rate and rhythm, S1, S2 normal, no murmur, click, rub or gallop. Abdomen:   Soft, non-tender. Bowel sounds normal. No masses,  No organomegaly. Extremities: Extremities normal, atraumatic, no cyanosis or edema. Pulses: 2+ and symmetric all extremities. Skin: Skin color, texture, turgor normal. No rashes or lesions   Neurologic: CNII-XII intact. EKG:  NSR  Data Review:     Recent Days:  Recent Labs     03/07/22  1715   WBC 7.6   HGB 10.4*   HCT 32.7*   *     Recent Labs     03/07/22  1715   *   K 2.7*   CL 97   CO2 29   GLU 89   BUN 10   CREA 0.84   CA 9.1   ALB 3.7   ALT 23   INR 1.0     No results for input(s): PH, PCO2, PO2, HCO3, FIO2 in the last 72 hours.     24 Hour Results:  Recent Results (from the past 24 hour(s))   GLUCOSE, POC    Collection Time: 03/07/22  5:11 PM   Result Value Ref Range    Glucose (POC) 92 65 - 117 mg/dL    Performed by Sis Lawson    CBC WITH AUTOMATED DIFF    Collection Time: 03/07/22  5:15 PM   Result Value Ref Range    WBC 7.6 3.6 - 11.0 K/uL    RBC 3.61 (L) 3.80 - 5.20 M/uL    HGB 10.4 (L) 11.5 - 16.0 g/dL    HCT 32.7 (L) 35.0 - 47.0 %    MCV 90.6 80.0 - 99.0 FL    MCH 28.8 26.0 - 34.0 PG    MCHC 31.8 30.0 - 36.5 g/dL    RDW 13.8 11.5 - 14.5 %    PLATELET 623 (H) 389 - 400 K/uL    MPV 8.9 8.9 - 12.9 FL    NRBC 0.0 0  WBC    ABSOLUTE NRBC 0.00 0.00 - 0.01 K/uL    NEUTROPHILS 54 32 - 75 %    LYMPHOCYTES 34 12 - 49 %    MONOCYTES 9 5 - 13 %    EOSINOPHILS 3 0 - 7 %    BASOPHILS 0 0 - 1 % IMMATURE GRANULOCYTES 0 0.0 - 0.5 %    ABS. NEUTROPHILS 4.0 1.8 - 8.0 K/UL    ABS. LYMPHOCYTES 2.6 0.8 - 3.5 K/UL    ABS. MONOCYTES 0.7 0.0 - 1.0 K/UL    ABS. EOSINOPHILS 0.2 0.0 - 0.4 K/UL    ABS. BASOPHILS 0.0 0.0 - 0.1 K/UL    ABS. IMM. GRANS. 0.0 0.00 - 0.04 K/UL    DF AUTOMATED     METABOLIC PANEL, COMPREHENSIVE    Collection Time: 03/07/22  5:15 PM   Result Value Ref Range    Sodium 135 (L) 136 - 145 mmol/L    Potassium 2.7 (LL) 3.5 - 5.1 mmol/L    Chloride 97 97 - 108 mmol/L    CO2 29 21 - 32 mmol/L    Anion gap 9 5 - 15 mmol/L    Glucose 89 65 - 100 mg/dL    BUN 10 6 - 20 MG/DL    Creatinine 0.84 0.55 - 1.02 MG/DL    BUN/Creatinine ratio 12 12 - 20      GFR est AA >60 >60 ml/min/1.73m2    GFR est non-AA >60 >60 ml/min/1.73m2    Calcium 9.1 8.5 - 10.1 MG/DL    Bilirubin, total 0.3 0.2 - 1.0 MG/DL    ALT (SGPT) 23 12 - 78 U/L    AST (SGOT) 23 15 - 37 U/L    Alk. phosphatase 52 45 - 117 U/L    Protein, total 9.6 (H) 6.4 - 8.2 g/dL    Albumin 3.7 3.5 - 5.0 g/dL    Globulin 5.9 (H) 2.0 - 4.0 g/dL    A-G Ratio 0.6 (L) 1.1 - 2.2     PROTHROMBIN TIME + INR    Collection Time: 03/07/22  5:15 PM   Result Value Ref Range    INR 1.0 0.9 - 1.1      Prothrombin time 10.2 9.0 - 11.1 sec   EKG, 12 LEAD, INITIAL    Collection Time: 03/07/22  6:02 PM   Result Value Ref Range    Ventricular Rate 76 BPM    Atrial Rate 76 BPM    P-R Interval 172 ms    QRS Duration 90 ms    Q-T Interval 406 ms    QTC Calculation (Bezet) 456 ms    Calculated P Axis 51 degrees    Calculated T Axis 13 degrees    Diagnosis       Normal sinus rhythm  Possible Left atrial enlargement  Left ventricular hypertrophy  Abnormal ECG  When compared with ECG of 30-DEC-2021 06:38,  Questionable change in QRS axis           Imaging:     Assessment:     Jaye Ordonez is a 27 y.o. female w/ hx of obesity, htn who is admitted for left sided paresthesias.        Plan:       Left Sided Paresthesias  Dizziness  -Per ER, patient evaluated by neurology who recommended admission for additional work-up  -Paresthesias certainly could be explained by profound hypokalemia  -CT head, CTA head and neck negative  -Will obtain MRI brain  -Check TSH, mag, phosphorus, ESR, CRP, NANCI  -Additional work-up per neurology consult    HTN  -As needed hydralazine with parameters    Hypokalemia  -Monitor and replete potassium  -Check magnesium          FEN/GI -  PO Hydration  Activity - as tolerated  DVT prophylaxis - SCDS  GI prophylaxis -  NI  Disposition - Home    CODE STATUS:  Full code       Signed By: Larry Hernandez MD     March 8, 2022

## 2022-03-08 NOTE — CONSULTS
[address]    Name:  Laury Sumner  MR#:  [id number]  :  1995  ACCOUNT #:  [de-identified]  DATE OF SERVICE:  2022    REQUESTING PHYSICIAN:  Veronika Judd MD    REASON FOR EVALUATION:  Dizziness and right-sided numbness. HISTORY OF PRESENT ILLNESS:  The patient is 25-year-old female with past medical history of hypertension, allergic rhinitis, cervical intraepithelial neoplasia, who had a normal day at work and was about to leave when she started to suddenly feel dizzy and everything started to spin around. She was also feeling a tingling sensation in the right arm and leg and right side of her face. She drove back home, rested for a little bit, and when she got up, she again felt dizzy. She decided to come to the emergency department and was admitted for further workup. She tells me that she has had at least three other similar episodes, each lasting 10-15 minutes over the past 6 months. She usually gets a slight headache afterwards, but not too bothersome. No photophobia, phonophobia, nausea or vomiting. Denies any history of migraines. No chest pain, shortness of breath, palpitations, nausea or vomiting. PAST MEDICAL HISTORY:  As mentioned above. HOME MEDICATIONS:  1. Albuterol. 2.  Multivitamins. 3.  Hydrochlorothiazide. 4.  Budesonide. ALLERGIES:  METRONIDAZOLE. SOCIAL HISTORY:  Lives independently. No history of smoking or alcohol use. FAMILY HISTORY:  Noncontributory. PHYSICAL EXAMINATION:  GENERAL:  The patient is alert, fully oriented. VITAL SIGNS:  Blood pressure 122/84, temperature 98.2, pulse is 92. HEART:  Regular rate and rhythm. CHEST:  Clear. ABDOMEN:  Soft, nontender. Positive bowel sounds. EXTREMITIES:  No edema. NEUROLOGIC:  Speech is clear. Comprehension is normal.  Pupils are equal, round, and reactive. Extraocular movements are full. Face is symmetric. Tongue is midline. Hearing is baseline.   Muscle tone and bulk normal.  Strength normal in both upper and lower extremities. DTRs 2/2 and symmetric. Toes downgoing. Gait is baseline. Julee-Hallpike testing is negative. LABORATORY DATA:  CBC is unremarkable except for hemoglobin 10.4, hematocrit 32.7, sed rate is 60. Chemistry, sodium 136, potassium 3.0, BUN 7, creatinine 0.72. Hemoglobin A1c is 6.1. CT of the head and neck is normal.  MRI scan of the brain is normal.  There is no acute infarction. ASSESSMENT AND PLAN:  49-year-old female with no significant vascular risk factors or coagulopathy, who is presenting with sudden onset of dizziness/vertigo along with right face, arm and leg tingling that lasted for about 10-15 minutes. She has had three other similar episodes over the past 6 months. Her stroke workup is negative. Exact etiology of her symptoms is unclear. Vestibular/basilar migraine is a possibility. Recurrent seizure typical symptoms are atypical for a transient ischemic attack. Negative brain MRI is reassuring. Suspicion for seizures is also low, but recommend checking electroencephalogram as an outpatient. Okay to discharge home. Please call with any further questions. Thank you for this consultation.       Rosamaria Mckeon MD      [id number]  D:  03/08/2022 13:46  T:  03/08/2022 16:19  JOB #:  [id number]

## 2022-03-09 ENCOUNTER — PATIENT OUTREACH (OUTPATIENT)
Dept: CASE MANAGEMENT | Age: 27
End: 2022-03-09

## 2022-03-09 LAB
ANA SER QL: POSITIVE
CENTROMERE B AB SER-ACNC: <0.2 AI (ref 0–0.9)
CHROMATIN AB SERPL-ACNC: <0.2 AI (ref 0–0.9)
DSDNA AB SER-ACNC: <1 IU/ML (ref 0–9)
ENA JO1 AB SER-ACNC: <0.2 AI (ref 0–0.9)
ENA RNP AB SER-ACNC: 0.2 AI (ref 0–0.9)
ENA SCL70 AB SER-ACNC: <0.2 AI (ref 0–0.9)
ENA SM AB SER-ACNC: <0.2 AI (ref 0–0.9)
ENA SS-A AB SER-ACNC: >8 AI (ref 0–0.9)
ENA SS-B AB SER-ACNC: <0.2 AI (ref 0–0.9)
SEE BELOW, 164869: ABNORMAL

## 2022-03-09 NOTE — PROGRESS NOTES
Care Transitions Initial Call    Call within 2 business days of discharge: Yes     Patient: Maikol Cevallos Patient : 1995 MRN: [id number]    Last Discharge [facility]IL[facility]       Complaint Diagnosis Description Type Department Provider    3/7/22 Numbness Paresthesia . .. ED to Hosp-Admission (Discharged) (ADMIT) Janusz Bowman MD; Tara Murrieta,... Was this an external facility discharge? No     Challenges to be reviewed by the provider   Additional needs identified to be addressed with provider:   K 3.0 on discharge, consider repeat BMP  Patient has questions regarding lab results, ie NANCI  Reviewed HCTZ       Method of communication with provider : chart routing    Discussed COVID-19 related testing which was not done at this time. Advance Care Planning:   Does patient have an Advance Directive:  currently not on file; patient declined education \"not worried about that right now\" CTN advised patient about the legal NOK in [place]    Inpatient Readmission Risk score: No data recorded  Was this a readmission? yes   Patient stated reason for the admission: dizziness/weakness    Patients top risk factors for readmission: medical condition-dizziness, weakness and medication management   Interventions to address risk factors: Scheduled appointment with PCP-3/10, Obtained and reviewed discharge summary and/or continuity of care documents and Assessment and support for treatment adherence and medication management-reviewed HCTZ    Care Transition Nurse (CTN) contacted the patient by telephone to perform post hospital discharge assessment. Verified name and  with patient as identifiers. Provided introduction to self, and explanation of the CTN role. Reports feeling better today. States she was feeling bad yesterday with dizziness and weakness. Reviewed HCTZ, diuretic and hypokalemia. Discussed taking K supplements, eat bananas.     CTN reviewed discharge instructions, medical action plan and red flags with patient who verbalized understanding. Were discharge instructions available to patient? yes. Reviewed appropriate site of care based on symptoms and resources available to patient including: PCP. Patient given an opportunity to ask questions and does not have any further questions or concerns at this time. The patient agrees to contact the PCP office for questions related to their healthcare. Medication reconciliation was performed with patient, who verbalizes understanding of administration of home medications. Referral to Pharm D needed: no     Home Health/Outpatient orders at discharge: none    Durable Medical Equipment ordered at discharge: None    Covid Risk Education    Educated patient about risk for severe COVID-19 due to risk factors according to CDC guidelines. CTN reviewed discharge instructions, medical action plan and red flag symptoms with the patient who verbalized understanding. Discussed COVID vaccination status: yes, vaccinated, not boosted. Education provided on COVID-19 vaccination as appropriate. Discussed exposure protocols and quarantine with CDC Guidelines. Patient was given an opportunity to verbalize any questions and concerns and agrees to contact CTN or health care provider for questions related to their healthcare. Was patient discharged with a pulse oximeter? no.     Discussed follow-up appointments. If no appointment was previously scheduled, appointment scheduling offered: no. Is follow up appointment scheduled within 7 days of discharge? yes. [facility] follow up appointment(s):   Future Appointments   Date Time Provider Ana Thorpe   3/10/2022  3:30 PM Elvia Wellington PA-C [facility]     Non-BS follow up appointment(s): NA    Plan for follow-up call in 7-10 days based on severity of symptoms and risk factors.   Plan for next call: symptom management-dizziness, follow up appointment-PCP and medication management-HCTZ  CTN provided contact information for future needs. Goals Addressed                 This Visit's Progress     Prevent complications post hospitalization.           03/10/22   Will attend follow up appt with PCP scheduled this afternoon   Will report compliance with K supplements   Will not fall during SHAWN period   Pt will remain out of the hospital or ER for remainder of SHAWN period   CTN plans to follow up next week

## 2022-03-10 ENCOUNTER — OFFICE VISIT (OUTPATIENT)
Dept: INTERNAL MEDICINE CLINIC | Age: 27
End: 2022-03-10
Payer: COMMERCIAL

## 2022-03-10 VITALS
WEIGHT: 186 LBS | BODY MASS INDEX: 30.99 KG/M2 | DIASTOLIC BLOOD PRESSURE: 92 MMHG | RESPIRATION RATE: 16 BRPM | OXYGEN SATURATION: 99 % | HEIGHT: 65 IN | TEMPERATURE: 97.9 F | SYSTOLIC BLOOD PRESSURE: 130 MMHG | HEART RATE: 89 BPM

## 2022-03-10 DIAGNOSIS — D64.9 ANEMIA, UNSPECIFIED TYPE: ICD-10-CM

## 2022-03-10 DIAGNOSIS — R77.9 ELEVATED BLOOD PROTEIN: ICD-10-CM

## 2022-03-10 DIAGNOSIS — E87.6 HYPOKALEMIA: ICD-10-CM

## 2022-03-10 DIAGNOSIS — M35.00 SICCA SYNDROME (HCC): ICD-10-CM

## 2022-03-10 DIAGNOSIS — I10 ESSENTIAL HYPERTENSION: Primary | ICD-10-CM

## 2022-03-10 DIAGNOSIS — J30.9 ALLERGIC RHINITIS, UNSPECIFIED SEASONALITY, UNSPECIFIED TRIGGER: ICD-10-CM

## 2022-03-10 DIAGNOSIS — R76.8 SS-A ANTIBODY POSITIVE: ICD-10-CM

## 2022-03-10 DIAGNOSIS — R76.8 ANA POSITIVE: ICD-10-CM

## 2022-03-10 DIAGNOSIS — R20.2 PARESTHESIA: ICD-10-CM

## 2022-03-10 DIAGNOSIS — R73.03 PREDIABETES: ICD-10-CM

## 2022-03-10 PROCEDURE — 99214 OFFICE O/P EST MOD 30 MIN: CPT | Performed by: PHYSICIAN ASSISTANT

## 2022-03-10 RX ORDER — OLOPATADINE HYDROCHLORIDE 665 UG/1
2 SPRAY NASAL 2 TIMES DAILY
Qty: 30.5 G | Refills: 5 | Status: SHIPPED | OUTPATIENT
Start: 2022-03-10 | End: 2022-04-21

## 2022-03-10 RX ORDER — VALSARTAN 80 MG/1
80 TABLET ORAL DAILY
Qty: 90 TABLET | Refills: 1 | Status: SHIPPED | OUTPATIENT
Start: 2022-03-10 | End: 2022-06-30 | Stop reason: SDUPTHER

## 2022-03-10 NOTE — PROGRESS NOTES
HPI:  27 y.o.  presents for follow up appointment. No hospital, ER or specialist visits since last primary care visit except as noted below. SHAWN    Right Leg still feels asleep  The upper body and right side arm feels better  She still feels dizzy    Saw rheum Dr Yonis Guy in 2018, dry mouth and dry eyes  (+) NANCI by IFA 8/2018, 1:640, speckled  Anti-SSA (+); anti-SSB (-)  Ds DNA (-)  She never had salivary gland biopsy and did not follow up after initial lab work    In hospital, A1C 6.1% on 3/8/22, previously 5.4% in 6/2021    Budesonide did not help,nor did flonase  atrovent nasal gave her HA  Has not been using her zyrtec  Does intermittent lili pot    Patient Active Problem List    Diagnosis    CVA [facility]cerebral vascular accident) [facility] [address]    Hypermobility syndrome    Arthralgia    Costochondritis    Carpal tunnel syndrome of right wrist    NANCI positive    SS-A antibody positive    Rheumatoid factor positive    Acute bacterial sinusitis    Acute bronchitis due to other specified organisms    Essential hypertension    History of gestational diabetes     diet controlled      Mastodynia     Nl b/l breast US in 2016.    Prolactin pending 12/22/2016   With galactorrhea 2/2 depo      Thyromegaly     On exam      Atypical mole     Noted at vaginal introitus (7 o'clock position)      Vitamin D deficiency    History of compression fracture of spine, thoracic with chronic back pain    Allergic rhinitis    Anemia         Past Medical History:   Diagnosis Date    Abnormal Pap smear of cervix 2017    seeing OBGYN    Acne 10/7/2009    Allergic rhinitis 9/2/2009    Anemia 9/2/2009    ASCUS HPV+ 09/14/2017    H/o ASCUS HPV + with Dr. Kari Frazier 9/14/2017    CHAR III (cervical intraepithelial neoplasia III) 11/30/2017    found by LEEP     Headache     Herpes genitalia 2017    dx by OB    History of gestational diabetes     diet controlled    Other ill-defined conditions(799.89)     scoliosis    PCB (post coital bleeding) 11/25/2014    Thoracic compression fracture ([address] 3/2014    Yousif Lopez    Thyromegaly 11/25/2014    On exam     Unspecified vitamin D deficiency 6/26/2014    Varicella 5/1/1998       Social History     Tobacco Use    Smoking status: Never Smoker    Smokeless tobacco: Never Used   Vaping Use    Vaping Use: Never used   Substance Use Topics    Alcohol use: Yes     Comment: once every 3 months, 3 drinks at a time    Drug use: No       Outpatient Medications Marked as Taking for the 3/10/22 encounter (Office Visit) with Elvia Mitchell PA-C   Medication Sig Dispense Refill    potassium chloride SR (K-TAB) 20 mEq tablet Take 1 Tablet by mouth daily. 5 Tablet 0    ascorbic acid (MAINOR-C PO) Take  by mouth.  VITAMIN B COMPLEX PO Take  by mouth.  ZINC PO Take 50 mg by mouth daily.  ibuprofen (MOTRIN) 600 mg tablet Take 1 Tablet by mouth every six (6) hours as needed (pain). Take with food or milk each time. 20 Tablet 0    hydroCHLOROthiazide (HYDRODIURIL) 25 mg tablet TAKE 1 TABLET BY MOUTH EVERY DAY 90 Tablet 1       Allergies   Allergen Reactions    Metronidazole Itching       ROS:  ROS negative except as per HPI.       PE:  Visit Vitals  BP (!) 130/92 (BP 1 Location: Left arm, BP Patient Position: Sitting, BP Cuff Size: Adult)   Pulse 89   Temp 97.9 °F (36.6 °C) (Temporal)   Resp 16   Ht 5' 5\" (1.651 m)   Wt 186 lb (84.4 kg)   SpO2 99%   BMI 30.95 kg/m²     Gen: alert, oriented, no acute distress  Head: normocephalic, atraumatic  Ears: external auditory canals clear, TMs without erythema or effusion  Eyes: pupils equal round reactive to light, sclera clear, conjunctiva clear  Oral: moist mucus membranes, no oral lesions, no pharyngeal inflammation or exudate  Neck: symmetric normal sized thyroid, no carotid bruits, no jugular vein distention  Resp: no increase work of breathing, lungs clear to ausculation bilaterally, no wheezing, rales or rhonchi  CV: S1, S2 normal. No murmurs, rubs, or gallops. Abd: soft, not tender, not distended. No hepatosplenomegaly. Normal bowel sounds. Neuro: cranial nerves intact, normal strength and movement in all extremities, reflexes and sensation intact and symmetric. Skin: no lesion or rash  Extremities: no cyanosis or edema    No results found for this visit on 03/10/22. Assessment/Plan:    There are no diagnoses linked to this encounter. Hypokalemia on replacement  Had low K+ at ER visit with vomiting the month prior  Is on HCTZ which could lower her K+ as well    /92 not controlled  Will stop HCTZ  Switch to valsartan    (+)NANCI screen in hospital, but this was known already  Has known Sjogren's, (+)anti-SSA and NANCI by IFA in 2018  Saw rheumatology once in 2018, Dr Marguerite Rooney, but she left the practice and pt did not follow up  Advised to re-establish with rheumatology    I recommend to start Patanase for her allergies  She may try Xyzal instead of Zyrtec, but realized after she left this could worsen her sicca symptoms, will review once labs come back  Continue lili pot  She requested allergy referral, but also will suggest ENT as her symptoms may be aggravated by the Sjogren's    A1C elevated in the hospital, will need to follow prediabetic diet and recheck in 3-6 mos    Health Maintenance reviewed - updated.     Orders Placed This Encounter    PROTEIN ELECTROPHORESIS W/ REFLX LA     Standing Status:   Future     Standing Expiration Date:   3/14/2023    COMPLEMENT, C3 & C4     Standing Status:   Future     Standing Expiration Date:   3/14/2023    C REACTIVE PROTEIN, QT     Standing Status:   Future     Standing Expiration Date:   3/14/2023    SED RATE (ESR)     Standing Status:   Future     Standing Expiration Date:   3/14/2023    MAGNESIUM     Standing Status:   Future     Standing Expiration Date:   4/94/7964    METABOLIC PANEL, COMPREHENSIVE     Standing Status:   Future     Standing Expiration Date:   3/14/2023   [facility] URINALYSIS W/ REFLEX CULTURE     Standing Status:   Future     Standing Expiration Date:   3/14/2023    REFERRAL TO ALLERGY     Referral Priority:   Routine     Referral Type:   Consultation     Referral Reason:   Specialty Services Required     Referred to Provider:   Waleska Kumar MD     Requested Specialty:   Allergy     Number of Visits Requested:   1    REFERRAL TO RHEUMATOLOGY     Referral Priority:   Routine     Referral Type:   Consultation     Referral Reason:   Specialty Services Required     Referred to Provider:   Carissa Ruiz MD     Requested Specialty:   Rheumatology     Number of Visits Requested:   1    olopatadine (PATANASE) 0.6 % spry     Si Squirts by Both Nostrils route two (2) times a day. Dispense:  30.5 g     Refill:  5    valsartan (DIOVAN) 80 mg tablet     Sig: Take 1 Tablet by mouth daily. Dispense:  90 Tablet     Refill:  1       There are no discontinued medications. Current Outpatient Medications   Medication Sig Dispense Refill    potassium chloride SR (K-TAB) 20 mEq tablet Take 1 Tablet by mouth daily. 5 Tablet 0    ascorbic acid (MAINOR-C PO) Take  by mouth.  VITAMIN B COMPLEX PO Take  by mouth.  ZINC PO Take 50 mg by mouth daily.  ibuprofen (MOTRIN) 600 mg tablet Take 1 Tablet by mouth every six (6) hours as needed (pain). Take with food or milk each time. 20 Tablet 0    hydroCHLOROthiazide (HYDRODIURIL) 25 mg tablet TAKE 1 TABLET BY MOUTH EVERY DAY 90 Tablet 1    albuterol (ProAir HFA) 90 mcg/actuation inhaler Take 2 Puffs by inhalation every four (4) hours as needed for Wheezing. 18 g 0    ipratropium (ATROVENT) 42 mcg (0.06 %) nasal spray 2 SPRAYS INTO EACH NOSTRIL 3 TIMES DAILY, PRN CONGESTION (Patient not taking: Reported on 2021) 15 mL 1    budesonide (Rhinocort Allergy) 32 mcg/actuation nasal spray 2 Sprays by Both Nostrils route daily. 8.6 g 0       Recommended healthy diet low in carbohydrates, fats, sodium and cholesterol. Recommended regular cardiovascular exercise 3-6 times per week for 30-60 minutes daily. Verbal and written instructions (see AVS) provided. Patient expresses understanding of diagnosis and treatment plan.

## 2022-03-10 NOTE — PROGRESS NOTES
Delia Morales is a 27 y.o. female     Chief Complaint   Patient presents with   [facility] Follow Up     seen at [facility] 3/7/2022 - 3/8/2022 for CVA       Visit Vitals  BP (!) 130/92 (BP 1 Location: Left arm, BP Patient Position: Sitting, BP Cuff Size: Adult)   Pulse 89   Temp 97.9 °F (36.6 °C) (Temporal)   Resp 16   Ht 5' 5\" (1.651 m)   Wt 186 lb (84.4 kg)   SpO2 99%   BMI 30.95 kg/m²       Health Maintenance Due   Topic Date Due    Pap Smear  10/03/2020    DTaP/Tdap/Td series (5 - Td or Tdap) 02/15/2021    Flu Vaccine (1) 09/01/2021         1. \"Have you been to the ER, urgent care clinic since your last visit? Hospitalized since your last visit? \" Yes When: 3/7/22 Where: [facility] Reason for visit: CVA    2. \"Have you seen or consulted any other health care providers outside of the [address] [place] [address] since your last visit? \" No     3. For patients aged 39-70: Has the patient had a colonoscopy / FIT/ Cologuard? NA - based on age      If the patient is female:    4. For patients aged 41-77: Has the patient had a mammogram within the past 2 years? NA - based on age or sex      11. For patients aged 21-65: Has the patient had a pap smear?  No

## 2022-03-10 NOTE — ACP (ADVANCE CARE PLANNING)
Advance Care Planning:   Does patient have an Advance Directive:  currently not on file; patient declined education \"not worried about that right now\". CTN advised patient about the legal NOK in [place].

## 2022-03-10 NOTE — PATIENT INSTRUCTIONS
Learning About High-Potassium Foods  What foods are high in potassium? The foods you eat contain nutrients, such as vitamins and minerals. Potassium is a nutrient. Your body needs the right amount to stay healthy and work as it should. You can use the list below to help you make choices about which foods to eat. The foods in this list have at least 200 milligrams (mg) of potassium per serving. Fruits  · Apricots (dried), ¼ cup  · Avocado, ½ fruit  · Banana, 1 medium  · Danilo, 1 fruit  · Nectarine, 1 fruit  · Orange, 1 fruit  · Prunes, 5 fruits  · Raisins, ¼ cup  Vegetables  · Artichoke, 1 medium  · Beets, ½ cup  · Broccoli, ½ cup  · [place] sprouts, ½ cup  · Potato with skin, 1 medium  · Spinach, ½ cup  · Sweet potato, 1 medium  · Tomato sauce, ½ cup  · Zucchini, ½ cup  Dairy and dairy alternatives  · Milk, 1 cup  · Soy milk, 1 cup  · Yogurt, 6 oz  Meats and other protein foods  · Beans (lima, navy, white), ½ cup  · Beef, ground, 3 oz  · Chicken, 3 oz  · Fish (halibut, tuna, cod, snapper), 3 oz  · Nuts (almonds, hazelnuts, [place], cashew, pistachios), 1 oz  · Peanut butter, 2 Tbsp  · Peanuts, 1 oz  · [place] 3 oz  Seasonings  · Salt substitutes  Work with your doctor to find out how much of this nutrient you need. Depending on your health, you may need more or less of it in your diet. Where can you learn more? Go to http://www.gray.com/  Enter P450 in the search box to learn more about \"Learning About High-Potassium Foods. \"  Current as of: September 8, 2021               Content Version: 13.2  © 9835-4204 [employer]. Care instructions adapted under license by [employer] (which disclaims liability or warranty for this information). If you have questions about a medical condition or this instruction, always ask your healthcare professional. [place] [address] any warranty or liability for your use of this information.          Paula's Syndrome: Care Instructions  Overview     Sjögren's syndrome (say \"SHOH-grins\") causes your body's defense, or immune, system to attack the glands that make moisture. The condition affects your tear and saliva glands and sometimes other parts of your body. Your eyes and mouth get very dry. Sjögren's also may cause you to be very tired and to have pain in your joints. A small number of people may have problems with their lungs, kidneys, and nerves. Even though there is no cure for Sjögren's, you can treat the symptoms. You can use artificial tears to keep your eyes moist. Saliva substitutes, water, or prescription medicines can help keep your mouth and throat moist.  Follow-up care is a key part of your treatment and safety. Be sure to make and go to all appointments, and call your doctor if you are having problems. It's also a good idea to know your test results and keep a list of the medicines you take. How can you care for yourself at home? For your eyes  · Use artificial tears during the day. If one brand doesn't work, try another. Try to use preservative-free drops. They may be easier on your eyes. · Avoid medicines that are known to cause dry eyes. These include antihistamines, diuretics, and some antidepressants. Talk with your doctor if you take any of these medicines. Sometimes the benefits of a medicine outweigh the risks. · Use a lubricant at night. It is thicker and lasts longer than artificial tears, so you have less burning, dryness, and itching when you wake up in the morning. The ointment may blur your vision for a short time, so use it before going to bed. · Wear wraparound sunglasses to protect your eyes from wind and dust.  · Avoid smoke. It irritates your eyes. · Keep makeup away from your eyes. Or you may want to avoid eye makeup. For your mouth  · Drink fluids during the day to keep your mouth moist. Try drinking small sips of water and rinsing your mouth a lot.   · Use mouthwash or spray to keep your mouth wet. · Brush your teeth with fluoride toothpaste two times a day and after meals, and floss your teeth every day. · Visit the dentist two times a year, or more if needed, to prevent and treat tooth decay. · Use sugar-free gum or candies such as lemon drops. They increase saliva. (Sugar can increase your risk for cavities and yeast infections.)  · Avoid over-the-counter medicines that can cause dryness. These include antihistamines, such as Benadryl or Chlor-Trimeton. For other parts of your body  · Take anti-inflammatory medicines if you have joint pain and swelling. These include ibuprofen (Advil, Motrin) and naproxen (Aleve). Read and follow all instructions on the label. · Use moisturizing skin creams or ointments during the day. · Use only moisturizing soaps while bathing. After a bath, apply skin creams or ointments. · Always wear sunscreen on exposed skin. Make sure to use a broad-spectrum sunscreen that has a sun protection factor (SPF) of 30 or higher. Use it every day, even when it is cloudy. · Use a vaporizer or humidifier to add moisture to your bedroom. Follow the directions for cleaning the machine. · Use saline (saltwater) nasal washes to help keep your nasal passages open and to wash out mucus and bacteria. You can buy saline nose drops at a grocery store or pharmacy. Or you can make your own at home by adding 1 teaspoon of salt and 1 teaspoon of baking soda to 2 cups of distilled water. If you make your own, fill a bulb syringe with the solution, insert the tip into your nostril, and squeeze gently. Kathi Harp your nose. · Use lubricants if you have vaginal dryness. · Take an over-the-counter antacid or acid reducer, such as Pepcid AC (famotidine) or Tagamet (cimetidine), when needed to reduce heartburn. Be careful when you take over-the-counter antacid medicines. Many of these medicines have aspirin in them.  Read the label to make sure that you are not taking more than the recommended dose. Too much aspirin can be harmful. When should you call for help? Watch closely for changes in your health, and be sure to contact your doctor if:    · Your eyes and mouth are still very dry even with home care.     · Your joint pain does not get better with over-the-counter medicine.     · Your tiredness gets worse or makes it hard to do daily activities. Where can you learn more? Go to http://www.gray.com/  Enter D040 in the search box to learn more about \"Sjogren's Syndrome: Care Instructions. \"  Current as of: December 20, 2021               Content Version: 13.2  © 8779-4026 [employer]. Care instructions adapted under license by [employer] (which disclaims liability or warranty for this information). If you have questions about a medical condition or this instruction, always ask your healthcare professional. [address] any warranty or liability for your use of this information.

## 2022-03-14 PROBLEM — I63.9 CVA (CEREBRAL VASCULAR ACCIDENT) (HCC): Status: RESOLVED | Noted: 2022-03-07 | Resolved: 2022-03-14

## 2022-03-14 NOTE — PROGRESS NOTES
Ms. Minna Arceo is a 27y.o. year old female, she is seen today for Transition of Care services following a hospital discharge for hypokalemia on 3/7/22 - 3/8/22. Our office Nurse Navigator performed an outreach to Ms. Nava Umanzor on 3/9/22 (within 2 business days of discharge) to complete medication reconciliation and a telephonic assessment of her condition. Per review of the hospital record, while she was an inpatient:  As excerpted from discharge summary:    Diagnosis:  Paresthesia  Hypokalemia  Hypertension    Hospital Course  Patient presents with dizziness, right facial, upper extremity and lower extremity weakness as well as numbness. Work-up includes CT of the head which shows no acute pathology. CTA of the head and neck shows no large vessel occlusion. MRI of the brain also shows no acute pathology. Neurology evaluated the patient. Exact etiology of her symptoms unclear, vestibular/baseline migraine possible. Current stereotypical symptoms are atypical for TIA. Suspicion for seizure is also low but recommend checking EEG as an outpatient. Patient has history of hypertension and takes hydrochlorothiazide. Patient was hypokalemic on admission but improving with replacement. Did discuss with patient, patient states that she has recurrent hypoglycemia. I suspect that HCTZ is causing her hypokalemia. However patient does not want to change her antihypertensives at this time. She will follow up with her PCP as scheduled this week to discuss about it. I have given her potassium supplements in the interim. she was discharged on the following medications:  Current Outpatient Medications   Medication Sig Dispense Refill    olopatadine (PATANASE) 0.6 % spry 2 Squirts by Both Nostrils route two (2) times a day. 30.5 g 5    valsartan (DIOVAN) 80 mg tablet Take 1 Tablet by mouth daily. 90 Tablet 1    potassium chloride SR (K-TAB) 20 mEq tablet Take 1 Tablet by mouth daily.  5 Tablet 0    ascorbic acid (MAINOR-C PO) Take  by mouth.  VITAMIN B COMPLEX PO Take  by mouth.  ZINC PO Take 50 mg by mouth daily.  ibuprofen (MOTRIN) 600 mg tablet Take 1 Tablet by mouth every six (6) hours as needed (pain). Take with food or milk each time. [address] are not in place    Since discharge:     Right Leg still feels asleep  The upper body and right side arm feels better  She still feels dizzy    Working diagnosis of CVA on admission, but was ruled out, so will resolve that on her problem list    Of note, was seen in ER 2722 for vomiting, had slightly low K+ at that time but was not on replacement    During current stay, her screening NANCI was (+) with elevated ESR 60     Saw rheum Dr Nancy Tello in 2018, dry mouth and dry eyes  (+) NANCI by IFA 8/2018, 1:640, speckled  Anti-SSA (+); anti-SSB (-)  Ds DNA (-)  She never had salivary gland biopsy and did not follow up after initial lab work     In hospital, A1C 6.1% on 3/8/22, previously 5.4% in 6/2021     Regarding her allergies:  Budesonide did not help,nor did flonase  atrovent nasal gave her HA  Has not been using her zyrtec  Does intermittent lili pot    ROS negative except as per HPI. Visit Vitals  BP (!) 130/92 (BP 1 Location: Left arm, BP Patient Position: Sitting, BP Cuff Size: Adult)   Pulse 89   Temp 97.9 °F (36.6 °C) (Temporal)   Resp 16   Ht 5' 5\" (1.651 m)   Wt 186 lb (84.4 kg)   SpO2 99%   BMI 30.95 kg/m²     Gen: alert, oriented, no acute distress  Ears: external auditory canals clear, TMs without erythema or effusion  Eyes: pupils equal round reactive to light, sclera clear, conjunctiva clear  Oral: masked  Neck: supple, no lymphadenopathy, no thyromegaly   Resp: no increase work of breathing, lungs clear to ausculation bilaterally, no wheezing, rales or rhonchi  CV: S1, S2 normal. No murmurs, rubs, or gallops. Abd: soft, not tender, not distended. Normal bowel sounds.     Neuro: cranial nerves intact, normal strength and movement in all extremities, sensation intact and symmetric. Skin: no lesion or rash  Extremities: no cyanosis or edema  No joint tenderness or peripheral synovitis      MRI Results (most recent):  Results from Hospital Encounter encounter on 03/07/22    MRI BRAIN WO CONT    Narrative  CLINICAL HISTORY: Left-sided paresthesias. INDICATION: Left-sided paresthesias. COMPARISON: 722 CT head, 5/15/2020 brain MRI. TECHNIQUE: MR examination of the brain includes axial and sagittal T1, coronal  T2, axial T2, axial FLAIR, axial gradient echo, axial DWI. CONTRAST: None    FINDINGS:  There is no intracranial mass, hemorrhage or evidence of acute infarction. IACs are symmetric in size. Visualized cranial nerves without evidence of  abnormal displacement. The brain architecture is normal. There is no evidence of midline shift or  mass-effect. There are no extra-axial fluid collections. There is no Chiari or  syrinx. The pituitary and infundibulum are grossly unremarkable. There is no  skull base mass. Cerebellopontine angles are grossly unremarkable. The major  intracranial vascular flow-voids are unremarkable. The cavernous sinuses are  symmetric. Optic chiasm and infundibulum grossly unremarkable. Orbits are  grossly symmetric. Dural venous sinuses are grossly patent. The mastoid air cells are well pneumatized and clear. Impression  Normal MRI of the brain. There is no intracranial mass, hemorrhage or evidence of acute infarction. No acute intracranial process is demonstrated. CT Results (most recent):  Results from Hospital Encounter encounter on 03/07/22    CTA CODE NEURO HEAD AND NECK W CONT    Narrative  EXAM:  CTA CODE NEURO HEAD AND NECK W CONT    INDICATION:  Code Stroke    COMPARISON:  None    TECHNIQUE:  Multislice helical axial CT angiography was performed from the aortic arch to  the top of the head during uneventful rapid bolus intravenous administration of  mL of Isovue 370. Postprocessing was performed to include MIP and reformatted  images. Standard images of the head were also obtained following contrast  administration and a delay. CT dose reduction was achieved through the use of a standardized protocol  tailored for this examination and automatic exposure control for dose  modulation. This study was analyzed by the [address] ai algorithm. FINDINGS:    HEAD CT:  The ventricles are normal in size and midline. .  There is no intracranial  hemorrhage or evolving infarct. . There is no abnormal enhancement. . .    CTA NECK:    Great vessels: Normal arch anatomy with the origins patent. Right subclavian artery: Patent    Left subclavian artery: Patent    Right common carotid artery: Patent    Left common carotid artery: Patent    Cervical right internal carotid artery: Patent with no significant stenosis by  NASCET criteria. Cervical left internal carotid artery: Patent with no significant stenosis by  NASCET criteria. Right vertebral artery: Patent    Left vertebral artery: Patent    No thyroid nodule. Lung apices clear. No cervical adenopathy. No abnormalities  in the cervical spine. CTA HEAD:    Right cavernous internal carotid artery: Patent    Left cavernous internal carotid artery: Patent    Anterior cerebral arteries: Patent    Anterior communicating artery: Patent    Right middle cerebral artery: Patent    Left middle cerebral artery: Patent    Posterior cerebral arteries: Patent    Posterior communicating arteries: Patent    Basilar artery: Patent    Distal vertebral arteries: Patent    No aneurysm or vascular malformation. Patency of the major venous sinuses and  deep venous system. Impression  1. Negative CT angiography of the head and neck. Assessment/Plan:        ICD-10-CM ICD-9-CM    1. Essential hypertension  I10 401.9 valsartan (DIOVAN) 80 mg tablet      METABOLIC PANEL, COMPREHENSIVE      URINALYSIS W/ REFLEX CULTURE   2.  Hypokalemia  E87.6 276.8 MAGNESIUM      METABOLIC PANEL, COMPREHENSIVE   3. Paresthesia  R20.2 782.0 REFERRAL TO RHEUMATOLOGY   4. NANCI positive  R76.8 795.79 COMPLEMENT, C3 & C4      C REACTIVE PROTEIN, QT      SED RATE (ESR)      URINALYSIS W/ REFLEX CULTURE   5. SS-A antibody positive  R76.8 795.79 REFERRAL TO RHEUMATOLOGY      COMPLEMENT, C3 & C4      C REACTIVE PROTEIN, QT      SED RATE (ESR)   6. Sicca syndrome (HCC)  M35.00 710.2 REFERRAL TO RHEUMATOLOGY      COMPLEMENT, C3 & C4      C REACTIVE PROTEIN, QT      SED RATE (ESR)   7. Allergic rhinitis, unspecified seasonality, unspecified trigger  J30.9 477.9 olopatadine (PATANASE) 0.6 % spry      REFERRAL TO ALLERGY   8. Elevated blood protein  R77.9 790.99 PROTEIN ELECTROPHORESIS W/ REFLX LA      METABOLIC PANEL, COMPREHENSIVE   9.  Prediabetes  R73.03 790.29        Hypokalemia, now on replacement  Had low K+ at ER visit with vomiting the month prior  Is on HCTZ which could lower her K+ as well  Neurologically intact     /92 not controlled  Will stop HCTZ, due to low K+  Switch to valsartan (ARB's can help increase K+)     (+)NANCI screen in hospital, but this was known already  Has known Sjogren's, (+)anti-SSA and NANCI by IFA in 2018  Saw rheumatology once in 2018, Dr Jaswinder Mas, but she left the practice and pt did not follow up  Never had salivary gland biopsy  Advised to re-establish with rheumatology, she should be monitored at least q6mos     I recommend to start Patanase for her allergies  She may try Xyzal instead of Zyrtec, but realized after she left this could worsen her sicca symptoms, will review once labs come back  Continue lili pot  She requested allergy referral, but also will suggest ENT as her symptoms may be aggravated by the Sjogren's     A1C elevated in the hospital, will need to follow prediabetic diet and recheck in 3-6 mos      Orders Placed This Encounter    PROTEIN ELECTROPHORESIS W/ REFLX LA     Standing Status:   Future     Standing Expiration Date:   3/14/2023    COMPLEMENT, C3 & C4     Standing Status:   Future     Standing Expiration Date:   3/14/2023    C REACTIVE PROTEIN, QT     Standing Status:   Future     Standing Expiration Date:   3/14/2023    SED RATE (ESR)     Standing Status:   Future     Standing Expiration Date:   3/14/2023    MAGNESIUM     Standing Status:   Future     Standing Expiration Date:   3/41/2568    METABOLIC PANEL, COMPREHENSIVE     Standing Status:   Future     Standing Expiration Date:   3/14/2023    URINALYSIS W/ REFLEX CULTURE     Standing Status:   Future     Standing Expiration Date:   3/14/2023    REFERRAL TO ALLERGY     Referral Priority:   Routine     Referral Type:   Consultation     Referral Reason:   Specialty Services Required     Referred to Provider:   Ollis Lennox, MD     Requested Specialty:   Allergy     Number of Visits Requested:   1    REFERRAL TO RHEUMATOLOGY     Referral Priority:   Routine     Referral Type:   Consultation     Referral Reason:   Specialty Services Required     Referred to Provider:   Daina Aase, MD     Requested Specialty:   Rheumatology     Number of Visits Requested:   1    olopatadine (PATANASE) 0.6 % spry     Si Squirts by Both Nostrils route two (2) times a day. Dispense:  30.5 g     Refill:  5    valsartan (DIOVAN) 80 mg tablet     Sig: Take 1 Tablet by mouth daily. Dispense:  90 Tablet     Refill:  1       Medications Discontinued During This Encounter   Medication Reason    ipratropium (ATROVENT) 42 mcg (0.06 %) nasal spray Side Effects    budesonide (Rhinocort Allergy) 32 mcg/actuation nasal spray Clinically Ineffective    albuterol (ProAir HFA) 90 mcg/actuation inhaler LIST CLEANUP    hydroCHLOROthiazide (HYDRODIURIL) 25 mg tablet Alternate Therapy         Current Outpatient Medications   Medication Sig Dispense Refill    olopatadine (PATANASE) 0.6 % spry 2 Squirts by Both Nostrils route two (2) times a day.  30.5 g 5    valsartan (DIOVAN) 80 mg tablet Take 1 Tablet by mouth daily. 90 Tablet 1    potassium chloride SR (K-TAB) 20 mEq tablet Take 1 Tablet by mouth daily. 5 Tablet 0    ascorbic acid (MAINOR-C PO) Take  by mouth.  VITAMIN B COMPLEX PO Take  by mouth.  ZINC PO Take 50 mg by mouth daily.  ibuprofen (MOTRIN) 600 mg tablet Take 1 Tablet by mouth every six (6) hours as needed (pain). Take with food or milk each time. 20 Tablet 0         Verbal and written instructions (see AVS) provided. Patient expresses understanding of diagnosis and treatment plan. Follow-up and Dispositions    · Return in about 5 weeks (around 4/14/2022) for HTN; 2nd appt early next week for labs (does not need to fast).          Future Appointments   Date Time Provider Ana Thorpe   3/15/2022 11:40 AM LAB ONLY [facility]   4/18/2022  3:30 PM Elvia Wellington PA-C [facility]

## 2022-03-17 ENCOUNTER — PATIENT MESSAGE (OUTPATIENT)
Dept: INTERNAL MEDICINE CLINIC | Age: 27
End: 2022-03-17

## 2022-03-17 NOTE — LETTER
4/12/2022 2:28 PM    Ms. Walker Host  [address] [id number]      Dear Ms. Tsering Crowe,    I am writing to confirm that you are under my care for an autoimmune disease and other medical conditions, which require regular follow up appointments for continued care and management.          Sincerely,      Elvia White PA-C

## 2022-03-18 PROBLEM — M94.0 COSTOCHONDRITIS: Status: ACTIVE | Noted: 2018-08-21

## 2022-03-19 PROBLEM — R76.8 ANA POSITIVE: Status: ACTIVE | Noted: 2018-07-16

## 2022-03-19 PROBLEM — R76.8 SS-A ANTIBODY POSITIVE: Status: ACTIVE | Noted: 2018-07-16

## 2022-03-19 PROBLEM — M35.7 HYPERMOBILITY SYNDROME: Status: ACTIVE | Noted: 2018-08-21

## 2022-03-19 PROBLEM — R76.8 RHEUMATOID FACTOR POSITIVE: Status: ACTIVE | Noted: 2018-07-16

## 2022-03-19 PROBLEM — B96.89 ACUTE BACTERIAL SINUSITIS: Status: ACTIVE | Noted: 2018-02-26

## 2022-03-19 PROBLEM — J01.90 ACUTE BACTERIAL SINUSITIS: Status: ACTIVE | Noted: 2018-02-26

## 2022-03-19 PROBLEM — M25.50 ARTHRALGIA: Status: ACTIVE | Noted: 2018-08-21

## 2022-03-20 PROBLEM — J20.8 ACUTE BRONCHITIS DUE TO OTHER SPECIFIED ORGANISMS: Status: ACTIVE | Noted: 2018-02-26

## 2022-03-20 PROBLEM — G56.01 CARPAL TUNNEL SYNDROME OF RIGHT WRIST: Status: ACTIVE | Noted: 2018-08-21

## 2022-03-22 ENCOUNTER — PATIENT OUTREACH (OUTPATIENT)
Dept: CASE MANAGEMENT | Age: 27
End: 2022-03-22

## 2022-04-06 ENCOUNTER — PATIENT OUTREACH (OUTPATIENT)
Dept: CASE MANAGEMENT | Age: 27
End: 2022-04-06

## 2022-04-06 NOTE — PROGRESS NOTES
Care Transitions Outreach Attempt    Attempted to reach patient for transitions of care follow up. Unable to reach patient. .Left voicemail stating my name, CTN with [facility], date and time of call, and return telephone number. Patient: Mary Richards Patient : 1995 MRN: [id number]    Last Discharge [facility] Facility       Complaint Diagnosis Description Type Department Provider    3/7/22 Numbness Paresthesia . .. ED to Hosp-Admission (Discharged) (ADMIT) Jayy Hernandez MD; Suzanna Gomez,...           Noted following upcoming appointments from discharge chart review:   [facility] follow up appointment(s):   Future Appointments   Date Time Provider Ana Thorpe   2022  3:30 PM Elvia Wellington PA-C [facility]

## 2022-04-08 ENCOUNTER — PATIENT OUTREACH (OUTPATIENT)
Dept: CASE MANAGEMENT | Age: 27
End: 2022-04-08

## 2022-04-08 NOTE — PROGRESS NOTES
Patient has graduated from the Transitions of Care Coordination  program on 4/8/2022. Patient/family has the ability to self-manage at this time Care management goals have been completed. Patient was not referred to the [facility] team for further management. Goals Addressed                 This Visit's Progress     COMPLETED: Prevent complications post hospitalization. 03/10/22   Will attend follow up appt with PCP scheduled this afternoon   Will report compliance with K supplements   Will not fall during SHAWN period   Pt will remain out of the hospital or ER for remainder of SHAWN period   CTN plans to follow up next week          Patient has Care Transition Nurse's contact information for any further questions, concerns, or needs.   Patients upcoming visits:    Future Appointments   Date Time Provider Ana Thorpe   4/18/2022  3:30 PM Elvia Wellington, [facility] [facility]

## 2022-04-12 NOTE — TELEPHONE ENCOUNTER
Please schedule lab visit for this week (does not need to fast) - so we can review results at her F/U appt next week. Also coordinate regarding  of letter I wrote for her.
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
## 2022-04-15 ENCOUNTER — APPOINTMENT (OUTPATIENT)
Dept: INTERNAL MEDICINE CLINIC | Age: 27
End: 2022-04-15

## 2022-04-15 DIAGNOSIS — R20.2 PARESTHESIA: ICD-10-CM

## 2022-04-15 DIAGNOSIS — R77.9 ELEVATED BLOOD PROTEIN: ICD-10-CM

## 2022-04-15 DIAGNOSIS — I10 ESSENTIAL HYPERTENSION: ICD-10-CM

## 2022-04-15 DIAGNOSIS — E87.6 HYPOKALEMIA: ICD-10-CM

## 2022-04-15 DIAGNOSIS — R76.8 ANA POSITIVE: ICD-10-CM

## 2022-04-15 DIAGNOSIS — D64.9 ANEMIA, UNSPECIFIED TYPE: ICD-10-CM

## 2022-04-15 DIAGNOSIS — R76.8 SS-A ANTIBODY POSITIVE: ICD-10-CM

## 2022-04-15 DIAGNOSIS — M35.00 SICCA SYNDROME (HCC): ICD-10-CM

## 2022-04-15 LAB
ALBUMIN SERPL-MCNC: 3.7 G/DL (ref 3.5–5)
ALBUMIN/GLOB SERPL: 0.7 {RATIO} (ref 1.1–2.2)
ALP SERPL-CCNC: 54 U/L (ref 45–117)
ALT SERPL-CCNC: 17 U/L (ref 12–78)
ANION GAP SERPL CALC-SCNC: 9 MMOL/L (ref 5–15)
APPEARANCE UR: ABNORMAL
AST SERPL-CCNC: 11 U/L (ref 15–37)
BACTERIA URNS QL MICRO: ABNORMAL /HPF
BILIRUB SERPL-MCNC: 0.3 MG/DL (ref 0.2–1)
BILIRUB UR QL: NEGATIVE
BUN SERPL-MCNC: 9 MG/DL (ref 6–20)
BUN/CREAT SERPL: 13 (ref 12–20)
CALCIUM SERPL-MCNC: 9.2 MG/DL (ref 8.5–10.1)
CHLORIDE SERPL-SCNC: 101 MMOL/L (ref 97–108)
CO2 SERPL-SCNC: 24 MMOL/L (ref 21–32)
COLOR UR: ABNORMAL
CREAT SERPL-MCNC: 0.68 MG/DL (ref 0.55–1.02)
CRP SERPL-MCNC: <0.29 MG/DL (ref 0–0.6)
EPITH CASTS URNS QL MICRO: ABNORMAL /LPF
ERYTHROCYTE [SEDIMENTATION RATE] IN BLOOD: 48 MM/HR (ref 0–20)
FERRITIN SERPL-MCNC: 7 NG/ML (ref 26–388)
FOLATE SERPL-MCNC: 16 NG/ML (ref 5–21)
GLOBULIN SER CALC-MCNC: 5.2 G/DL (ref 2–4)
GLUCOSE SERPL-MCNC: 81 MG/DL (ref 65–100)
GLUCOSE UR STRIP.AUTO-MCNC: NEGATIVE MG/DL
HGB UR QL STRIP: NEGATIVE
HYALINE CASTS URNS QL MICRO: ABNORMAL /LPF (ref 0–5)
KETONES UR QL STRIP.AUTO: NEGATIVE MG/DL
LEUKOCYTE ESTERASE UR QL STRIP.AUTO: ABNORMAL
MAGNESIUM SERPL-MCNC: 2.1 MG/DL (ref 1.6–2.4)
NITRITE UR QL STRIP.AUTO: NEGATIVE
PH UR STRIP: 6.5 [PH] (ref 5–8)
POTASSIUM SERPL-SCNC: 4.2 MMOL/L (ref 3.5–5.1)
PROT SERPL-MCNC: 8.9 G/DL (ref 6.4–8.2)
PROT UR STRIP-MCNC: NEGATIVE MG/DL
RBC #/AREA URNS HPF: ABNORMAL /HPF (ref 0–5)
SODIUM SERPL-SCNC: 134 MMOL/L (ref 136–145)
SP GR UR REFRACTOMETRY: 1.01 (ref 1–1.03)
UA: UC IF INDICATED,UAUC: ABNORMAL
UROBILINOGEN UR QL STRIP.AUTO: 0.2 EU/DL (ref 0.2–1)
VIT B12 SERPL-MCNC: 435 PG/ML (ref 193–986)
WBC URNS QL MICRO: ABNORMAL /HPF (ref 0–4)

## 2022-04-16 LAB
C3 SERPL-MCNC: 152 MG/DL (ref 82–167)
C4 SERPL-MCNC: 44 MG/DL (ref 12–38)

## 2022-04-18 ENCOUNTER — OFFICE VISIT (OUTPATIENT)
Dept: INTERNAL MEDICINE CLINIC | Age: 27
End: 2022-04-18
Payer: COMMERCIAL

## 2022-04-18 VITALS
BODY MASS INDEX: 31.39 KG/M2 | SYSTOLIC BLOOD PRESSURE: 126 MMHG | OXYGEN SATURATION: 99 % | DIASTOLIC BLOOD PRESSURE: 74 MMHG | HEART RATE: 90 BPM | RESPIRATION RATE: 16 BRPM | WEIGHT: 188.4 LBS | TEMPERATURE: 97.9 F | HEIGHT: 65 IN

## 2022-04-18 DIAGNOSIS — E87.6 HYPOKALEMIA: ICD-10-CM

## 2022-04-18 DIAGNOSIS — Z11.3 SCREEN FOR STD (SEXUALLY TRANSMITTED DISEASE): ICD-10-CM

## 2022-04-18 DIAGNOSIS — J30.9 ALLERGIC RHINITIS, UNSPECIFIED SEASONALITY, UNSPECIFIED TRIGGER: ICD-10-CM

## 2022-04-18 DIAGNOSIS — I10 ESSENTIAL HYPERTENSION: ICD-10-CM

## 2022-04-18 DIAGNOSIS — R76.8 SS-A ANTIBODY POSITIVE: ICD-10-CM

## 2022-04-18 DIAGNOSIS — N89.8 VAGINAL DISCHARGE: Primary | ICD-10-CM

## 2022-04-18 DIAGNOSIS — E61.1 IRON DEFICIENCY: ICD-10-CM

## 2022-04-18 LAB
ALBUMIN SERPL ELPH-MCNC: 3.6 G/DL (ref 2.9–4.4)
ALBUMIN/GLOB SERPL: 0.7 {RATIO} (ref 0.7–1.7)
ALPHA1 GLOB SERPL ELPH-MCNC: 0.3 G/DL (ref 0–0.4)
ALPHA2 GLOB SERPL ELPH-MCNC: 0.8 G/DL (ref 0.4–1)
B-GLOBULIN SERPL ELPH-MCNC: 1.3 G/DL (ref 0.7–1.3)
GAMMA GLOB SERPL ELPH-MCNC: 2.9 G/DL (ref 0.4–1.8)
GLOBULIN SER CALC-MCNC: 5.2 G/DL (ref 2.2–3.9)
M PROTEIN SERPL ELPH-MCNC: ABNORMAL G/DL
PROT PATTERN SERPL ELPH-IMP: ABNORMAL
PROT SERPL-MCNC: 8.8 G/DL (ref 6–8.5)

## 2022-04-18 PROCEDURE — 99214 OFFICE O/P EST MOD 30 MIN: CPT | Performed by: PHYSICIAN ASSISTANT

## 2022-04-18 NOTE — PROGRESS NOTES
Adan Dominguez is a 27 y.o. female     Chief Complaint   Patient presents with    Hypertension     5wk follow up       Visit Vitals  /74 (BP 1 Location: Left arm, BP Patient Position: Sitting, BP Cuff Size: Adult)   Pulse 90   Temp 97.9 °F (36.6 °C) (Temporal)   Resp 16   Ht 5' 5\" (1.651 m)   Wt 188 lb 6.4 oz (85.5 kg)   SpO2 99%   BMI 31.35 kg/m²       Health Maintenance Due   Topic Date Due    Pap Smear  10/03/2020    DTaP/Tdap/Td series (5 - Td or Tdap) 02/15/2021    COVID-19 Vaccine (3 - Booster for Pfizer series) 03/31/2022         1. \"Have you been to the ER, urgent care clinic since your last visit? Hospitalized since your last visit? \" No    2. \"Have you seen or consulted any other health care providers outside of the [address] [place] [address] since your last visit? \" No     3. For patients aged 39-70: Has the patient had a colonoscopy / FIT/ Cologuard? NA - based on age      If the patient is female:    4. For patients aged 41-77: Has the patient had a mammogram within the past 2 years? NA - based on age or sex      11. For patients aged 21-65: Has the patient had a pap smear?  Yes - no Care Gap present

## 2022-04-18 NOTE — PROGRESS NOTES
HPI:  27 y.o.  presents for follow up appointment. No hospital, ER or specialist visits since last primary care visit except as noted below.     Last visit 3/10/22  Had labs done 4/15/22 to review    HTN   Now on valsartan 80 mg (started 3/2022) with good tolerance and she feels great  Stopped HCTZ due to low K+    Hypokalemia  Noted as low as 2.7 on 3/7/22 in hospital  Has completed prescription K, currently not on supplementation  Current K+ 4.2 on 4/15/22; normal Mg    (+)NANCI screen in hospital 3/2022, but this was known already  Has known Sjogren's, (+)anti-SSA and NANCI by IFA in 2018  Saw rheumatology once in 2018, Dr Loida Aguirre, but she left the practice and pt did not follow up  Never had salivary gland biopsy  Advised to re-establish with rheumatology, she should be monitored at least q6mos  TODAY 4/18/22 - She has not scheduled with rheum yet  -ESR 48 on 4/15/22; normal CRP, normal C3, C4 is slightly elevated at 44    She wakes up with dry mouth and dry eyes, but seems to resolve once she gets up and going  Some days worse than others  Does not feel need to use eye drops or increase water intake    Allergies  Bother her but are controlled with zyrtec   She does not appreciate any dryness with the zyrtec    Iron deficiency   Treated in the past  Current ferritin 7 on 4/15/22, not on suppl  Recently has been chewing ice  Reports iron pills cause constipation, but has not taken with a stool softener before    C/O fishy white vaginal discharge  Has rash to metronidazole, and reports the vaginal \"inserts\" are not effective in treating BV, has had persistent/recurrent BV in the past and presumes that is what she has now  She would like full STI screening, she likes this yearly  Same single partner, does not use condoms, is not on contraception  Normal menses, LMP 3/29/22, reports her menses has been on time like clockwork  Reports being treated for herpes in the past, but states her Gyn checked blood work last year and told her no signs of prior herpes by blood work results  UA with bacterial contamination suggestive of BV        Patient Active Problem List    Diagnosis    Hypermobility syndrome    Arthralgia    Costochondritis    Carpal tunnel syndrome of right wrist    NANCI positive    SS-A antibody positive    Rheumatoid factor positive    Acute bacterial sinusitis    Acute bronchitis due to other specified organisms    Essential hypertension    History of gestational diabetes     diet controlled      Mastodynia     Nl b/l breast US in 2016.    Prolactin pending 12/22/2016   With galactorrhea 2/2 depo      Thyromegaly     On exam      Atypical mole     Noted at vaginal introitus (7 o'clock position)      Vitamin D deficiency    History of compression fracture of spine, thoracic with chronic back pain    Allergic rhinitis    Anemia         Past Medical History:   Diagnosis Date    Abnormal Pap smear of cervix 2017    seeing OBGYN    Acne 10/7/2009    Allergic rhinitis 9/2/2009    Anemia 9/2/2009    ASCUS HPV+ 09/14/2017    H/o ASCUS HPV + with Dr. Sushma Rodriguez 9/14/2017    CHAR III (cervical intraepithelial neoplasia III) 11/30/2017    found by LEEP     Headache     Herpes genitalia 2017    dx by OB    History of gestational diabetes     diet controlled    Other ill-defined conditions(799.89)     scoliosis    PCB (post coital bleeding) 11/25/2014    Thoracic compression fracture ([address] 3/2014    Yousif Groverkachorn    Thyromegaly 11/25/2014    On exam     Unspecified vitamin D deficiency 6/26/2014    Varicella 5/1/1998       Social History     Tobacco Use    Smoking status: Never Smoker    Smokeless tobacco: Never Used   Vaping Use    Vaping Use: Never used   Substance Use Topics    Alcohol use: Yes     Comment: once every 3 months, 3 drinks at a time    Drug use: No       Outpatient Medications Marked as Taking for the 4/18/22 encounter (Office Visit) with Ollie Marte PA-C   Medication Sig Dispense Refill    valsartan (DIOVAN) 80 mg tablet Take 1 Tablet by mouth daily. 90 Tablet 1    ascorbic acid (MAINOR-C PO) Take  by mouth.  VITAMIN B COMPLEX PO Take  by mouth.  ZINC PO Take 50 mg by mouth daily.  ibuprofen (MOTRIN) 600 mg tablet Take 1 Tablet by mouth every six (6) hours as needed (pain). Take with food or milk each time. 20 Tablet 0       Allergies   Allergen Reactions    Metronidazole Itching       ROS:  ROS negative except as per HPI. PE:  Visit Vitals  /74 (BP 1 Location: Left arm, BP Patient Position: Sitting, BP Cuff Size: Adult)   Pulse 90   Temp 97.9 °F (36.6 °C) (Temporal)   Resp 16   Ht 5' 5\" (1.651 m)   Wt 188 lb 6.4 oz (85.5 kg)   SpO2 99%   BMI 31.35 kg/m²     Gen: alert, oriented, no acute distress  Head: normocephalic, atraumatic  Eyes: pupils equal round reactive to light, sclera clear, conjunctiva clear  Oral: masked  Neck: symmetric normal sized thyroid, no lymphadenopathy  Resp: no increase work of breathing, lungs clear to ausculation bilaterally, no wheezing, rales or rhonchi  CV: S1, S2 normal.  No murmurs, rubs, or gallops. Abd: soft, not tender, not distended. Normal bowel sounds. Neuro: grossly intact  Skin: no lesion or rash  Extremities: no cyanosis or edema    No results found for this visit on 04/18/22.     Lab Results   Component Value Date/Time    Ferritin 7 (L) 04/15/2022 01:31 PM     Lab Results   Component Value Date/Time    Vitamin B12 435 04/15/2022 01:31 PM    Folate 16.0 04/15/2022 01:31 PM     Lab Results   Component Value Date/Time    Sodium 134 (L) 04/15/2022 01:31 PM    Potassium 4.2 04/15/2022 01:31 PM    Chloride 101 04/15/2022 01:31 PM    CO2 24 04/15/2022 01:31 PM    Anion gap 9 04/15/2022 01:31 PM    Glucose 81 04/15/2022 01:31 PM    BUN 9 04/15/2022 01:31 PM    Creatinine 0.68 04/15/2022 01:31 PM    BUN/Creatinine ratio 13 04/15/2022 01:31 PM    GFR est AA >60 04/15/2022 01:31 PM    GFR est non-AA >60 04/15/2022 01:31 PM    Calcium 9.2 04/15/2022 01:31 PM    Bilirubin, total 0.3 04/15/2022 01:31 PM    Alk. phosphatase 54 04/15/2022 01:31 PM    Protein, total 8.8 (H) 04/15/2022 01:31 PM    Protein, total 8.9 (H) 04/15/2022 01:31 PM    Albumin 3.7 04/15/2022 01:31 PM    Globulin 5.2 (H) 04/15/2022 01:31 PM    A-G Ratio 0.7 (L) 04/15/2022 01:31 PM    ALT (SGPT) 17 04/15/2022 01:31 PM    AST (SGOT) 11 (L) 04/15/2022 01:31 PM     Magnesium   Date Value Ref Range Status   04/15/2022 2.1 1.6 - 2.4 mg/dL Final   03/08/2022 2.1 1.6 - 2.4 mg/dL Final   06/14/2021 2.0 1.6 - 2.4 mg/dL Final   07/10/2018 2.1 1.6 - 2.3 mg/dL Final   12/22/2016 2.4 (H) 1.6 - 2.3 mg/dL Final     Lab Results   Component Value Date/Time    C-Reactive protein <0.29 04/15/2022 01:31 PM           Assessment/Plan:      ICD-10-CM ICD-9-CM    1.  Vaginal discharge  N89.8 623.5 HEPATITIS C AB      HIV 1/2 AG/AB, 4TH GENERATION,W RFLX CONFIRM      HEP B SURFACE AB      HEP B SURFACE AG      HEPATITIS B CORE AB, TOTAL      RPR      HSV 1 AND 2-SPEC AB, IGG W/RFX TO SUPPL HSV-2 TESTING      HSV 1 AND 2-SPEC AB, IGG W/RFX TO SUPPL HSV-2 TESTING      RPR      HEPATITIS B CORE AB, TOTAL      HEP B SURFACE AG      HEP B SURFACE AB      HIV 1/2 AG/AB, 4TH GENERATION,W RFLX CONFIRM      HEPATITIS C AB      CANCELED: NUSWAB VAGINITIS PLUS (VG+) WITH CANDIDA (SIX SPECIES)      CANCELED: NUSWAB VAGINITIS PLUS (VG+) WITH CANDIDA (SIX SPECIES)   2. Screen for STD (sexually transmitted disease)  Z11.3 V74.5 HEPATITIS C AB      HIV 1/2 AG/AB, 4TH GENERATION,W RFLX CONFIRM      HEP B SURFACE AB      HEP B SURFACE AG      HEPATITIS B CORE AB, TOTAL      RPR      HSV 1 AND 2-SPEC AB, IGG W/RFX TO SUPPL HSV-2 TESTING      HSV 1 AND 2-SPEC AB, IGG W/RFX TO SUPPL HSV-2 TESTING      RPR      HEPATITIS B CORE AB, TOTAL      HEP B SURFACE AG      HEP B SURFACE AB      HIV 1/2 AG/AB, 4TH GENERATION,W RFLX CONFIRM      HEPATITIS C AB      CANCELED: NUSWAB VAGINITIS PLUS (VG+) WITH CANDIDA (SIX SPECIES)      CANCELED: NUSWAB VAGINITIS PLUS (VG+) WITH CANDIDA (SIX SPECIES)   3. Essential hypertension  I10 401.9    4. Hypokalemia  E87.6 276.8    5. SS-A antibody positive  R76.8 795.79    6. Allergic rhinitis, unspecified seasonality, unspecified trigger  J30.9 477.9    7. Iron deficiency  E61.1 280.9      1. Vaginal discharge  Reports history of persistent or recurrent bacterial vaginosis  Complaining of vaginal discharge with fishy odor  Reports rash to metronidazole and another medicine is ineffective which may be Cleocin  She does see gynecology  She will self collect new swab and we will follow-up pending results  Advised not to use an antibacterial soap for cleansing the genital area    2. She requests full panel STD screening  She is in monogamous relationship, same male partner, does not use condoms  She reports clinically being treated for herpes but blood test were negative in the past  She denies any current/active lesions  I reviewed how the antibodies can be interpreted, and she would still like to proceed with testing    3. Hypertension controlled on valsartan, continue current dose  HCTZ was discontinued due to low potassium    4. Hypokalemia now resolved  She had nausea vomiting back in February and also had been on hydrochlorothiazide which has since been discontinued    5. Positive SSA antibody consistent with Sjogren's  She had 1 visit with [facility] rheumatology in 2019 but that doctor left the practice and she did not continue follow-up  She had positive NANCI screen at a recent hospitalization, however this was already known  She has been encouraged to reestablish with rheumatology, however she has not done so since last visit  Her sed rate is elevated at 50 previously 60 in the hospital, this may be from her autoimmune disease and I have asked her again to schedule with rheumatology    6.   Allergic rhinitis is controlled with Zyrtec  Advised her that antihistamines could worsen Sjogren's syndrome, however she does not complain of dryness and states Zyrtec treats her allergies well, so she will continue with this therapy for now and follow-up with ENT if she notes any worsening    7. Iron deficiency  She has been treated for iron deficiency in the past  Current ferritin is low at 7  She reports constipation with the iron pill in the past but did not take with stool softener  I have asked her to take ferrous gluconate daily with vitamin C for better absorption and docusate twice a day  Recheck iron levels in 3 months      Health Maintenance reviewed - updated.     Orders Placed This Encounter    HEPATITIS C AB     Standing Status:   Future     Number of Occurrences:   1     Standing Expiration Date:   4/18/2023    HIV 1/2 AG/AB, 4TH GENERATION,W RFLX CONFIRM     Standing Status:   Future     Number of Occurrences:   1     Standing Expiration Date:   4/18/2023    HEP B SURFACE AB     Standing Status:   Future     Number of Occurrences:   1     Standing Expiration Date:   4/18/2023    HEP B SURFACE AG     Standing Status:   Future     Number of Occurrences:   1     Standing Expiration Date:   4/18/2023    HEPATITIS B CORE AB, TOTAL     Standing Status:   Future     Number of Occurrences:   1     Standing Expiration Date:   4/18/2023    RPR     Standing Status:   Future     Number of Occurrences:   1     Standing Expiration Date:   4/18/2023    HSV 1 AND 2-SPEC AB, IGG W/RFX TO SUPPL HSV-2 TESTING     Standing Status:   Future     Number of Occurrences:   1     Standing Expiration Date:   4/18/2023    NUSWAB VAGINITIS PLUS (VG+) WITH CANDIDA (SIX SPECIES)     ATLASSITEID:[id number]       Medications Discontinued During This Encounter   Medication Reason    olopatadine (PATANASE) 0.6 % spry Cost of Medication    potassium chloride SR (K-TAB) 20 mEq tablet Therapy Completed       Current Outpatient Medications   Medication Sig Dispense Refill    valsartan (DIOVAN) 80 mg tablet Take 1 Tablet by mouth daily. 90 Tablet 1    ascorbic acid (MAINOR-C PO) Take  by mouth.  VITAMIN B COMPLEX PO Take  by mouth.  ZINC PO Take 50 mg by mouth daily.  ibuprofen (MOTRIN) 600 mg tablet Take 1 Tablet by mouth every six (6) hours as needed (pain). Take with food or milk each time. 20 Tablet 0       Recommended healthy diet low in carbohydrates, fats, sodium and cholesterol. Recommended regular cardiovascular exercise 3-6 times per week for 30-60 minutes daily. Verbal and written instructions (see AVS) provided. Patient expresses understanding of diagnosis and treatment plan. Follow-up and Dispositions    · Return in about 3 months (around 7/18/2022) for HTN, iron deficiency.        Future Appointments   Date Time Provider Ana Thorpe   7/25/2022  3:30 PM Elvia Wellington PA-C [facility]

## 2022-04-18 NOTE — PATIENT INSTRUCTIONS
For the iron deficiency:  Take ferrous gluconate 324 mg once daily  To help iron absorption, take Vitamin C 500 mg once daily  To prevent constipation, take stool softener docusate 100 mg once or twice daily           Iron-Rich Diet: Care Instructions  Your Care Instructions     Your body needs iron to make hemoglobin. Hemoglobin is a substance in red blood cells that carries oxygen from the lungs to cells all through your body. If you do not get enough iron, your body makes fewer and smaller red blood cells. As a result, your body's cells may not get enough oxygen. Adult men need 8 milligrams of iron a day; adult women need 18 milligrams of iron a day. After menopause, women need 8 milligrams of iron a day. A pregnant woman needs 27 milligrams of iron a day. Infants and young children have higher iron needs relative to their size than other age groups. People who have lost blood because of ulcers or heavy menstrual periods may become very low in iron and may develop anemia. Most people can get the iron their bodies need by eating enough of certain iron-rich foods. Your doctor may recommend that you take an iron supplement along with eating an iron-rich diet. Follow-up care is a key part of your treatment and safety. Be sure to make and go to all appointments, and call your doctor if you are having problems. It's also a good idea to know your test results and keep a list of the medicines you take. How can you care for yourself at home? · Make iron-rich foods a part of your daily diet. Iron-rich foods include:  ? All meats, such as chicken, beef, lamb, pork, fish, and shellfish. Liver is especially high in iron. ? Leafy green vegetables. ? Raisins, peas, beans, lentils, barley, and eggs. ? Iron-fortified breakfast cereals. · Eat foods with vitamin C along with iron-rich foods. Vitamin C helps you absorb more iron from food. Drink a glass of orange juice or another citrus juice with your food.   · Eat meat and vegetables or grains together. The iron in meat helps your body absorb the iron in other foods. Where can you learn more? Go to http://www.gray.com/  Enter Z290 in the search box to learn more about \"Iron-Rich Diet: Care Instructions. \"  Current as of: September 8, 2021               Content Version: 13.2  © 2006-2022 [employer]. Care instructions adapted under license by [employer] (which disclaims liability or warranty for this information). If you have questions about a medical condition or this instruction, always ask your healthcare professional. [place] [address] any warranty or liability for your use of this information. Learning About High-Iron Foods  What foods are high in iron? The foods you eat contain nutrients, such as vitamins and minerals. Iron is a nutrient. Your body needs the right amount to stay healthy and work as it should. You can use the list below to help you make choices about which foods to eat. Here are some foods that contain iron. They have 1 to 2 milligrams of iron per serving. Fruits  · Figs (dried), 5 figs  Vegetables  · Asparagus (canned), 6 iyer  · Rios, beet, Swiss chard, or turnip greens, 1 cup  · Dried peas, cooked, ½ cup  · Seaweed, spirulina (dried), ¼ cup  · Spinach, (cooked) ½ cup or (raw) 1 cup  Grains  · Cereals, fortified with iron, 1 cup  · Grits (instant, cooked), fortified with iron, ½ cup  Meats and other protein foods  · Beans (kidney, lima, navy, white), canned or cooked, ½ cup  · Beef or lamb, 3 oz  · Chicken giblets, 3 oz  · Chickpeas (garbanzo beans), ½ cup  · Liver of beef, lamb, or pork, 3 oz  · Oysters (cooked), 3 oz  · Sardines (canned), 3 oz  · Soybeans (boiled), ½ cup  · Tofu (firm), ½ cup  Work with your doctor to find out how much of this nutrient you need. Depending on your health, you may need more or less of it in your diet. Where can you learn more?   Go to http://www.gray.com/  Enter R005 in the search box to learn more about \"Learning About High-Iron Foods. \"  Current as of: September 8, 2021               Content Version: 13.2  © 7345-9557 [employer]. Care instructions adapted under license by [employer] (which disclaims liability or warranty for this information). If you have questions about a medical condition or this instruction, always ask your healthcare professional. [place] [address] any warranty or liability for your use of this information.

## 2022-04-19 LAB
HBV SURFACE AB SER QL: REACTIVE
HBV SURFACE AB SER-ACNC: 14.94 MIU/ML
HBV SURFACE AG SER QL: 0.1 INDEX
HBV SURFACE AG SER QL: NEGATIVE
HCV AB SERPL QL IA: NONREACTIVE
HIV 1+2 AB+HIV1 P24 AG SERPL QL IA: NONREACTIVE
HIV12 RESULT COMMENT, HHIVC: NORMAL
RPR SER QL: NONREACTIVE

## 2022-04-20 LAB
HBV CORE AB SERPL QL IA: NEGATIVE
HSV1 IGG SER IA-ACNC: 40.8 INDEX (ref 0–0.9)
HSV2 IGG SER IA-ACNC: <0.91 INDEX (ref 0–0.9)

## 2022-04-21 LAB
A VAGINAE DNA VAG QL NAA+PROBE: NORMAL SCORE
BVAB2 DNA VAG QL NAA+PROBE: NORMAL SCORE
C ALBICANS DNA VAG QL NAA+PROBE: NEGATIVE
C GLABRATA DNA VAG QL NAA+PROBE: NEGATIVE
C PARAPSILOSIS/TROPICALIS: NEGATIVE
C TRACH RRNA SPEC QL NAA+PROBE: NEGATIVE
CANDIDA KRUSEI, NAA, 180016: NEGATIVE
CANDIDA LUSITANIAE, NAA, 180015: NEGATIVE
MEGA1 DNA VAG QL NAA+PROBE: NORMAL SCORE
N GONORRHOEA RRNA SPEC QL NAA+PROBE: NEGATIVE
T VAGINALIS RRNA SPEC QL NAA+PROBE: NEGATIVE

## 2022-04-21 NOTE — PROGRESS NOTES
Mychart msg sentThe lab confirms prior exposure to Herpes Virus Type 1    You are immune to Hepatitis B (prior vaccine)    No exposure to syphilis, HIV, or Hepatitis C    The vaginal swab is still pending

## 2022-04-28 NOTE — PROGRESS NOTES
Mychart msg sentThere is an elevated protein level called Hypergammaglobulinemia, which is related to your positive NANCI status and Sjogren's syndrome. Again I recommend seeing a rheumatologist, and this should be monitored every 6-12 months.

## 2022-05-08 ENCOUNTER — HOSPITAL ENCOUNTER (EMERGENCY)
Age: 27
Discharge: HOME | End: 2022-05-08
Attending: EMERGENCY MEDICINE
Payer: COMMERCIAL

## 2022-05-08 ENCOUNTER — APPOINTMENT (OUTPATIENT)
Dept: ULTRASOUND IMAGING | Age: 27
End: 2022-05-08
Attending: EMERGENCY MEDICINE
Payer: COMMERCIAL

## 2022-05-08 VITALS
OXYGEN SATURATION: 100 % | SYSTOLIC BLOOD PRESSURE: 144 MMHG | BODY MASS INDEX: 31.66 KG/M2 | HEART RATE: 100 BPM | TEMPERATURE: 99 F | DIASTOLIC BLOOD PRESSURE: 106 MMHG | WEIGHT: 190.26 LBS | RESPIRATION RATE: 18 BRPM

## 2022-05-08 DIAGNOSIS — N93.8 DUB (DYSFUNCTIONAL UTERINE BLEEDING): Primary | ICD-10-CM

## 2022-05-08 LAB
ALBUMIN SERPL-MCNC: 3.4 G/DL (ref 3.5–5)
ALBUMIN/GLOB SERPL: 0.6 {RATIO} (ref 1.1–2.2)
ALP SERPL-CCNC: 52 U/L (ref 45–117)
ALT SERPL-CCNC: 18 U/L (ref 12–78)
ANION GAP SERPL CALC-SCNC: 10 MMOL/L (ref 5–15)
APPEARANCE UR: ABNORMAL
AST SERPL-CCNC: 18 U/L (ref 15–37)
BACTERIA URNS QL MICRO: ABNORMAL /HPF
BASOPHILS # BLD: 0 K/UL (ref 0–0.1)
BASOPHILS NFR BLD: 0 % (ref 0–1)
BILIRUB SERPL-MCNC: 0.4 MG/DL (ref 0.2–1)
BILIRUB UR QL: NEGATIVE
BUN SERPL-MCNC: 11 MG/DL (ref 6–20)
BUN/CREAT SERPL: 12 (ref 12–20)
CALCIUM SERPL-MCNC: 8.7 MG/DL (ref 8.5–10.1)
CHLORIDE SERPL-SCNC: 103 MMOL/L (ref 97–108)
CO2 SERPL-SCNC: 26 MMOL/L (ref 21–32)
COLOR UR: ABNORMAL
CREAT SERPL-MCNC: 0.89 MG/DL (ref 0.55–1.02)
DIFFERENTIAL METHOD BLD: ABNORMAL
EOSINOPHIL # BLD: 0.1 K/UL (ref 0–0.4)
EOSINOPHIL NFR BLD: 2 % (ref 0–7)
EPITH CASTS URNS QL MICRO: ABNORMAL /LPF
ERYTHROCYTE [DISTWIDTH] IN BLOOD BY AUTOMATED COUNT: 14.9 % (ref 11.5–14.5)
GLOBULIN SER CALC-MCNC: 5.4 G/DL (ref 2–4)
GLUCOSE SERPL-MCNC: 71 MG/DL (ref 65–100)
GLUCOSE UR STRIP.AUTO-MCNC: NEGATIVE MG/DL
HCG UR QL: NEGATIVE
HCT VFR BLD AUTO: 31.7 % (ref 35–47)
HGB BLD-MCNC: 9.8 G/DL (ref 11.5–16)
HGB UR QL STRIP: ABNORMAL
IMM GRANULOCYTES # BLD AUTO: 0 K/UL (ref 0–0.04)
IMM GRANULOCYTES NFR BLD AUTO: 0 % (ref 0–0.5)
KETONES UR QL STRIP.AUTO: ABNORMAL MG/DL
LEUKOCYTE ESTERASE UR QL STRIP.AUTO: ABNORMAL
LYMPHOCYTES # BLD: 2.1 K/UL (ref 0.8–3.5)
LYMPHOCYTES NFR BLD: 30 % (ref 12–49)
MCH RBC QN AUTO: 27.5 PG (ref 26–34)
MCHC RBC AUTO-ENTMCNC: 30.9 G/DL (ref 30–36.5)
MCV RBC AUTO: 89 FL (ref 80–99)
MONOCYTES # BLD: 0.6 K/UL (ref 0–1)
MONOCYTES NFR BLD: 9 % (ref 5–13)
MUCOUS THREADS URNS QL MICRO: ABNORMAL /LPF
NEUTS SEG # BLD: 4.1 K/UL (ref 1.8–8)
NEUTS SEG NFR BLD: 59 % (ref 32–75)
NITRITE UR QL STRIP.AUTO: NEGATIVE
NRBC # BLD: 0 K/UL (ref 0–0.01)
NRBC BLD-RTO: 0 PER 100 WBC
PH UR STRIP: 6 [PH] (ref 5–8)
PLATELET # BLD AUTO: 543 K/UL (ref 150–400)
PMV BLD AUTO: 8.9 FL (ref 8.9–12.9)
POTASSIUM SERPL-SCNC: 3.6 MMOL/L (ref 3.5–5.1)
PROT SERPL-MCNC: 8.8 G/DL (ref 6.4–8.2)
PROT UR STRIP-MCNC: NEGATIVE MG/DL
RBC # BLD AUTO: 3.56 M/UL (ref 3.8–5.2)
RBC #/AREA URNS HPF: ABNORMAL /HPF (ref 0–5)
SODIUM SERPL-SCNC: 139 MMOL/L (ref 136–145)
SP GR UR REFRACTOMETRY: 1.02 (ref 1–1.03)
UR CULT HOLD, URHOLD: NORMAL
UROBILINOGEN UR QL STRIP.AUTO: 1 EU/DL (ref 0.2–1)
WBC # BLD AUTO: 7 K/UL (ref 3.6–11)
WBC URNS QL MICRO: ABNORMAL /HPF (ref 0–4)

## 2022-05-08 PROCEDURE — 81001 URINALYSIS AUTO W/SCOPE: CPT

## 2022-05-08 PROCEDURE — 99284 EMERGENCY DEPT VISIT MOD MDM: CPT

## 2022-05-08 PROCEDURE — 85025 COMPLETE CBC W/AUTO DIFF WBC: CPT

## 2022-05-08 PROCEDURE — 81025 URINE PREGNANCY TEST: CPT

## 2022-05-08 PROCEDURE — 76856 US EXAM PELVIC COMPLETE: CPT

## 2022-05-08 PROCEDURE — 36415 COLL VENOUS BLD VENIPUNCTURE: CPT

## 2022-05-08 PROCEDURE — 80053 COMPREHEN METABOLIC PANEL: CPT

## 2022-05-08 NOTE — ED PROVIDER NOTES
The history is provided by the patient. Vaginal Bleeding  This is a new problem. The current episode started 12 to 24 hours ago. The problem occurs daily. The problem has not changed since onset. Pertinent negatives include no chest pain, no abdominal pain, no headaches and no shortness of breath. Nothing aggravates the symptoms. Nothing relieves the symptoms. She has tried nothing for the symptoms. The treatment provided no relief. Past Medical History:   Diagnosis Date    Abnormal Pap smear of cervix     seeing OBGYN    Acne 10/7/2009    Allergic rhinitis 2009    Anemia 2009    ASCUS HPV+ 2017    H/o ASCUS HPV + with Dr. Lary Fuentes 2017    CHAR III (cervical intraepithelial neoplasia III) 2017    found by LEEP     Headache     Herpes genitalia     dx by OB    History of gestational diabetes     diet controlled    Other ill-defined conditions(799.89)     scoliosis    PCB (post coital bleeding) 2014    Thoracic compression fracture ([address] 3/2014    Skippy Filter    Thyromegaly 2014    On exam     Unspecified vitamin D deficiency 2014    Varicella 1998       Past Surgical History:   Procedure Laterality Date    HX GYN           HX LEEP PROCEDURE  18    found CHAR 3         Family History:   Problem Relation Age of Onset    Breast Cancer Maternal Grandmother         great, great gma, age?     Hypertension Maternal Grandmother     Breast Cancer Maternal Aunt 32        survivor    Diabetes Father     Hypertension Father     Hypertension Paternal Grandmother     Diabetes Paternal Grandmother     No Known Problems Brother     Osteoporosis Neg Hx        Social History     Socioeconomic History    Marital status: SINGLE     Spouse name: Not on file    Number of children: Not on file    Years of education: Not on file    Highest education level: Not on file   Occupational History    Not on file   Tobacco Use    Smoking status: Never Smoker    Smokeless tobacco: Never Used   Vaping Use    Vaping Use: Never used   Substance and Sexual Activity    Alcohol use: Yes     Comment: once every 3 months, 3 drinks at a time    Drug use: No    Sexual activity: Yes     Partners: Male     Birth control/protection: Injection   Other Topics Concern    Not on file   Social History Narrative    Lives in [facility] with parents and 3 yo son. Works at [employer]. Social Determinants of Health     Financial Resource Strain:     Difficulty of Paying Living Expenses: Not on file   Food Insecurity:     Worried About Running Out of Food in the Last Year: Not on file    Yani of Food in the Last Year: Not on file   Transportation Needs:     Lack of Transportation (Medical): Not on file    Lack of Transportation (Non-Medical): Not on file   Physical Activity:     Days of Exercise per Week: Not on file    Minutes of Exercise per Session: Not on file   Stress:     Feeling of Stress : Not on file   Social Connections:     Frequency of Communication with Friends and Family: Not on file    Frequency of Social Gatherings with Friends and Family: Not on file    Attends Roman [employer] Services: Not on file    Active Member of [address] [place] [address] [employer] or Organizations: Not on file    Attends Club or Organization Meetings: Not on file    Marital Status: Not on file   Intimate Partner Violence:     Fear of Current or Ex-Partner: Not on file    Emotionally Abused: Not on file    Physically Abused: Not on file    Sexually Abused: Not on file   Housing Stability:     Unable to Pay for Housing in the Last Year: Not on file    Number of [place] in the Last Year: Not on file    Unstable Housing in the Last Year: Not on file         ALLERGIES: Hydrochlorothiazide and Metronidazole    Review of Systems   Constitutional: Negative for activity change, chills and fever. HENT: Negative for nosebleeds, sore throat, trouble swallowing and voice change. Eyes: Negative for visual disturbance. Respiratory: Negative for shortness of breath. Cardiovascular: Negative for chest pain and palpitations. Gastrointestinal: Negative for abdominal pain, constipation, diarrhea and nausea. Genitourinary: Positive for menstrual problem, pelvic pain and vaginal bleeding. Negative for difficulty urinating, dysuria, hematuria and urgency. Musculoskeletal: Negative for back pain, neck pain and neck stiffness. Skin: Negative for color change. Allergic/Immunologic: Negative for immunocompromised state. Neurological: Negative for dizziness, seizures, syncope, weakness, light-headedness, numbness and headaches. Psychiatric/Behavioral: Negative for behavioral problems, confusion, hallucinations, self-injury and suicidal ideas. Vitals:    05/08/22 1843   BP: (!) 139/109   Pulse: 100   Resp: 18   Temp: 99 °F (37.2 °C)   SpO2: 98%   Weight: 86.3 kg (190 lb 4.1 oz)            Physical Exam  Vitals and nursing note reviewed. Constitutional:       General: She is not in acute distress. Appearance: She is well-developed. She is not diaphoretic. HENT:      Head: Atraumatic. Neck:      Trachea: No tracheal deviation. Cardiovascular:      Comments: Warm and well perfused  Pulmonary:      Effort: Pulmonary effort is normal. No respiratory distress. Musculoskeletal:         General: Normal range of motion. Skin:     General: Skin is warm and dry. Neurological:      Mental Status: She is alert. Coordination: Coordination normal.   Psychiatric:         Behavior: Behavior normal.         Thought Content: Thought content normal.         Judgment: Judgment normal.          MDM     This is a 68-year-old female with past medical history, review of systems, physical exam as above, presenting with complaints of vaginal bleeding, pelvic pain.   Patient states her menstrual cycle last started 2 weeks early, lasted 7 days, which was approximately 3 weeks ago when she states she started having vaginal bleeding and right pelvic cramping today. Patient states pregnancy remains a possibility, she endorses 3 prior pregnancies 1 carried to term, 1 ectopic and 1 spontaneous . She denies other complaints. Physical exam is remarkable for a well-appearing young female, in no acute distress noted to be hypertensive, afebrile without tachycardia, satting well on room air. Differential includes recurrent ectopic pregnancy, dysfunctional uterine bleeding. Plan to obtain CMP, CBC, UA, point-of-care pregnancy test and pelvic ultrasound. We will reassess, and make a disposition.     Procedures

## 2022-05-08 NOTE — ED TRIAGE NOTES
Ambulatory with a steady gait. States she came on her menstrual cycle 2 days early on 4/25/22 and it came on light and got heavy last until 5/1/22. States now today she is having heavy bleeding when she is supposed to be ovulating. States lower abdominal cramping that radiates to sacrum. Denies N/V/D.

## 2022-05-21 ENCOUNTER — APPOINTMENT (OUTPATIENT)
Dept: GENERAL RADIOLOGY | Age: 27
End: 2022-05-21
Attending: EMERGENCY MEDICINE
Payer: COMMERCIAL

## 2022-05-21 ENCOUNTER — HOSPITAL ENCOUNTER (EMERGENCY)
Age: 27
Discharge: HOME | End: 2022-05-21
Attending: EMERGENCY MEDICINE
Payer: COMMERCIAL

## 2022-05-21 VITALS
HEIGHT: 65 IN | HEART RATE: 97 BPM | WEIGHT: 187.39 LBS | TEMPERATURE: 98.4 F | RESPIRATION RATE: 18 BRPM | SYSTOLIC BLOOD PRESSURE: 149 MMHG | DIASTOLIC BLOOD PRESSURE: 105 MMHG | BODY MASS INDEX: 31.22 KG/M2 | OXYGEN SATURATION: 99 %

## 2022-05-21 DIAGNOSIS — R14.1 ABDOMINAL GAS PAIN: ICD-10-CM

## 2022-05-21 DIAGNOSIS — G43.009 ATYPICAL MIGRAINE: Primary | ICD-10-CM

## 2022-05-21 LAB
ALBUMIN SERPL-MCNC: 3.3 G/DL (ref 3.5–5)
ALBUMIN/GLOB SERPL: 0.6 {RATIO} (ref 1.1–2.2)
ALP SERPL-CCNC: 48 U/L (ref 45–117)
ALT SERPL-CCNC: 19 U/L (ref 12–78)
ANION GAP SERPL CALC-SCNC: 4 MMOL/L (ref 5–15)
AST SERPL-CCNC: 45 U/L (ref 15–37)
ATRIAL RATE: 86 BPM
BASOPHILS # BLD: 0 K/UL (ref 0–0.1)
BASOPHILS NFR BLD: 0 % (ref 0–1)
BILIRUB SERPL-MCNC: 0.4 MG/DL (ref 0.2–1)
BUN SERPL-MCNC: 14 MG/DL (ref 6–20)
BUN/CREAT SERPL: 16 (ref 12–20)
CALCIUM SERPL-MCNC: 9.4 MG/DL (ref 8.5–10.1)
CALCULATED P AXIS, ECG09: 67 DEGREES
CALCULATED R AXIS, ECG10: 47 DEGREES
CALCULATED T AXIS, ECG11: 50 DEGREES
CHLORIDE SERPL-SCNC: 106 MMOL/L (ref 97–108)
CO2 SERPL-SCNC: 25 MMOL/L (ref 21–32)
COMMENT, HOLDF: NORMAL
CREAT SERPL-MCNC: 0.89 MG/DL (ref 0.55–1.02)
DIAGNOSIS, 93000: NORMAL
DIFFERENTIAL METHOD BLD: ABNORMAL
EOSINOPHIL # BLD: 0.1 K/UL (ref 0–0.4)
EOSINOPHIL NFR BLD: 2 % (ref 0–7)
ERYTHROCYTE [DISTWIDTH] IN BLOOD BY AUTOMATED COUNT: 16.4 % (ref 11.5–14.5)
GLOBULIN SER CALC-MCNC: 5.7 G/DL (ref 2–4)
GLUCOSE SERPL-MCNC: 111 MG/DL (ref 65–100)
HCT VFR BLD AUTO: 31.8 % (ref 35–47)
HGB BLD-MCNC: 10.1 G/DL (ref 11.5–16)
IMM GRANULOCYTES # BLD AUTO: 0 K/UL (ref 0–0.04)
IMM GRANULOCYTES NFR BLD AUTO: 0 % (ref 0–0.5)
LYMPHOCYTES # BLD: 2.3 K/UL (ref 0.8–3.5)
LYMPHOCYTES NFR BLD: 30 % (ref 12–49)
MCH RBC QN AUTO: 28.9 PG (ref 26–34)
MCHC RBC AUTO-ENTMCNC: 31.8 G/DL (ref 30–36.5)
MCV RBC AUTO: 90.9 FL (ref 80–99)
MONOCYTES # BLD: 0.6 K/UL (ref 0–1)
MONOCYTES NFR BLD: 8 % (ref 5–13)
NEUTS SEG # BLD: 4.7 K/UL (ref 1.8–8)
NEUTS SEG NFR BLD: 60 % (ref 32–75)
NRBC # BLD: 0 K/UL (ref 0–0.01)
NRBC BLD-RTO: 0 PER 100 WBC
P-R INTERVAL, ECG05: 172 MS
PLATELET # BLD AUTO: 484 K/UL (ref 150–400)
PMV BLD AUTO: 9.9 FL (ref 8.9–12.9)
POTASSIUM SERPL-SCNC: 4.5 MMOL/L (ref 3.5–5.1)
PROT SERPL-MCNC: 9 G/DL (ref 6.4–8.2)
Q-T INTERVAL, ECG07: 366 MS
QRS DURATION, ECG06: 88 MS
QTC CALCULATION (BEZET), ECG08: 437 MS
RBC # BLD AUTO: 3.5 M/UL (ref 3.8–5.2)
SAMPLES BEING HELD,HOLD: NORMAL
SODIUM SERPL-SCNC: 135 MMOL/L (ref 136–145)
TROPONIN-HIGH SENSITIVITY: 4 NG/L (ref 0–51)
VENTRICULAR RATE, ECG03: 86 BPM
WBC # BLD AUTO: 7.8 K/UL (ref 3.6–11)

## 2022-05-21 PROCEDURE — 84484 ASSAY OF TROPONIN QUANT: CPT

## 2022-05-21 PROCEDURE — 99285 EMERGENCY DEPT VISIT HI MDM: CPT

## 2022-05-21 PROCEDURE — 36415 COLL VENOUS BLD VENIPUNCTURE: CPT

## 2022-05-21 PROCEDURE — 85025 COMPLETE CBC W/AUTO DIFF WBC: CPT

## 2022-05-21 PROCEDURE — 80053 COMPREHEN METABOLIC PANEL: CPT

## 2022-05-21 PROCEDURE — 93005 ELECTROCARDIOGRAM TRACING: CPT

## 2022-05-21 PROCEDURE — 71045 X-RAY EXAM CHEST 1 VIEW: CPT

## 2022-05-21 RX ORDER — METOCLOPRAMIDE 10 MG/1
10 TABLET ORAL
Qty: 9 TABLET | Refills: 0 | Status: SHIPPED | OUTPATIENT
Start: 2022-05-21 | End: 2022-05-24

## 2022-05-21 RX ORDER — DICYCLOMINE HYDROCHLORIDE 20 MG/1
20 TABLET ORAL 3 TIMES DAILY
Qty: 9 TABLET | Refills: 0 | Status: SHIPPED | OUTPATIENT
Start: 2022-05-21 | End: 2022-05-24

## 2022-05-21 NOTE — ED PROVIDER NOTES
EMERGENCY DEPARTMENT HISTORY AND PHYSICAL EXAM    Please note that this dictation was completed with [employer], the computer voice recognition software. Quite often unanticipated grammatical, syntax, homophones, and other interpretive errors are inadvertently transcribed by the computer software. Please disregard these errors. Please excuse any errors that have escaped final proofreading. Date: 5/21/2022  Patient Name: Deanna Bond  Patient Age and Sex: 27 y.o. female    History of Presenting Illness     Chief Complaint   Patient presents with    Chest Pain     pt presents w/ c/o chest pain today, pt reports right side numbness all day, and weakness. pt rpeorts has had similar symptoms in past        History Provided By: Patient     Chief Complaint: right-sided numbness/tingling, tingling over anterior chest wall      HPI: Deanna Bond, is a 27 y.o. female who presents to the ED with sensation of tingling over right side of her entire body and similar tingling sensation over her chest. No sob, chest pain, cough, fever or chills. No focal weakness, facial droop, speech changes, vision changes. Sensation objectively intact. Symptoms started this morning. She has had similar symptoms in the past, was admitted for a stroke workup to [address] [place] [address] in march. Copy of neuro note is copied below. Workup for stroke was negative. She denies dizziness, headache. Her symptoms have been constant since onset. Nothing makes them better or worse. Admitted 3/7-3/8 2022 to [address] [place] [address] with dizziness and paresthesias. Note from neuro:  ASSESSMENT AND PLAN:  78-year-old female with no significant vascular risk factors or coagulopathy, who is presenting with sudden onset of dizziness/vertigo along with right face, arm and leg tingling that lasted for about 10-15 minutes. She has had three other similar episodes over the past 6 months. Her stroke workup is negative. Exact etiology of her symptoms is unclear.   Vestibular/basilar migraine is a possibility. Recurrent seizure typical symptoms are atypical for a transient ischemic attack. Negative brain MRI is reassuring. Suspicion for seizures is also low, but recommend checking electroencephalogram as an outpatient. Okay to discharge home      Pt denies any other alleviating or exacerbating factors. No other associated signs or symptoms. There are no other complaints, changes or physical findings at this time. PCP: Domenick Kawasaki, PA-C    Past History   All documented elements of the \[facility] [place] [facility]\""H reviewed and verified by me. -Belia Almonte MD    Past Medical History:  Past Medical History:   Diagnosis Date    Abnormal Pap smear of cervix     seeing OBGYN    Acne 10/7/2009    Allergic rhinitis 2009    Anemia 2009    ASCUS HPV+ 2017    H/o ASCUS HPV + with Dr. Joe Javier 2017    CHAR III (cervical intraepithelial neoplasia III) 2017    found by LEEP     Headache     Herpes genitalia     dx by OB    History of gestational diabetes     diet controlled    Other ill-defined conditions(799.89)     scoliosis    PCB (post coital bleeding) 2014    Thoracic compression fracture ([address] 3/2014    Aaron Anderson    Thyromegaly 2014    On exam     Unspecified vitamin D deficiency 2014    Varicella 1998       Past Surgical History:  Past Surgical History:   Procedure Laterality Date    HX GYN           HX LEEP PROCEDURE  18    found CHAR 3       Family History:   Family History   Problem Relation Age of Onset    Breast Cancer Maternal Grandmother         great, great gma, age?     Hypertension Maternal Grandmother     Breast Cancer Maternal Aunt 32        survivor    Diabetes Father     Hypertension Father     Hypertension Paternal Grandmother     Diabetes Paternal Grandmother     No Known Problems Brother     Osteoporosis Neg Hx        Social History:  Social History     Tobacco Use    Smoking status: Never Smoker    Smokeless tobacco: Never Used   Vaping Use    Vaping Use: Never used   Substance Use Topics    Alcohol use: Yes     Comment: once every 3 months, 3 drinks at a time    Drug use: No       Current Medications:  No current facility-administered medications on file prior to encounter. Current Outpatient Medications on File Prior to Encounter   Medication Sig Dispense Refill    valsartan (DIOVAN) 80 mg tablet Take 1 Tablet by mouth daily. 90 Tablet 1    ascorbic acid (MAINOR-C PO) Take  by mouth.  VITAMIN B COMPLEX PO Take  by mouth.  ZINC PO Take 50 mg by mouth daily.  ibuprofen (MOTRIN) 600 mg tablet Take 1 Tablet by mouth every six (6) hours as needed (pain). Take with food or milk each time. 20 Tablet 0       Allergies: Allergies   Allergen Reactions    Hydrochlorothiazide Other (comments)     Low potassium    Metronidazole Itching       Review of Systems   All other systems reviewed and negative    Review of Systems   Constitutional: Negative for chills and fever. HENT: Negative. Negative for trouble swallowing. Eyes: Negative. Negative for photophobia and visual disturbance. Respiratory: Negative for cough and shortness of breath. Cardiovascular: Negative for chest pain, palpitations and leg swelling. Gastrointestinal: Negative for abdominal pain, diarrhea, nausea and vomiting. Endocrine: Negative. Genitourinary: Negative for dysuria, flank pain and hematuria. Musculoskeletal: Negative for arthralgias, gait problem, joint swelling, myalgias and neck stiffness. Skin: Negative for rash. Allergic/Immunologic: Negative for immunocompromised state. Neurological: Positive for numbness. Negative for dizziness, tremors, seizures, syncope, facial asymmetry, speech difficulty, weakness, light-headedness and headaches. Hematological: Negative for adenopathy. Psychiatric/Behavioral: Negative. Negative for confusion.    All other systems reviewed and are negative. Physical Exam   Reviewed patients vital signs and nursing note    Physical Exam  Vitals and nursing note reviewed. Constitutional:       Appearance: She is not diaphoretic. HENT:      Head: Atraumatic. Nose: Nose normal.      Mouth/Throat:      Mouth: Mucous membranes are moist.   Eyes:      Extraocular Movements: Extraocular movements intact. Conjunctiva/sclera: Conjunctivae normal.      Pupils: Pupils are equal, round, and reactive to light. Neck:      Vascular: No JVD. Trachea: No tracheal deviation. Cardiovascular:      Rate and Rhythm: Normal rate and regular rhythm. Pulses:           Radial pulses are 2+ on the right side and 1+ on the left side. Dorsalis pedis pulses are 2+ on the right side and 2+ on the left side. Heart sounds: Normal heart sounds. No murmur heard. Pulmonary:      Effort: Pulmonary effort is normal. No tachypnea or accessory muscle usage. Breath sounds: Normal breath sounds. No decreased breath sounds. Chest:      Chest wall: No tenderness or crepitus. Abdominal:      General: Bowel sounds are normal.      Palpations: Abdomen is soft. Tenderness: There is no abdominal tenderness. Musculoskeletal:         General: No tenderness. Normal range of motion. Cervical back: Normal range of motion. No rigidity. Right lower leg: No tenderness. No edema. Left lower leg: No tenderness. No edema. Skin:     General: Skin is warm and dry. Capillary Refill: Capillary refill takes less than 2 seconds. Coloration: Skin is not cyanotic or pale. Findings: No rash. Neurological:      General: No focal deficit present. Mental Status: She is alert and oriented to person, place, and time. Cranial Nerves: Cranial nerves are intact. Sensory: Sensation is intact. Motor: Motor function is intact. Coordination: Coordination is intact. Gait: Gait is intact.       Deep Tendon Reflexes: Babinski sign absent on the right side. Babinski sign absent on the left side. Reflex Scores:       Bicep reflexes are 2+ on the right side and 2+ on the left side. Brachioradialis reflexes are 2+ on the right side and 2+ on the left side. Patellar reflexes are 2+ on the right side and 2+ on the left side. Comments: NEURO EXAM  Mental status: A/Ox3  CN II-XII tested and intact. Sensation intact to sharp/dull differentiation in all extremities. Motor: Normal tone and bulk. No abnormal movements appreciated. No pronator drift. Strength tested and 5/5 in bilateral wrist flexion/extension, elbow flexion/extension, shoulder abduction, straight leg raise, knee flexion/extension, ankle dorsiflexion/plantarflexion. Patient ambulates with a steady gait. Coordination: Finger to nose and heel to shin testing intact bilaterally. Reflexes: Brachioradialis, biceps, and patellar reflexes WNL and symmetric bilaterally. Babinski with downgoing toes bilaterally. Psychiatric:         Mood and Affect: Mood normal.         Behavior: Behavior normal.         Diagnostic Study Results     Labs - I have personally reviewed and interpreted all laboratory results. Interpretation of available and pertinent results detailed below in MDM.  Aron Barriga MD, MSc  Recent Results (from the past 300 hour(s))   EKG, 12 LEAD, INITIAL    Collection Time: 05/21/22  2:15 AM   Result Value Ref Range    Ventricular Rate 86 BPM    Atrial Rate 86 BPM    P-R Interval 172 ms    QRS Duration 88 ms    Q-T Interval 366 ms    QTC Calculation (Bezet) 437 ms    Calculated P Axis 67 degrees    Calculated R Axis 47 degrees    Calculated T Axis 50 degrees    Diagnosis       Normal sinus rhythm  When compared with ECG of 07-MAR-2022 18:02,  T wave inversion no longer evident in Inferior leads  Confirmed by Huber Marshall ([id number]) on 5/21/2022 10:18:11 AM     CBC WITH AUTOMATED DIFF    Collection Time: 05/21/22  2:27 AM   Result Value Ref Range    WBC 7.8 3.6 - 11.0 K/uL    RBC 3.50 (L) 3.80 - 5.20 M/uL    HGB 10.1 (L) 11.5 - 16.0 g/dL    HCT 31.8 (L) 35.0 - 47.0 %    MCV 90.9 80.0 - 99.0 FL    MCH 28.9 26.0 - 34.0 PG    MCHC 31.8 30.0 - 36.5 g/dL    RDW 16.4 (H) 11.5 - 14.5 %    PLATELET 872 (H) 460 - 400 K/uL    MPV 9.9 8.9 - 12.9 FL    NRBC 0.0 0  WBC    ABSOLUTE NRBC 0.00 0.00 - 0.01 K/uL    NEUTROPHILS 60 32 - 75 %    LYMPHOCYTES 30 12 - 49 %    MONOCYTES 8 5 - 13 %    EOSINOPHILS 2 0 - 7 %    BASOPHILS 0 0 - 1 %    IMMATURE GRANULOCYTES 0 0.0 - 0.5 %    ABS. NEUTROPHILS 4.7 1.8 - 8.0 K/UL    ABS. LYMPHOCYTES 2.3 0.8 - 3.5 K/UL    ABS. MONOCYTES 0.6 0.0 - 1.0 K/UL    ABS. EOSINOPHILS 0.1 0.0 - 0.4 K/UL    ABS. BASOPHILS 0.0 0.0 - 0.1 K/UL    ABS. IMM. GRANS. 0.0 0.00 - 0.04 K/UL    DF AUTOMATED     METABOLIC PANEL, COMPREHENSIVE    Collection Time: 05/21/22  2:27 AM   Result Value Ref Range    Sodium 135 (L) 136 - 145 mmol/L    Potassium 4.5 3.5 - 5.1 mmol/L    Chloride 106 97 - 108 mmol/L    CO2 25 21 - 32 mmol/L    Anion gap 4 (L) 5 - 15 mmol/L    Glucose 111 (H) 65 - 100 mg/dL    BUN 14 6 - 20 MG/DL    Creatinine 0.89 0.55 - 1.02 MG/DL    BUN/Creatinine ratio 16 12 - 20      GFR est AA >60 >60 ml/min/1.73m2    GFR est non-AA >60 >60 ml/min/1.73m2    Calcium 9.4 8.5 - 10.1 MG/DL    Bilirubin, total 0.4 0.2 - 1.0 MG/DL    ALT (SGPT) 19 12 - 78 U/L    AST (SGOT) 45 (H) 15 - 37 U/L    Alk. phosphatase 48 45 - 117 U/L    Protein, total 9.0 (H) 6.4 - 8.2 g/dL    Albumin 3.3 (L) 3.5 - 5.0 g/dL    Globulin 5.7 (H) 2.0 - 4.0 g/dL    A-G Ratio 0.6 (L) 1.1 - 2.2     SAMPLES BEING HELD    Collection Time: 05/21/22  2:27 AM   Result Value Ref Range    SAMPLES BEING HELD  PST     COMMENT        Add-on orders for these samples will be processed based on acceptable specimen integrity and analyte stability, which may vary by analyte.    TROPONIN-HIGH SENSITIVITY    Collection Time: 05/21/22  2:27 AM   Result Value Ref Range    Troponin-High Sensitivity 4 0 - 51 ng/L       Radiologic Studies - I have personally reviewed and interpreted (see MDM for brief interpreation of available results) all imaging studies and agree with radiology interpretation and report. - Renato Stanton MD, MSc  XR CHEST PORT   Final Result   No acute process. Medical Decision Making   I am the first provider for this patient. Records Reviewed:   I reviewed our electronic medical record system for any past medical records that were available that may contribute to the patient's current condition, including their PMH, surgical history, social and family history. This includes most recent ED visits, any available discharge summaries and prior ECGs, which I have reviewed and interpreted personally. I have summarized most salient findings in my HPI and MDM. Renato Stanton MD, MSc    I also reviewed the nursing notes and vital signs from today's visit. Nursing notes will be reviewed as they become available in realtime while the pt has been in the ED. Renato Stanton MD, MSc      Vital Signs-Reviewed the patient's vital signs. Patient Vitals for the past 24 hrs:   Temp Pulse Resp BP SpO2   05/21/22 0208 98.4 °F (36.9 °C) 97 18 (!) 149/105 99 %       ECG interpretation: Normal sinus rhythm with rate 86, Qtc is 437, normal intervals, and no changes concerning for acute ischemia. This ECG has been viewed and interpreted by me personally. Renato Stanton MD, Msc    Cardiac Monitor: Cardiac monitor interpreted by me. The cardiac monitor revealed normal sinus rhythm. The cardiac monitor was ordered to monitor patient for signs of cardiac dysrhythmia, which they are at risk for based on their history or risk for cardiovascular disease and/or metabolic abnormalities. Renato Stanton MD MSc    Pulse ox interpretation: 99% on RA with good pleth.     Provider Notes (Medical Decision Making):   Assessment: Patient is a 31yo female who presents with paresthesias affecting her entire right side. No objective sensory loss. Detailed neuro exam as noted above and normal. Anterior chest paresthesias as well, no chest pain. Normal vital signs. Ddx: complex migraine possible, low concern for cva particularly in light of recurrent symptoms and recent negative stroke workup. Low concern for acs but will send trop. Low concern for seizure. Planned Workup and Intervention: cbc, cmp, trop, cxr, ecg    ED Course:   Initial assessment performed. The patients presenting problems have been discussed, and they are in agreement with the care plan formulated and outlined with them. I have encouraged them to ask questions as they arise throughout their visit. I personally reviewed and interpreted the patient's laboratory and imaging studies. No acute abnormalities that could account for her symptoms. Negative trop and normal ecg, low risk factors, HEART score 0, no indication to repeat trop and acs very unlikely. Of note, she also reports gas pains that are intermittent. Normal abd exam. Discussed OTC medications to use for gas pains, may also benefit from bentyl which we can try. Urged her to follow up with neurology as she was scheduled to do after last hospital discharge. Confirms plans to do so. Progress note:  Patient has been reassessed and reports feeling considerably better, has normal vital signs and feels comfortable going home. I think this is reasonable as no findings today suggest a life-threatening condition. DISPOSITION: DISCHARGE  The patient's results have been reviewed with patient and available family and/or caregiver. They verbally convey their understanding and agreement of the patient's signs, symptoms, diagnosis, treatment and prognosis and additionally agree to follow up as recommended in the discharge instructions or to return to the Emergency Department should the patient's condition change prior to their follow-up appointment.    The patient and available family and/or caregiver verbally agree with the care plan and all of their questions have been answered. The discharge instructions have also been provided to the them with educational information regarding the patient's diagnosis as well a list of reasons why the patient would want to return to the ER prior to their follow-up appointment should any concerns arise, the patient's condition change or symptoms worsen. Nuria Anaya MD, Msc    PLAN:  Discharge Medication List as of 5/21/2022  5:06 AM      START taking these medications    Details   dicyclomine (BENTYL) 20 mg tablet Take 1 Tablet by mouth three (3) times daily for 3 days. , Normal, Disp-9 Tablet, R-0      metoclopramide HCl (Reglan) 10 mg tablet Take 1 Tablet by mouth Before breakfast, lunch, and dinner for 3 days. , Normal, Disp-9 Tablet, R-0         CONTINUE these medications which have NOT CHANGED    Details   valsartan (DIOVAN) 80 mg tablet Take 1 Tablet by mouth daily. , Normal, Disp-90 Tablet, R-1      ascorbic acid (MAINOR-C PO) Take  by mouth., Historical Med      VITAMIN B COMPLEX PO Take  by mouth., Historical Med      ZINC PO Take 50 mg by mouth daily. , Historical Med      ibuprofen (MOTRIN) 600 mg tablet Take 1 Tablet by mouth every six (6) hours as needed (pain). Take with food or milk each time. , Print, Disp-20 Tablet, R-0         1.   2.     Follow-up Information     Follow up With Specialties Details Why Contact Info    Dayne Wellington PA-C Medical Physician Assistant, Physician Assistant Schedule an appointment as soon as possible for a visit in 3 days  [facility] [address] [place] [address] Neurology Clinic  Schedule an appointment as soon as possible for a visit   [address]  [address] [facility]  [phone]        3. Return to ED if worse       I, Sunil Delgadillo MD, am the attending of record for this patient encounter. Diagnosis     Clinical Impression:   1.  Atypical migraine    2. Abdominal gas pain        Attestation:  I personally performed the services described in this documentation on this date 5/21/2022 for patient Hiwot Sim.   Ora Parsons MD

## 2022-05-21 NOTE — DISCHARGE INSTRUCTIONS
It was a pleasure taking care of you in our Emergency Department today. We know that when you come to [facility], you are entrusting us with your health, comfort, and safety. Our physicians and nurses honor that trust, and truly appreciate the opportunity to care for you and your loved ones. We also value your feedback. If you receive a survey about your Emergency Department experience today, please fill it out. We care about our patients' feedback, and we listen to what you have to say.   Thank you!       --- Dr. Juan Tran MD

## 2022-05-24 ENCOUNTER — HOSPITAL ENCOUNTER (EMERGENCY)
Age: 27
Discharge: HOME | End: 2022-05-24
Attending: EMERGENCY MEDICINE
Payer: COMMERCIAL

## 2022-05-24 VITALS
OXYGEN SATURATION: 100 % | HEART RATE: 89 BPM | SYSTOLIC BLOOD PRESSURE: 162 MMHG | TEMPERATURE: 99.5 F | RESPIRATION RATE: 16 BRPM | DIASTOLIC BLOOD PRESSURE: 110 MMHG | WEIGHT: 196.65 LBS | BODY MASS INDEX: 32.72 KG/M2

## 2022-05-24 DIAGNOSIS — J01.00 ACUTE MAXILLARY SINUSITIS, RECURRENCE NOT SPECIFIED: Primary | ICD-10-CM

## 2022-05-24 PROCEDURE — 99283 EMERGENCY DEPT VISIT LOW MDM: CPT

## 2022-05-24 RX ORDER — AMOXICILLIN AND CLAVULANATE POTASSIUM 875; 125 MG/1; MG/1
1 TABLET, FILM COATED ORAL 2 TIMES DAILY
Qty: 20 TABLET | Refills: 0 | Status: SHIPPED | OUTPATIENT
Start: 2022-05-24 | End: 2022-06-03

## 2022-05-24 NOTE — ED PROVIDER NOTES
28 yo F here for evaluation of sinus pain/pressure, ear pain and congestion. Symptoms over the past 2 weeks. +Right sided facial pressure. Has taken Mucinex but states she tries to limit meds secondary to her HTN. Denies fever, cough, CP, SOB. The history is provided by the patient. Facial Pain  The current episode started more than 1 week ago. The problem occurs constantly. The problem has been gradually worsening. Pertinent negatives include no chest pain, no abdominal pain, no headaches and no shortness of breath. Nothing aggravates the symptoms. Nothing relieves the symptoms. Past Medical History:   Diagnosis Date    Abnormal Pap smear of cervix     seeing OBGYN    Acne 10/7/2009    Allergic rhinitis 2009    Anemia 2009    ASCUS HPV+ 2017    H/o ASCUS HPV + with Dr. [facility] 2017    CHAR III (cervical intraepithelial neoplasia III) 2017    found by LEEP     Headache     Herpes genitalia     dx by OB    History of gestational diabetes     diet controlled    Other ill-defined conditions(799.89)     scoliosis    PCB (post coital bleeding) 2014    Thoracic compression fracture ([address] 3/2014    Darblane Lundberg    Thyromegaly 2014    On exam     Unspecified vitamin D deficiency 2014    Varicella 1998       Past Surgical History:   Procedure Laterality Date    HX GYN           HX LEEP PROCEDURE  18    found CHAR 3         Family History:   Problem Relation Age of Onset    Breast Cancer Maternal Grandmother         great, great gma, age?     Hypertension Maternal Grandmother     Breast Cancer Maternal Aunt 32        survivor    Diabetes Father     Hypertension Father     Hypertension Paternal Grandmother     Diabetes Paternal Grandmother     No Known Problems Brother     Osteoporosis Neg Hx        Social History     Socioeconomic History    Marital status: SINGLE     Spouse name: Not on file    Number of children: Not on file    Years of education: Not on file    Highest education level: Not on file   Occupational History    Not on file   Tobacco Use    Smoking status: Never Smoker    Smokeless tobacco: Never Used   Vaping Use    Vaping Use: Never used   Substance and Sexual Activity    Alcohol use: Yes     Comment: occassionally     Drug use: No    Sexual activity: Yes     Partners: Male     Birth control/protection: Injection   Other Topics Concern    Not on file   Social History Narrative    Lives in [facility] with parents and 3 yo son. Works at [employer]. Social Determinants of Health     Financial Resource Strain:     Difficulty of Paying Living Expenses: Not on file   Food Insecurity:     Worried About Running Out of Food in the Last Year: Not on file    Yani of Food in the Last Year: Not on file   Transportation Needs:     Lack of Transportation (Medical): Not on file    Lack of Transportation (Non-Medical):  Not on file   Physical Activity:     Days of Exercise per Week: Not on file    Minutes of Exercise per Session: Not on file   Stress:     Feeling of Stress : Not on file   Social Connections:     Frequency of Communication with Friends and Family: Not on file    Frequency of Social Gatherings with Friends and Family: Not on file    Attends Yazidi Services: Not on file    Active Member of [address] [place] [address] [employer] or Organizations: Not on file    Attends Club or Organization Meetings: Not on file    Marital Status: Not on file   Intimate Partner Violence:     Fear of Current or Ex-Partner: Not on file    Emotionally Abused: Not on file    Physically Abused: Not on file    Sexually Abused: Not on file   Housing Stability:     Unable to Pay for Housing in the Last Year: Not on file    Number of [place] in the Last Year: Not on file    Unstable Housing in the Last Year: Not on file         ALLERGIES: Hydrochlorothiazide and Metronidazole    Review of Systems Constitutional: Negative. HENT: Positive for congestion, ear pain, facial swelling, rhinorrhea, sinus pressure, sinus pain and sneezing. Negative for ear discharge. Eyes: Negative for photophobia, pain, discharge and visual disturbance. Respiratory: Negative for apnea, cough, chest tightness and shortness of breath. Cardiovascular: Negative for chest pain, palpitations and leg swelling. Gastrointestinal: Negative for abdominal distention, abdominal pain and blood in stool. Genitourinary: Negative for difficulty urinating, dysuria, flank pain, frequency and hematuria. Musculoskeletal: Negative for back pain, gait problem, joint swelling, myalgias and neck pain. Skin: Negative for color change and pallor. Neurological: Negative for dizziness, syncope, weakness, numbness and headaches. Psychiatric/Behavioral: Negative for behavioral problems and confusion. The patient is not nervous/anxious. Vitals:    05/24/22 1713   BP: (!) 142/117   Pulse: 91   Resp: 16   Temp: 99.5 °F (37.5 °C)   SpO2: 100%   Weight: 89.2 kg (196 lb 10.4 oz)            Physical Exam  Vitals and nursing note reviewed. Constitutional:       General: She is not in acute distress. Appearance: She is well-developed. HENT:      Head: Normocephalic and atraumatic. Right Ear: External ear normal.      Left Ear: External ear normal.      Nose: Congestion and rhinorrhea present. Mouth/Throat:      Pharynx: No oropharyngeal exudate or posterior oropharyngeal erythema. Eyes:      General:         Right eye: No discharge. Left eye: No discharge. Conjunctiva/sclera: Conjunctivae normal.      Pupils: Pupils are equal, round, and reactive to light. Cardiovascular:      Rate and Rhythm: Normal rate and regular rhythm. Heart sounds: Normal heart sounds. Pulmonary:      Effort: Pulmonary effort is normal.      Breath sounds: Normal breath sounds.    Abdominal:      General: Bowel sounds are normal. There is no distension. Palpations: Abdomen is soft. Tenderness: There is no abdominal tenderness. There is no guarding or rebound. Musculoskeletal:         General: No tenderness. Normal range of motion. Cervical back: Normal range of motion and neck supple. Skin:     General: Skin is warm and dry. Findings: No rash. Neurological:      Mental Status: She is alert and oriented to person, place, and time. Cranial Nerves: No cranial nerve deficit. Coordination: Coordination normal.   Psychiatric:         Behavior: Behavior normal.         Thought Content: Thought content normal.         Judgment: Judgment normal.          MDM  Number of Diagnoses or Management Options     Amount and/or Complexity of Data Reviewed  Discuss the patient with other providers: yes           Procedures      Discussed case with attending Physician Levon Gomez. Agrees with care and will D/C with follow up. Patient's results have been reviewed with them. Patient and/or family have verbally conveyed their understanding and agreement of the patient's signs, symptoms, diagnosis, treatment and prognosis and additionally agree to follow up as recommended or return to the Emergency Room should their condition change prior to follow-up. Discharge instructions have also been provided to the patient with some educational information regarding their diagnosis as well a list of reasons why they would want to return to the ER prior to their follow-up appointment should their condition change.   BOSSMAN Montano

## 2022-05-24 NOTE — ED NOTES
The patient was encouraged to call or return to the ED for worsening issues or problems and was encouraged to schedule a follow up appointment for continuing care.      The patient verbalized understanding of discharge instructions and prescriptions, all questions were answered. The patient has no further concerns at this time.

## 2022-05-24 NOTE — ED TRIAGE NOTES
Patient arrives ambulatory to the ED with complaints of ear pain and drainage. States she has a headache. States it began about 2 weeks ago. States it worsened yesterday.

## 2022-06-02 NOTE — ED PROVIDER NOTES
84 f with breast Ca (s/p lumpectomy and radiation), MAC (not on tc), MVP, asthma, IBS, anxiety/depression, developed fever, chills, body ache, N/V, HA and sore throat, tested positive for COVID on 5/27, was told to go for the monocolonal but pt was not feeling well and came to the hospital   pt afebrile, normal WBC on RA, CXR negative    * pt is admitted for COVID so does not qualify for monoclonal   * c/w remdesivir for up to a 5 day course, started 5/29, now day 3  * monitor the renal function and LFTs while on remdesivir  * will sign off, please call with questions    The above assessment and plan was discussed with the primary team    Em Li MD  contact on teams  After 5pm and on weekends call [phone] 84yoF w/ PMHx significant for breast Ca (s/p lumpectomy and radiation), MAC (not on tc), MVP, asthma, IBS, anxiety/depression, GERD, who presents from home with 2 days of complaints of myalgia, nausea, NBNB emesis x2, HA, and fever/chills, in setting of being diagnosed w/ COVID 19 as O/P 1 day ago, and symptoms progressively worsening despite self administration of Tylenol with patient describing feeling lightheaded, weakness, and nearly fainting.  85YO Female PMH Breast Ca (s/p lumpectomy and radiation), MVP, IBS, anxiety/depression, GERD, CT chest with RUL mass and hx of MAC (pt of Dr. Mcgowan, pt has previously declined tx) who presented from home 5/28 with 2 days of complaints of myalgia, nausea, NBNB emesis x2, HA, and fever/chills, in setting of being diagnosed w/ COVID-19 5/27.    #Asthma in the setting of COVID-19  - Pt remains stable and nontoxic on room air  - Pt is on Albuterol PRN at home can add while in patient  - Pt is on Airway clearance with Hypersal nebs at home for her MAC  - Can hold off on hypersal while inpatient, please restart with albuterol nebs upon discharge  - Incentive spirometer  - No pulmonary contraindication to discharge  - Pt will follow up with Dr. Mcgowan after discharge       Please note that this dictation was completed with [email or web address], the computer voice recognition software.  Quite often unanticipated grammatical, syntax, homophones, and other interpretive errors are inadvertently transcribed by the computer software.  Please disregard these errors.  Please excuse any errors that have escaped final proofreading. 56-year-old female past medical history remarkable for abnormal Pap smear, acne, allergic rhinitis, anemia, headache, herpes, gestational diabetes which was diet controlled, scoliosis, postcoital bleeding, thoracic compression fracture (Dr. Sheryl Cox), thyromegaly, vitamin D deficiency, and previous varicella presents the ER complaining of \" chest pain palpitation shortness of breath since about 11 PM.\"  Patient states she was getting ready for bed when in the lay down initially developed a chest tightness, which she denies his pain palpitations felt like her heart was racing and felt like she was having trouble catching her breath. Symptoms have since abated/she has not taken anything for her symptoms. Patient denies previous episodes of this stated was not associated with diaphoresis or nausea she did not vomit. When asked if she could be pregnant patient replies I hope not. \"  Patient denies burning with urination vaginal discharge other current systemic complaints. The pain is described as a tightness and she denies overt pain. \"  Located about the anterior chest associated with palpitations and shortness of breath. She states she got up and walked around try to walk it off and that did help alleviate the symptoms slightly. Patient presents the ER POV for further evaluation.     pt denies HA, vison changes, diff swallowing,  Abd pain, F/Ch, N/V, D/Cons or other current systemic complaints    Social/ PSH reviewed in EMR    EMR Chart Reviewed               Past Medical History:   Diagnosis Date    Abnormal Pap smear of cervix 2017    seeing [facility]    Acne 10/7/2009  Allergic rhinitis 2009    Anemia 2009    ASCUS HPV+ 2017    H/o ASCUS HPV + with Dr. Crane Child 2017    CHAR III (cervical intraepithelial neoplasia III) 2017    found by LEEP     Headache     Herpes genitalia     dx by OB    History of gestational diabetes     diet controlled    Other ill-defined conditions(799.89)     scoliosis    PCB (post coital bleeding) 2014    Thoracic compression fracture ([address] 3/2014    Gilmer Riling    Thyromegaly 2014    On exam     Unspecified vitamin D deficiency 2014    Varicella 1998       Past Surgical History:   Procedure Laterality Date    HX GYN           HX LEEP PROCEDURE  18    found CHAR 3         Family History:   Problem Relation Age of Onset    Breast Cancer Maternal Grandmother         great, great gma, age?  Hypertension Maternal Grandmother     Breast Cancer Maternal Aunt 32        survivor    Diabetes Father     Hypertension Paternal Grandmother     Diabetes Paternal Grandmother     Osteoporosis Neg Hx        Social History     Socioeconomic History    Marital status: SINGLE     Spouse name: Not on file    Number of children: Not on file    Years of education: Not on file    Highest education level: Not on file   Occupational History    Not on file   Tobacco Use    Smoking status: Never Smoker    Smokeless tobacco: Never Used   Substance and Sexual Activity    Alcohol use: Yes     Comment: once every 3 months, 3 drinks at a time    Drug use: No    Sexual activity: Yes     Partners: Male     Birth control/protection: Injection   Other Topics Concern    Not on file   Social History Narrative    Lives in [facility] with parents and 3 yo son. Works at [employer].      Social Determinants of Health     Financial Resource Strain:     Difficulty of Paying Living Expenses:    Food Insecurity:     Worried About Running Out of Food in the Last Year:     Yani [employer] Food in the Last Year:    Transportation Needs:     Lack of Transportation (Medical):  Lack of Transportation (Non-Medical):    Physical Activity:     Days of Exercise per Week:     Minutes of Exercise per Session:    Stress:     Feeling of Stress :    Social Connections:     Frequency of Communication with Friends and Family:     Frequency of Social Gatherings with Friends and Family:     Attends Yarsani Services:     Active Member of Clubs or Organizations:     Attends Club or Organization Meetings:     Marital Status:    Intimate Partner Violence:     Fear of Current or Ex-Partner:     Emotionally Abused:     Physically Abused:     Sexually Abused: ALLERGIES: Patient has no known allergies. Review of Systems   Constitutional: Negative for appetite change, chills, diaphoresis and fever. HENT: Negative for drooling, trouble swallowing and voice change. Eyes: Negative for visual disturbance. Respiratory: Positive for chest tightness and shortness of breath. Cardiovascular: Positive for palpitations. Negative for chest pain and leg swelling. Gastrointestinal: Negative for abdominal pain, constipation, diarrhea, nausea and vomiting. Genitourinary: Negative for dysuria and flank pain. Musculoskeletal: Negative for back pain and gait problem. Skin: Negative for rash. Neurological: Negative for facial asymmetry and speech difficulty. Psychiatric/Behavioral: Negative for confusion. All other systems reviewed and are negative. Vitals:    06/07/21 0129   BP: (!) (P) 150/113   Pulse: (P) 96   Resp: (P) 16   Temp: (P) 98 °F (36.7 °C)   SpO2: (P) 99%   Weight: (P) 86.6 kg (190 lb 14.7 oz)   Height: (P) 5' 5\" (1.651 m)            Physical Exam  Vitals and nursing note reviewed. Constitutional:       General: She is not in acute distress. Appearance: Normal appearance. She is well-developed. She is obese. She is not ill-appearing, toxic-appearing or diaphoretic. Comments: NAD, AxOx4, speaking in complete sentences       HENT:      Head: Normocephalic and atraumatic. Right Ear: External ear normal.      Left Ear: External ear normal.   Eyes:      General: No scleral icterus. Right eye: No discharge. Left eye: No discharge. Extraocular Movements: Extraocular movements intact. Conjunctiva/sclera: Conjunctivae normal.      Pupils: Pupils are equal, round, and reactive to light. Neck:      Vascular: No JVD. Trachea: No tracheal deviation. Cardiovascular:      Rate and Rhythm: Normal rate and regular rhythm. Pulses: Normal pulses. Heart sounds: Normal heart sounds. No murmur heard. No friction rub. No gallop. Pulmonary:      Effort: Pulmonary effort is normal. No respiratory distress. Breath sounds: Normal breath sounds. No stridor. No wheezing, rhonchi or rales. Chest:      Chest wall: No tenderness. Abdominal:      General: Bowel sounds are normal.      Palpations: Abdomen is soft. Tenderness: There is no abdominal tenderness. There is no guarding or rebound. Hernia: No hernia is present. Genitourinary:     Vagina: No vaginal discharge. Comments: Pt denies urinary/ vaginal complaints  Musculoskeletal:         General: No swelling, tenderness, deformity or signs of injury. Normal range of motion. Cervical back: Normal range of motion and neck supple. No rigidity or tenderness. Right lower leg: No edema. Left lower leg: No edema. Skin:     General: Skin is warm and dry. Capillary Refill: Capillary refill takes less than 2 seconds. Coloration: Skin is not pale. Findings: No bruising, erythema or rash. Neurological:      General: No focal deficit present. Mental Status: She is alert and oriented to person, place, and time. Cranial Nerves: No cranial nerve deficit. Sensory: No sensory deficit. Motor: No weakness or abnormal muscle tone. 84yoF w/ PMHx significant for breast Ca (s/p lumpectomy and radiation), MAC (not on tc), MVP, asthma, IBS, anxiety/depression, GERD, who presents from home with 2 days of complaints of myalgia, nausea, NBNB emesis x2, HA, and fever/chills, in setting of being diagnosed w/ COVID 19 as O/P 1 day ago, and symptoms progressively worsening despite self administration of Tylenol with patient describing feeling lightheaded, weakness, and nearly fainting.  Coordination: Coordination normal.      Gait: Gait normal.      Deep Tendon Reflexes: Reflexes normal.   Psychiatric:         Behavior: Behavior normal.         Thought Content: Thought content normal.          MDM       Procedures    Chief Complaint   Patient presents with    Chest Pain       1:59 AM  The patients presenting problems have been discussed, and they are in agreement with the care plan formulated and outlined with them. I have encouraged them to ask questions as they arise throughout their visit. MEDICATIONS GIVEN:  Medications - No data to display    LABS REVIEWED:  Labs Reviewed   URINE CULTURE HOLD SAMPLE   CBC WITH AUTOMATED DIFF   METABOLIC PANEL, BASIC   TROPONIN I   URINALYSIS W/MICROSCOPIC   LIPASE   D DIMER   HCG QL SERUM       RADIOLOGY RESULTS:  The following have been ordered and reviewed:  _____________________________________________________________________  _____________________________________________________________________    EKG interpretation:   Rhythm: normal sinus rhythm; and regular . Rate (approx.): 92; Axis: normal; P wave: normal; QRS interval: normal ; ST/T wave: normal; Negative acute significant segmental elevations/ unchanged compared to study dated 04/20/2021    PROCEDURES:        CONSULTATIONS:       PROGRESS NOTES:      DIAGNOSIS:    1. Other chest pain    2. Dyspnea, unspecified type        PLAN:  1-Chest Pain / neg ed evaluation - will have pt follow-up with Cardiology;       ED COURSE: The patients hospital course has been uncomplicated. 'can I get a work note?';   Argenis Felton's  results have been reviewed with her. She has been counseled regarding her diagnosis. She verbally conveys understanding and agreement of the signs, symptoms, diagnosis, treatment and prognosis and additionally agrees to Call/ Arrange follow up as recommended with Dr. Rosa Santana MD in 24 - 48 hours.   She also agrees with the care-plan and conveys that all of her 84yoF w/ PMHx significant for breast Ca (s/p lumpectomy and radiation), MAC (not on tc), MVP, asthma, IBS, anxiety/depression, GERD, who presents from home with 2 days of complaints of myalgia, nausea, NBNB emesis x2, HA, and fever/chills, in setting of being diagnosed w/ COVID 19 as O/P 1 day ago, and symptoms progressively worsening despite self administration of Tylenol with patient describing feeling lightheaded, weakness, and nearly fainting.  questions have been answered. I have also put together some discharge instructions for her that include: 1) educational information regarding their diagnosis, 2) how to care for their diagnosis at home, as well a 3) list of reasons why they would want to return to the ED prior to their follow-up appointment, should their condition change or for concerns. 84yoF w/ PMHx significant for breast Ca (s/p lumpectomy and radiation), MAC (not on tc), MVP, asthma, IBS, anxiety/depression, GERD, who presents from home with 2 days of complaints of myalgia, nausea, NBNB emesis x2, HA, and fever/chills, in setting of being diagnosed w/ COVID 19 as O/P 1 day ago, and symptoms progressively worsening despite self administration of Tylenol with patient describing feeling lightheaded, weakness, and nearly fainting.

## 2022-06-29 ENCOUNTER — HOSPITAL ENCOUNTER (EMERGENCY)
Age: 27
Discharge: HOME | End: 2022-06-30
Attending: EMERGENCY MEDICINE
Payer: COMMERCIAL

## 2022-06-29 DIAGNOSIS — I10 PRIMARY HYPERTENSION: Primary | ICD-10-CM

## 2022-06-29 LAB
ALBUMIN SERPL-MCNC: 3.6 G/DL (ref 3.5–5)
ALBUMIN/GLOB SERPL: 0.6 {RATIO} (ref 1.1–2.2)
ALP SERPL-CCNC: 57 U/L (ref 45–117)
ALT SERPL-CCNC: 19 U/L (ref 12–78)
ANION GAP SERPL CALC-SCNC: 10 MMOL/L (ref 5–15)
AST SERPL-CCNC: 24 U/L (ref 15–37)
BASOPHILS # BLD: 0 K/UL (ref 0–0.1)
BASOPHILS NFR BLD: 0 % (ref 0–1)
BILIRUB SERPL-MCNC: 0.3 MG/DL (ref 0.2–1)
BUN SERPL-MCNC: 7 MG/DL (ref 6–20)
BUN/CREAT SERPL: 9 (ref 12–20)
CALCIUM SERPL-MCNC: 8.9 MG/DL (ref 8.5–10.1)
CHLORIDE SERPL-SCNC: 103 MMOL/L (ref 97–108)
CO2 SERPL-SCNC: 25 MMOL/L (ref 21–32)
CREAT SERPL-MCNC: 0.75 MG/DL (ref 0.55–1.02)
DIFFERENTIAL METHOD BLD: ABNORMAL
EOSINOPHIL # BLD: 0.1 K/UL (ref 0–0.4)
EOSINOPHIL NFR BLD: 2 % (ref 0–7)
ERYTHROCYTE [DISTWIDTH] IN BLOOD BY AUTOMATED COUNT: 15.1 % (ref 11.5–14.5)
GLOBULIN SER CALC-MCNC: 5.7 G/DL (ref 2–4)
GLUCOSE SERPL-MCNC: 97 MG/DL (ref 65–100)
HCT VFR BLD AUTO: 33.7 % (ref 35–47)
HGB BLD-MCNC: 10.7 G/DL (ref 11.5–16)
IMM GRANULOCYTES # BLD AUTO: 0 K/UL (ref 0–0.04)
IMM GRANULOCYTES NFR BLD AUTO: 0 % (ref 0–0.5)
LYMPHOCYTES # BLD: 2.3 K/UL (ref 0.8–3.5)
LYMPHOCYTES NFR BLD: 30 % (ref 12–49)
MCH RBC QN AUTO: 28.7 PG (ref 26–34)
MCHC RBC AUTO-ENTMCNC: 31.8 G/DL (ref 30–36.5)
MCV RBC AUTO: 90.3 FL (ref 80–99)
MONOCYTES # BLD: 0.5 K/UL (ref 0–1)
MONOCYTES NFR BLD: 6 % (ref 5–13)
NEUTS SEG # BLD: 4.9 K/UL (ref 1.8–8)
NEUTS SEG NFR BLD: 62 % (ref 32–75)
NRBC # BLD: 0 K/UL (ref 0–0.01)
NRBC BLD-RTO: 0 PER 100 WBC
PLATELET # BLD AUTO: 500 K/UL (ref 150–400)
PMV BLD AUTO: 9.2 FL (ref 8.9–12.9)
POTASSIUM SERPL-SCNC: 3.9 MMOL/L (ref 3.5–5.1)
PROT SERPL-MCNC: 9.3 G/DL (ref 6.4–8.2)
RBC # BLD AUTO: 3.73 M/UL (ref 3.8–5.2)
SODIUM SERPL-SCNC: 138 MMOL/L (ref 136–145)
TROPONIN-HIGH SENSITIVITY: 5 NG/L (ref 0–51)
WBC # BLD AUTO: 7.8 K/UL (ref 3.6–11)

## 2022-06-29 PROCEDURE — 74011250637 HC RX REV CODE- 250/637: Performed by: EMERGENCY MEDICINE

## 2022-06-29 PROCEDURE — 96374 THER/PROPH/DIAG INJ IV PUSH: CPT

## 2022-06-29 PROCEDURE — 99284 EMERGENCY DEPT VISIT MOD MDM: CPT

## 2022-06-29 PROCEDURE — 84484 ASSAY OF TROPONIN QUANT: CPT

## 2022-06-29 PROCEDURE — 80053 COMPREHEN METABOLIC PANEL: CPT

## 2022-06-29 PROCEDURE — 36415 COLL VENOUS BLD VENIPUNCTURE: CPT

## 2022-06-29 PROCEDURE — 74011250636 HC RX REV CODE- 250/636: Performed by: EMERGENCY MEDICINE

## 2022-06-29 PROCEDURE — 85025 COMPLETE CBC W/AUTO DIFF WBC: CPT

## 2022-06-29 RX ORDER — HYDRALAZINE HYDROCHLORIDE 20 MG/ML
10 INJECTION INTRAMUSCULAR; INTRAVENOUS ONCE
Status: COMPLETED | OUTPATIENT
Start: 2022-06-29 | End: 2022-06-29

## 2022-06-29 RX ORDER — ACETAMINOPHEN 500 MG
1000 TABLET ORAL
Status: COMPLETED | OUTPATIENT
Start: 2022-06-29 | End: 2022-06-29

## 2022-06-29 RX ADMIN — HYDRALAZINE HYDROCHLORIDE 10 MG: 20 INJECTION, SOLUTION INTRAMUSCULAR; INTRAVENOUS at 23:01

## 2022-06-29 RX ADMIN — ACETAMINOPHEN 1000 MG: 500 TABLET ORAL at 22:51

## 2022-06-29 NOTE — Clinical Note
[facility]  [address]  [id number]  [phone]    Work/School Note    Date: 6/29/2022    To Whom It May concern:      Desiree Porter was seen and treated today in the emergency room by the following provider(s):  Attending Provider: Mary Mcnair MD.      Desiree Porter is excused from work/school on 06/30/22. She is clear to return to work/school on 07/01/22.         Sincerely,          Celso Johnson MD

## 2022-06-30 ENCOUNTER — TELEPHONE (OUTPATIENT)
Dept: INTERNAL MEDICINE CLINIC | Age: 27
End: 2022-06-30

## 2022-06-30 VITALS
TEMPERATURE: 97.6 F | BODY MASS INDEX: 31.48 KG/M2 | DIASTOLIC BLOOD PRESSURE: 118 MMHG | WEIGHT: 188.93 LBS | OXYGEN SATURATION: 99 % | SYSTOLIC BLOOD PRESSURE: 148 MMHG | HEART RATE: 80 BPM | RESPIRATION RATE: 18 BRPM | HEIGHT: 65 IN

## 2022-06-30 DIAGNOSIS — I10 ESSENTIAL HYPERTENSION: ICD-10-CM

## 2022-06-30 LAB
ATRIAL RATE: 121 BPM
CALCULATED P AXIS, ECG09: 68 DEGREES
CALCULATED R AXIS, ECG10: 48 DEGREES
CALCULATED T AXIS, ECG11: 6 DEGREES
DIAGNOSIS, 93000: NORMAL
GLUCOSE BLD STRIP.AUTO-MCNC: 106 MG/DL (ref 65–117)
P-R INTERVAL, ECG05: 156 MS
Q-T INTERVAL, ECG07: 328 MS
QRS DURATION, ECG06: 80 MS
QTC CALCULATION (BEZET), ECG08: 465 MS
SERVICE CMNT-IMP: NORMAL
VENTRICULAR RATE, ECG03: 121 BPM

## 2022-06-30 PROCEDURE — 82962 GLUCOSE BLOOD TEST: CPT

## 2022-06-30 PROCEDURE — 93005 ELECTROCARDIOGRAM TRACING: CPT

## 2022-06-30 RX ORDER — VALSARTAN 160 MG/1
160 TABLET ORAL DAILY
Qty: 90 TABLET | Refills: 1 | Status: SHIPPED | OUTPATIENT
Start: 2022-06-30 | End: 2022-09-01 | Stop reason: SDUPTHER

## 2022-06-30 NOTE — TELEPHONE ENCOUNTER
Spoke with patient  Per Kira Cuellar her of what Elvia  Stated . Patient stated she was laying down resting and was feeling a little better but still needed to eat something. I advised her to make sure she was drinking plenty of water and eating something and if she started to feel bad to  Go to the ER . Patient has appointment on 7/ 25 /22 but to called  To see of needed to be seen sooner.

## 2022-06-30 NOTE — ED NOTES
Vitals checked before, during and after hydralazine administration.  BP after is 141/104, HR-89, SPO2 - 98

## 2022-06-30 NOTE — TELEPHONE ENCOUNTER
Since she is feeling woozy again, like she was feeling the same way when her BP was up yesterday, she should go back in for BP check. Meanwhile, I want her to double her dose of valsartan, she can take 2 of her 80 mg pills for total dose of 160 mg, and I will send in new Rx for valsartan 160 mg.    Schedule a follow up visit with me soon.

## 2022-06-30 NOTE — ED PROVIDER NOTES
57-year-old female with history of hypertension and recurrent headaches who presents the ED with complaints of headache, dizziness, high blood pressure. Patient reports eating a sandwich, salty chips, and a large salty pickle earlier today. Soon after eating that, patient started having a headache and feeling dizzy/lightheaded. She states that this is common for her when her blood pressure goes high. She went to the grocery store to check her blood pressure and it was 190/120. Patient states that she is compliant with her valsartan 80 mg daily. She denies vision loss, speech problem, extremity weakness/numbness, chest pain, shortness of breath. Past Medical History:   Diagnosis Date    Abnormal Pap smear of cervix     seeing OBGYN    Acne 10/7/2009    Allergic rhinitis 2009    Anemia 2009    ASCUS HPV+ 2017    H/o ASCUS HPV + with Dr. Mehnaz Mann 2017    CHAR III (cervical intraepithelial neoplasia III) 2017    found by LEEP     Headache     Herpes genitalia     dx by OB    History of gestational diabetes     diet controlled    Other ill-defined conditions(799.89)     scoliosis    PCB (post coital bleeding) 2014    Thoracic compression fracture ([address] 3/2014    [facility]    Thyromegaly 2014    On exam     Unspecified vitamin D deficiency 2014    Varicella 1998       Past Surgical History:   Procedure Laterality Date    HX GYN           HX LEEP PROCEDURE  18    found CHAR 3         Family History:   Problem Relation Age of Onset    Breast Cancer Maternal Grandmother         great, great gma, age?     Hypertension Maternal Grandmother     Breast Cancer Maternal Aunt 32        survivor    Diabetes Father     Hypertension Father     Hypertension Paternal Grandmother     Diabetes Paternal Grandmother     No Known Problems Brother     Osteoporosis Neg Hx        Social History     Socioeconomic History  Marital status: SINGLE     Spouse name: Not on file    Number of children: Not on file    Years of education: Not on file    Highest education level: Not on file   Occupational History    Not on file   Tobacco Use    Smoking status: Never Smoker    Smokeless tobacco: Never Used   Vaping Use    Vaping Use: Never used   Substance and Sexual Activity    Alcohol use: Yes     Comment: occassionally     Drug use: No    Sexual activity: Yes     Partners: Male     Birth control/protection: Injection   Other Topics Concern    Not on file   Social History Narrative    Lives in [facility] with parents and 3 yo son. Works at [employer]. Social Determinants of Health     Financial Resource Strain:     Difficulty of Paying Living Expenses: Not on file   Food Insecurity:     Worried About Running Out of Food in the Last Year: Not on file    Yani of Food in the Last Year: Not on file   Transportation Needs:     Lack of Transportation (Medical): Not on file    Lack of Transportation (Non-Medical):  Not on file   Physical Activity:     Days of Exercise per Week: Not on file    Minutes of Exercise per Session: Not on file   Stress:     Feeling of Stress : Not on file   Social Connections:     Frequency of Communication with Friends and Family: Not on file    Frequency of Social Gatherings with Friends and Family: Not on file    Attends Sabianism Services: Not on file    Active Member of [address] [place] [address] or Organizations: Not on file    Attends Club or Organization Meetings: Not on file    Marital Status: Not on file   Intimate Partner Violence:     Fear of Current or Ex-Partner: Not on file    Emotionally Abused: Not on file    Physically Abused: Not on file    Sexually Abused: Not on file   Housing Stability:     Unable to Pay for Housing in the Last Year: Not on file    Number of [place] in the Last Year: Not on file    Unstable Housing in the Last Year: Not on file         ALLERGIES: Hydrochlorothiazide and Metronidazole    Review of Systems   Constitutional: Negative for chills and fever. HENT: Negative for sinus pain, sore throat and trouble swallowing. Eyes: Negative for pain and visual disturbance. Respiratory: Negative for cough, chest tightness and shortness of breath. Cardiovascular: Negative for chest pain, palpitations and leg swelling. Gastrointestinal: Negative for abdominal pain, diarrhea, nausea and vomiting. Genitourinary: Negative for dysuria and hematuria. Musculoskeletal: Negative for back pain and neck pain. Skin: Negative for rash and wound. Neurological: Positive for dizziness, light-headedness and headaches. Negative for seizures, syncope, speech difficulty, weakness and numbness. Psychiatric/Behavioral: Negative for confusion and suicidal ideas. Vitals:    06/29/22 2204   BP: (!) 162/134   Pulse: 79   Resp: 18   Temp: 97.6 °F (36.4 °C)   SpO2: 100%   Weight: 85.7 kg (188 lb 15 oz)   Height: 5' 5\" (1.651 m)            Physical Exam  Vitals and nursing note reviewed. Constitutional:       Appearance: Normal appearance. She is obese. HENT:      Head: Normocephalic. Mouth/Throat:      Mouth: Mucous membranes are moist.   Eyes:      Extraocular Movements: Extraocular movements intact. Conjunctiva/sclera: Conjunctivae normal.      Pupils: Pupils are equal, round, and reactive to light. Cardiovascular:      Rate and Rhythm: Regular rhythm. Heart sounds: Normal heart sounds. Pulmonary:      Effort: Pulmonary effort is normal.      Breath sounds: Normal breath sounds. Abdominal:      General: Bowel sounds are normal.      Palpations: Abdomen is soft. Tenderness: There is no abdominal tenderness. There is no right CVA tenderness, left CVA tenderness or rebound. Musculoskeletal:         General: No tenderness. Cervical back: Neck supple. Right lower leg: No edema. Left lower leg: No edema.    Skin:     General: Skin is warm and dry. Neurological:      General: No focal deficit present. Mental Status: She is oriented to person, place, and time. Mental status is at baseline. Cranial Nerves: No cranial nerve deficit. Psychiatric:         Mood and Affect: Mood normal.         Behavior: Behavior normal.          MDM  Number of Diagnoses or Management Options  Primary hypertension  Diagnosis management comments: DDx:  High blood pressure, dietary indiscretion, renal failure, CVA, ICH    2235 -I have offered to order head CT on patient to evaluate for ICH/CVA, but patient declines due to previous episodes in the past similar to this in the setting of high blood pressure. There is no focal neurological deficit. Patient is capable of making decisions.     Recent Results (from the past 12 hour(s))  -CBC WITH AUTOMATED DIFF:   Collection Time: 06/29/22 10:49 PM       Result                      Value             Ref Range           WBC                         7.8               3.6 - 11.0 K*       RBC                         3.73 (L)          3.80 - 5.20 *       HGB                         10.7 (L)          11.5 - 16.0 *       HCT                         33.7 (L)          35.0 - 47.0 %       MCV                         90.3              80.0 - 99.0 *       MCH                         28.7              26.0 - 34.0 *       MCHC                        31.8              30.0 - 36.5 *       RDW                         15.1 (H)          11.5 - 14.5 %       PLATELET                    500 (H)           150 - 400 K/*       MPV                         9.2               8.9 - 12.9 FL       NRBC                        0.0               0  WBC       ABSOLUTE NRBC               0.00              0.00 - 0.01 *       NEUTROPHILS                 62                32 - 75 %           LYMPHOCYTES                 30                12 - 49 %           MONOCYTES                   6                 5 - 13 %            EOSINOPHILS 2                 0 - 7 %             BASOPHILS                   0                 0 - 1 %             IMMATURE GRANULOCYTES       0                 0.0 - 0.5 %         ABS. NEUTROPHILS            4.9               1.8 - 8.0 K/*       ABS. LYMPHOCYTES            2.3               0.8 - 3.5 K/*       ABS. MONOCYTES              0.5               0.0 - 1.0 K/*       ABS. EOSINOPHILS            0.1               0.0 - 0.4 K/*       ABS. BASOPHILS              0.0               0.0 - 0.1 K/*       ABS. IMM. GRANS.            0.0               0.00 - 0.04 *       DF                          AUTOMATED                        -METABOLIC PANEL, COMPREHENSIVE:   Collection Time: 06/29/22 10:49 PM       Result                      Value             Ref Range           Sodium                      138               136 - 145 mm*       Potassium                   3.9               3.5 - 5.1 mm*       Chloride                    103               97 - 108 mmo*       CO2                         25                21 - 32 mmol*       Anion gap                   10                5 - 15 mmol/L       Glucose                     97                65 - 100 mg/*       BUN                         7                 6 - 20 MG/DL        Creatinine                  0.75              0.55 - 1.02 *       BUN/Creatinine ratio        9 (L)             12 - 20             GFR est AA                  >60               >60 ml/min/1*       GFR est non-AA              >60               >60 ml/min/1*       Calcium                     8.9               8.5 - 10.1 M*       Bilirubin, total            0.3               0.2 - 1.0 MG*       ALT (SGPT)                  19                12 - 78 U/L         AST (SGOT)                  24                15 - 37 U/L         Alk.  phosphatase            57                45 - 117 U/L        Protein, total              9.3 (H)           6.4 - 8.2 g/*       Albumin                     3.6 3.5 - 5.0 g/*       Globulin                    5.7 (H)           2.0 - 4.0 g/*       A-G Ratio                   0.6 (L)           1.1 - 2.2      -TROPONIN-HIGH SENSITIVITY:   Collection Time: 06/29/22 10:49 PM       Result                      Value             Ref Range           Troponin-High Sensitiv*     5                 0 - 51 ng/L    -GLUCOSE, POC:   Collection Time: 06/30/22 12:06 AM       Result                      Value             Ref Range           Glucose (POC)               106               65 - 117 mg/*       Performed by                Carolyn Chandan                    -EKG, 12 LEAD, INITIAL:   Collection Time: 06/30/22  2:11 AM       Result                      Value             Ref Range           Ventricular Rate            121               BPM                 Atrial Rate                 121               BPM                 P-R Interval                156               ms                  QRS Duration                80                ms                  Q-T Interval                328               ms                  QTC Calculation (Bezet)     465               ms                  Calculated P Axis           68                degrees             Calculated R Axis           48                degrees             Calculated T Axis           6                 degrees             Diagnosis                                                     Sinus tachycardia Otherwise normal ECG When compared with ECG of 21-MAY-2022 02:15, T wave inversion now evident in Inferior leads Nonspecific T wave abnormality now evident in Anterior leads     0230 -patient reports feeling much better, but is starting to feel anxious after being in the ED this long. Pt is requesting DC home now. Blood pressure 150/100, heart rate 110. No evidence of endorgan damage. In review of last ED visit on 5/24/2022, patient's blood pressure was documented to be 142/117.   Plan to DC home and have patient follow-up with her PMD to monitor her blood pressure. She understands that there may need to be adjustments made to her medications.        Amount and/or Complexity of Data Reviewed  Clinical lab tests: reviewed  Tests in the medicine section of CPT®: reviewed    Risk of Complications, Morbidity, and/or Mortality  Presenting problems: moderate  Diagnostic procedures: moderate  Management options: moderate    Patient Progress  Patient progress: improved         EKG    Date/Time: 6/30/2022 2:31 AM  Performed by: Triny Davis MD  Authorized by: Triny Davis MD     Previous ECG:     Previous ECG:  Unavailable  Interpretation:     Interpretation: abnormal    Rate:     ECG rate:  121    ECG rate assessment: tachycardic    Rhythm:     Rhythm: sinus rhythm    Ectopy:     Ectopy: none    QRS:     QRS axis:  Normal  Conduction:     Conduction: normal    ST segments:     ST segments:  Normal  T waves:     T waves: normal

## 2022-06-30 NOTE — DISCHARGE INSTRUCTIONS
Follow-up with your PMD today and have your blood pressure monitored. You may need to have your blood pressure medication change. Avoid foods with high salt content. Return to the emergency department for high blood pressure, headache, dizziness, chest pain, shortness of breath, confusion, lethargy, or other medical concerns.

## 2022-06-30 NOTE — ED NOTES
Patient blood pressure checked and documented before discharge. I have reviewed discharge instructions with the patient. The patient verbalized understanding.

## 2022-06-30 NOTE — TELEPHONE ENCOUNTER
Patient called and stated that she went to the er yesterday due to her high blood pressure and feeling \"woozy\". Patient stated that they didn't find anything however they did give her a medication to bring down her blood pressure but she feels it brought her pressure down too low. Stated she is feeling a bit better today after attempting to stay more hydrated but did eat some saltier foods and feels that may have caused her t start feeling woozy again today. Patient unsure of her bp reading at this time and doesn't know if she should return to the er. Advised patient that we would send a message to Elvia and check with her but if she felt that she needed to go to the ER then she should not hesitate to do so.

## 2022-06-30 NOTE — ED NOTES
Pt stated no dizziness after she walked to the bathroom. She stated \"I feel better than I did before\".

## 2022-06-30 NOTE — ED NOTES
Pt complains of dizziness but she is alert and conscious. Vital signs and blood glucose checked and documented. Pt reports last time she ate was 12 noon yesterday. MD Spear, was informed. Pt has been offered some crackers and apple juice.

## 2022-06-30 NOTE — ED TRIAGE NOTES
Patient presents ambulatory to triage with steady gait. Pt complains of \"high blood pressure because I ate something really salty\". Pt reports it was 190s/120s at home. Currently, pt endorses dizziness. Pt reports taking Tahir ASA (4 pills) PTA. Pt reports taking valsartan at 0700.

## 2022-07-01 NOTE — TELEPHONE ENCOUNTER
Surjit Lorenzo, do I have anything sooner, and if not are you keeping a cancellation list? Please reach out to pt regarding appt availability to be seen sooner.

## 2022-07-25 ENCOUNTER — OFFICE VISIT (OUTPATIENT)
Dept: INTERNAL MEDICINE CLINIC | Age: 27
End: 2022-07-25
Payer: COMMERCIAL

## 2022-07-25 VITALS
TEMPERATURE: 98.4 F | SYSTOLIC BLOOD PRESSURE: 156 MMHG | BODY MASS INDEX: 31.06 KG/M2 | RESPIRATION RATE: 16 BRPM | HEIGHT: 65 IN | OXYGEN SATURATION: 98 % | DIASTOLIC BLOOD PRESSURE: 98 MMHG | HEART RATE: 96 BPM | WEIGHT: 186.4 LBS

## 2022-07-25 DIAGNOSIS — E87.6 HYPOKALEMIA: ICD-10-CM

## 2022-07-25 DIAGNOSIS — M35.00 SJOGREN'S SYNDROME, WITH UNSPECIFIED ORGAN INVOLVEMENT (HCC): ICD-10-CM

## 2022-07-25 DIAGNOSIS — I10 ESSENTIAL HYPERTENSION: Primary | ICD-10-CM

## 2022-07-25 DIAGNOSIS — F41.9 ANXIETY: ICD-10-CM

## 2022-07-25 PROCEDURE — 99214 OFFICE O/P EST MOD 30 MIN: CPT | Performed by: PHYSICIAN ASSISTANT

## 2022-07-25 RX ORDER — ESCITALOPRAM OXALATE 10 MG/1
10 TABLET ORAL DAILY
Qty: 90 TABLET | Refills: 1 | Status: SHIPPED | OUTPATIENT
Start: 2022-07-25 | End: 2022-09-01

## 2022-07-25 RX ORDER — NEBIVOLOL 5 MG/1
5 TABLET ORAL DAILY
Qty: 90 TABLET | Refills: 1 | Status: SHIPPED | OUTPATIENT
Start: 2022-07-25 | End: 2022-10-13 | Stop reason: SINTOL

## 2022-07-25 NOTE — PROGRESS NOTES
HPI:  27 y.o.  presents for follow up appointment. No hospital, ER or specialist visits since last primary care visit except as noted below. Last visit 4/18/22    HTN  Now on valsartan 160 mg (started valsartan in 3/2022, increased from 80 mg 6/30/22 when she called after ER visit)   Stopped HCTZ due to low K+  -Of note she has had 4 ER visits since last visit with me, 2 of them for hyper tensive urgency's; on 5/21/2022 she had elevated blood pressure and chest pain, on 6/29/2022 she had elevated blood pressure and headache    Hypokalemia  Noted as low as 2.7 on 3/7/22 in hospital  Has completed prescription K, currently not on supplementation  Current K+ 4.2 on 4/15/22; normal Mg    (+)NANCI screen in hospital 3/2022, but this was known already  Has known Sjogren's, (+)anti-SSA and NANCI by IFA in 2018  Saw rheumatology once in 2018, Dr Melissa Arreguin, but she left the practice and pt did not follow up  Never had salivary gland biopsy  Advised to re-establish with rheumatology, she should be monitored at least q6mos  -at visit 4/18/22 - She has not scheduled with rheum yet  -ESR 48 on 4/15/22; normal CRP, normal C3, C4 is slightly elevated at 44  -TODAY 7/25/22 she has appt with rheum a year out as new pt with rheumatology Dr Arely Martinez  Currently not on medications  Last year 10/2021 we reviewed episodes of anxiety with panic and I recommended treatment with Lexapro and BuSpar at that time, however on follow-up in 12/2021 she reported not taking the medications and was able to control her symptoms with lifestyle changes but is sleeping well and exercising  Anxiety has increased since that time, and she feels it is associated with her multiple ER visits mentioned above.   She works for a bank, work is stressful, she wears a mask but no one else does and she does not feel safe  She has fears for seymour COVID and the complement occasions she could have from it given her medical history of Sjogren's syndrome and hypertension  She would need a doctor's note to work from home        3 most recent [facility] 2022   Little interest or pleasure in doing things Not at all   Feeling down, depressed, irritable, or hopeless Not at all   Total Score PHQ 2 0   Trouble falling or staying asleep, or sleeping too much Not at all   Feeling tired or having little energy Not at all   Poor appetite, weight loss, or overeating Not at all   Feeling bad about yourself - or that you are a failure or have let yourself or your family down Not at all   Trouble concentrating on things such as school, work, reading, or watching TV Not at all   Moving or speaking so slowly that other people could have noticed; or the opposite being so fidgety that others notice Not at all   Thoughts of being better off dead, or hurting yourself in some way Not at all   PHQ 9 Score 0       GAD7 score: 13  Feeling nervous, anxious or on edge:  3   Not being able to stop or control worrying: 3  Worrying too much about different things:  3  Trouble relaxin  Being so restless that it is hard to sit still:  0  Becoming easily annoyed or irritable:  3  Feeling afraid as if something awful might happen: 0  If any of the above were scored more than 0, how difficult have these problems made it for you to do your work, take care of things at home, or get along with other people? 0  Not at all             Somewhat difficult            Very difficult             Extremely difficult        Patient Active Problem List    Diagnosis    Hypermobility syndrome    Arthralgia    Costochondritis    Carpal tunnel syndrome of right wrist    NANCI positive    SS-A antibody positive    Rheumatoid factor positive    Acute bacterial sinusitis    Acute bronchitis due to other specified organisms    Essential hypertension    History of gestational diabetes     diet controlled      Mastodynia     Nl b/l breast US in 2016.    Prolactin pending 2016   With galactorrhea  depo Thyromegaly     On exam      Atypical mole     Noted at vaginal introitus (7 o'clock position)      Vitamin D deficiency    History of compression fracture of spine, thoracic with chronic back pain    Allergic rhinitis    Anemia         Past Medical History:   Diagnosis Date    Abnormal Pap smear of cervix 2017    seeing OBGYN    Acne 10/7/2009    Allergic rhinitis 9/2/2009    Anemia 9/2/2009    ASCUS HPV+ 09/14/2017    H/o ASCUS HPV + with Dr. Brii Rodriguze 9/14/2017    CHAR III (cervical intraepithelial neoplasia III) 11/30/2017    found by LEEP     Headache     Herpes genitalia 2017    dx by OB    History of gestational diabetes     diet controlled    Other ill-defined conditions(799.89)     scoliosis    PCB (post coital bleeding) 11/25/2014    Thoracic compression fracture ([address] 3/2014    Maye Braden    Thyromegaly 11/25/2014    On exam     Unspecified vitamin D deficiency 6/26/2014    Varicella 5/1/1998       Social History     Tobacco Use    Smoking status: Never    Smokeless tobacco: Never   Vaping Use    Vaping Use: Never used   Substance Use Topics    Alcohol use: Yes     Comment: occassionally     Drug use: No       Outpatient Medications Marked as Taking for the 7/25/22 encounter (Office Visit) with Helena Aguila, Elvia ROSALES PA-C   Medication Sig Dispense Refill    valsartan (DIOVAN) 160 mg tablet Take 1 Tablet by mouth daily. (THIS IS A NEW AND HIGHER DOSE) 90 Tablet 1    ascorbic acid (MAINOR-C PO) Take  by mouth. VITAMIN B COMPLEX PO Take  by mouth. ZINC PO Take 50 mg by mouth daily. Allergies   Allergen Reactions    Hydrochlorothiazide Other (comments)     Low potassium    Metronidazole Itching       ROS:  ROS negative except as per HPI.       PE:  Visit Vitals  BP (!) 156/98 (BP 1 Location: Left arm, BP Patient Position: Sitting, BP Cuff Size: Adult)   Pulse 96   Temp 98.4 °F (36.9 °C) (Oral)   Resp 16   Ht 5' 5\" (1.651 m)   Wt 186 lb 6.4 oz (84.6 kg)   LMP 07/21/2022   SpO2 98%   BMI 31.02 kg/m²     Gen: alert, oriented, no acute distress  Head: normocephalic, atraumatic  Eyes: pupils equal round reactive to light, sclera clear, conjunctiva clear  Oral: masked  Neck: symmetric normal sized thyroid, no carotid bruits, no jugular vein distention  Resp: no increase work of breathing, lungs clear to ausculation bilaterally, no wheezing, rales or rhonchi  CV: S1, S2 normal.  No murmurs, rubs, or gallops. Abd: soft, not tender, not distended. No hepatosplenomegaly. Normal bowel sounds. Neuro: grossly intact  Skin: no lesion or rash  Extremities: no cyanosis or edema  Psych:  alert, oriented to person, place, and time, normal mood, behavior, speech, dress, motor activity, and thought processes, no suicidal ideations      No results found for this visit on 07/25/22. Lab Results   Component Value Date/Time    WBC 7.8 06/29/2022 10:49 PM    Hemoglobin (POC) 11.7 09/09/2019 01:56 PM    HGB 10.7 (L) 06/29/2022 10:49 PM    Hematocrit (POC) 37 01/15/2021 11:54 AM    HCT 33.7 (L) 06/29/2022 10:49 PM    PLATELET 040 (H) 94/06/7069 10:49 PM    MCV 90.3 06/29/2022 10:49 PM     Lab Results   Component Value Date/Time    Sodium 138 06/29/2022 10:49 PM    Potassium 3.9 06/29/2022 10:49 PM    Chloride 103 06/29/2022 10:49 PM    CO2 25 06/29/2022 10:49 PM    Anion gap 10 06/29/2022 10:49 PM    Glucose 97 06/29/2022 10:49 PM    BUN 7 06/29/2022 10:49 PM    Creatinine 0.75 06/29/2022 10:49 PM    BUN/Creatinine ratio 9 (L) 06/29/2022 10:49 PM    GFR est AA >60 06/29/2022 10:49 PM    GFR est non-AA >60 06/29/2022 10:49 PM    Calcium 8.9 06/29/2022 10:49 PM    Bilirubin, total 0.3 06/29/2022 10:49 PM    Alk.  phosphatase 57 06/29/2022 10:49 PM    Protein, total 9.3 (H) 06/29/2022 10:49 PM    Albumin 3.6 06/29/2022 10:49 PM    Globulin 5.7 (H) 06/29/2022 10:49 PM    A-G Ratio 0.6 (L) 06/29/2022 10:49 PM    ALT (SGPT) 19 06/29/2022 10:49 PM    AST (SGOT) 24 06/29/2022 10:49 PM     Lab Results   Component Value Date/Time TSH 2.64 03/08/2022 04:07 AM     Lab Results   Component Value Date/Time    Hemoglobin A1c 6.1 (H) 03/08/2022 04:07 AM         Assessment/Plan:        ICD-10-CM ICD-9-CM    1. Essential hypertension  I10 401.9 nebivoloL (BYSTOLIC) 5 mg tablet      2. Anxiety  F41.9 300.00 escitalopram oxalate (LEXAPRO) 10 mg tablet      3. Sjogren's syndrome, with unspecified organ involvement ([facility]  M35.00 710.2       4. Hypokalemia  E87.6 276.8         1. Hypertension  Current therapy valsartan 160 mg  Blood pressure 156/98 today, not controlled  She seems to have very labile blood pressure with anxiety so we will add a beta-blocker  New start Bystolic 5 mg daily, added to her same valsartan 160 mg    2. Anxiety  MATT-7 score is 13 today, PHQ-9 is 0  In 10/2021 reviewed starting Lexapro and BuSpar, however she did not start at that time was able to get her anxiety under control with lifestyle changes    She has increased anxiety and stress over work, fear for COVID infection, which is elevating her her blood pressure, and has caused ER visits for hypertensive urgency's  New start Lexapro 10 mg daily  Recommend counseling  Consider adding BuSpar or hydroxyzine follow-up if needed    3. Sjogren's syndrome  Positive anti-SSA antibodies and NANCI by IFA in 2018  She had evaluation by [facility] rheumatology, however that provider left the practice and she has not had follow-up  She is on scheduled to see Dr. Leonel Hinton, rheumatology, as a new patient but that appointment is next year    4. Hypokalemia  Has been low in the past with hydrochlorothiazide  Most recent check 4/15/2022 was normal  Currently not on supplementation    She is unable to interact with others face-to-face without the fear of seymour COVID and worsening her autoimmune condition, Sjogren's.   This in turn increases her anxiety and hypertension and she has had multiple emergency room visits due to elevated blood pressure which I feel is due to stress    I have written a note for work for her to be allowed to work virtually from home    Health Maintenance reviewed - updated. Orders Placed This Encounter    nebivoloL (BYSTOLIC) 5 mg tablet     Sig: Take 1 Tablet by mouth in the morning. Dispense:  90 Tablet     Refill:  1    escitalopram oxalate (LEXAPRO) 10 mg tablet     Sig: Take 1 Tablet by mouth in the morning. Dispense:  90 Tablet     Refill:  1       There are no discontinued medications. Current Outpatient Medications   Medication Sig Dispense Refill    nebivoloL (BYSTOLIC) 5 mg tablet Take 1 Tablet by mouth in the morning. 90 Tablet 1    escitalopram oxalate (LEXAPRO) 10 mg tablet Take 1 Tablet by mouth in the morning. 90 Tablet 1    valsartan (DIOVAN) 160 mg tablet Take 1 Tablet by mouth daily. (THIS IS A NEW AND HIGHER DOSE) 90 Tablet 1    ascorbic acid (MAINOR-C PO) Take  by mouth. VITAMIN B COMPLEX PO Take  by mouth. ZINC PO Take 50 mg by mouth daily. Recommended healthy diet low in carbohydrates, fats, sodium and cholesterol. Recommended regular cardiovascular exercise 3-6 times per week for 30-60 minutes daily. Verbal and written instructions (see AVS) provided. Patient expresses understanding of diagnosis and treatment plan. Follow-up and Dispositions    Return in about 3 weeks (around 8/15/2022) for HTN, anxiey. No future appointments.

## 2022-07-25 NOTE — LETTER
NOTIFICATION RETURN TO WORK / SCHOOL    7/25/2022 5:27 PM    Ms. Mary Jane Webb [phone]      To Whom It May Concern:    Felisa Soto is currently under the care of [facility]. 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 If there are questions or concerns please have the patient contact our office.         Sincerely,      Elvia Drake PA-C

## 2022-07-25 NOTE — PROGRESS NOTES
Hailey Cuba is a 27 y.o. female     Chief Complaint   Patient presents with    Hypertension     3M follow up       Visit Vitals  BP (!) 156/98 (BP 1 Location: Left arm, BP Patient Position: Sitting, BP Cuff Size: Adult)   Pulse 96   Temp 98.4 °F (36.9 °C) (Oral)   Resp 16   Ht 5' 5\" (1.651 m)   Wt 186 lb 6.4 oz (84.6 kg)   LMP 07/21/2022   SpO2 98%   BMI 31.02 kg/m²       Health Maintenance Due   Topic Date Due    DTaP/Tdap/Td series (5 - Td or Tdap) 02/15/2021    COVID-19 Vaccine (3 - Booster for Pfizer series) 03/31/2022         1. \"Have you been to the ER, urgent care clinic since your last visit? Hospitalized since your last visit? \"  Yes seen at [facility] ER for dizziness    2. \"Have you seen or consulted any other health care providers outside of the [address] [place] [address] since your last visit? \" No     3. For patients aged 39-70: Has the patient had a colonoscopy / FIT/ Cologuard? NA - based on age      If the patient is female:    4. For patients aged 41-77: Has the patient had a mammogram within the past 2 years? NA - based on age or sex      11. For patients aged 21-65: Has the patient had a pap smear?  Yes - no Care Gap present

## 2022-08-25 ENCOUNTER — TELEPHONE (OUTPATIENT)
Dept: INTERNAL MEDICINE CLINIC | Age: 27
End: 2022-08-25

## 2022-08-25 DIAGNOSIS — J01.90 ACUTE NON-RECURRENT SINUSITIS, UNSPECIFIED LOCATION: Primary | ICD-10-CM

## 2022-08-25 RX ORDER — AZITHROMYCIN 250 MG/1
TABLET, FILM COATED ORAL
Qty: 6 TABLET | Refills: 0 | Status: SHIPPED | OUTPATIENT
Start: 2022-08-25 | End: 2022-10-13 | Stop reason: ALTCHOICE

## 2022-08-25 NOTE — TELEPHONE ENCOUNTER
Please inform pt -   -I sent Funmi James to her pharmacy (I don't have any openings today)  -take plain Mucinex to keep the mucus loose/break it up  -Use Flonase nasal spray, 2 sprays to each nostril once a day, x 2 weeks  -if not improving over the weekend, then be seen at urgent care

## 2022-08-25 NOTE — TELEPHONE ENCOUNTER
Patient called in and stated that she would like to come in and see Elvia for sinus issues. Stated that she is having constant sinus drainage and pressure. Stated that she feels as if she has mucous in her throat and that it's bothering her in her chest. Stated that she does not have a cough or a fever and that her ears are hurting. Stated that her covid test was negative. Please advise.

## 2022-08-27 ENCOUNTER — APPOINTMENT (OUTPATIENT)
Dept: GENERAL RADIOLOGY | Age: 27
End: 2022-08-27
Attending: NURSE PRACTITIONER
Payer: COMMERCIAL

## 2022-08-27 ENCOUNTER — HOSPITAL ENCOUNTER (EMERGENCY)
Age: 27
Discharge: HOME | End: 2022-08-27
Attending: EMERGENCY MEDICINE
Payer: COMMERCIAL

## 2022-08-27 VITALS
RESPIRATION RATE: 18 BRPM | OXYGEN SATURATION: 100 % | SYSTOLIC BLOOD PRESSURE: 178 MMHG | DIASTOLIC BLOOD PRESSURE: 98 MMHG | TEMPERATURE: 98 F | HEART RATE: 78 BPM

## 2022-08-27 DIAGNOSIS — M54.2 NECK PAIN: ICD-10-CM

## 2022-08-27 DIAGNOSIS — R03.0 ELEVATED BLOOD PRESSURE READING: ICD-10-CM

## 2022-08-27 DIAGNOSIS — R20.0 NUMBNESS AND TINGLING: ICD-10-CM

## 2022-08-27 DIAGNOSIS — D64.9 ANEMIA, UNSPECIFIED TYPE: Primary | ICD-10-CM

## 2022-08-27 DIAGNOSIS — R20.2 NUMBNESS AND TINGLING: ICD-10-CM

## 2022-08-27 DIAGNOSIS — R07.89 CHEST TIGHTNESS: ICD-10-CM

## 2022-08-27 LAB
ALBUMIN SERPL-MCNC: 4.1 G/DL (ref 3.5–5)
ALBUMIN/GLOB SERPL: 0.7 {RATIO} (ref 1.1–2.2)
ALP SERPL-CCNC: 70 U/L (ref 45–117)
ALT SERPL-CCNC: 16 U/L (ref 12–78)
ANION GAP SERPL CALC-SCNC: 3 MMOL/L (ref 5–15)
AST SERPL-CCNC: 16 U/L (ref 15–37)
BASOPHILS # BLD: 0 K/UL (ref 0–0.1)
BASOPHILS NFR BLD: 1 % (ref 0–1)
BILIRUB SERPL-MCNC: 0.3 MG/DL (ref 0.2–1)
BUN SERPL-MCNC: 9 MG/DL (ref 6–20)
BUN/CREAT SERPL: 11 (ref 12–20)
CALCIUM SERPL-MCNC: 9.3 MG/DL (ref 8.5–10.1)
CHLORIDE SERPL-SCNC: 106 MMOL/L (ref 97–108)
CO2 SERPL-SCNC: 28 MMOL/L (ref 21–32)
CREAT SERPL-MCNC: 0.84 MG/DL (ref 0.55–1.02)
DIFFERENTIAL METHOD BLD: ABNORMAL
EOSINOPHIL # BLD: 0.1 K/UL (ref 0–0.4)
EOSINOPHIL NFR BLD: 1 % (ref 0–7)
ERYTHROCYTE [DISTWIDTH] IN BLOOD BY AUTOMATED COUNT: 13.9 % (ref 11.5–14.5)
GLOBULIN SER CALC-MCNC: 5.7 G/DL (ref 2–4)
GLUCOSE SERPL-MCNC: 93 MG/DL (ref 65–100)
HCT VFR BLD AUTO: 33.9 % (ref 35–47)
HGB BLD-MCNC: 10.9 G/DL (ref 11.5–16)
IMM GRANULOCYTES # BLD AUTO: 0 K/UL (ref 0–0.04)
IMM GRANULOCYTES NFR BLD AUTO: 0 % (ref 0–0.5)
LYMPHOCYTES # BLD: 2 K/UL (ref 0.8–3.5)
LYMPHOCYTES NFR BLD: 24 % (ref 12–49)
MCH RBC QN AUTO: 29.4 PG (ref 26–34)
MCHC RBC AUTO-ENTMCNC: 32.2 G/DL (ref 30–36.5)
MCV RBC AUTO: 91.4 FL (ref 80–99)
MONOCYTES # BLD: 0.5 K/UL (ref 0–1)
MONOCYTES NFR BLD: 6 % (ref 5–13)
NEUTS SEG # BLD: 5.8 K/UL (ref 1.8–8)
NEUTS SEG NFR BLD: 68 % (ref 32–75)
NRBC # BLD: 0 K/UL (ref 0–0.01)
NRBC BLD-RTO: 0 PER 100 WBC
PLATELET # BLD AUTO: 496 K/UL (ref 150–400)
PMV BLD AUTO: 8.9 FL (ref 8.9–12.9)
POTASSIUM SERPL-SCNC: 3.5 MMOL/L (ref 3.5–5.1)
PROT SERPL-MCNC: 9.8 G/DL (ref 6.4–8.2)
RBC # BLD AUTO: 3.71 M/UL (ref 3.8–5.2)
SODIUM SERPL-SCNC: 137 MMOL/L (ref 136–145)
TROPONIN-HIGH SENSITIVITY: 5 NG/L (ref 0–51)
WBC # BLD AUTO: 8.4 K/UL (ref 3.6–11)

## 2022-08-27 PROCEDURE — 36415 COLL VENOUS BLD VENIPUNCTURE: CPT

## 2022-08-27 PROCEDURE — 96374 THER/PROPH/DIAG INJ IV PUSH: CPT

## 2022-08-27 PROCEDURE — 84484 ASSAY OF TROPONIN QUANT: CPT

## 2022-08-27 PROCEDURE — 93005 ELECTROCARDIOGRAM TRACING: CPT

## 2022-08-27 PROCEDURE — 74011250636 HC RX REV CODE- 250/636: Performed by: NURSE PRACTITIONER

## 2022-08-27 PROCEDURE — 80053 COMPREHEN METABOLIC PANEL: CPT

## 2022-08-27 PROCEDURE — 72040 X-RAY EXAM NECK SPINE 2-3 VW: CPT

## 2022-08-27 PROCEDURE — 85025 COMPLETE CBC W/AUTO DIFF WBC: CPT

## 2022-08-27 PROCEDURE — 96375 TX/PRO/DX INJ NEW DRUG ADDON: CPT

## 2022-08-27 PROCEDURE — 99285 EMERGENCY DEPT VISIT HI MDM: CPT

## 2022-08-27 PROCEDURE — 74011250637 HC RX REV CODE- 250/637: Performed by: NURSE PRACTITIONER

## 2022-08-27 RX ORDER — IBUPROFEN 800 MG/1
800 TABLET ORAL
Qty: 20 TABLET | Refills: 0 | Status: SHIPPED | OUTPATIENT
Start: 2022-08-27 | End: 2022-09-03

## 2022-08-27 RX ORDER — CYCLOBENZAPRINE HCL 10 MG
10 TABLET ORAL
Qty: 12 TABLET | Refills: 0 | Status: SHIPPED | OUTPATIENT
Start: 2022-08-27

## 2022-08-27 RX ORDER — DEXAMETHASONE SODIUM PHOSPHATE 10 MG/ML
10 INJECTION INTRAMUSCULAR; INTRAVENOUS
Status: COMPLETED | OUTPATIENT
Start: 2022-08-27 | End: 2022-08-27

## 2022-08-27 RX ORDER — KETOROLAC TROMETHAMINE 30 MG/ML
30 INJECTION, SOLUTION INTRAMUSCULAR; INTRAVENOUS
Status: COMPLETED | OUTPATIENT
Start: 2022-08-27 | End: 2022-08-27

## 2022-08-27 RX ORDER — CYCLOBENZAPRINE HCL 10 MG
10 TABLET ORAL
Status: COMPLETED | OUTPATIENT
Start: 2022-08-27 | End: 2022-08-27

## 2022-08-27 RX ADMIN — CYCLOBENZAPRINE 10 MG: 10 TABLET, FILM COATED ORAL at 21:51

## 2022-08-27 RX ADMIN — DEXAMETHASONE SODIUM PHOSPHATE 10 MG: 10 INJECTION INTRAMUSCULAR; INTRAVENOUS at 21:51

## 2022-08-27 RX ADMIN — KETOROLAC TROMETHAMINE 30 MG: 30 INJECTION, SOLUTION INTRAMUSCULAR; INTRAVENOUS at 21:52

## 2022-08-27 NOTE — Clinical Note
[address]  [address] [id number]  [phone]    Work/School Note    Date: 8/27/2022    To Whom It May concern:    Carmen Lainez was seen and treated today in the emergency room by the following provider(s):  Attending Provider: Ryann Ingram MD  Nurse Practitioner: Eddie Oreilly NP. Carmen Lainez is excused from work/school on 8/27/2022 through 8/29/2022. She is medically clear to return to work/school on 8/30/2022.          Sincerely,          Villa Lundy NP

## 2022-08-27 NOTE — ED PROVIDER NOTES
HPI   Dina Esteban is a 27 y.o. female with Hx of acne, anemia, CHAR, herpes, thoracic compression fx, HTN, thyromegaly who presents via EMS to [facility] ED with cc of neck pain, chest tightness, numbness/ tingling. Patient reports left lateral neck pain with associative numbness and tingling in her left upper extremity while driving today with associative chest tightness. She denies any trauma, falls, injuries. She denies any focal weakness, visual changes, speech changes, gait changes or any other unilateral concerns. She is currently under treatment for sinusitis and is taking a Z-Puneet. EMS states that vital signs were stable and there were no acute or emergent findings with their examination in route. Denies F/C, N/V/D, cough, HA, dizziness, congestion, SOB, \urinary or bowel habit concerns. Denies tobacco, alcohol, substance abuse. Denies chance of pregnancy. PCP: Elvia Reeves PA-C    There are no other complaints, changes or physical findings at this time. Past Medical History:   Diagnosis Date    Abnormal Pap smear of cervix     seeing OBGYN    Acne 10/7/2009    Allergic rhinitis 2009    Anemia 2009    ASCUS HPV+ 2017    H/o ASCUS HPV + with Dr. Wyatt Ewing 2017    CHAR III (cervical intraepithelial neoplasia III) 2017    found by LEEP     Headache     Herpes genitalia     dx by OB    History of gestational diabetes     diet controlled    Other ill-defined conditions(799.89)     scoliosis    PCB (post coital bleeding) 2014    Thoracic compression fracture ([address] 3/2014    Gaila Covington    Thyromegaly 2014    On exam     Unspecified vitamin D deficiency 2014    Varicella 1998       Past Surgical History:   Procedure Laterality Date    HX GYN           HX LEEP PROCEDURE  16    found CHAR 3         Family History:   Problem Relation Age of Onset    Breast Cancer Maternal Grandmother         great, great gma, age? Hypertension Maternal Grandmother     Breast Cancer Maternal Aunt 32        survivor    Diabetes Father     Hypertension Father     Hypertension Paternal Grandmother     Diabetes Paternal Grandmother     No Known Problems Brother     Osteoporosis Neg Hx        Social History     Socioeconomic History    Marital status: SINGLE     Spouse name: Not on file    Number of children: Not on file    Years of education: Not on file    Highest education level: Not on file   Occupational History    Not on file   Tobacco Use    Smoking status: Never    Smokeless tobacco: Never   Vaping Use    Vaping Use: Never used   Substance and Sexual Activity    Alcohol use: Yes     Comment: occassionally     Drug use: No    Sexual activity: Yes     Partners: Male     Birth control/protection: Injection   Other Topics Concern    Not on file   Social History Narrative    Lives in [place] with parents and 3 yo son. Works at [employer]. Social Determinants of Health     Financial Resource Strain: Low Risk     Difficulty of Paying Living Expenses: Not hard at all   Food Insecurity: No Food Insecurity    Worried About Running Out of Food in the Last Year: Never true    Ran Out of Food in the Last Year: Never true   Transportation Needs: Not on file   Physical Activity: Not on file   Stress: Not on file   Social Connections: Not on file   Intimate Partner Violence: Not on file   Housing Stability: Not on file         ALLERGIES: Hydrochlorothiazide and Metronidazole    Review of Systems   Constitutional:  Negative for activity change, appetite change, chills and fever. HENT:  Negative for congestion, rhinorrhea and sore throat. Eyes:  Negative for visual disturbance. Respiratory:  Negative for cough and shortness of breath. Cardiovascular:  Positive for chest pain. Gastrointestinal:  Negative for abdominal pain, diarrhea, nausea and vomiting. Genitourinary:  Negative for dysuria, flank pain, frequency and urgency. Musculoskeletal:  Positive for arthralgias, myalgias and neck pain. Negative for back pain, gait problem and joint swelling. Skin:  Negative for color change and rash. Neurological:  Positive for numbness. Negative for dizziness, speech difficulty, weakness, light-headedness and headaches. Psychiatric/Behavioral:  Negative for agitation, behavioral problems and confusion. All other systems reviewed and are negative. Vitals:    08/27/22 2020 08/27/22 2239   BP: (!) 172/95 (!) 178/98   Pulse: 92 78   Resp: 16 18   Temp: 98 °F (36.7 °C) 98 °F (36.7 °C)   SpO2: 100% 100%            Physical Exam  Vitals and nursing note reviewed. Constitutional:       General: She is not in acute distress. Appearance: She is well-developed. HENT:      Head: Normocephalic and atraumatic. Right Ear: External ear normal.      Left Ear: External ear normal.   Eyes:      Conjunctiva/sclera: Conjunctivae normal.      Pupils: Pupils are equal, round, and reactive to light. Cardiovascular:      Rate and Rhythm: Normal rate and regular rhythm. Heart sounds: Normal heart sounds. Pulmonary:      Effort: Pulmonary effort is normal.      Breath sounds: Normal breath sounds. Musculoskeletal:         General: No swelling or deformity. Normal range of motion. Cervical back: Normal range of motion and neck supple. No tenderness. Skin:     General: Skin is warm and dry. Neurological:      Mental Status: She is alert and oriented to person, place, and time. Psychiatric:         Behavior: Behavior normal.         Thought Content:  Thought content normal.         Judgment: Judgment normal.        MDM  Number of Diagnoses or Management Options  Anemia, unspecified type  Chest tightness  Elevated blood pressure reading  Neck pain  Numbness and tingling  Diagnosis management comments: DDx: ACS, anxiety, panic, radiculopathy, DDD, MSK spasm     Patient presents the emergency department with concern for left lateral neck pain, numbness and tingling, chest tightness while driving today. EKG was reviewed by an attending physician and without any emergent findings. Troponin was flat. Mild arthritis noticed on x-ray, no other emergent findings. No focal neurovascular deficits on physical exam findings. Patient was advised to follow-up with her primary care provider soon as possible for ongoing management. Symptom management was discussed at time of discharge.   Reasons to return to the emergency department were reviewed and provided       Amount and/or Complexity of Data Reviewed  Clinical lab tests: ordered and reviewed  Tests in the radiology section of CPT®: ordered and reviewed  Review and summarize past medical records: yes           Procedures    LABORATORY TESTS:  Recent Results (from the past 12 hour(s))   EKG, 12 LEAD, INITIAL    Collection Time: 08/27/22  8:06 PM   Result Value Ref Range    Ventricular Rate 91 BPM    Atrial Rate 91 BPM    P-R Interval 174 ms    QRS Duration 78 ms    Q-T Interval 372 ms    QTC Calculation (Bezet) 457 ms    Calculated P Axis 52 degrees    Calculated R Axis -5 degrees    Calculated T Axis 10 degrees    Diagnosis       Normal sinus rhythm  Minimal voltage criteria for LVH, may be normal variant ( R in aVL )  Borderline ECG  When compared with ECG of 30-JUN-2022 02:11,  No significant change was found     CBC WITH AUTOMATED DIFF    Collection Time: 08/27/22  8:15 PM   Result Value Ref Range    WBC 8.4 3.6 - 11.0 K/uL    RBC 3.71 (L) 3.80 - 5.20 M/uL    HGB 10.9 (L) 11.5 - 16.0 g/dL    HCT 33.9 (L) 35.0 - 47.0 %    MCV 91.4 80.0 - 99.0 FL    MCH 29.4 26.0 - 34.0 PG    MCHC 32.2 30.0 - 36.5 g/dL    RDW 13.9 11.5 - 14.5 %    PLATELET 587 (H) 190 - 400 K/uL    MPV 8.9 8.9 - 12.9 FL    NRBC 0.0 0  WBC    ABSOLUTE NRBC 0.00 0.00 - 0.01 K/uL    NEUTROPHILS 68 32 - 75 %    LYMPHOCYTES 24 12 - 49 %    MONOCYTES 6 5 - 13 %    EOSINOPHILS 1 0 - 7 %    BASOPHILS 1 0 - 1 %    IMMATURE GRANULOCYTES 0 0.0 - 0.5 %    ABS. NEUTROPHILS 5.8 1.8 - 8.0 K/UL    ABS. LYMPHOCYTES 2.0 0.8 - 3.5 K/UL    ABS. MONOCYTES 0.5 0.0 - 1.0 K/UL    ABS. EOSINOPHILS 0.1 0.0 - 0.4 K/UL    ABS. BASOPHILS 0.0 0.0 - 0.1 K/UL    ABS. IMM. GRANS. 0.0 0.00 - 0.04 K/UL    DF AUTOMATED     METABOLIC PANEL, COMPREHENSIVE    Collection Time: 08/27/22  8:15 PM   Result Value Ref Range    Sodium 137 136 - 145 mmol/L    Potassium 3.5 3.5 - 5.1 mmol/L    Chloride 106 97 - 108 mmol/L    CO2 28 21 - 32 mmol/L    Anion gap 3 (L) 5 - 15 mmol/L    Glucose 93 65 - 100 mg/dL    BUN 9 6 - 20 MG/DL    Creatinine 0.84 0.55 - 1.02 MG/DL    BUN/Creatinine ratio 11 (L) 12 - 20      GFR est AA >60 >60 ml/min/1.73m2    GFR est non-AA >60 >60 ml/min/1.73m2    Calcium 9.3 8.5 - 10.1 MG/DL    Bilirubin, total 0.3 0.2 - 1.0 MG/DL    ALT (SGPT) 16 12 - 78 U/L    AST (SGOT) 16 15 - 37 U/L    Alk. phosphatase 70 45 - 117 U/L    Protein, total 9.8 (H) 6.4 - 8.2 g/dL    Albumin 4.1 3.5 - 5.0 g/dL    Globulin 5.7 (H) 2.0 - 4.0 g/dL    A-G Ratio 0.7 (L) 1.1 - 2.2     TROPONIN-HIGH SENSITIVITY    Collection Time: 08/27/22  8:15 PM   Result Value Ref Range    Troponin-High Sensitivity 5 0 - 51 ng/L       IMAGING RESULTS:  XR SPINE CERV PA LAT ODONT 3 V MAX   Final Result   Minimal facet osteoarthritis. MEDICATIONS GIVEN:  Medications   ketorolac (TORADOL) injection 30 mg (30 mg IntraVENous Given 8/27/22 2152)   cyclobenzaprine (FLEXERIL) tablet 10 mg (10 mg Oral Given 8/27/22 2151)   dexamethasone (PF) (DECADRON) 10 mg/mL injection 10 mg (10 mg IntraVENous Given 8/27/22 0322)       IMPRESSION:  1. Anemia, unspecified type    2. Numbness and tingling    3. Neck pain    4. Chest tightness    5. Elevated blood pressure reading        PLAN:  1.    Discharge Medication List as of 8/27/2022 10:34 PM        START taking these medications    Details   ibuprofen (MOTRIN) 800 mg tablet Take 1 Tablet by mouth every six (6) hours as needed for Pain for up to 7 days., Normal, Disp-20 Tablet, R-0      cyclobenzaprine (FLEXERIL) 10 mg tablet Take 1 Tablet by mouth three (3) times daily as needed for Muscle Spasm(s). , Normal, Disp-12 Tablet, R-0           CONTINUE these medications which have NOT CHANGED    Details   azithromycin (ZITHROMAX) 250 mg tablet Take 2 tablets today, then take 1 tablet daily. , Normal, Disp-6 Tablet, R-0      nebivoloL (BYSTOLIC) 5 mg tablet Take 1 Tablet by mouth in the morning., Normal, Disp-90 Tablet, R-1      escitalopram oxalate (LEXAPRO) 10 mg tablet Take 1 Tablet by mouth in the morning., Normal, Disp-90 Tablet, R-1      valsartan (DIOVAN) 160 mg tablet Take 1 Tablet by mouth daily. (THIS IS A NEW AND HIGHER DOSE), Normal, Disp-90 Tablet, R-1      ascorbic acid (MAINOR-C PO) Take  by mouth., Historical Med      VITAMIN B COMPLEX PO Take  by mouth., Historical Med      ZINC PO Take 50 mg by mouth daily. , Historical Med           2. Follow-up Information       Follow up With Specialties Details Why Contact Arpita Will PA-C Medical Physician Assistant, Physician Assistant Schedule an appointment as soon as possible for a visit   [address]      [address], [facility] Emergency Medicine Go to  As needed, If symptoms worsen [address] [facility]  [phone]          3. Return to ED if worse     Please note that this dictation was completed with [employer], the [employer] voice recognition software. Quite often unanticipated grammatical, syntax, homophones, and other interpretive errors are inadvertently transcribed by the computer software. Please disregard these errors. Please excuse any errors that have escaped final proofreading.

## 2022-08-28 LAB
ATRIAL RATE: 91 BPM
CALCULATED P AXIS, ECG09: 52 DEGREES
CALCULATED R AXIS, ECG10: -5 DEGREES
CALCULATED T AXIS, ECG11: 10 DEGREES
DIAGNOSIS, 93000: NORMAL
P-R INTERVAL, ECG05: 174 MS
Q-T INTERVAL, ECG07: 372 MS
QRS DURATION, ECG06: 78 MS
QTC CALCULATION (BEZET), ECG08: 457 MS
VENTRICULAR RATE, ECG03: 91 BPM

## 2022-08-28 NOTE — ED TRIAGE NOTES
Pt arrives via EMS with CC of sudden onset of numbness in left side of her neck. Pt also appears very anxious in triage. Strength is equal in all extremities. Neg stroke scale for EMS. Pt denies headache, dizziness, and vision problems.

## 2022-08-28 NOTE — DISCHARGE INSTRUCTIONS
You likely have a muscle spasm in your neck which is why you had numbness and tingling today. Your neck x-ray is normal, please apply warm compresses and take medications as directed for management. There is no evidence on your EKG or lab work that you had an emergent cardiac problem with your chest tightness today. Please call your primary care provider on Monday to follow up regarding your ER visit today and to have you blood pressure rechecked. Take medications as directed and return to the ER for any worsening or worrisome symptoms.

## 2022-09-01 ENCOUNTER — OFFICE VISIT (OUTPATIENT)
Dept: INTERNAL MEDICINE CLINIC | Age: 27
End: 2022-09-01
Payer: COMMERCIAL

## 2022-09-01 VITALS
OXYGEN SATURATION: 99 % | HEART RATE: 101 BPM | SYSTOLIC BLOOD PRESSURE: 136 MMHG | WEIGHT: 184 LBS | RESPIRATION RATE: 18 BRPM | HEIGHT: 65 IN | DIASTOLIC BLOOD PRESSURE: 90 MMHG | BODY MASS INDEX: 30.66 KG/M2

## 2022-09-01 DIAGNOSIS — F41.9 ANXIETY: ICD-10-CM

## 2022-09-01 DIAGNOSIS — I10 ESSENTIAL HYPERTENSION: Primary | ICD-10-CM

## 2022-09-01 DIAGNOSIS — J30.9 ALLERGIC RHINITIS, UNSPECIFIED SEASONALITY, UNSPECIFIED TRIGGER: ICD-10-CM

## 2022-09-01 DIAGNOSIS — M35.00 SICCA SYNDROME (HCC): ICD-10-CM

## 2022-09-01 DIAGNOSIS — M35.00 SJOGREN'S SYNDROME, WITH UNSPECIFIED ORGAN INVOLVEMENT (HCC): ICD-10-CM

## 2022-09-01 PROCEDURE — 99214 OFFICE O/P EST MOD 30 MIN: CPT | Performed by: PHYSICIAN ASSISTANT

## 2022-09-01 RX ORDER — VALSARTAN 320 MG/1
320 TABLET ORAL DAILY
Qty: 90 TABLET | Refills: 1 | Status: SHIPPED | OUTPATIENT
Start: 2022-09-01

## 2022-09-01 NOTE — PATIENT INSTRUCTIONS
Plain Mucinex 600 mg, 1 or 2 pills twice a day to loosen the mucus    Génesis pot and/or nasal saline sprays    Biotene products for the dry mouth.

## 2022-09-01 NOTE — PROGRESS NOTES
HPI:  27 y.o.  presents for follow up appointment. No hospital, ER or specialist visits since last primary care visit except as noted below.     Anxiety  Did not start Lexapro 10 mg (7/25/22)   -she reports less stress and is feeling better with working from home  -she wants to see how she improves with these non-medication changes first before starting a long term medicine    Hypertension  Current therapy: valsartan 160 mg  She seems to have very labile blood pressure with anxiety so we tried a beta-blocker, bystolic, but she states it caused chest tightness with arm tingling which made her anxious and she had an ER visit, noted on 8/27/22, negative troponin, normal EKG, BP was 178/98 at ER  -Stopped HCTZ due to low K+  -amlodipine caused HA   -she had 4 ER visits earlier this year, 2 of them for hyper tensive urgency's; on 5/21/2022 she had elevated blood pressure and chest pain, on 6/29/2022 she had elevated blood pressure and headache    (+)NANCI screen in hospital 3/2022, but this was known already  Has known Sjogren's, (+)anti-SSA and NANCI by IFA in 2018  Saw rheumatology once in 2018, Dr Maykel Millan, but she left the practice and pt did not follow up  Never had salivary gland biopsy  Advised to re-establish with rheumatology, she should be monitored at least q6mos  -at visit 4/18/22 - She has not scheduled with rheum yet  -ESR 48 on 4/15/22; normal CRP, normal C3, C4 is slightly elevated at 44  -Visit- 7/25/22 she has appt with rheum a year out as new pt with rheumatology Dr Leonel Hinton    C/O dry sinuses, but feels stuffy  Has dry mouth  Was treated for sinusitis last week  Has typically not been bothered by sicca syndrome with her Sjogren's until recently   No sneezing  Flonase dried her out too much in the past  She has not seen ENT        Patient Active Problem List    Diagnosis    Hypermobility syndrome    Arthralgia    Costochondritis    Carpal tunnel syndrome of right wrist    NANCI positive    SS-A antibody positive Rheumatoid factor positive    Acute bacterial sinusitis    Acute bronchitis due to other specified organisms    Essential hypertension    History of gestational diabetes     diet controlled      Mastodynia     Nl b/l breast US in 2016. Prolactin pending 12/22/2016   With galactorrhea 2/2 depo      Thyromegaly     On exam      Atypical mole     Noted at vaginal introitus (7 o'clock position)      Vitamin D deficiency    History of compression fracture of spine, thoracic with chronic back pain    Allergic rhinitis    Anemia         Past Medical History:   Diagnosis Date    Abnormal Pap smear of cervix 2017    seeing OBGYN    Acne 10/7/2009    Allergic rhinitis 9/2/2009    Anemia 9/2/2009    ASCUS HPV+ 09/14/2017    H/o ASCUS HPV + with Dr. Davidson Hammer 9/14/2017    CHAR III (cervical intraepithelial neoplasia III) 11/30/2017    found by LEEP     Headache     Herpes genitalia 2017    dx by OB    History of gestational diabetes     diet controlled    Other ill-defined conditions(799.89)     scoliosis    PCB (post coital bleeding) 11/25/2014    Thoracic compression fracture ([address] 3/2014    Yousif Lopez    Thyromegaly 11/25/2014    On exam     Unspecified vitamin D deficiency 6/26/2014    Varicella 5/1/1998       Social History     Tobacco Use    Smoking status: Never    Smokeless tobacco: Never   Vaping Use    Vaping Use: Never used   Substance Use Topics    Alcohol use: Yes     Comment: occassionally     Drug use: No       Outpatient Medications Marked as Taking for the 9/1/22 encounter (Office Visit) with Elvia Lea PA-C   Medication Sig Dispense Refill    ibuprofen (MOTRIN) 800 mg tablet Take 1 Tablet by mouth every six (6) hours as needed for Pain for up to 7 days. 20 Tablet 0    cyclobenzaprine (FLEXERIL) 10 mg tablet Take 1 Tablet by mouth three (3) times daily as needed for Muscle Spasm(s). 12 Tablet 0    azithromycin (ZITHROMAX) 250 mg tablet Take 2 tablets today, then take 1 tablet daily.  6 Tablet 0    nebivoloL (BYSTOLIC) 5 mg tablet Take 1 Tablet by mouth in the morning. 90 Tablet 1    escitalopram oxalate (LEXAPRO) 10 mg tablet Take 1 Tablet by mouth in the morning. 90 Tablet 1    valsartan (DIOVAN) 160 mg tablet Take 1 Tablet by mouth daily. (THIS IS A NEW AND HIGHER DOSE) 90 Tablet 1    ascorbic acid (MAINOR-C PO) Take  by mouth. VITAMIN B COMPLEX PO Take  by mouth. ZINC PO Take 50 mg by mouth daily. Allergies   Allergen Reactions    Hydrochlorothiazide Other (comments)     Low potassium    Metronidazole Itching       ROS:  ROS negative except as per HPI. PE:  Visit Vitals  BP (!) 136/90 (BP 1 Location: Right arm, BP Patient Position: Sitting, BP Cuff Size: Large adult)   Pulse (!) 101   Resp 18   Ht 5' 5\" (1.651 m)   Wt 184 lb (83.5 kg)   SpO2 99%   BMI 30.62 kg/m²     Gen: alert, oriented, no acute distress  Head: normocephalic, atraumatic  Ears: external auditory canals clear, TMs without erythema or effusion  Eyes: pupils equal round reactive to light, sclera clear, conjunctiva clear  Oral: moist mucus membranes, no obvious dryness,  no oral lesions, no pharyngeal inflammation or exudate  Neck: symmetric normal sized thyroid, no carotid bruits, no jugular vein distention  Resp: no increase work of breathing, lungs clear to ausculation bilaterally, no wheezing, rales or rhonchi  CV: S1, S2 normal.  No murmurs, rubs, or gallops. Abd: soft, not tender, not distended. Normal bowel sounds. Neuro: grossly intact  Skin: no lesion or rash  Extremities: no cyanosis or edema    No results found for this visit on 09/01/22.     Lab Results   Component Value Date/Time    WBC 8.4 08/27/2022 08:15 PM    Hemoglobin (POC) 11.7 09/09/2019 01:56 PM    HGB 10.9 (L) 08/27/2022 08:15 PM    Hematocrit (POC) 37 01/15/2021 11:54 AM    HCT 33.9 (L) 08/27/2022 08:15 PM    PLATELET 592 (H) 51/49/4641 08:15 PM    MCV 91.4 08/27/2022 08:15 PM     Lab Results   Component Value Date/Time    Sodium 137 08/27/2022 08:15 PM    Potassium 3.5 08/27/2022 08:15 PM    Chloride 106 08/27/2022 08:15 PM    CO2 28 08/27/2022 08:15 PM    Anion gap 3 (L) 08/27/2022 08:15 PM    Glucose 93 08/27/2022 08:15 PM    BUN 9 08/27/2022 08:15 PM    Creatinine 0.84 08/27/2022 08:15 PM    BUN/Creatinine ratio 11 (L) 08/27/2022 08:15 PM    GFR est AA >60 08/27/2022 08:15 PM    GFR est non-AA >60 08/27/2022 08:15 PM    Calcium 9.3 08/27/2022 08:15 PM    Bilirubin, total 0.3 08/27/2022 08:15 PM    Alk. phosphatase 70 08/27/2022 08:15 PM    Protein, total 9.8 (H) 08/27/2022 08:15 PM    Albumin 4.1 08/27/2022 08:15 PM    Globulin 5.7 (H) 08/27/2022 08:15 PM    A-G Ratio 0.7 (L) 08/27/2022 08:15 PM    ALT (SGPT) 16 08/27/2022 08:15 PM    AST (SGOT) 16 08/27/2022 08:15 PM     Lab Results   Component Value Date/Time    TSH 2.64 03/08/2022 04:07 AM     Lab Results   Component Value Date/Time    Cholesterol, total 144 12/22/2016 11:10 AM    HDL Cholesterol 56 12/22/2016 11:10 AM    LDL, calculated 78 12/22/2016 11:10 AM    VLDL, calculated 10 12/22/2016 11:10 AM    Triglyceride 50 12/22/2016 11:10 AM         Assessment/Plan:      ICD-10-CM ICD-9-CM    1. Essential hypertension  I10 401.9 valsartan (DIOVAN) 320 mg tablet      2. Anxiety  F41.9 300.00       3. Sjogren's syndrome, with unspecified organ involvement ([address]  M35.00 710.2 REFERRAL TO ENT-OTOLARYNGOLOGY      4. Sicca syndrome ([address]  M35.00 710.2 REFERRAL TO ENT-OTOLARYNGOLOGY      5. Allergic rhinitis, unspecified seasonality, unspecified trigger  J30.9 477.9 REFERRAL TO ENT-OTOLARYNGOLOGY        1.   Hypertension  Current therapy: Valsartan 160 mg  Blood pressure 136/90  Elevated at 178/98 at ER visit on 8/27/2022  HCTZ caused hypokalemia  Amlodipine caused headache  I had recently added Bystolic at last visit, however she states it caused the chest pain and arm tingling which led her to the ER visit on 8/27/2022 so she stopped it  Of like to increase her valsartan to 320 mg and follow-up for recheck next    2. Anxiety  I have recommended Lexapro 10 mg, however she has not started this medication yet  She states she is feeling much less stress and anxiety since she is working from home now and would like to continue to see how she does without medications  We will continue to monitor    4. Sjogren's syndrome  Diagnosed in 2018, had visit with [facility] rheumatology, but lost to follow-up and that provider left the practice  She has appointment as a new patient next year with rheumatology Dr. Shayan Mary    4. Sicca syndrome  Complaining of dry nose and dry mouth  I recommend Biotene products  I recommend nasal saline sprays and Ozone Meeter Vogt  She has never seen ENT so I have referred, see below    5. Allergic rhinitis  She was unable to tolerate Flonase since it dried her out too much in the past  Currently has been very stuffy, feels dry, no sneezing  Recommend plain Mucinex 600 mg, 1 to 2 pills twice a day as needed, I discussed that she can titrate this to her needs  I recommend follow-up with ENT for further evaluation as the Sjogren's and dryness will complicate treatment for allergies        Health Maintenance reviewed - updated. Orders Placed This Encounter    [facility]     Referral Priority:   Routine     Referral Type:   Consultation     Referral Reason:   Specialty Services Required     Referred to Provider:   Arnold Baum MD     Number of Visits Requested:   1    valsartan (DIOVAN) 320 mg tablet     Sig: Take 1 Tablet by mouth daily. (THIS IS A NEW AND HIGHER DOSE)     Dispense:  90 Tablet     Refill:  1       Medications Discontinued During This Encounter   Medication Reason    escitalopram oxalate (LEXAPRO) 10 mg tablet Not A Current Medication    valsartan (DIOVAN) 160 mg tablet REORDER       Current Outpatient Medications   Medication Sig Dispense Refill    ibuprofen (MOTRIN) 800 mg tablet Take 1 Tablet by mouth every six (6) hours as needed for Pain for up to 7 days.  21 Tablet 0    cyclobenzaprine (FLEXERIL) 10 mg tablet Take 1 Tablet by mouth three (3) times daily as needed for Muscle Spasm(s). 12 Tablet 0    azithromycin (ZITHROMAX) 250 mg tablet Take 2 tablets today, then take 1 tablet daily. 6 Tablet 0    nebivoloL (BYSTOLIC) 5 mg tablet Take 1 Tablet by mouth in the morning. 90 Tablet 1    escitalopram oxalate (LEXAPRO) 10 mg tablet Take 1 Tablet by mouth in the morning. 90 Tablet 1    valsartan (DIOVAN) 160 mg tablet Take 1 Tablet by mouth daily. (THIS IS A NEW AND HIGHER DOSE) 90 Tablet 1    ascorbic acid (MAINOR-C PO) Take  by mouth. VITAMIN B COMPLEX PO Take  by mouth. ZINC PO Take 50 mg by mouth daily. Recommended healthy diet low in carbohydrates, fats, sodium and cholesterol. Recommended regular cardiovascular exercise 3-6 times per week for 30-60 minutes daily. Verbal and written instructions (see AVS) provided. Patient expresses understanding of diagnosis and treatment plan. Follow-up and Dispositions    Return in about 6 weeks (around 10/13/2022) for HTN.        Future Appointments   Date Time Provider Ana Thorpe   10/13/2022  3:30 PM Elvia Wellington PA-C [facility]

## 2022-09-01 NOTE — PROGRESS NOTES
Sussy Perez presents today at the clinic for follow up.      Chief Complaint   Patient presents with    Anxiety        Wt Readings from Last 3 Encounters:   09/01/22 184 lb (83.5 kg)   07/25/22 186 lb 6.4 oz (84.6 kg)   06/29/22 188 lb 15 oz (85.7 kg)     Temp Readings from Last 3 Encounters:   08/27/22 98 °F (36.7 °C)   07/25/22 98.4 °F (36.9 °C) (Oral)   06/29/22 97.6 °F (36.4 °C)     BP Readings from Last 3 Encounters:   08/27/22 (!) 178/98   07/25/22 (!) 156/98   06/30/22 (!) 148/118     Pulse Readings from Last 3 Encounters:   08/27/22 78   07/25/22 96   06/29/22 80       Health Maintenance Due   Topic    DTaP/Tdap/Td series (5 - Td or Tdap)    COVID-19 Vaccine (3 - Booster for Gentile Peter series)    Flu Vaccine (1)         Learning Assessment:  :     Learning Assessment 1/18/2016 4/29/2014   PRIMARY LEARNER Patient Patient   HIGHEST LEVEL OF EDUCATION - PRIMARY LEARNER  - GRADUATED HIGH SCHOOL OR GED   BARRIERS PRIMARY LEARNER - NONE   CO-LEARNER CAREGIVER - No   PRIMARY LANGUAGE ENGLISH ENGLISH    NEED - No   LEARNER PREFERENCE PRIMARY DEMONSTRATION DEMONSTRATION     LISTENING LISTENING   LEARNING SPECIAL TOPICS - mo   ANSWERED BY patient self   RELATIONSHIP SELF SELF   ASSESSMENT COMMENT - none       Depression Screening:  :     3 most recent PHQ Screens 9/1/2022   Little interest or pleasure in doing things Not at all   Feeling down, depressed, irritable, or hopeless Not at all   Total Score PHQ 2 0   Trouble falling or staying asleep, or sleeping too much -   Feeling tired or having little energy -   Poor appetite, weight loss, or overeating -   Feeling bad about yourself - or that you are a failure or have let yourself or your family down -   Trouble concentrating on things such as school, work, reading, or watching TV -   Moving or speaking so slowly that other people could have noticed; or the opposite being so fidgety that others notice -   Thoughts of being better off dead, or hurting yourself in some way -   PHQ 9 Score -       Fall Risk Assessment:  :     Fall Risk Assessment, last 12 mths 6/14/2021   Able to walk? Yes   Fall in past 12 months? 0   Do you feel unsteady? 0   Are you worried about falling 0       Abuse Screening:  :     Abuse Screening Questionnaire 6/14/2021 5/8/2019   Do you ever feel afraid of your partner? N N   Are you in a relationship with someone who physically or mentally threatens you? N N   Is it safe for you to go home? Y Y       Coordination of Care Questionnaire:  :     1. Have you been to the ER, urgent care clinic since your last visit? Hospitalized since your last visit? No    2. Have you seen or consulted any other health care providers outside of the [address] [place] [address] since your last visit? Include any pap smears or colon screening.  No

## 2022-09-12 ENCOUNTER — PATIENT OUTREACH (OUTPATIENT)
Dept: CASE MANAGEMENT | Age: 27
End: 2022-09-12

## 2022-09-12 NOTE — PROGRESS NOTES
09/12/22  Attempted to outreach with pt to introduce self and offer CM services. Pt did answer , she asked for a return call this evening, earliest at 5pm. This ACM will call pt back around 5pm. -JUDY    [phone]  Called back at specified time, unable to leave  - mb is full. Other 2 numbers not available. Sent MY Chart message introducing self including contact info.  -MLL

## 2022-09-13 ENCOUNTER — PATIENT OUTREACH (OUTPATIENT)
Dept: CASE MANAGEMENT | Age: 27
End: 2022-09-13

## 2022-09-13 NOTE — PROGRESS NOTES
09/13/22  Third attempt to reach out to pt to introduce self and offer CM services. Will close this episode of care.  -MLL

## 2022-10-13 ENCOUNTER — OFFICE VISIT (OUTPATIENT)
Dept: INTERNAL MEDICINE CLINIC | Age: 27
End: 2022-10-13
Payer: COMMERCIAL

## 2022-10-13 VITALS
SYSTOLIC BLOOD PRESSURE: 140 MMHG | TEMPERATURE: 98.5 F | WEIGHT: 181.8 LBS | DIASTOLIC BLOOD PRESSURE: 90 MMHG | OXYGEN SATURATION: 100 % | RESPIRATION RATE: 18 BRPM | HEIGHT: 65 IN | BODY MASS INDEX: 30.29 KG/M2 | HEART RATE: 98 BPM

## 2022-10-13 DIAGNOSIS — I10 ESSENTIAL HYPERTENSION: Primary | ICD-10-CM

## 2022-10-13 DIAGNOSIS — Z23 NEEDS FLU SHOT: ICD-10-CM

## 2022-10-13 DIAGNOSIS — M35.00 SJOGREN'S SYNDROME, WITH UNSPECIFIED ORGAN INVOLVEMENT (HCC): ICD-10-CM

## 2022-10-13 DIAGNOSIS — J30.9 ALLERGIC RHINITIS, UNSPECIFIED SEASONALITY, UNSPECIFIED TRIGGER: ICD-10-CM

## 2022-10-13 PROCEDURE — 99214 OFFICE O/P EST MOD 30 MIN: CPT | Performed by: PHYSICIAN ASSISTANT

## 2022-10-13 PROCEDURE — 90471 IMMUNIZATION ADMIN: CPT | Performed by: PHYSICIAN ASSISTANT

## 2022-10-13 PROCEDURE — 90686 IIV4 VACC NO PRSV 0.5 ML IM: CPT | Performed by: PHYSICIAN ASSISTANT

## 2022-10-13 NOTE — PATIENT INSTRUCTIONS
Vaccine Information Statement    Influenza (Flu) Vaccine (Inactivated or Recombinant): What You Need to Know    Many vaccine information statements are available in [place] and other languages. See www.immunize.org/vis. Hojas de información sobre vacunas están disponibles en español y en muchos otros idiomas. Visite www.immunize.org/vis. 1. Why get vaccinated? Influenza vaccine can prevent influenza (flu). Flu is a contagious disease that spreads around the [facility] every year, usually between October and May. Anyone can get the flu, but it is more dangerous for some people. Infants and young children, people 72 years and older, pregnant people, and people with certain health conditions or a weakened immune system are at greatest risk of flu complications. Pneumonia, bronchitis, sinus infections, and ear infections are examples of flu-related complications. If you have a medical condition, such as heart disease, cancer, or diabetes, flu can make it worse. Flu can cause fever and chills, sore throat, muscle aches, fatigue, cough, headache, and runny or stuffy nose. Some people may have vomiting and diarrhea, though this is more common in children than adults. In an average year, thousands of people in the [facility] die from flu, and many more are hospitalized. Flu vaccine prevents millions of illnesses and flu-related visits to the doctor each year. 2. Influenza vaccines     CDC recommends everyone 6 months and older get vaccinated every flu season. Children 6 months through 6years of age may need 2 doses during a single flu season. Everyone else needs only 1 dose each flu season. It takes about 2 weeks for protection to develop after vaccination. There are many flu viruses, and they are always changing. Each year a new flu vaccine is made to protect against the influenza viruses believed to be likely to cause disease in the upcoming flu season.  Even when the vaccine doesnt exactly match these viruses, it may still provide some protection. Influenza vaccine does not cause flu. Influenza vaccine may be given at the same time as other vaccines. 3. Talk with your health care provider    Tell your vaccination provider if the person getting the vaccine:  Has had an allergic reaction after a previous dose of influenza vaccine, or has any severe, life-threatening allergies   Has ever had Guillain-Barré Syndrome (also called GBS)    In some cases, your health care provider may decide to postpone influenza vaccination until a future visit. Influenza vaccine can be administered at any time during pregnancy. People who are or will be pregnant during influenza season should receive inactivated influenza vaccine. People with minor illnesses, such as a cold, may be vaccinated. People who are moderately or severely ill should usually wait until they recover before getting influenza vaccine. Your health care provider can give you more information. 4. Risks of a vaccine reaction    Soreness, redness, and swelling where the shot is given, fever, muscle aches, and headache can happen after influenza vaccination. There may be a very small increased risk of Guillain-Barré Syndrome (GBS) after inactivated influenza vaccine (the flu shot). Remus Barer children who get the flu shot along with pneumococcal vaccine (PCV13) and/or DTaP vaccine at the same time might be slightly more likely to have a seizure caused by fever. Tell your health care provider if a child who is getting flu vaccine has ever had a seizure. People sometimes faint after medical procedures, including vaccination. Tell your provider if you feel dizzy or have vision changes or ringing in the ears. As with any medicine, there is a very remote chance of a vaccine causing a severe allergic reaction, other serious injury, or death. 5. What if there is a serious problem?     An allergic reaction could occur after the vaccinated person leaves the clinic. If you see signs of a severe allergic reaction (hives, swelling of the face and throat, difficulty breathing, a fast heartbeat, dizziness, or weakness), call 9-1-1 and get the person to the nearest hospital.    For other signs that concern you, call your health care provider. Adverse reactions should be reported to the Vaccine Adverse Event Reporting System (VAERS). Your health care provider will usually file this report, or you can do it yourself. Visit the VAERS website at www.vaers. [facility].gov or call 7-503.647.6081. VAERS is only for reporting reactions, and VAERS staff members do not give medical advice. 6. The National Vaccine Injury Compensation Program    The [facility] Vaccine Injury Compensation Program (VICP) is a federal program that was created to compensate people who may have been injured by certain vaccines. Claims regarding alleged injury or death due to vaccination have a time limit for filing, which may be as short as two years. Visit the VICP website at www.[facility].gov/vaccinecompensation or call 8-903.661.3440 to learn about the program and about filing a claim. 7. How can I learn more? Ask your health care provider. Call your local or state health department. Visit the website of the Food and Drug Administration (FDA) for vaccine package inserts and additional information at www.fda.gov/vaccines-blood-biologics/vaccines. Contact the Centers for Disease Control and Prevention (CDC): Call 7-457.869.4701 (1-800-CDC-INFO) or  Visit CDCs influenza website at www.cdc.gov/flu. Vaccine Information Statement   Inactivated Influenza Vaccine   8/6/2021  42 SOURAV Margaret Muñoz [id number]   Department of Health and Human Services  Centers for Disease Control and Prevention    Office Use Only

## 2022-10-13 NOTE — ADDENDUM NOTE
Addended by: Joann Camilo on: 10/13/2022 03:59 PM     Modules accepted: Orders [DrChristopher  ___] : Dr. LANGFORD

## 2022-10-13 NOTE — PROGRESS NOTES
HIPAA verified by two patient identifiers. Peter Alicea is a 32 y.o. female    Chief Complaint   Patient presents with    Hypertension     6 weeks       Visit Vitals  BP (!) 140/90 (BP 1 Location: Left upper arm, BP Patient Position: Sitting, BP Cuff Size: Adult long)   Pulse 98   Temp 98.5 °F (36.9 °C) (Oral)   Resp 18   Ht 5' 5\" (1.651 m)   Wt 181 lb 12.8 oz (82.5 kg)   SpO2 100%   BMI 30.25 kg/m²       Pain Scale: 0 - No pain/10  Pain Location:       Health Maintenance Due   Topic Date Due    DTaP/Tdap/Td series (5 - Td or Tdap) 02/15/2021    COVID-19 Vaccine (3 - Booster for Pfizer series) 03/31/2022    Flu Vaccine (1) 08/01/2022         Coordination of Care Questionnaire:  :   1) Have you been to an emergency room, urgent care, or hospitalized since your last visit? If yes, where when, and reason for visit? no       2. Have seen or consulted any other health care provider since your last visit? If yes, where when, and reason for visit? NO      Patient is accompanied by self I have received verbal consent from Peter Alicea to discuss any/all medical information while they are present in the room.

## 2022-10-13 NOTE — PROGRESS NOTES
After obtaining patient  consent, and per order from Dr. Virginia Nicholas, patient received influenza vaccine given by Marzella Castleman in LEFT Deltoid. Influenza Vaccine 0.5 mL IM now. Patient was observed for 10 minutes post injection. Patient tolerated injection well.

## 2022-10-13 NOTE — PROGRESS NOTES
HPI:  32 y.o.  presents for follow up appointment. No hospital, ER or specialist visits since last primary care visit except as noted below. Last visit 9/1/22    Hypertension  Current therapy: Valsartan 320 mg (increased from 160 mg 9/1/22)  HCTZ caused hypokalemia  Amlodipine caused headache  Bystolic caused chest pain and tingling  BP check at home in 130s/80s, she has not brought in her home cuff    Allergies are still bothering her  She has been using plain Mucinex 600 mg as needed since last visit with some benefit  Flonase dries her out too much  Has used Zyrtec in the past without exacerbation of her Sjogren's syndrome and dryness feelings and wonders if she can try that again    Patient Active Problem List    Diagnosis    Hypermobility syndrome    Arthralgia    Costochondritis    Carpal tunnel syndrome of right wrist    NANCI positive    SS-A antibody positive    Rheumatoid factor positive    Acute bacterial sinusitis    Acute bronchitis due to other specified organisms    Essential hypertension    History of gestational diabetes     diet controlled      Mastodynia     Nl b/l breast US in 2016.    Prolactin pending 12/22/2016   With galactorrhea 2/2 depo      Thyromegaly     On exam      Atypical mole     Noted at vaginal introitus (7 o'clock position)      Vitamin D deficiency    History of compression fracture of spine, thoracic with chronic back pain    Allergic rhinitis    Anemia         Past Medical History:   Diagnosis Date    Abnormal Pap smear of cervix 2017    seeing OBGYN    Acne 10/7/2009    Allergic rhinitis 9/2/2009    Anemia 9/2/2009    ASCUS HPV+ 09/14/2017    H/o ASCUS HPV + with Dr. Clarissa Alcaraz 9/14/2017    CHAR III (cervical intraepithelial neoplasia III) 11/30/2017    found by LEEP     Headache     Herpes genitalia 2017    dx by OB    History of gestational diabetes     diet controlled    Other ill-defined conditions(799.89)     scoliosis    PCB (post coital bleeding) 11/25/2014 Thoracic compression fracture ([facility] [address] 3/2014    Yousif Nesskachodom    Thyromegaly 11/25/2014    On exam     Unspecified vitamin D deficiency 6/26/2014    Varicella 5/1/1998       Social History     Tobacco Use    Smoking status: Never    Smokeless tobacco: Never   Vaping Use    Vaping Use: Never used   Substance Use Topics    Alcohol use: Yes     Comment: occassionally     Drug use: No       Outpatient Medications Marked as Taking for the 10/13/22 encounter (Office Visit) with Elvia Sarmiento PA-C   Medication Sig Dispense Refill    valsartan (DIOVAN) 320 mg tablet Take 1 Tablet by mouth daily. (THIS IS A NEW AND HIGHER DOSE) 90 Tablet 1    cyclobenzaprine (FLEXERIL) 10 mg tablet Take 1 Tablet by mouth three (3) times daily as needed for Muscle Spasm(s). 12 Tablet 0    nebivoloL (BYSTOLIC) 5 mg tablet Take 1 Tablet by mouth in the morning. 90 Tablet 1    ascorbic acid (MAINOR-C PO) Take  by mouth. VITAMIN B COMPLEX PO Take  by mouth. ZINC PO Take 50 mg by mouth daily. Allergies   Allergen Reactions    Hydrochlorothiazide Other (comments)     Low potassium    Metronidazole Itching       ROS:  ROS negative except as per HPI. PE:  Visit Vitals  BP (!) 140/90 (BP 1 Location: Left upper arm, BP Patient Position: Sitting, BP Cuff Size: Adult long)   Pulse 98   Temp 98.5 °F (36.9 °C) (Oral)   Resp 18   Ht 5' 5\" (1.651 m)   Wt 181 lb 12.8 oz (82.5 kg)   SpO2 100%   BMI 30.25 kg/m²     Gen: alert, oriented, no acute distress  Head: normocephalic, atraumatic  Eyes: pupils equal round reactive to light, sclera clear, conjunctiva clear  Neck: supple  Resp: no increase work of breathing, lungs clear to ausculation bilaterally, no wheezing, rales or rhonchi  CV: S1, S2 normal.  No murmurs, rubs, or gallops. Neuro: grossly intact  Skin: no lesion or rash  Extremities: no cyanosis or edema    No results found for this visit on 10/13/22.     Results for orders placed or performed during the hospital encounter of 08/27/22   CBC WITH AUTOMATED DIFF   Result Value Ref Range    WBC 8.4 3.6 - 11.0 K/uL    RBC 3.71 (L) 3.80 - 5.20 M/uL    HGB 10.9 (L) 11.5 - 16.0 g/dL    HCT 33.9 (L) 35.0 - 47.0 %    MCV 91.4 80.0 - 99.0 FL    MCH 29.4 26.0 - 34.0 PG    MCHC 32.2 30.0 - 36.5 g/dL    RDW 13.9 11.5 - 14.5 %    PLATELET 633 (H) 022 - 400 K/uL    MPV 8.9 8.9 - 12.9 FL    NRBC 0.0 0  WBC    ABSOLUTE NRBC 0.00 0.00 - 0.01 K/uL    NEUTROPHILS 68 32 - 75 %    LYMPHOCYTES 24 12 - 49 %    MONOCYTES 6 5 - 13 %    EOSINOPHILS 1 0 - 7 %    BASOPHILS 1 0 - 1 %    IMMATURE GRANULOCYTES 0 0.0 - 0.5 %    ABS. NEUTROPHILS 5.8 1.8 - 8.0 K/UL    ABS. LYMPHOCYTES 2.0 0.8 - 3.5 K/UL    ABS. MONOCYTES 0.5 0.0 - 1.0 K/UL    ABS. EOSINOPHILS 0.1 0.0 - 0.4 K/UL    ABS. BASOPHILS 0.0 0.0 - 0.1 K/UL    ABS. IMM. GRANS. 0.0 0.00 - 0.04 K/UL    DF AUTOMATED     METABOLIC PANEL, COMPREHENSIVE   Result Value Ref Range    Sodium 137 136 - 145 mmol/L    Potassium 3.5 3.5 - 5.1 mmol/L    Chloride 106 97 - 108 mmol/L    CO2 28 21 - 32 mmol/L    Anion gap 3 (L) 5 - 15 mmol/L    Glucose 93 65 - 100 mg/dL    BUN 9 6 - 20 MG/DL    Creatinine 0.84 0.55 - 1.02 MG/DL    BUN/Creatinine ratio 11 (L) 12 - 20      GFR est AA >60 >60 ml/min/1.73m2    GFR est non-AA >60 >60 ml/min/1.73m2    Calcium 9.3 8.5 - 10.1 MG/DL    Bilirubin, total 0.3 0.2 - 1.0 MG/DL    ALT (SGPT) 16 12 - 78 U/L    AST (SGOT) 16 15 - 37 U/L    Alk.  phosphatase 70 45 - 117 U/L    Protein, total 9.8 (H) 6.4 - 8.2 g/dL    Albumin 4.1 3.5 - 5.0 g/dL    Globulin 5.7 (H) 2.0 - 4.0 g/dL    A-G Ratio 0.7 (L) 1.1 - 2.2     TROPONIN-HIGH SENSITIVITY   Result Value Ref Range    Troponin-High Sensitivity 5 0 - 51 ng/L   EKG, 12 LEAD, INITIAL   Result Value Ref Range    Ventricular Rate 91 BPM    Atrial Rate 91 BPM    P-R Interval 174 ms    QRS Duration 78 ms    Q-T Interval 372 ms    QTC Calculation (Bezet) 457 ms    Calculated P Axis 52 degrees    Calculated R Axis -5 degrees    Calculated T Axis 10 degrees Diagnosis       Normal sinus rhythm  Nonspecific ST abnormality    When compared with ECG of 30-JUN-2022 02:11,  No significant change was found  Confirmed by Shelton Guerrero M.D., Sameer García ([id number]) on 8/28/2022 1:50:29 PM           Assessment/Plan:      ICD-10-CM ICD-9-CM    1. Essential hypertension  V81 003.2 METABOLIC PANEL, BASIC      2. Allergic rhinitis, unspecified seasonality, unspecified trigger  J30.9 477.9       3. Sjogren's syndrome, with unspecified organ involvement ([facility]  M35.00 710.2       4. Needs flu shot  Z23 V04.81 INFLUENZA, FLUARIX, FLULAVAL, FLUZONE (AGE 6 MO+), AFLURIA(AGE 3Y+) IM, PF, 0.5 ML        1. Hypertension  Current therapy: Valsartan 320 mg (increased from 160 mg 9/1/2022)  /90 today, however she states she has been very nervous about coming in for her blood pressure check today  She has a home cuff showing readings in the 130s over 80s which has been great control for her considering she has often been in the 170s and higher systolic and 28B to 403G or higher diastolic  HCTZ causes hypokalemia  Amlodipine caused headache  Bystolic caused chest pain and tingling  We will continue current therapy valsartan 320 mg daily  I have asked her to check her blood pressure once a week and if she notes this is trending upwards at home above her 130s over 80s, call for a sooner appointment and bring her home cuff  Otherwise follow-up 3 months for recheck  Will monitor BMP today due to recent dose increase of valsartan  Of note she is not taking Sudafed for her allergies (see below)    2. Allergic rhinitis  Using Mucinex 600 mg as needed  Flonase dried her out too much  Has tolerated Zyrtec in the past, of asked her to use this with caution in setting of her Sjogren's syndrome  She may use regular dose of 10 mg daily but if it exacerbates any of her dryness symptoms, of asked her to lowered to 5 mg daily    3.   Sjogren's syndrome  Reminder to follow-up with ENT  She has appointment in the future to establish with rheumatology Dr. Sandra Pickard    4. Flu shot today    Greater than 30 min was spent with her, of which more than 50% was spent on counseling. Health Maintenance reviewed - updated. Orders Placed This Encounter    Influenza, FLUARIX, FLULAVAL, Fluzone (age 10 mo+), AFLURIA (age 3y+) IM, PF, 0.5 mL    METABOLIC PANEL, BASIC     Standing Status:   Future     Standing Expiration Date:   10/13/2023       Medications Discontinued During This Encounter   Medication Reason    nebivoloL (BYSTOLIC) 5 mg tablet Side Effects    azithromycin (ZITHROMAX) 250 mg tablet Therapy Completed       Current Outpatient Medications   Medication Sig Dispense Refill    valsartan (DIOVAN) 320 mg tablet Take 1 Tablet by mouth daily. (THIS IS A NEW AND HIGHER DOSE) 90 Tablet 1    cyclobenzaprine (FLEXERIL) 10 mg tablet Take 1 Tablet by mouth three (3) times daily as needed for Muscle Spasm(s). 12 Tablet 0    ascorbic acid (MAINOR-C PO) Take  by mouth. VITAMIN B COMPLEX PO Take  by mouth. ZINC PO Take 50 mg by mouth daily. Recommended healthy diet low in carbohydrates, fats, sodium and cholesterol. Recommended regular cardiovascular exercise 3-6 times per week for 30-60 minutes daily. Verbal and written instructions (see AVS) provided. Patient expresses understanding of diagnosis and treatment plan. Follow-up and Dispositions    Return in about 3 months (around 1/13/2023) for HTN (bring in home cuff).

## 2022-10-14 LAB
ANION GAP SERPL CALC-SCNC: 4 MMOL/L (ref 5–15)
BUN SERPL-MCNC: 10 MG/DL (ref 6–20)
BUN/CREAT SERPL: 13 (ref 12–20)
CALCIUM SERPL-MCNC: 9.1 MG/DL (ref 8.5–10.1)
CHLORIDE SERPL-SCNC: 106 MMOL/L (ref 97–108)
CO2 SERPL-SCNC: 25 MMOL/L (ref 21–32)
CREAT SERPL-MCNC: 0.76 MG/DL (ref 0.55–1.02)
GLUCOSE SERPL-MCNC: 80 MG/DL (ref 65–100)
POTASSIUM SERPL-SCNC: 4.2 MMOL/L (ref 3.5–5.1)
SODIUM SERPL-SCNC: 135 MMOL/L (ref 136–145)

## 2022-10-19 ENCOUNTER — TELEPHONE (OUTPATIENT)
Dept: INTERNAL MEDICINE CLINIC | Age: 27
End: 2022-10-19

## 2022-10-19 ENCOUNTER — OFFICE VISIT (OUTPATIENT)
Dept: INTERNAL MEDICINE CLINIC | Age: 27
End: 2022-10-19
Payer: COMMERCIAL

## 2022-10-19 VITALS
DIASTOLIC BLOOD PRESSURE: 120 MMHG | TEMPERATURE: 98.1 F | BODY MASS INDEX: 30.25 KG/M2 | HEIGHT: 65 IN | SYSTOLIC BLOOD PRESSURE: 160 MMHG | RESPIRATION RATE: 18 BRPM | OXYGEN SATURATION: 98 % | HEART RATE: 109 BPM

## 2022-10-19 DIAGNOSIS — Z91.199 NON-ADHERENCE TO MEDICAL TREATMENT: ICD-10-CM

## 2022-10-19 DIAGNOSIS — I10 ESSENTIAL HYPERTENSION: Primary | ICD-10-CM

## 2022-10-19 DIAGNOSIS — F41.9 ANXIETY: ICD-10-CM

## 2022-10-19 PROCEDURE — 3074F SYST BP LT 130 MM HG: CPT | Performed by: PHYSICIAN ASSISTANT

## 2022-10-19 PROCEDURE — 99214 OFFICE O/P EST MOD 30 MIN: CPT | Performed by: PHYSICIAN ASSISTANT

## 2022-10-19 PROCEDURE — 3078F DIAST BP <80 MM HG: CPT | Performed by: PHYSICIAN ASSISTANT

## 2022-10-19 RX ORDER — ESCITALOPRAM OXALATE 10 MG/1
10 TABLET ORAL DAILY
Qty: 90 TABLET | Refills: 1 | Status: SHIPPED | OUTPATIENT
Start: 2022-10-19

## 2022-10-19 RX ORDER — BUSPIRONE HYDROCHLORIDE 5 MG/1
5 TABLET ORAL 2 TIMES DAILY
Qty: 180 TABLET | Refills: 1 | Status: SHIPPED | OUTPATIENT
Start: 2022-10-19

## 2022-10-19 RX ORDER — CHLORTHALIDONE 25 MG/1
12.5 TABLET ORAL DAILY
Qty: 45 TABLET | Refills: 1 | Status: SHIPPED | OUTPATIENT
Start: 2022-10-19

## 2022-10-19 NOTE — Clinical Note
Please schedule her for lab-only visit 2-3 wks after she has started chlorthalidone (new blood pressure medicine)

## 2022-10-19 NOTE — PATIENT INSTRUCTIONS
Counseling groups that I recommend are: [facility], [address] [facility], [phone]      Start Buspar 5 mg twice a day for the first week. 2. Then after 1 week, start the Lexapro, take 1/2 tablet (5mg) daily for 1 week, keep taking the Buspar 5 mg twice a day  3.  Then increase Lexapro to 1 tablet (10 mg) daily, stay on the Buspar 5 mg twice a day    Start chlorthalidone 1/2 tab (12.5 mg) daily for your blood pressure, in addition to the valsartan 320 mg daily        Other options for a therapist and/or psychiatrist, call:     [facility] ([address])  [address] [place], [place], [address]  [address]  [address] [place], [place], [address]  [address] at [address] [facility], [facility], [address] [place], [address]  [phone]     [facility]  [address], [place], [address]  [address] [place], [address]  [phone]     [facility]  [address] [place], [address]  [address]  [facility] [place] moraima, [address]  [phone]     [facility]  [address] [place], [facility] [address]  [address] Psychiatry  [address] [facility], [address] [place] [address], [address] [facility]  [address]  [facility] Brenna Lawler, [address]   [phone]

## 2022-10-19 NOTE — PROGRESS NOTES
HPI:  32 y.o.  presents for acute visit due to stress. 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    -No chest pain, no shortness of breath, no headaches, no lower extremity swelling, no visual changes. Anxiety  VISIT - 22 - Did not start Lexapro 10 mg (22)   -she reports less stress and is feeling better with working from home  -she wants to see how she improves with these non-medication changes first before starting a long term medicine  TODAY - 10/19/22 - no panic attacks currently  Has used hydroxyzine 2021, some benefit for sleep at the time, but at that time started having nocturnal panic attacks so she stopped it  Not seeing a counselor  Never tried the buspar that was prescribed last year    PHQ-9 Score: 8  Over the past 2 weeks, how often have you been bothered by any of the following problems?  (Not at all = 0; several days = 1; More than 1/2 the days = 2; nearly every day = 3)  1) Little interest or pleasure in doing things:  0  2) Feeling down, depressed or hopeless:  1  3) Trouble falling asleep, staying asleep or sleeping too much:  0  4) Feeling tired or having little energy:  3  5) Poor appetite or overeatin  6) Feeling bad about yourself - or that you're a failure or have let yourself or your family down:  1  7) Trouble concentrating on things, such as reading the newspaper or watching TV:  3  8) Moving or speaking so slowly that other people could have noticed. Or, the opposite - being so fidgety or restless that you have been moving around a lot more than usual: 0  9) Thoughts that you would be better off dead or of hurting yourself in some way: 0    GAD7 score: 20  Feeling nervous, anxious or on edge: 3  Not being able to stop or control worrying:  3  Worrying too much about different things:  3  Trouble relaxing:  3  Being so restless that it is hard to sit still:  0  Becoming easily annoyed or irritable:  3  Feeling afraid as if something awful might happen: 3  If any of the above were scored more than 0, how difficult have these problems made it for you to do your work, take care of things at home, or get along with other people? 2  Not at all             Somewhat difficult            Very difficult             Extremely difficult        Patient Active Problem List    Diagnosis    Hypermobility syndrome    Arthralgia    Costochondritis    Carpal tunnel syndrome of right wrist    NANCI positive    SS-A antibody positive    Rheumatoid factor positive    Acute bacterial sinusitis    Acute bronchitis due to other specified organisms    Essential hypertension    History of gestational diabetes     diet controlled      Mastodynia     Nl b/l breast US in 2016.    Prolactin pending 12/22/2016   With galactorrhea 2/2 depo      Thyromegaly     On exam      Atypical mole     Noted at vaginal introitus (7 o'clock position)      Vitamin D deficiency    History of compression fracture of spine, thoracic with chronic back pain    Allergic rhinitis    Anemia         Past Medical History:   Diagnosis Date    Abnormal Pap smear of cervix 2017    seeing OBGYN    Acne 10/7/2009    Allergic rhinitis 9/2/2009    Anemia 9/2/2009    ASCUS HPV+ 09/14/2017    H/o ASCUS HPV + with Dr. Anupama Gamez 9/14/2017    CHAR III (cervical intraepithelial neoplasia III) 11/30/2017    found by LEEP Headache     Herpes genitalia 2017    dx by OB    History of gestational diabetes     diet controlled    Other ill-defined conditions(799.89)     scoliosis    PCB (post coital bleeding) 11/25/2014    Thoracic compression fracture ([address] 3/2014    Evie Push    Thyromegaly 11/25/2014    On exam     Unspecified vitamin D deficiency 6/26/2014    Varicella 5/1/1998       Social History     Tobacco Use    Smoking status: Never    Smokeless tobacco: Never   Vaping Use    Vaping Use: Never used   Substance Use Topics    Alcohol use: Yes     Comment: occassionally     Drug use: No       Outpatient Medications Marked as Taking for the 10/19/22 encounter (Office Visit) with Elvia Madison PA-C   Medication Sig Dispense Refill    valsartan (DIOVAN) 320 mg tablet Take 1 Tablet by mouth daily. (THIS IS A NEW AND HIGHER DOSE) 90 Tablet 1    cyclobenzaprine (FLEXERIL) 10 mg tablet Take 1 Tablet by mouth three (3) times daily as needed for Muscle Spasm(s). 12 Tablet 0    ascorbic acid (MAINOR-C PO) Take  by mouth. VITAMIN B COMPLEX PO Take  by mouth. ZINC PO Take 50 mg by mouth daily. Allergies   Allergen Reactions    Hydrochlorothiazide Other (comments)     Low potassium    Metronidazole Itching       ROS:  ROS negative except as per HPI. PE:  Visit Vitals  BP (!) 160/120 (BP 1 Location: Left upper arm, BP Patient Position: Sitting, BP Cuff Size: Adult long)   Pulse (!) 109   Temp 98.1 °F (36.7 °C) (Oral)   Resp 18   Ht 5' 5\" (1.651 m)   SpO2 98%   BMI 30.25 kg/m²     Gen: alert, oriented, no acute distress  Head: normocephalic, atraumatic  Eyes: pupils equal round reactive to light, sclera clear, conjunctiva clear  Oral: moist mucus membranes, no oral lesions, no pharyngeal inflammation or exudate  Neck: supple  Resp: no increase work of breathing, lungs clear to ausculation bilaterally, no wheezing, rales or rhonchi  CV: S1, S2 normal.  No murmurs, rubs, or gallops.      Neuro: grossly intact  Skin: no lesion or rash  Extremities: no cyanosis or edema  Psych:  alert, oriented to person, place, and time, normal mood, behavior, speech, dress, motor activity, and thought processes, no suicidal ideations      No results found for this visit on 10/19/22. Assessment/Plan:      ICD-10-CM ICD-9-CM    1. Anxiety  F41.9 300.00 escitalopram oxalate (LEXAPRO) 10 mg tablet      busPIRone (BUSPAR) 5 mg tablet      2. Essential hypertension  I10 401.9 chlorthalidone (HYGROTON) 25 mg tablet      METABOLIC PANEL, BASIC      3. Non-adherence to medical treatment  Z91.199 V15.81         1. Hypertension  Blood pressure 160/120 today, but of note she has not taken her medication today  She is prescribed valsartan 320 mg daily, and on recent visit last week blood pressure was 140/90 on therapy  No chest pain, shortness of breath, headaches, or visual changes  She is feeling overwhelmed and stressed with work  Medication intolerances or side effects include HCTZ caused hypokalemia, amlodipine caused headache, Bystolic caused chest pain and tingling  I stressed the importance of treating blood pressure and taking her medications daily to avoid risk for stroke  Will add chlorthalidone 12.5 mg but will need to monitor BMP in 2-3 wks given that HCTZ caused hypokalemia in the past  She is to take her valsartan immediately when she leaves here today    2.   Anxiety  MATT-7 score 20, PHQ-9 score 8  Currently not on medication nor is she seeing a counselor  I have reviewed in prior visits recommendations for a multipronged approach to treating her anxiety to include medications and counseling but she has declined preferring lifestyle stress reduction techniques  Again I recommend new start Lexapro with BuSpar and to establish with a counselor given the interference of her anxiety on her overall health and wellbeing  I recommend starting BuSpar 5 mg twice a day first for 1 week and then began Lexapro 5 mg for 1 week then increase to 10 mg and stay on the combination of the 2 medications at that time  I will excuse her from work for 3 days to recover from current stress, however any longer duration of time or leave from work will need to come from a specialist treating her  Handout was given on references for counseling groups and psychiatrists    3. Nonadherence noted to hypertension treatment with not taking her medications today. I stressed the importance of treatment of high blood pressure to avoid adverse outcomes such as heart attack and stroke and small vessel damage and eyes and kidneys and risk for cardiovascular disease. Health Maintenance reviewed - updated. Orders Placed This Encounter    METABOLIC PANEL, BASIC     Standing Status:   Future     Standing Expiration Date:   10/19/2023    chlorthalidone (HYGROTON) 25 mg tablet     Sig: Take 0.5 Tablets by mouth daily. Dispense:  45 Tablet     Refill:  1    escitalopram oxalate (LEXAPRO) 10 mg tablet     Sig: Take 1 Tablet by mouth daily. Dispense:  90 Tablet     Refill:  1    busPIRone (BUSPAR) 5 mg tablet     Sig: Take 1 Tablet by mouth two (2) times a day. Dispense:  180 Tablet     Refill:  1       There are no discontinued medications. Current Outpatient Medications   Medication Sig Dispense Refill    chlorthalidone (HYGROTON) 25 mg tablet Take 0.5 Tablets by mouth daily. 45 Tablet 1    escitalopram oxalate (LEXAPRO) 10 mg tablet Take 1 Tablet by mouth daily. 90 Tablet 1    busPIRone (BUSPAR) 5 mg tablet Take 1 Tablet by mouth two (2) times a day. 180 Tablet 1    valsartan (DIOVAN) 320 mg tablet Take 1 Tablet by mouth daily. (THIS IS A NEW AND HIGHER DOSE) 90 Tablet 1    cyclobenzaprine (FLEXERIL) 10 mg tablet Take 1 Tablet by mouth three (3) times daily as needed for Muscle Spasm(s). 12 Tablet 0    ascorbic acid (MAINOR-C PO) Take  by mouth. VITAMIN B COMPLEX PO Take  by mouth. ZINC PO Take 50 mg by mouth daily.       hydrOXYzine HCL (ATARAX) 50 mg tablet Take 1 Tablet by mouth every six (6) hours as needed for Anxiety for up to 10 days. 20 Tablet 0       Recommended healthy diet low in carbohydrates, fats, sodium and cholesterol. Recommended regular cardiovascular exercise 3-6 times per week for 30-60 minutes daily. Verbal and written instructions (see AVS) provided. Patient expresses understanding of diagnosis and treatment plan. Follow-up as planned in January    Greater than 35 min was spent with her, of which more than 50% was spent on counseling.

## 2022-10-19 NOTE — TELEPHONE ENCOUNTER
----- Message from Sylvaintwyla Shai Hendricks sent at 10/19/2022  9:35 AM EDT -----  Regarding: FW: Blood pressure     ----- Message -----  From: Dilma Cotter  Sent: 10/18/2022   4:21 PM EDT  To: [facility]  Subject: Blood pressure                                   Cristino Gan can you please give me a call as soon as you can about my blood pressure.  [phone].

## 2022-10-19 NOTE — PROGRESS NOTES
HIPAA verified by two patient identifiers. Carolina Armstrong is a 32 y.o. female    Chief Complaint   Patient presents with    Elevated Blood Pressure     Had a mental melt down at work yesterday  because of the stress     Anxiety       Visit Vitals  BP (!) 160/120 (BP 1 Location: Left upper arm, BP Patient Position: Sitting, BP Cuff Size: Adult long)   Pulse (!) 109   Temp 98.1 °F (36.7 °C) (Oral)   Resp 18   Ht 5' 5\" (1.651 m)   SpO2 98%   BMI 30.25 kg/m²       Pain Scale: 0 - No pain/10  Pain Location:       Health Maintenance Due   Topic Date Due    DTaP/Tdap/Td series (5 - Td or Tdap) 02/15/2021    COVID-19 Vaccine (3 - Booster for Pfizer series) 03/31/2022         Coordination of Care Questionnaire:  :   1) Have you been to an emergency room, urgent care, or hospitalized since your last visit? If yes, where when, and reason for visit? no       2. Have seen or consulted any other health care provider since your last visit? If yes, where when, and reason for visit? NO      Patient is accompanied by self I have received verbal consent from Carolina Armstrong to discuss any/all medical information while they are present in the room.

## 2022-10-19 NOTE — LETTER
NOTIFICATION RETURN TO WORK / SCHOOL    10/19/2022 1:07 PM    [facility] [address] [phone]      To Whom It May Concern:    Nikolas Mcgovern is currently under the care of [facility]. She will return to work/school on: Monday, 10/24/22    If there are questions or concerns please have the patient contact our office.         Sincerely,      Elvia Rabago PA-C

## 2022-10-20 PROCEDURE — 85025 COMPLETE CBC W/AUTO DIFF WBC: CPT

## 2022-10-20 PROCEDURE — 96376 TX/PRO/DX INJ SAME DRUG ADON: CPT

## 2022-10-20 PROCEDURE — 99284 EMERGENCY DEPT VISIT MOD MDM: CPT

## 2022-10-20 PROCEDURE — 36415 COLL VENOUS BLD VENIPUNCTURE: CPT

## 2022-10-20 PROCEDURE — 80053 COMPREHEN METABOLIC PANEL: CPT

## 2022-10-20 PROCEDURE — 96374 THER/PROPH/DIAG INJ IV PUSH: CPT

## 2022-10-20 PROCEDURE — 93005 ELECTROCARDIOGRAM TRACING: CPT

## 2022-10-21 ENCOUNTER — HOSPITAL ENCOUNTER (EMERGENCY)
Age: 27
Discharge: HOME | End: 2022-10-21
Attending: EMERGENCY MEDICINE
Payer: COMMERCIAL

## 2022-10-21 VITALS
HEART RATE: 85 BPM | BODY MASS INDEX: 30.3 KG/M2 | OXYGEN SATURATION: 99 % | SYSTOLIC BLOOD PRESSURE: 161 MMHG | RESPIRATION RATE: 14 BRPM | HEIGHT: 65 IN | TEMPERATURE: 98.5 F | DIASTOLIC BLOOD PRESSURE: 119 MMHG | WEIGHT: 181.88 LBS

## 2022-10-21 DIAGNOSIS — I10 PRIMARY HYPERTENSION: Primary | ICD-10-CM

## 2022-10-21 LAB
ALBUMIN SERPL-MCNC: 3.6 G/DL (ref 3.5–5)
ALBUMIN/GLOB SERPL: 0.6 {RATIO} (ref 1.1–2.2)
ALP SERPL-CCNC: 63 U/L (ref 45–117)
ALT SERPL-CCNC: 15 U/L (ref 12–78)
ANION GAP SERPL CALC-SCNC: 9 MMOL/L (ref 5–15)
AST SERPL-CCNC: 13 U/L (ref 15–37)
ATRIAL RATE: 100 BPM
BASOPHILS # BLD: 0 K/UL (ref 0–0.1)
BASOPHILS NFR BLD: 0 % (ref 0–1)
BILIRUB SERPL-MCNC: 0.3 MG/DL (ref 0.2–1)
BUN SERPL-MCNC: 12 MG/DL (ref 6–20)
BUN/CREAT SERPL: 14 (ref 12–20)
CALCIUM SERPL-MCNC: 9.5 MG/DL (ref 8.5–10.1)
CALCULATED P AXIS, ECG09: 76 DEGREES
CALCULATED R AXIS, ECG10: 44 DEGREES
CALCULATED T AXIS, ECG11: 45 DEGREES
CHLORIDE SERPL-SCNC: 101 MMOL/L (ref 97–108)
CO2 SERPL-SCNC: 25 MMOL/L (ref 21–32)
COMMENT, HOLDF: NORMAL
CREAT SERPL-MCNC: 0.88 MG/DL (ref 0.55–1.02)
DIAGNOSIS, 93000: NORMAL
DIFFERENTIAL METHOD BLD: ABNORMAL
EOSINOPHIL # BLD: 0.1 K/UL (ref 0–0.4)
EOSINOPHIL NFR BLD: 1 % (ref 0–7)
ERYTHROCYTE [DISTWIDTH] IN BLOOD BY AUTOMATED COUNT: 13.3 % (ref 11.5–14.5)
GLOBULIN SER CALC-MCNC: 6 G/DL (ref 2–4)
GLUCOSE SERPL-MCNC: 116 MG/DL (ref 65–100)
HCT VFR BLD AUTO: 32.8 % (ref 35–47)
HGB BLD-MCNC: 10.7 G/DL (ref 11.5–16)
IMM GRANULOCYTES # BLD AUTO: 0 K/UL (ref 0–0.04)
IMM GRANULOCYTES NFR BLD AUTO: 0 % (ref 0–0.5)
LYMPHOCYTES # BLD: 2.2 K/UL (ref 0.8–3.5)
LYMPHOCYTES NFR BLD: 35 % (ref 12–49)
MCH RBC QN AUTO: 30 PG (ref 26–34)
MCHC RBC AUTO-ENTMCNC: 32.6 G/DL (ref 30–36.5)
MCV RBC AUTO: 91.9 FL (ref 80–99)
MONOCYTES # BLD: 0.5 K/UL (ref 0–1)
MONOCYTES NFR BLD: 7 % (ref 5–13)
NEUTS SEG # BLD: 3.5 K/UL (ref 1.8–8)
NEUTS SEG NFR BLD: 57 % (ref 32–75)
NRBC # BLD: 0 K/UL (ref 0–0.01)
NRBC BLD-RTO: 0 PER 100 WBC
P-R INTERVAL, ECG05: 186 MS
PLATELET # BLD AUTO: 533 K/UL (ref 150–400)
PMV BLD AUTO: 8.9 FL (ref 8.9–12.9)
POTASSIUM SERPL-SCNC: 3.3 MMOL/L (ref 3.5–5.1)
PROT SERPL-MCNC: 9.6 G/DL (ref 6.4–8.2)
Q-T INTERVAL, ECG07: 362 MS
QRS DURATION, ECG06: 88 MS
QTC CALCULATION (BEZET), ECG08: 466 MS
RBC # BLD AUTO: 3.57 M/UL (ref 3.8–5.2)
SAMPLES BEING HELD,HOLD: NORMAL
SODIUM SERPL-SCNC: 135 MMOL/L (ref 136–145)
VENTRICULAR RATE, ECG03: 100 BPM
WBC # BLD AUTO: 6.3 K/UL (ref 3.6–11)

## 2022-10-21 PROCEDURE — 74011000250 HC RX REV CODE- 250: Performed by: EMERGENCY MEDICINE

## 2022-10-21 PROCEDURE — 74011250637 HC RX REV CODE- 250/637: Performed by: EMERGENCY MEDICINE

## 2022-10-21 RX ORDER — LABETALOL HYDROCHLORIDE 5 MG/ML
20 INJECTION, SOLUTION INTRAVENOUS ONCE
Status: COMPLETED | OUTPATIENT
Start: 2022-10-21 | End: 2022-10-21

## 2022-10-21 RX ORDER — POTASSIUM CHLORIDE 750 MG/1
40 TABLET, FILM COATED, EXTENDED RELEASE ORAL
Status: DISCONTINUED | OUTPATIENT
Start: 2022-10-21 | End: 2022-10-21

## 2022-10-21 RX ORDER — CHLORTHALIDONE 25 MG/1
25 TABLET ORAL
Status: COMPLETED | OUTPATIENT
Start: 2022-10-21 | End: 2022-10-21

## 2022-10-21 RX ORDER — ACETAMINOPHEN 325 MG/1
650 TABLET ORAL ONCE
Status: COMPLETED | OUTPATIENT
Start: 2022-10-21 | End: 2022-10-21

## 2022-10-21 RX ORDER — CHLORTHALIDONE 25 MG/1
12.5 TABLET ORAL
Status: COMPLETED | OUTPATIENT
Start: 2022-10-21 | End: 2022-10-21

## 2022-10-21 RX ORDER — POTASSIUM CHLORIDE 20MEQ/15ML
40 LIQUID (ML) ORAL
Status: DISCONTINUED | OUTPATIENT
Start: 2022-10-21 | End: 2022-10-21

## 2022-10-21 RX ORDER — HYDROXYZINE 50 MG/1
50 TABLET, FILM COATED ORAL
Qty: 20 TABLET | Refills: 0 | Status: SHIPPED | OUTPATIENT
Start: 2022-10-21 | End: 2022-10-31

## 2022-10-21 RX ORDER — HYDROXYZINE 25 MG/1
25 TABLET, FILM COATED ORAL
Status: COMPLETED | OUTPATIENT
Start: 2022-10-21 | End: 2022-10-21

## 2022-10-21 RX ADMIN — POTASSIUM BICARBONATE 40 MEQ: 782 TABLET, EFFERVESCENT ORAL at 04:24

## 2022-10-21 RX ADMIN — LABETALOL HYDROCHLORIDE 20 MG: 5 INJECTION INTRAVENOUS at 02:51

## 2022-10-21 RX ADMIN — ACETAMINOPHEN 650 MG: 325 TABLET ORAL at 06:05

## 2022-10-21 RX ADMIN — CHLORTHALIDONE 25 MG: 25 TABLET ORAL at 07:18

## 2022-10-21 RX ADMIN — HYDROXYZINE HYDROCHLORIDE 25 MG: 25 TABLET, FILM COATED ORAL at 04:25

## 2022-10-21 RX ADMIN — CHLORTHALIDONE 12.5 MG: 25 TABLET ORAL at 04:25

## 2022-10-21 RX ADMIN — LABETALOL HYDROCHLORIDE 20 MG: 5 INJECTION INTRAVENOUS at 05:09

## 2022-10-21 NOTE — ED PROVIDER NOTES
The history is provided by the patient. Hypertension   This is a chronic problem. The current episode started yesterday. The problem has not changed since onset. Associated symptoms include headaches. Pertinent negatives include no chest pain, no palpitations, no shortness of breath, no nausea and no vomiting. Associated symptoms comments: Patient has also had diffuse tingling to her body worse on the right than the left. Risk factors include hypertension (Primary care provider recently increased her valsartan and wrote her a prescription for chlorthalidone; however, she has not yet started the chlorthalidone). Past Medical History:   Diagnosis Date    Abnormal Pap smear of cervix     seeing OBGYN    Acne 10/7/2009    Allergic rhinitis 2009    Anemia 2009    ASCUS HPV+ 2017    H/o ASCUS HPV + with Dr. Eyad Stoddard 2017    CHAR III (cervical intraepithelial neoplasia III) 2017    found by LEEP     Headache     Herpes genitalia     dx by OB    History of gestational diabetes     diet controlled    Other ill-defined conditions(799.89)     scoliosis    PCB (post coital bleeding) 2014    Thoracic compression fracture ([address] 3/2014    Darshan Demarco    Thyromegaly 2014    On exam     Unspecified vitamin D deficiency 2014    Varicella 1998       Past Surgical History:   Procedure Laterality Date    HX GYN           HX LEEP PROCEDURE  18    found CHAR 3         Family History:   Problem Relation Age of Onset    Breast Cancer Maternal Grandmother         great, great gma, age?     Hypertension Maternal Grandmother     Breast Cancer Maternal Aunt 32        survivor    Diabetes Father     Hypertension Father     Hypertension Paternal Grandmother     Diabetes Paternal Grandmother     No Known Problems Brother     Osteoporosis Neg Hx        Social History     Socioeconomic History    Marital status: SINGLE     Spouse name: Not on file    Number of children: Not on file    Years of education: Not on file    Highest education level: Not on file   Occupational History    Not on file   Tobacco Use    Smoking status: Never    Smokeless tobacco: Never   Vaping Use    Vaping Use: Never used   Substance and Sexual Activity    Alcohol use: Yes     Comment: occassionally     Drug use: No    Sexual activity: Yes     Partners: Male     Birth control/protection: Injection   Other Topics Concern    Not on file   Social History Narrative    Lives in [place] [facility] with parents and 3 yo son. Works at [employer]. Social Determinants of Health     Financial Resource Strain: Low Risk     Difficulty of Paying Living Expenses: Not hard at all   Food Insecurity: No Food Insecurity    Worried About Running Out of Food in the Last Year: Never true    Yani of Food in the Last Year: Never true   Transportation Needs: Not on file   Physical Activity: Not on file   Stress: Not on file   Social Connections: Not on file   Intimate Partner Violence: Not on file   Housing Stability: Not on file         ALLERGIES: Hydrochlorothiazide and Metronidazole    Review of Systems   Respiratory:  Negative for shortness of breath. Cardiovascular:  Negative for chest pain and palpitations. Gastrointestinal:  Negative for nausea and vomiting. Neurological:  Positive for headaches. All other systems reviewed and are negative. Vitals:    10/20/22 2338 10/21/22 0251 10/21/22 0256   BP: (!) 187/134 (!) 167/124 (!) 137/101   Pulse: 96 97 89   Resp: 18 11 13   Temp: 98.4 °F (36.9 °C)     SpO2: 100% 100% 98%   Weight: 82.5 kg (181 lb 14.1 oz)     Height: 5' 5\" (1.651 m)              Physical Exam  Vitals and nursing note reviewed. Constitutional:       Appearance: She is well-developed. HENT:      Head: Normocephalic and atraumatic. Eyes:      Pupils: Pupils are equal, round, and reactive to light.    Cardiovascular:      Rate and Rhythm: Normal rate and regular rhythm. Pulmonary:      Effort: Pulmonary effort is normal.      Breath sounds: Normal breath sounds. Abdominal:      General: There is no distension. Palpations: Abdomen is soft. Tenderness: There is no abdominal tenderness. Musculoskeletal:      Cervical back: Normal range of motion and neck supple. Skin:     General: Skin is warm and dry. Capillary Refill: Capillary refill takes less than 2 seconds. Neurological:      General: No focal deficit present. Mental Status: She is alert and oriented to person, place, and time. Psychiatric:         Mood and Affect: Mood is anxious. Behavior: Behavior normal.        [facility]    ED Course as of 10/21/22 0616   Fri Oct 21, 2022   0246 EKG at 1142 shows normal sinus rhythm, 100 bpm, normal axis, normal intervals, no ST changes, no T wave changes, no ectopy [IO]      ED Course User Index  [IO] Mariely Shin MD       Procedures    6:16 AM  Patient is resting comfortably with no complaints. Education was provided to the patient today regarding their hypertension. Patient has not signs/symptoms of ACS, CHF, CVA, or other hypertensive emergency. Patient is made aware of their elevated blood pressure and is instructed to follow up this week with their Primary Care for a recheck. Patient is counseled regarding consequences of chronic, uncontrolled hypertension including kidney disease, heart disease, stroke or even death. Patient states their understanding and agrees to follow up.

## 2022-10-21 NOTE — ED TRIAGE NOTES
Pt arrives ambulatory with cc of hypertension. States she has been feeling weak and tingling. States this is how she normally feels when it is high, but her doctor has been changing medications around trying to get it under control.

## 2022-10-21 NOTE — ED NOTES
Pt laying in bed with male visitor. Pt said BP cuff going off over and over so removed by pt. Waiting on medications from pharmacy to administer.

## 2022-11-16 DIAGNOSIS — I10 ESSENTIAL HYPERTENSION: ICD-10-CM

## 2022-11-16 NOTE — TELEPHONE ENCOUNTER
PCP: Galindo Teran PA-C    Last appt: 11/7/2022  No future appointments. Requested Prescriptions     Pending Prescriptions Disp Refills    chlorthalidone (HYGROTON) 25 mg tablet 45 Tablet 1     Sig: Take 0.5 Tablets by mouth daily.        Prior labs and Blood pressures:  BP Readings from Last 3 Encounters:   10/21/22 (!) 161/119   10/19/22 (!) 160/120   10/13/22 (!) 140/90     Lab Results   Component Value Date/Time    Sodium 135 (L) 10/20/2022 11:51 PM    Potassium 3.3 (L) 10/20/2022 11:51 PM    Chloride 101 10/20/2022 11:51 PM    CO2 25 10/20/2022 11:51 PM    Anion gap 9 10/20/2022 11:51 PM    Glucose 116 (H) 10/20/2022 11:51 PM    BUN 12 10/20/2022 11:51 PM    Creatinine 0.88 10/20/2022 11:51 PM    BUN/Creatinine ratio 14 10/20/2022 11:51 PM    GFR est AA >60 08/27/2022 08:15 PM    GFR est non-AA >60 08/27/2022 08:15 PM    Calcium 9.5 10/20/2022 11:51 PM     Lab Results   Component Value Date/Time    Hemoglobin A1c 6.1 (H) 03/08/2022 04:07 AM     Lab Results   Component Value Date/Time    Cholesterol, total 144 12/22/2016 11:10 AM    HDL Cholesterol 56 12/22/2016 11:10 AM    LDL, calculated 78 12/22/2016 11:10 AM    VLDL, calculated 10 12/22/2016 11:10 AM    Triglyceride 50 12/22/2016 11:10 AM     Lab Results   Component Value Date/Time    VITAMIN D, 25-HYDROXY 18.7 (L) 07/10/2018 01:39 PM       Lab Results   Component Value Date/Time    TSH 2.64 03/08/2022 04:07 AM

## 2022-11-16 NOTE — TELEPHONE ENCOUNTER
Patient called and stated that her medication dosage should be upped to 1 tablet a day versus 1/2 a tablet daily. Please send in new prescription.

## 2022-11-17 RX ORDER — CHLORTHALIDONE 25 MG/1
25 TABLET ORAL DAILY
Qty: 45 TABLET | Refills: 1 | OUTPATIENT
Start: 2022-11-17

## 2022-11-17 NOTE — TELEPHONE ENCOUNTER
Please call pt - I had just started chlorthalidone at her last visit, it comes in 25 mg tablet, and I started her on 1/2 tablet daily (12.5 mg daily dose). I have not seen her since then regarding increasing to 1 tablet daily; if she has started the medicine, she is due for a lab to check her electrolytes before increasing the dose. Let me know.

## 2022-11-17 NOTE — TELEPHONE ENCOUNTER
PCP: Grazyna Owens PA-C    Last appt: 11/7/2022  No future appointments. Requested Prescriptions     Pending Prescriptions Disp Refills    chlorthalidone (HYGROTON) 25 mg tablet 45 Tablet 1     Sig: Take 1 Tablet by mouth daily.          Other Comments:

## 2022-11-25 DIAGNOSIS — I10 ESSENTIAL HYPERTENSION: ICD-10-CM

## 2022-11-25 NOTE — TELEPHONE ENCOUNTER
Patient called and stated that  she is feeling fine with taking one tab daily and bp has been normal and no tingling etc.  Can you please refill this Rx  because she gates not have time to come in for a lab only visit. PCP: Vanda Morris PA-C    Last appt: 11/7/2022  No future appointments. Requested Prescriptions     Pending Prescriptions Disp Refills    chlorthalidone (HYGROTON) 25 mg tablet 90 Tablet 2     Sig: Take 1 Tablet by mouth daily for 90 days.          Other Comments:

## 2022-11-28 RX ORDER — CHLORTHALIDONE 25 MG/1
25 TABLET ORAL DAILY
Qty: 90 TABLET | Refills: 2 | OUTPATIENT
Start: 2022-11-28 | End: 2023-02-26

## 2022-11-28 NOTE — TELEPHONE ENCOUNTER
Please notify pt that the chlorthalidone has the risk of causing low potassium just like the HCTZ did to her, so this needs to be monitored for safety. We can mail her a lab dasia order if that is more convenient and she could go on the weekend. She does not need to be fasting.

## 2022-12-07 ENCOUNTER — HOSPITAL ENCOUNTER (EMERGENCY)
Age: 27
Discharge: HOME | End: 2022-12-07
Attending: STUDENT IN AN ORGANIZED HEALTH CARE EDUCATION/TRAINING PROGRAM
Payer: COMMERCIAL

## 2022-12-07 ENCOUNTER — APPOINTMENT (OUTPATIENT)
Dept: CT IMAGING | Age: 27
End: 2022-12-07
Attending: STUDENT IN AN ORGANIZED HEALTH CARE EDUCATION/TRAINING PROGRAM
Payer: COMMERCIAL

## 2022-12-07 VITALS
DIASTOLIC BLOOD PRESSURE: 78 MMHG | WEIGHT: 175.27 LBS | TEMPERATURE: 98.1 F | BODY MASS INDEX: 29.2 KG/M2 | SYSTOLIC BLOOD PRESSURE: 109 MMHG | RESPIRATION RATE: 12 BRPM | HEIGHT: 65 IN | OXYGEN SATURATION: 99 % | HEART RATE: 64 BPM

## 2022-12-07 DIAGNOSIS — J01.01 ACUTE RECURRENT MAXILLARY SINUSITIS: ICD-10-CM

## 2022-12-07 DIAGNOSIS — R51.9 OCCIPITAL HEADACHE: Primary | ICD-10-CM

## 2022-12-07 PROCEDURE — 99284 EMERGENCY DEPT VISIT MOD MDM: CPT

## 2022-12-07 PROCEDURE — 70450 CT HEAD/BRAIN W/O DYE: CPT

## 2022-12-07 PROCEDURE — 74011250637 HC RX REV CODE- 250/637: Performed by: STUDENT IN AN ORGANIZED HEALTH CARE EDUCATION/TRAINING PROGRAM

## 2022-12-07 RX ORDER — IBUPROFEN 400 MG/1
400 TABLET ORAL ONCE
Status: COMPLETED | OUTPATIENT
Start: 2022-12-07 | End: 2022-12-07

## 2022-12-07 RX ORDER — AMOXICILLIN AND CLAVULANATE POTASSIUM 875; 125 MG/1; MG/1
1 TABLET, FILM COATED ORAL 2 TIMES DAILY
Qty: 20 TABLET | Refills: 0 | Status: SHIPPED | OUTPATIENT
Start: 2022-12-07 | End: 2022-12-17

## 2022-12-07 RX ADMIN — IBUPROFEN 400 MG: 400 TABLET, FILM COATED ORAL at 09:03

## 2022-12-07 NOTE — ED PROVIDER NOTES
Matilda Adhikari is a 32 y.o. female with past medical history notable for  has a past medical history of Abnormal Pap smear of cervix (2017), Acne (10/7/2009), Allergic rhinitis (2009), Anemia (2009), ASCUS HPV+ (2017), CHAR III (cervical intraepithelial neoplasia III) (2017), Headache, Herpes genitalia (), History of gestational diabetes, Other ill-defined conditions(799.89), PCB (post coital bleeding) (2014), Thoracic compression fracture ([address] (3/2014), Thyromegaly (2014), Unspecified vitamin D deficiency (2014), and Varicella (1998). presenting with headache, a tender spot on the l occiput for 3 days, tenderness and congestion for the past 2 weeks. No f/c. Increased nasal congestion this week. No diplopia, dysarthria, dysphagia       Past Medical History:   Diagnosis Date    Abnormal Pap smear of cervix     seeing OBGYN    Acne 10/7/2009    Allergic rhinitis 2009    Anemia 2009    ASCUS HPV+ 2017    H/o ASCUS HPV + with Dr. Mario Lechuga 2017    CHAR III (cervical intraepithelial neoplasia III) 2017    found by LEEP     Headache     Herpes genitalia     dx by OB    History of gestational diabetes     diet controlled    Other ill-defined conditions(799.89)     scoliosis    PCB (post coital bleeding) 2014    Thoracic compression fracture ([address] 3/2014    Chito Juan Carlos    Thyromegaly 2014    On exam     Unspecified vitamin D deficiency 2014    Varicella 1998       Past Surgical History:   Procedure Laterality Date    HX GYN           HX LEEP PROCEDURE  16    found CHAR 3         Family History:   Problem Relation Age of Onset    Breast Cancer Maternal Grandmother         great, great gma, age?     Hypertension Maternal Grandmother     Breast Cancer Maternal Aunt 32        survivor    Diabetes Father     Hypertension Father     Hypertension Paternal Grandmother     Diabetes Paternal Grandmother No Known Problems Brother     Osteoporosis Neg Hx        Social History     Socioeconomic History    Marital status: SINGLE     Spouse name: Not on file    Number of children: Not on file    Years of education: Not on file    Highest education level: Not on file   Occupational History    Not on file   Tobacco Use    Smoking status: Never    Smokeless tobacco: Never   Vaping Use    Vaping Use: Never used   Substance and Sexual Activity    Alcohol use: Yes     Comment: occassionally     Drug use: No    Sexual activity: Yes     Partners: Male     Birth control/protection: Injection   Other Topics Concern    Not on file   Social History Narrative    Lives in [facility] with parents and 3 yo son. Works at [employer]. Social Determinants of Health     Financial Resource Strain: Low Risk     Difficulty of Paying Living Expenses: Not hard at all   Food Insecurity: No Food Insecurity    Worried About Running Out of Food in the Last Year: Never true    Ran Out of Food in the Last Year: Never true   Transportation Needs: Not on file   Physical Activity: Not on file   Stress: Not on file   Social Connections: Not on file   Intimate Partner Violence: Not on file   Housing Stability: Not on file         ALLERGIES: Hydrochlorothiazide and Metronidazole    Review of Systems   Constitutional:  Positive for chills, fatigue and fever. Eyes:  Negative for photophobia. Respiratory:  Negative for shortness of breath. Cardiovascular:  Negative for chest pain. Gastrointestinal:  Negative for abdominal pain. Genitourinary:  Negative for dysuria. Musculoskeletal:  Negative for back pain. Neurological:  Negative for headaches. Psychiatric/Behavioral:  Negative for confusion. All other systems reviewed and are negative.     Vitals:    12/07/22 0805 12/07/22 0952   BP: 119/83 109/78   Pulse: 88 64   Resp: 16 12   Temp: 98.1 °F (36.7 °C)    SpO2: 98% 99%   Weight: 79.5 kg (175 lb 4.3 oz)    Height: 5' 5\" (1.651 m) Physical Exam  Constitutional:       General: She is not in acute distress. Appearance: She is not toxic-appearing. HENT:      Head: Normocephalic and atraumatic. Mouth/Throat:      Mouth: Mucous membranes are moist.   Eyes:      Extraocular Movements: Extraocular movements intact. Cardiovascular:      Rate and Rhythm: Normal rate and regular rhythm. Heart sounds: Normal heart sounds. Pulmonary:      Effort: Pulmonary effort is normal. No respiratory distress. Breath sounds: Normal breath sounds. Abdominal:      Palpations: Abdomen is soft. Tenderness: There is no abdominal tenderness. Musculoskeletal:      Cervical back: Normal range of motion. Right lower leg: No edema. Left lower leg: No edema. Skin:     Capillary Refill: Capillary refill takes less than 2 seconds. Neurological:      General: No focal deficit present. Mental Status: She is alert and oriented to person, place, and time. Psychiatric:         Mood and Affect: Mood normal.        MDM         Procedures          Patient complaining of pain in the occiput. There is no correlate on CT. I was unable to visualize any abnormality. May be referred pain. NSAIDs, PCP possibly neurology follow-up. May be cephalgia. No evidence of intracranial hemorrhage, traumatic injury, infection. PROGRESS NOTE:  9:30 AM  The patient has been re-evaluated and are stable for discharge. All available radiology and laboratory results have been reviewed with patient and/or available family. Patient and/or family verbally conveyed their understanding and agreement of the patient's signs, symptoms, diagnosis, treatment and prognosis and additionally agree to follow-up as recommended in the discharge instructions or to return to the Emergency Department should their condition change or worsen prior to their follow-up appointment.   All questions have been answered and patient and/or available family who express understanding. LABORATORY RESULTS:  Labs Reviewed - No data to display    IMAGING RESULTS:  CT HEAD WO CONT   Final Result         No acute abnormality          MEDICATIONS GIVEN:  Medications   ibuprofen (MOTRIN) tablet 400 mg (400 mg Oral Given 12/7/22 0903)       IMPRESSION:  1. Occipital headache    2. Acute recurrent maxillary sinusitis        PLAN:  Follow-up Information       Follow up With Specialties Details Why Contact Info    Reshma Wellington PA-C Medical Physician Assistant, Physician Assistant Schedule an appointment as soon as possible for a visit  Call for a follow up appointment. [address]  [facility]  [phone]             Discharge Medication List as of 12/7/2022  9:47 AM            Quita Ballesteros MD      Please note that this dictation was completed with Sqoot, the computer voice recognition software. Quite often unanticipated grammatical, syntax, homophones, and other interpretive errors are inadvertently transcribed by the computer software. Please disregard these errors. Please excuse any errors that have escaped final proofreading.

## 2022-12-07 NOTE — ED TRIAGE NOTES
Patient presents with sinus pain and nasal drainage that started last Sunday. She also has \"a tender spot\" on the back left side of her head and neck pain. Denies injury.

## 2022-12-29 ENCOUNTER — PATIENT OUTREACH (OUTPATIENT)
Dept: CASE MANAGEMENT | Age: 27
End: 2022-12-29

## 2022-12-29 NOTE — PROGRESS NOTES
Ambulatory Care Management Note    Date/Time:  12/29/2022 1:57 PM    This patient was received as a referral from Daily assignment. Ambulatory Care Manager outreached to patient today to offer care management services. Introduction to self and role of care manager provided. Patient declined care management services at this time. No follow up call scheduled at this time. Patient has Ambulatory Care Manager's contact number for any questions or concerns. Pt denies any health concern at at this time, \"I feel great\"! She reports taking her Valsartan & Chlorthalidone daily, BP today was 112/75. She has not started the Lexapro or Buspar as previously prescribed, though she denies any anxiety. Offered to schedule appoint with PCP, she said she will call the office to schedule on her own.    Rob Perry, MSN, RN - Ambulatory Care Manager - [phone]

## 2022-12-30 NOTE — PROGRESS NOTES
Jaden Flores is a 23 y.o. female who presents for routine DEPO to left buttocks. She denies any symptoms , reactions or allergies that would exclude them from being immunized today. Risks and adverse reactions were discussed and the VIS was given to them. All questions were addressed. She was observed for 15 min post injection. There were no reactions observed.     Brian Drake Alert and oriented to person, place and time/Awake

## 2023-02-15 ENCOUNTER — HOSPITAL ENCOUNTER (EMERGENCY)
Age: 28
Discharge: HOME | End: 2023-02-15
Attending: EMERGENCY MEDICINE | Admitting: EMERGENCY MEDICINE
Payer: COMMERCIAL

## 2023-02-15 VITALS
RESPIRATION RATE: 20 BRPM | DIASTOLIC BLOOD PRESSURE: 85 MMHG | WEIGHT: 185.19 LBS | HEART RATE: 89 BPM | OXYGEN SATURATION: 100 % | SYSTOLIC BLOOD PRESSURE: 113 MMHG | HEIGHT: 65 IN | BODY MASS INDEX: 30.85 KG/M2 | TEMPERATURE: 98.2 F

## 2023-02-15 DIAGNOSIS — R00.2 PALPITATIONS: Primary | ICD-10-CM

## 2023-02-15 DIAGNOSIS — R07.89 ATYPICAL CHEST PAIN: ICD-10-CM

## 2023-02-15 LAB
ALBUMIN SERPL-MCNC: 3.7 G/DL (ref 3.5–5)
ALBUMIN/GLOB SERPL: 0.7 (ref 1.1–2.2)
ALP SERPL-CCNC: 53 U/L (ref 45–117)
ALT SERPL-CCNC: 14 U/L (ref 12–78)
ANION GAP SERPL CALC-SCNC: 7 MMOL/L (ref 5–15)
AST SERPL-CCNC: 19 U/L (ref 15–37)
BASOPHILS # BLD: 0 K/UL (ref 0–0.1)
BASOPHILS NFR BLD: 1 % (ref 0–1)
BILIRUB SERPL-MCNC: 0.2 MG/DL (ref 0.2–1)
BUN SERPL-MCNC: 10 MG/DL (ref 6–20)
BUN/CREAT SERPL: 13 (ref 12–20)
CALCIUM SERPL-MCNC: 8.8 MG/DL (ref 8.5–10.1)
CHLORIDE SERPL-SCNC: 98 MMOL/L (ref 97–108)
CO2 SERPL-SCNC: 29 MMOL/L (ref 21–32)
CREAT SERPL-MCNC: 0.78 MG/DL (ref 0.55–1.02)
DIFFERENTIAL METHOD BLD: ABNORMAL
EOSINOPHIL # BLD: 0.1 K/UL (ref 0–0.4)
EOSINOPHIL NFR BLD: 1 % (ref 0–7)
ERYTHROCYTE [DISTWIDTH] IN BLOOD BY AUTOMATED COUNT: 12.9 % (ref 11.5–14.5)
GLOBULIN SER CALC-MCNC: 5.4 G/DL (ref 2–4)
GLUCOSE SERPL-MCNC: 131 MG/DL (ref 65–100)
HCT VFR BLD AUTO: 33.9 % (ref 35–47)
HGB BLD-MCNC: 11 G/DL (ref 11.5–16)
IMM GRANULOCYTES # BLD AUTO: 0 K/UL (ref 0–0.04)
IMM GRANULOCYTES NFR BLD AUTO: 0 % (ref 0–0.5)
LYMPHOCYTES # BLD: 1.4 K/UL (ref 0.8–3.5)
LYMPHOCYTES NFR BLD: 22 % (ref 12–49)
MCH RBC QN AUTO: 30.4 PG (ref 26–34)
MCHC RBC AUTO-ENTMCNC: 32.4 G/DL (ref 30–36.5)
MCV RBC AUTO: 93.6 FL (ref 80–99)
MONOCYTES # BLD: 0.3 K/UL (ref 0–1)
MONOCYTES NFR BLD: 5 % (ref 5–13)
NEUTS SEG # BLD: 4.6 K/UL (ref 1.8–8)
NEUTS SEG NFR BLD: 71 % (ref 32–75)
NRBC # BLD: 0 K/UL (ref 0–0.01)
NRBC BLD-RTO: 0 PER 100 WBC
PLATELET # BLD AUTO: 445 K/UL (ref 150–400)
PMV BLD AUTO: 8.8 FL (ref 8.9–12.9)
POTASSIUM SERPL-SCNC: 3.2 MMOL/L (ref 3.5–5.1)
PROT SERPL-MCNC: 9.1 G/DL (ref 6.4–8.2)
RBC # BLD AUTO: 3.62 M/UL (ref 3.8–5.2)
SODIUM SERPL-SCNC: 134 MMOL/L (ref 136–145)
TROPONIN I SERPL HS-MCNC: 4 NG/L (ref 0–51)
WBC # BLD AUTO: 6.4 K/UL (ref 3.6–11)

## 2023-02-15 PROCEDURE — 80053 COMPREHEN METABOLIC PANEL: CPT

## 2023-02-15 PROCEDURE — 84484 ASSAY OF TROPONIN QUANT: CPT

## 2023-02-15 PROCEDURE — 85025 COMPLETE CBC W/AUTO DIFF WBC: CPT

## 2023-02-15 PROCEDURE — 99284 EMERGENCY DEPT VISIT MOD MDM: CPT | Performed by: EMERGENCY MEDICINE

## 2023-02-15 PROCEDURE — 36415 COLL VENOUS BLD VENIPUNCTURE: CPT

## 2023-02-15 NOTE — ED NOTES
I have reviewed discharge instructions with the patient. The patient verbalized understanding. The patient is ambulatory, alert, oriented, and in no apparent distress at time of discharge.

## 2023-02-15 NOTE — ED TRIAGE NOTES
Pt reports L sided CP since this am around 0430. Pt reports pain is \"like a tightness andfeels like her heart is racing. \"

## 2023-02-15 NOTE — Clinical Note
[facility]  [address]  [id number]  [phone]    Work/School Note    Date: 2/15/2023    To Whom It May concern:    Mayelin Syed was seen and treated today in the emergency room by the following provider(s):  Attending Provider: Carlene Zamora MD.      Mayelin Syed is excused from work/school on 02/15/23 and 02/16/23. She is medically clear to return to work/school on 2/17/2023.        Sincerely,          Lashanda Plasencia MD

## 2023-02-15 NOTE — ED PROVIDER NOTES
HPI   The patient is a 68-year-old black female who presents to the emergency room acute onset of having palpitations when she woke up from sleep at 3 AM.  She checked her pulse and stated it was about 107. She did not have significant chest pain except for a quick left precordial shooting pain which lasted only a few seconds. She denies any pleuritic chest pain she denies shortness of breath she denies any swelling in her lower extremities and she does not take OCPs or other hormones. She has no pain at this time  Past Medical History:   Diagnosis Date    Abnormal Pap smear of cervix     seeing OBGYN    Acne 10/7/2009    Allergic rhinitis 2009    Anemia 2009    ASCUS HPV+ 2017    H/o ASCUS HPV + with Dr. Seferino Ocampo 2017    CHAR III (cervical intraepithelial neoplasia III) 2017    found by LEEP     Headache     Herpes genitalia     dx by OB    History of gestational diabetes     diet controlled    Other ill-defined conditions(799.89)     scoliosis    PCB (post coital bleeding) 2014    Thoracic compression fracture ([address] 3/2014    Othella Booty    Thyromegaly 2014    On exam     Unspecified vitamin D deficiency 2014    Varicella 1998       Past Surgical History:   Procedure Laterality Date    HX GYN           HX LEEP PROCEDURE  16    found CHAR 3         Family History:   Problem Relation Age of Onset    Breast Cancer Maternal Grandmother         great, great gma, age?     Hypertension Maternal Grandmother     Breast Cancer Maternal Aunt 32        survivor    Diabetes Father     Hypertension Father     Hypertension Paternal Grandmother     Diabetes Paternal Grandmother     No Known Problems Brother     Osteoporosis Neg Hx        Social History     Socioeconomic History    Marital status: SINGLE     Spouse name: Not on file    Number of children: Not on file    Years of education: Not on file    Highest education level: Not on file Occupational History    Not on file   Tobacco Use    Smoking status: Never    Smokeless tobacco: Never   Vaping Use    Vaping Use: Never used   Substance and Sexual Activity    Alcohol use: Yes     Comment: occassionally     Drug use: No    Sexual activity: Yes     Partners: Male     Birth control/protection: Injection   Other Topics Concern    Not on file   Social History Narrative    Lives in [facility] with parents and 3 yo son. Works at [employer]. Social Determinants of Health     Financial Resource Strain: Low Risk     Difficulty of Paying Living Expenses: Not hard at all   Food Insecurity: No Food Insecurity    Worried About Running Out of Food in the Last Year: Never true    Ran Out of Food in the Last Year: Never true   Transportation Needs: Not on file   Physical Activity: Not on file   Stress: Not on file   Social Connections: Not on file   Intimate Partner Violence: Not on file   Housing Stability: Not on file         ALLERGIES: Hydrochlorothiazide and Metronidazole    Review of Systems   All other systems reviewed and are negative. Vitals:    02/15/23 0749 02/15/23 0754   BP: 116/89    Pulse: 86    Resp: 19    Temp: 98.2 °F (36.8 °C)    SpO2: 100% 100%   Weight: 84 kg (185 lb 3 oz)    Height: 5' 5\" (1.651 m)             Physical Exam  Vitals and nursing note reviewed. Constitutional:       Appearance: She is well-developed. HENT:      Head: Normocephalic and atraumatic. Mouth/Throat:      Pharynx: No oropharyngeal exudate. Eyes:      General: No scleral icterus. Conjunctiva/sclera: Conjunctivae normal.   Neck:      Thyroid: No thyromegaly. Cardiovascular:      Rate and Rhythm: Normal rate and regular rhythm. Heart sounds: Normal heart sounds. No murmur heard. No friction rub. No gallop. Pulmonary:      Effort: Pulmonary effort is normal. No respiratory distress. Breath sounds: Normal breath sounds. No stridor. No wheezing or rales.    Abdominal:      General: Bowel sounds are normal.      Palpations: Abdomen is soft. Tenderness: There is no abdominal tenderness. There is no guarding or rebound. Musculoskeletal:         General: Normal range of motion. Cervical back: Neck supple. Lymphadenopathy:      Cervical: No cervical adenopathy. Skin:     General: Skin is warm and dry. Neurological:      Mental Status: She is alert and oriented to person, place, and time. Medical Decision Making  The patient was observed in the emergency room for approximately 2 hours she had a negative troponin at 4 her EKG showed no acute changes electrolytes and other labs were also normal we will discharge her home with follow-up by PCP and possibly cardiology for her palpitations though none were observed in the emergency department. Her very brief chest pain was very atypical.    Problems Addressed:  Atypical chest pain: acute illness or injury    Amount and/or Complexity of Data Reviewed  Labs: ordered. ECG/medicine tests: ordered and independent interpretation performed. Procedures          ED EKG interpretation:  Rhythm: ; Normal sinus rhythm slightly prolonged QT no acute ST or T wave changes rate 89 this EKG was interpreted by Ariadna Garcia MD,ED Provider.

## 2023-02-17 LAB
ATRIAL RATE: 89 BPM
CALCULATED P AXIS, ECG09: 53 DEGREES
CALCULATED R AXIS, ECG10: 16 DEGREES
CALCULATED T AXIS, ECG11: 14 DEGREES
DIAGNOSIS, 93000: NORMAL
P-R INTERVAL, ECG05: 164 MS
Q-T INTERVAL, ECG07: 400 MS
QRS DURATION, ECG06: 88 MS
QTC CALCULATION (BEZET), ECG08: 486 MS
VENTRICULAR RATE, ECG03: 89 BPM

## 2023-04-16 ENCOUNTER — HOSPITAL ENCOUNTER (EMERGENCY)
Age: 28
Discharge: HOME | End: 2023-04-16
Attending: EMERGENCY MEDICINE
Payer: COMMERCIAL

## 2023-04-16 VITALS
OXYGEN SATURATION: 100 % | HEIGHT: 65 IN | HEART RATE: 96 BPM | WEIGHT: 179.23 LBS | SYSTOLIC BLOOD PRESSURE: 143 MMHG | TEMPERATURE: 98.7 F | DIASTOLIC BLOOD PRESSURE: 107 MMHG | RESPIRATION RATE: 18 BRPM | BODY MASS INDEX: 29.86 KG/M2

## 2023-04-16 DIAGNOSIS — E87.6 HYPOKALEMIA: Primary | ICD-10-CM

## 2023-04-16 DIAGNOSIS — R00.2 PALPITATIONS: ICD-10-CM

## 2023-04-16 LAB
ALBUMIN SERPL-MCNC: 3.7 G/DL (ref 3.5–5)
ALBUMIN/GLOB SERPL: 0.7 (ref 1.1–2.2)
ALP SERPL-CCNC: 47 U/L (ref 45–117)
ALT SERPL-CCNC: 16 U/L (ref 12–78)
ANION GAP SERPL CALC-SCNC: 7 MMOL/L (ref 5–15)
AST SERPL-CCNC: 21 U/L (ref 15–37)
BASOPHILS # BLD: 0 K/UL (ref 0–0.1)
BASOPHILS NFR BLD: 0 % (ref 0–1)
BILIRUB SERPL-MCNC: 0.4 MG/DL (ref 0.2–1)
BUN SERPL-MCNC: 15 MG/DL (ref 6–20)
BUN/CREAT SERPL: 15 (ref 12–20)
CALCIUM SERPL-MCNC: 8.9 MG/DL (ref 8.5–10.1)
CHLORIDE SERPL-SCNC: 97 MMOL/L (ref 97–108)
CO2 SERPL-SCNC: 30 MMOL/L (ref 21–32)
CREAT SERPL-MCNC: 0.98 MG/DL (ref 0.55–1.02)
DIFFERENTIAL METHOD BLD: ABNORMAL
EOSINOPHIL # BLD: 0 K/UL (ref 0–0.4)
EOSINOPHIL NFR BLD: 1 % (ref 0–7)
ERYTHROCYTE [DISTWIDTH] IN BLOOD BY AUTOMATED COUNT: 12.9 % (ref 11.5–14.5)
GLOBULIN SER CALC-MCNC: 5.2 G/DL (ref 2–4)
GLUCOSE SERPL-MCNC: 115 MG/DL (ref 65–100)
HCT VFR BLD AUTO: 32.7 % (ref 35–47)
HGB BLD-MCNC: 10.4 G/DL (ref 11.5–16)
IMM GRANULOCYTES # BLD AUTO: 0 K/UL (ref 0–0.04)
IMM GRANULOCYTES NFR BLD AUTO: 0 % (ref 0–0.5)
LYMPHOCYTES # BLD: 1.6 K/UL (ref 0.8–3.5)
LYMPHOCYTES NFR BLD: 19 % (ref 12–49)
MAGNESIUM SERPL-MCNC: 2 MG/DL (ref 1.6–2.4)
MCH RBC QN AUTO: 29.5 PG (ref 26–34)
MCHC RBC AUTO-ENTMCNC: 31.8 G/DL (ref 30–36.5)
MCV RBC AUTO: 92.9 FL (ref 80–99)
MONOCYTES # BLD: 0.6 K/UL (ref 0–1)
MONOCYTES NFR BLD: 7 % (ref 5–13)
NEUTS SEG # BLD: 6.4 K/UL (ref 1.8–8)
NEUTS SEG NFR BLD: 73 % (ref 32–75)
NRBC # BLD: 0 K/UL (ref 0–0.01)
NRBC BLD-RTO: 0 PER 100 WBC
PLATELET # BLD AUTO: 450 K/UL (ref 150–400)
PMV BLD AUTO: 8.9 FL (ref 8.9–12.9)
POTASSIUM SERPL-SCNC: 2.5 MMOL/L (ref 3.5–5.1)
POTASSIUM SERPL-SCNC: 3.1 MMOL/L (ref 3.5–5.1)
PROT SERPL-MCNC: 8.9 G/DL (ref 6.4–8.2)
RBC # BLD AUTO: 3.52 M/UL (ref 3.8–5.2)
SODIUM SERPL-SCNC: 134 MMOL/L (ref 136–145)
TSH SERPL DL<=0.05 MIU/L-ACNC: 0.48 UIU/ML (ref 0.36–3.74)
WBC # BLD AUTO: 8.7 K/UL (ref 3.6–11)

## 2023-04-16 PROCEDURE — 84132 ASSAY OF SERUM POTASSIUM: CPT

## 2023-04-16 PROCEDURE — 96366 THER/PROPH/DIAG IV INF ADDON: CPT

## 2023-04-16 PROCEDURE — 99284 EMERGENCY DEPT VISIT MOD MDM: CPT

## 2023-04-16 PROCEDURE — 84443 ASSAY THYROID STIM HORMONE: CPT

## 2023-04-16 PROCEDURE — 93005 ELECTROCARDIOGRAM TRACING: CPT

## 2023-04-16 PROCEDURE — 85025 COMPLETE CBC W/AUTO DIFF WBC: CPT

## 2023-04-16 PROCEDURE — 80053 COMPREHEN METABOLIC PANEL: CPT

## 2023-04-16 PROCEDURE — 96361 HYDRATE IV INFUSION ADD-ON: CPT

## 2023-04-16 PROCEDURE — 83735 ASSAY OF MAGNESIUM: CPT

## 2023-04-16 PROCEDURE — 74011250636 HC RX REV CODE- 250/636: Performed by: EMERGENCY MEDICINE

## 2023-04-16 PROCEDURE — 96365 THER/PROPH/DIAG IV INF INIT: CPT

## 2023-04-16 PROCEDURE — 36415 COLL VENOUS BLD VENIPUNCTURE: CPT

## 2023-04-16 PROCEDURE — 74011250637 HC RX REV CODE- 250/637: Performed by: EMERGENCY MEDICINE

## 2023-04-16 RX ORDER — POTASSIUM CHLORIDE 750 MG/1
40 TABLET, FILM COATED, EXTENDED RELEASE ORAL
Status: COMPLETED | OUTPATIENT
Start: 2023-04-16 | End: 2023-04-16

## 2023-04-16 RX ORDER — POTASSIUM CHLORIDE 1500 MG/1
20 TABLET, FILM COATED, EXTENDED RELEASE ORAL 2 TIMES DAILY
Qty: 28 TABLET | Refills: 0 | Status: SHIPPED | OUTPATIENT
Start: 2023-04-16 | End: 2023-04-30

## 2023-04-16 RX ORDER — POTASSIUM CHLORIDE 7.45 MG/ML
10 INJECTION INTRAVENOUS
Status: COMPLETED | OUTPATIENT
Start: 2023-04-16 | End: 2023-04-16

## 2023-04-16 RX ADMIN — SODIUM CHLORIDE 1000 ML: 9 INJECTION, SOLUTION INTRAVENOUS at 20:11

## 2023-04-16 RX ADMIN — POTASSIUM CHLORIDE 10 MEQ: 7.46 INJECTION, SOLUTION INTRAVENOUS at 20:58

## 2023-04-16 RX ADMIN — POTASSIUM CHLORIDE 40 MEQ: 750 TABLET, FILM COATED, EXTENDED RELEASE ORAL at 22:18

## 2023-04-16 RX ADMIN — POTASSIUM CHLORIDE 40 MEQ: 750 TABLET, FILM COATED, EXTENDED RELEASE ORAL at 20:57

## 2023-04-17 NOTE — ED PROVIDER NOTES
The history is provided by the patient. Palpitations   This is a new problem. The current episode started 1 to 2 hours ago. The problem has been resolved. Associated with: caffeine, poor hydration. Associated symptoms include chest pressure and irregular heartbeat. Pertinent negatives include no fever and no dizziness. Risk factors: hx of anemia. She has tried breathing exercises for the symptoms. Past medical history comments: thyromegaly. Past Medical History:   Diagnosis Date    Abnormal Pap smear of cervix     seeing OBGYN    Acne 10/7/2009    Allergic rhinitis 2009    Anemia 2009    ASCUS HPV+ 2017    H/o ASCUS HPV + with Dr. Debra Lucas 2017    CHAR III (cervical intraepithelial neoplasia III) 2017    found by LEEP     Headache     Herpes genitalia     dx by OB    History of gestational diabetes     diet controlled    Other ill-defined conditions(799.89)     scoliosis    PCB (post coital bleeding) 2014    Thoracic compression fracture ([address] 3/2014    [facility] Lab    Thyromegaly 2014    On exam     Unspecified vitamin D deficiency 2014    Varicella 1998       Past Surgical History:   Procedure Laterality Date    HX GYN           HX LEEP PROCEDURE  16    found CHAR 3         Family History:   Problem Relation Age of Onset    Breast Cancer Maternal Grandmother         great, great gma, age?     Hypertension Maternal Grandmother     Breast Cancer Maternal Aunt 32        survivor    Diabetes Father     Hypertension Father     Hypertension Paternal Grandmother     Diabetes Paternal Grandmother     No Known Problems Brother     Osteoporosis Neg Hx        Social History     Socioeconomic History    Marital status: SINGLE     Spouse name: Not on file    Number of children: Not on file    Years of education: Not on file    Highest education level: Not on file   Occupational History    Not on file   Tobacco Use    Smoking status: Never Smokeless tobacco: Never   Vaping Use    Vaping Use: Never used   Substance and Sexual Activity    Alcohol use: Yes     Comment: occassionally     Drug use: No    Sexual activity: Yes     Partners: Male     Birth control/protection: Injection   Other Topics Concern    Not on file   Social History Narrative    Lives in [facility] with parents and 3 yo son. Works at [employer]. Social Determinants of Health     Financial Resource Strain: Low Risk     Difficulty of Paying Living Expenses: Not hard at all   Food Insecurity: No Food Insecurity    Worried About Running Out of Food in the Last Year: Never true    Ran Out of Food in the Last Year: Never true   Transportation Needs: Not on file   Physical Activity: Not on file   Stress: Not on file   Social Connections: Not on file   Intimate Partner Violence: Not on file   Housing Stability: Not on file         ALLERGIES: Hydrochlorothiazide and Metronidazole    Review of Systems   Constitutional:  Negative for fever. Cardiovascular:  Positive for palpitations. Neurological:  Negative for dizziness. Vitals:    04/16/23 1958   BP: (!) 131/94   Pulse: 95   Resp: 16   Temp: 98.7 °F (37.1 °C)   SpO2: 100%   Weight: 81.3 kg (179 lb 3.7 oz)   Height: 5' 5\" (1.651 m)            Physical Exam  Vitals and nursing note reviewed. Constitutional:       Appearance: She is well-developed. HENT:      Head: Normocephalic and atraumatic. Eyes:      Pupils: Pupils are equal, round, and reactive to light. Cardiovascular:      Rate and Rhythm: Normal rate and regular rhythm. Pulmonary:      Effort: Pulmonary effort is normal.      Breath sounds: Normal breath sounds. Abdominal:      General: There is no distension. Palpations: Abdomen is soft. Tenderness: There is no abdominal tenderness. Musculoskeletal:      Cervical back: Normal range of motion and neck supple. Skin:     General: Skin is warm and dry.       Capillary Refill: Capillary refill takes less than 2 seconds. Neurological:      General: No focal deficit present. Mental Status: She is alert and oriented to person, place, and time. Psychiatric:         Mood and Affect: Mood normal.         Behavior: Behavior normal.        Medical Decision Making  Amount and/or Complexity of Data Reviewed  Labs: ordered. ECG/medicine tests: ordered. Risk  Prescription drug management. ED Course as of 04/17/23 0043   Sun Apr 16, 2023 2010 EKG at 8:08 PM shows normal sinus rhythm, 92 bpm, normal axis, normal intervals, no ST changes, no T wave changes, no ectopy [IO]   2251 EKG at 10:47 PM shows normal sinus rhythm, 94 bpm, normal axis, normal intervals, no ST changes, no T wave changes, no ectopy [IO]      ED Course User Index  [IO] Kenisha Fajardo MD       Procedures      The patient is resting comfortably and feels better, is alert and in no distress. The repeat examination is unremarkable and benign. The electrocardiogram shows no signs of acute ischemia and the history, exam, diagnostic testing and current condition do not suggest that this patient is having an acute myocardial infarction, significant arrhythmia, unstable angina, pulmonary embolism, aortic dissection, sepsis or other significant pathology that would warrant further testing, continued ED treatment, admission, or cardiology or other specialist consultation at this point. The vital signs have been stable. Evaluation was significant for hypokalemia which likely contributed to the patient's elevated heart rate and palpitations. Potassium was repleted in the emergency department and the patient was sent home with potassium supplementation and will follow-up with her primary care provider for recheck and continued management. The patient's condition is stable and appropriate for discharge. The patient will pursue further outpatient evaluation with the primary care physician.  The patient and/or caregivers have expressed a clear and thorough understanding and agree to follow up as instructed. LABORATORY TESTS:  Recent Results (from the past 12 hour(s))   CBC WITH AUTOMATED DIFF    Collection Time: 04/16/23  8:02 PM   Result Value Ref Range    WBC 8.7 3.6 - 11.0 K/uL    RBC 3.52 (L) 3.80 - 5.20 M/uL    HGB 10.4 (L) 11.5 - 16.0 g/dL    HCT 32.7 (L) 35.0 - 47.0 %    MCV 92.9 80.0 - 99.0 FL    MCH 29.5 26.0 - 34.0 PG    MCHC 31.8 30.0 - 36.5 g/dL    RDW 12.9 11.5 - 14.5 %    PLATELET 203 (H) 453 - 400 K/uL    MPV 8.9 8.9 - 12.9 FL    NRBC 0.0 0  WBC    ABSOLUTE NRBC 0.00 0.00 - 0.01 K/uL    NEUTROPHILS 73 32 - 75 %    LYMPHOCYTES 19 12 - 49 %    MONOCYTES 7 5 - 13 %    EOSINOPHILS 1 0 - 7 %    BASOPHILS 0 0 - 1 %    IMMATURE GRANULOCYTES 0 0.0 - 0.5 %    ABS. NEUTROPHILS 6.4 1.8 - 8.0 K/UL    ABS. LYMPHOCYTES 1.6 0.8 - 3.5 K/UL    ABS. MONOCYTES 0.6 0.0 - 1.0 K/UL    ABS. EOSINOPHILS 0.0 0.0 - 0.4 K/UL    ABS. BASOPHILS 0.0 0.0 - 0.1 K/UL    ABS. IMM. GRANS. 0.0 0.00 - 0.04 K/UL    DF AUTOMATED     METABOLIC PANEL, COMPREHENSIVE    Collection Time: 04/16/23  8:02 PM   Result Value Ref Range    Sodium 134 (L) 136 - 145 mmol/L    Potassium 2.5 (LL) 3.5 - 5.1 mmol/L    Chloride 97 97 - 108 mmol/L    CO2 30 21 - 32 mmol/L    Anion gap 7 5 - 15 mmol/L    Glucose 115 (H) 65 - 100 mg/dL    BUN 15 6 - 20 MG/DL    Creatinine 0.98 0.55 - 1.02 MG/DL    BUN/Creatinine ratio 15 12 - 20      eGFR >60 >60 ml/min/1.73m2    Calcium 8.9 8.5 - 10.1 MG/DL    Bilirubin, total 0.4 0.2 - 1.0 MG/DL    ALT (SGPT) 16 12 - 78 U/L    AST (SGOT) 21 15 - 37 U/L    Alk.  phosphatase 47 45 - 117 U/L    Protein, total 8.9 (H) 6.4 - 8.2 g/dL    Albumin 3.7 3.5 - 5.0 g/dL    Globulin 5.2 (H) 2.0 - 4.0 g/dL    A-G Ratio 0.7 (L) 1.1 - 2.2     MAGNESIUM    Collection Time: 04/16/23  8:02 PM   Result Value Ref Range    Magnesium 2.0 1.6 - 2.4 mg/dL   TSH 3RD GENERATION    Collection Time: 04/16/23  8:02 PM   Result Value Ref Range    TSH 0.48 0.36 - 3.74 uIU/mL   EKG, 12 LEAD, INITIAL    Collection Time: 04/16/23  8:08 PM   Result Value Ref Range    Ventricular Rate 92 BPM    Atrial Rate 92 BPM    P-R Interval 174 ms    QRS Duration 92 ms    Q-T Interval 386 ms    QTC Calculation (Bezet) 477 ms    Calculated P Axis 58 degrees    Calculated R Axis 10 degrees    Calculated T Axis 20 degrees    Diagnosis       Normal sinus rhythm  Normal ECG  When compared with ECG of 15-FEB-2023 07:51,  No significant change was found     POTASSIUM    Collection Time: 04/16/23 11:27 PM   Result Value Ref Range    Potassium 3.1 (L) 3.5 - 5.1 mmol/L           MEDICATIONS GIVEN:  Medications   sodium chloride 0.9 % bolus infusion 1,000 mL (0 mL IntraVENous IV Completed 4/16/23 2355)   potassium chloride SR (KLOR-CON 10) tablet 40 mEq (40 mEq Oral Given 4/16/23 2218)   potassium chloride 10 mEq in 100 ml IVPB (0 mEq IntraVENous IV Completed 4/16/23 2355)       IMPRESSION:  1. Hypokalemia    2.  Palpitations

## 2023-04-18 LAB
ATRIAL RATE: 92 BPM
CALCULATED P AXIS, ECG09: 58 DEGREES
CALCULATED R AXIS, ECG10: 10 DEGREES
CALCULATED T AXIS, ECG11: 20 DEGREES
DIAGNOSIS, 93000: NORMAL
P-R INTERVAL, ECG05: 174 MS
Q-T INTERVAL, ECG07: 386 MS
QRS DURATION, ECG06: 92 MS
QTC CALCULATION (BEZET), ECG08: 477 MS
VENTRICULAR RATE, ECG03: 92 BPM

## 2023-05-01 ENCOUNTER — HOSPITAL ENCOUNTER (EMERGENCY)
Age: 28
Discharge: HOME | End: 2023-05-01
Attending: EMERGENCY MEDICINE
Payer: COMMERCIAL

## 2023-05-01 VITALS
OXYGEN SATURATION: 99 % | TEMPERATURE: 98 F | WEIGHT: 176.37 LBS | SYSTOLIC BLOOD PRESSURE: 129 MMHG | BODY MASS INDEX: 28.34 KG/M2 | HEIGHT: 66 IN | HEART RATE: 89 BPM | RESPIRATION RATE: 18 BRPM | DIASTOLIC BLOOD PRESSURE: 96 MMHG

## 2023-05-01 DIAGNOSIS — E87.6 HYPOKALEMIA: ICD-10-CM

## 2023-05-01 DIAGNOSIS — R53.83 FATIGUE, UNSPECIFIED TYPE: Primary | ICD-10-CM

## 2023-05-01 LAB
ALBUMIN SERPL-MCNC: 3.6 G/DL (ref 3.5–5)
ALBUMIN/GLOB SERPL: 0.6 (ref 1.1–2.2)
ALP SERPL-CCNC: 44 U/L (ref 45–117)
ALT SERPL-CCNC: 19 U/L (ref 12–78)
ANION GAP BLD CALC-SCNC: 11 MMOL/L (ref 10–20)
ANION GAP SERPL CALC-SCNC: 10 MMOL/L (ref 5–15)
APPEARANCE UR: CLEAR
AST SERPL-CCNC: 29 U/L (ref 15–37)
ATRIAL RATE: 90 BPM
BACTERIA URNS QL MICRO: NEGATIVE /HPF
BASOPHILS # BLD: 0 K/UL (ref 0–0.1)
BASOPHILS NFR BLD: 0 % (ref 0–1)
BILIRUB SERPL-MCNC: 0.5 MG/DL (ref 0.2–1)
BILIRUB UR QL: NEGATIVE
BUN SERPL-MCNC: 15 MG/DL (ref 6–20)
BUN/CREAT SERPL: 19 (ref 12–20)
CA-I BLD-MCNC: 1.22 MMOL/L (ref 1.12–1.32)
CALCIUM SERPL-MCNC: 9.6 MG/DL (ref 8.5–10.1)
CALCULATED P AXIS, ECG09: 42 DEGREES
CALCULATED R AXIS, ECG10: 4 DEGREES
CALCULATED T AXIS, ECG11: 12 DEGREES
CHLORIDE BLD-SCNC: 101 MMOL/L (ref 98–107)
CHLORIDE SERPL-SCNC: 99 MMOL/L (ref 97–108)
CO2 BLD-SCNC: 27.1 MMOL/L (ref 21–32)
CO2 SERPL-SCNC: 28 MMOL/L (ref 21–32)
COLOR UR: ABNORMAL
CREAT BLD-MCNC: 0.79 MG/DL (ref 0.6–1.3)
CREAT SERPL-MCNC: 0.78 MG/DL (ref 0.55–1.02)
DIAGNOSIS, 93000: NORMAL
DIFFERENTIAL METHOD BLD: ABNORMAL
EOSINOPHIL # BLD: 0.1 K/UL (ref 0–0.4)
EOSINOPHIL NFR BLD: 1 % (ref 0–7)
EPITH CASTS URNS QL MICRO: ABNORMAL /LPF
ERYTHROCYTE [DISTWIDTH] IN BLOOD BY AUTOMATED COUNT: 13.3 % (ref 11.5–14.5)
GLOBULIN SER CALC-MCNC: 5.7 G/DL (ref 2–4)
GLUCOSE BLD-MCNC: 86 MG/DL (ref 65–100)
GLUCOSE SERPL-MCNC: 99 MG/DL (ref 65–100)
GLUCOSE UR STRIP.AUTO-MCNC: NEGATIVE MG/DL
HCG UR QL: NEGATIVE
HCT VFR BLD AUTO: 33.5 % (ref 35–47)
HGB BLD-MCNC: 10.6 G/DL (ref 11.5–16)
HGB UR QL STRIP: ABNORMAL
IMM GRANULOCYTES # BLD AUTO: 0 K/UL (ref 0–0.04)
IMM GRANULOCYTES NFR BLD AUTO: 0 % (ref 0–0.5)
KETONES UR QL STRIP.AUTO: NEGATIVE MG/DL
LEUKOCYTE ESTERASE UR QL STRIP.AUTO: ABNORMAL
LYMPHOCYTES # BLD: 1.9 K/UL (ref 0.8–3.5)
LYMPHOCYTES NFR BLD: 24 % (ref 12–49)
MCH RBC QN AUTO: 29.8 PG (ref 26–34)
MCHC RBC AUTO-ENTMCNC: 31.6 G/DL (ref 30–36.5)
MCV RBC AUTO: 94.1 FL (ref 80–99)
MONOCYTES # BLD: 0.6 K/UL (ref 0–1)
MONOCYTES NFR BLD: 7 % (ref 5–13)
NEUTS SEG # BLD: 5.3 K/UL (ref 1.8–8)
NEUTS SEG NFR BLD: 68 % (ref 32–75)
NITRITE UR QL STRIP.AUTO: NEGATIVE
NRBC # BLD: 0 K/UL (ref 0–0.01)
NRBC BLD-RTO: 0 PER 100 WBC
P-R INTERVAL, ECG05: 174 MS
PH UR STRIP: 7.5 (ref 5–8)
PLATELET # BLD AUTO: 428 K/UL (ref 150–400)
PMV BLD AUTO: 9.1 FL (ref 8.9–12.9)
POTASSIUM BLD-SCNC: 3.1 MMOL/L (ref 3.5–5.1)
POTASSIUM SERPL-SCNC: 4.1 MMOL/L (ref 3.5–5.1)
PROT SERPL-MCNC: 9.3 G/DL (ref 6.4–8.2)
PROT UR STRIP-MCNC: NEGATIVE MG/DL
Q-T INTERVAL, ECG07: 400 MS
QRS DURATION, ECG06: 78 MS
QTC CALCULATION (BEZET), ECG08: 489 MS
RBC # BLD AUTO: 3.56 M/UL (ref 3.8–5.2)
RBC #/AREA URNS HPF: ABNORMAL /HPF (ref 0–5)
SERVICE CMNT-IMP: ABNORMAL
SODIUM BLD-SCNC: 138 MMOL/L (ref 136–145)
SODIUM SERPL-SCNC: 137 MMOL/L (ref 136–145)
SP GR UR REFRACTOMETRY: 1.01 (ref 1–1.03)
TROPONIN I SERPL HS-MCNC: 5 NG/L (ref 0–51)
TSH SERPL DL<=0.05 MIU/L-ACNC: 1.42 UIU/ML (ref 0.36–3.74)
UR CULT HOLD, URHOLD: NORMAL
UROBILINOGEN UR QL STRIP.AUTO: 0.2 EU/DL (ref 0.2–1)
VENTRICULAR RATE, ECG03: 90 BPM
WBC # BLD AUTO: 7.9 K/UL (ref 3.6–11)
WBC URNS QL MICRO: ABNORMAL /HPF (ref 0–4)

## 2023-05-01 PROCEDURE — 84484 ASSAY OF TROPONIN QUANT: CPT

## 2023-05-01 PROCEDURE — 84439 ASSAY OF FREE THYROXINE: CPT

## 2023-05-01 PROCEDURE — 85025 COMPLETE CBC W/AUTO DIFF WBC: CPT

## 2023-05-01 PROCEDURE — 36415 COLL VENOUS BLD VENIPUNCTURE: CPT

## 2023-05-01 PROCEDURE — 81001 URINALYSIS AUTO W/SCOPE: CPT

## 2023-05-01 PROCEDURE — 80047 BASIC METABLC PNL IONIZED CA: CPT

## 2023-05-01 PROCEDURE — 84443 ASSAY THYROID STIM HORMONE: CPT

## 2023-05-01 PROCEDURE — 99284 EMERGENCY DEPT VISIT MOD MDM: CPT

## 2023-05-01 PROCEDURE — 74011250636 HC RX REV CODE- 250/636: Performed by: EMERGENCY MEDICINE

## 2023-05-01 PROCEDURE — 96360 HYDRATION IV INFUSION INIT: CPT

## 2023-05-01 PROCEDURE — 93005 ELECTROCARDIOGRAM TRACING: CPT

## 2023-05-01 PROCEDURE — 80053 COMPREHEN METABOLIC PANEL: CPT

## 2023-05-01 PROCEDURE — 81025 URINE PREGNANCY TEST: CPT

## 2023-05-01 RX ORDER — SODIUM CHLORIDE 9 MG/ML
1000 INJECTION, SOLUTION INTRAVENOUS ONCE
Status: COMPLETED | OUTPATIENT
Start: 2023-05-01 | End: 2023-05-01

## 2023-05-01 RX ORDER — POTASSIUM CHLORIDE 20MEQ/15ML
20 LIQUID (ML) ORAL 2 TIMES DAILY
Qty: 120 ML | Refills: 0 | Status: SHIPPED | OUTPATIENT
Start: 2023-05-01 | End: 2023-05-05

## 2023-05-01 RX ADMIN — SODIUM CHLORIDE 1000 ML: 900 INJECTION, SOLUTION INTRAVENOUS at 18:33

## 2023-05-01 NOTE — ED TRIAGE NOTES
Triage: pt arrives to the ER for c/o \"heavy breathing, my feet are cold, my hands are cold and really tired\" starting at 1400 today. Pt has a potassium and iron deficiency. Denies fever, cough, chest pain.

## 2023-05-01 NOTE — ED PROVIDER NOTES
Is a 29-year-old female past medical history significant for anemia headache, gestational diabetes who presents to emergency room for evaluation of fatigue. Patient reports onset of her period today. She states that she has felt fatigued and not right today. No chest pain, shortness of breath or difficulty breathing. No cough, congestion, runny nose or sore throats. No abdominal pain, nausea or vomiting. No dizziness or lightheadedness. She reports her feet and hands felt cold earlier today but that is improved. No dysuria frequency or urgency. No blood in the urine. Patient states she is not consistent with taking her iron. The history is provided by the patient. Fatigue  Pertinent negatives include no shortness of breath, no chest pain, no vomiting, no headaches and no nausea. Past Medical History:   Diagnosis Date    Abnormal Pap smear of cervix     seeing OBGYN    Acne 10/7/2009    Allergic rhinitis 2009    Anemia 2009    ASCUS HPV+ 2017    H/o ASCUS HPV + with Dr. Jule Kehr 2017    CHAR III (cervical intraepithelial neoplasia III) 2017    found by LEEP     Headache     Herpes genitalia 2017    dx by OB    History of gestational diabetes     diet controlled    Other ill-defined conditions(799.89)     scoliosis    PCB (post coital bleeding) 2014    Thoracic compression fracture ([address] 3/2014    Fani Sender    Thyromegaly 2014    On exam     Unspecified vitamin D deficiency 2014    Varicella 1998       Past Surgical History:   Procedure Laterality Date    HX GYN           HX LEEP PROCEDURE  16    found CHAR 3         Family History:   Problem Relation Age of Onset    Breast Cancer Maternal Grandmother         great, great gma, age?     Hypertension Maternal Grandmother     Breast Cancer Maternal Aunt 32        survivor    Diabetes Father     Hypertension Father     Hypertension Paternal Grandmother     Diabetes Paternal Grandmother     No Known Problems Brother     Osteoporosis Neg Hx        Social History     Socioeconomic History    Marital status: SINGLE     Spouse name: Not on file    Number of children: Not on file    Years of education: Not on file    Highest education level: Not on file   Occupational History    Not on file   Tobacco Use    Smoking status: Never    Smokeless tobacco: Never   Vaping Use    Vaping Use: Never used   Substance and Sexual Activity    Alcohol use: Yes     Comment: occassionally     Drug use: No    Sexual activity: Yes     Partners: Male     Birth control/protection: Injection   Other Topics Concern    Not on file   Social History Narrative    Lives in [facility] with parents and 3 yo son. Works at [employer]. Social Determinants of Health     Financial Resource Strain: Low Risk     Difficulty of Paying Living Expenses: Not hard at all   Food Insecurity: No Food Insecurity    Worried About Running Out of Food in the Last Year: Never true    Ran Out of Food in the Last Year: Never true   Transportation Needs: Not on file   Physical Activity: Not on file   Stress: Not on file   Social Connections: Not on file   Intimate Partner Violence: Not on file   Housing Stability: Not on file         ALLERGIES: Hydrochlorothiazide and Metronidazole    Review of Systems   Constitutional:  Positive for fatigue. Negative for appetite change and chills. HENT:  Negative for congestion, rhinorrhea and sore throat. Respiratory:  Negative for cough, chest tightness and shortness of breath. Cardiovascular:  Negative for chest pain and palpitations. Gastrointestinal:  Negative for abdominal pain, nausea and vomiting. Genitourinary:  Negative for difficulty urinating and dysuria. Musculoskeletal:  Negative for back pain and neck pain. Skin:  Negative for color change and rash. Neurological:  Negative for dizziness, weakness, light-headedness and headaches.    All other systems reviewed and are negative. Vitals:    05/01/23 1715   BP: (!) 139/98   Pulse: 87   Resp: 18   Temp: 98 °F (36.7 °C)   SpO2: 100%   Weight: 80 kg (176 lb 5.9 oz)   Height: 5' 5.5\" (1.664 m)            Physical Exam  Vitals and nursing note reviewed. Constitutional:       General: She is not in acute distress. Appearance: Normal appearance. She is well-developed. She is not ill-appearing, toxic-appearing or diaphoretic. HENT:      Head: Normocephalic and atraumatic. Mouth/Throat:      Mouth: Mucous membranes are moist.      Pharynx: Oropharynx is clear. Eyes:      General: No scleral icterus. Right eye: No discharge. Left eye: No discharge. Conjunctiva/sclera: Conjunctivae normal.      Pupils: Pupils are equal, round, and reactive to light. Cardiovascular:      Rate and Rhythm: Normal rate and regular rhythm. Pulses:           Carotid pulses are 2+ on the right side and 2+ on the left side. Radial pulses are 2+ on the right side and 2+ on the left side. Femoral pulses are 2+ on the right side and 2+ on the left side. Dorsalis pedis pulses are 2+ on the right side and 2+ on the left side. Posterior tibial pulses are 2+ on the right side and 2+ on the left side. Heart sounds: Normal heart sounds, S1 normal and S2 normal.   Pulmonary:      Effort: Pulmonary effort is normal. No respiratory distress. Breath sounds: Normal breath sounds. No wheezing. Chest:      Chest wall: No tenderness. Abdominal:      General: Bowel sounds are normal. There is no distension or abdominal bruit. Palpations: Abdomen is soft. There is no hepatomegaly, splenomegaly, mass or pulsatile mass. Tenderness: There is no abdominal tenderness. There is no right CVA tenderness, left CVA tenderness, guarding or rebound. Hernia: No hernia is present. There is no hernia in the ventral area. Comments: Abdomen exposed for exam. No pain with light or deep palpation. Musculoskeletal:         General: No swelling or tenderness. Normal range of motion. Cervical back: Normal range of motion and neck supple. Comments: Calves nontender to palpation bilaterally. No cords, negative homans sign   Lymphadenopathy:      Cervical: No cervical adenopathy. Skin:     General: Skin is warm and dry. Coloration: Skin is not jaundiced or pale. Findings: No erythema, lesion or rash. Neurological:      General: No focal deficit present. Mental Status: She is alert and oriented to person, place, and time. Cranial Nerves: No cranial nerve deficit. Sensory: No sensory deficit. Motor: No weakness or abnormal muscle tone. Coordination: Coordination normal.      Gait: Gait normal.   Psychiatric:         Mood and Affect: Mood normal.         Behavior: Behavior normal.         Thought Content: Thought content normal.         Judgment: Judgment normal.        Medical Decision Making  20-year-old female presenting to the emergency room for fatigue. Patient not complain of chest pain or shortness of breath. She is not tachycardic tachypneic or hypoxic. She is afebrile. Her abdomen is soft and nontender. No peripheral edema. Differential diagnosis anemia, electrolyte abnormality,ACS, dehydration  Labs    Amount and/or Complexity of Data Reviewed  External Data Reviewed: labs. Labs: ordered. ECG/medicine tests: ordered. Risk  Prescription drug management. Procedures          EKG interpreted by myself and Dr. Demetrius Samuels: Normal sinus rhythm, rate of regular rate of 70, normal axis, slightly prolonged QT. No acute ST changes. The following have been ordered and reviewed:    No results found.     Recent Results (from the past 12 hour(s))   CBC WITH AUTOMATED DIFF    Collection Time: 05/01/23  5:22 PM   Result Value Ref Range    WBC 7.9 3.6 - 11.0 K/uL    RBC 3.56 (L) 3.80 - 5.20 M/uL    HGB 10.6 (L) 11.5 - 16.0 g/dL    HCT 33.5 (L) 35.0 - 47.0 %    MCV 94.1 80.0 - 99.0 FL    MCH 29.8 26.0 - 34.0 PG    MCHC 31.6 30.0 - 36.5 g/dL    RDW 13.3 11.5 - 14.5 %    PLATELET 424 (H) 185 - 400 K/uL    MPV 9.1 8.9 - 12.9 FL    NRBC 0.0 0  WBC    ABSOLUTE NRBC 0.00 0.00 - 0.01 K/uL    NEUTROPHILS 68 32 - 75 %    LYMPHOCYTES 24 12 - 49 %    MONOCYTES 7 5 - 13 %    EOSINOPHILS 1 0 - 7 %    BASOPHILS 0 0 - 1 %    IMMATURE GRANULOCYTES 0 0.0 - 0.5 %    ABS. NEUTROPHILS 5.3 1.8 - 8.0 K/UL    ABS. LYMPHOCYTES 1.9 0.8 - 3.5 K/UL    ABS. MONOCYTES 0.6 0.0 - 1.0 K/UL    ABS. EOSINOPHILS 0.1 0.0 - 0.4 K/UL    ABS. BASOPHILS 0.0 0.0 - 0.1 K/UL    ABS. IMM. GRANS. 0.0 0.00 - 0.04 K/UL    DF AUTOMATED     METABOLIC PANEL, COMPREHENSIVE    Collection Time: 05/01/23  5:22 PM   Result Value Ref Range    Sodium 137 136 - 145 mmol/L    Potassium 4.1 3.5 - 5.1 mmol/L    Chloride 99 97 - 108 mmol/L    CO2 28 21 - 32 mmol/L    Anion gap 10 5 - 15 mmol/L    Glucose 99 65 - 100 mg/dL    BUN 15 6 - 20 MG/DL    Creatinine 0.78 0.55 - 1.02 MG/DL    BUN/Creatinine ratio 19 12 - 20      eGFR >60 >60 ml/min/1.73m2    Calcium 9.6 8.5 - 10.1 MG/DL    Bilirubin, total 0.5 0.2 - 1.0 MG/DL    ALT (SGPT) 19 12 - 78 U/L    AST (SGOT) 29 15 - 37 U/L    Alk.  phosphatase 44 (L) 45 - 117 U/L    Protein, total 9.3 (H) 6.4 - 8.2 g/dL    Albumin 3.6 3.5 - 5.0 g/dL    Globulin 5.7 (H) 2.0 - 4.0 g/dL    A-G Ratio 0.6 (L) 1.1 - 2.2     TROPONIN-HIGH SENSITIVITY    Collection Time: 05/01/23  5:22 PM   Result Value Ref Range    Troponin-High Sensitivity 5 0 - 51 ng/L   TSH 3RD GENERATION    Collection Time: 05/01/23  5:59 PM   Result Value Ref Range    TSH 1.42 0.36 - 3.74 uIU/mL   HCG URINE, QL. - POC    Collection Time: 05/01/23  6:04 PM   Result Value Ref Range    Pregnancy test,urine (POC) Negative NEG     URINALYSIS W/MICROSCOPIC    Collection Time: 05/01/23  6:08 PM   Result Value Ref Range    Color YELLOW/STRAW      Appearance CLEAR CLEAR      Specific gravity 1.010 1.003 - 1.030      pH (UA) 7.5 5.0 - 8.0      Protein Negative NEG mg/dL    Glucose Negative NEG mg/dL    Ketone Negative NEG mg/dL    Bilirubin Negative NEG      Blood TRACE (A) NEG      Urobilinogen 0.2 0.2 - 1.0 EU/dL    Nitrites Negative NEG      Leukocyte Esterase TRACE (A) NEG      WBC 5-10 0 - 4 /hpf    RBC 5-10 0 - 5 /hpf    Epithelial cells FEW FEW /lpf    Bacteria Negative NEG /hpf   URINE CULTURE HOLD SAMPLE    Collection Time: 05/01/23  6:08 PM    Specimen: Urine   Result Value Ref Range    Urine culture hold        Urine on hold in Microbiology dept for 2 days. If unpreserved urine is submitted, it cannot be used for addtional testing after 24 hours, recollection will be required. EKG, 12 LEAD, INITIAL    Collection Time: 05/01/23  6:09 PM   Result Value Ref Range    Ventricular Rate 90 BPM    Atrial Rate 90 BPM    P-R Interval 174 ms    QRS Duration 78 ms    Q-T Interval 400 ms    QTC Calculation (Bezet) 489 ms    Calculated P Axis 42 degrees    Calculated R Axis 4 degrees    Calculated T Axis 12 degrees    Diagnosis       Normal sinus rhythm  Minimal voltage criteria for LVH, may be normal variant  Prolonged QT  Abnormal ECG     POC CHEM8    Collection Time: 05/01/23  6:53 PM   Result Value Ref Range    Calcium, ionized (POC) 1.22 1.12 - 1.32 mmol/L    Sodium (POC) 138 136 - 145 mmol/L    Potassium (POC) 3.1 (L) 3.5 - 5.1 mmol/L    Chloride (POC) 101 98 - 107 mmol/L    CO2 (POC) 27.1 21 - 32 mmol/L    Anion gap (POC) 11 10 - 20 mmol/L    Glucose (POC) 86 65 - 100 mg/dL    Creatinine (POC) 0.79 0.6 - 1.3 mg/dL    eGFR (POC) >60 >60 ml/min/1.73m2    Comment Comment Not Indicated. 7:32 PM  Patient reevaluated. Patient feeling better after resting and getting fluid. Troponin is negative. Potassium  low at 3.1. This appears to be at baseline level for the patient. Thyroid levels with no acute abnormalities. Hemoglobin and hematocrit on the CBC is low at 10.6 and 33.5.   This also appears consistent with patient's baseline level. No transfusion required. Patient physical exam reassuring no signs or symptoms serious illness. I do not feel need for admission at this time. Patient to be discharged home with supportive care. Will discharge home with 4 days of potassium. Patient to follow-up with PCP. Patient's results have been reviewed with them. Patient and/or family have verbally conveyed their understanding and agreement of the patient's signs, symptoms, diagnosis, treatment and prognosis and additionally agree to follow up as recommended or return to the Emergency Room should their condition change prior to follow-up. Discharge instructions have also been provided to the patient with some educational information regarding their diagnosis as well a list of reasons why they would want to return to the ER prior to their follow-up appointment should their condition change. Pt case including HPI, PE, and all available lab and radiology results has been discussed with attending physician, Dr. Rodolfo Copeland. Opportunity to evaluate patient has been provided to ER attending. Discharge and prescription plan has been agreed upon.

## 2023-05-01 NOTE — DISCHARGE INSTRUCTIONS
Potassium: 15 mL twice a day for 4 days. Continue your normal medications  Return to the emergency room for any fever, chills, nausea, vomiting, chest pain, shortness of breath or difficulty breathing, inability to eat or drink, worsening fatigue or weakness. Please follow-up with your primary care provider next week for reevaluation.

## 2023-05-02 LAB — T4 FREE SERPL-MCNC: 1 NG/DL (ref 0.8–1.5)

## 2023-05-22 RX ORDER — CHLORTHALIDONE 25 MG/1
12.5 TABLET ORAL DAILY
Qty: 30 TABLET | Refills: 0 | Status: SHIPPED | OUTPATIENT
Start: 2023-05-22

## 2023-05-22 NOTE — TELEPHONE ENCOUNTER
Patient requesting refill sent to [facility] on [address]. Patient states she only has 5 pills left of this medication. Please notify patient when filled. chlorthalidone (HYGROTON) 25 mg tablet 30 Tablet 0 4/27/2023     Sig - Route:  Take 0.5 Tablets by mouth daily. - Oral    Sent to pharmacy as: chlorthalidone 25 mg tablet (HYGROTON)

## 2023-05-22 NOTE — TELEPHONE ENCOUNTER
Patient also states that she takes one pill a day and was advised to eventually taper up to from half of a tablet by her previous provider Paula.

## 2023-05-23 DIAGNOSIS — I10 ESSENTIAL (PRIMARY) HYPERTENSION: ICD-10-CM

## 2023-05-24 RX ORDER — VALSARTAN 320 MG/1
320 TABLET ORAL DAILY
Qty: 90 TABLET | Refills: 0 | Status: SHIPPED | OUTPATIENT
Start: 2023-05-24 | End: 2023-05-25 | Stop reason: ALTCHOICE

## 2023-05-24 NOTE — TELEPHONE ENCOUNTER
Disp Refills Start End    valsartan (DIOVAN) 320 mg tablet 90 Tablet 0 2/15/2023     Sig - Route: Take 1 Tablet by mouth daily.  Establish with new PCP before next refill - Oral      Last visit: 10/19/22 Ondina Silva  Future visit:  7/6/23 NP [address]:  [facility] [address]    Patient is out of  medication
Request was sent to  a provider
(3) no apparent problem

## 2023-05-25 RX ORDER — VALSARTAN 320 MG/1
TABLET ORAL
Qty: 90 TABLET | Refills: 0 | Status: SHIPPED | OUTPATIENT
Start: 2023-05-25

## 2023-05-25 NOTE — TELEPHONE ENCOUNTER
Last Refill: Refilled 6-69-23 (Duplicate request)  Last Visit: 10/19/2022   Next Visit: 5/24/2023     Requested Prescriptions     Pending Prescriptions Disp Refills    valsartan (DIOVAN) 320 MG tablet [Pharmacy Med Name: VALSARTAN 320 MG TABLET] 90 tablet 0     Sig: TAKE 1 TABLET BY MOUTH DAILY.  ESTABLISH WITH NEW PCP BEFORE NEXT REFILL

## 2023-06-05 ENCOUNTER — HOSPITAL ENCOUNTER (EMERGENCY)
Facility: HOSPITAL | Age: 28
Discharge: HOME | End: 2023-06-05
Attending: EMERGENCY MEDICINE
Payer: COMMERCIAL

## 2023-06-05 VITALS
DIASTOLIC BLOOD PRESSURE: 88 MMHG | WEIGHT: 185.19 LBS | OXYGEN SATURATION: 99 % | TEMPERATURE: 98.1 F | HEIGHT: 65 IN | HEART RATE: 77 BPM | BODY MASS INDEX: 30.85 KG/M2 | SYSTOLIC BLOOD PRESSURE: 123 MMHG | RESPIRATION RATE: 16 BRPM

## 2023-06-05 DIAGNOSIS — B96.89 BACTERIAL VAGINOSIS: Primary | ICD-10-CM

## 2023-06-05 DIAGNOSIS — N76.0 BACTERIAL VAGINOSIS: Primary | ICD-10-CM

## 2023-06-05 LAB
APPEARANCE UR: CLEAR
BACTERIA URNS QL MICRO: NEGATIVE /HPF
BILIRUB UR QL: NEGATIVE
C TRACH DNA SPEC QL NAA+PROBE: NEGATIVE
CLUE CELLS VAG QL WET PREP: NORMAL
COLOR UR: ABNORMAL
EPITH CASTS URNS QL MICRO: ABNORMAL /LPF
GLUCOSE UR STRIP.AUTO-MCNC: NEGATIVE MG/DL
HCG UR QL: NEGATIVE
HGB UR QL STRIP: NEGATIVE
KETONES UR QL STRIP.AUTO: NEGATIVE MG/DL
KOH PREP SPEC: NORMAL
LEUKOCYTE ESTERASE UR QL STRIP.AUTO: NEGATIVE
N GONORRHOEA DNA SPEC QL NAA+PROBE: NEGATIVE
NITRITE UR QL STRIP.AUTO: NEGATIVE
PH UR STRIP: 6 (ref 5–8)
PROT UR STRIP-MCNC: NEGATIVE MG/DL
RBC #/AREA URNS HPF: ABNORMAL /HPF (ref 0–5)
SAMPLE TYPE: NORMAL
SERVICE CMNT-IMP: NORMAL
SERVICE CMNT-IMP: NORMAL
SP GR UR REFRACTOMETRY: 1.02 (ref 1–1.03)
SPECIMEN SOURCE: NORMAL
T VAGINALIS VAG QL WET PREP: NORMAL
UROBILINOGEN UR QL STRIP.AUTO: 0.2 EU/DL (ref 0.2–1)
WBC URNS QL MICRO: ABNORMAL /HPF (ref 0–4)

## 2023-06-05 PROCEDURE — 87591 N.GONORRHOEAE DNA AMP PROB: CPT

## 2023-06-05 PROCEDURE — 99283 EMERGENCY DEPT VISIT LOW MDM: CPT

## 2023-06-05 PROCEDURE — 87210 SMEAR WET MOUNT SALINE/INK: CPT

## 2023-06-05 PROCEDURE — 81001 URINALYSIS AUTO W/SCOPE: CPT

## 2023-06-05 PROCEDURE — 81025 URINE PREGNANCY TEST: CPT

## 2023-06-05 PROCEDURE — 87491 CHLMYD TRACH DNA AMP PROBE: CPT

## 2023-06-05 RX ORDER — CLINDAMYCIN PHOSPHATE 20 MG/G
1 CREAM VAGINAL NIGHTLY
Qty: 40 G | Refills: 0 | Status: SHIPPED | OUTPATIENT
Start: 2023-06-05 | End: 2023-06-12

## 2023-06-05 ASSESSMENT — ENCOUNTER SYMPTOMS
CONSTIPATION: 0
COLOR CHANGE: 0
NAUSEA: 0
SHORTNESS OF BREATH: 0
DIARRHEA: 0
VOMITING: 0
ABDOMINAL PAIN: 0
BACK PAIN: 1

## 2023-06-05 ASSESSMENT — PAIN DESCRIPTION - ORIENTATION: ORIENTATION: LOWER

## 2023-06-05 ASSESSMENT — PAIN DESCRIPTION - LOCATION: LOCATION: BACK

## 2023-06-05 ASSESSMENT — PAIN SCALES - GENERAL: PAINLEVEL_OUTOF10: 4

## 2023-06-05 NOTE — ED TRIAGE NOTES
ED triage note: patient reports vaginal discharge, vaginal smell, and lower back pain. Patient states the vaginal smell started 2-3 weeks ago. Reports just finished her menstrual cycle and has a history of BV.

## 2023-06-05 NOTE — ED NOTES
Patient stable at time of discharge. Reviewed discharge instructions, medications, and follow up with patient. Answered all patient questions. Patient ambulatory out of the department with steady gait.       Vivek Torres RN  06/05/23 4763

## 2023-06-05 NOTE — ED NOTES
[facility]  EMERGENCY DEPARTMENT ENCOUNTER      Pt Name: Maurice Montalvo  MRN: [id number]  [place] 1995  Date of evaluation: 6/5/2023  Provider: Franchesca Cody MD    CHIEF COMPLAINT       Chief Complaint   Patient presents with    Vaginal Discharge         HISTORY OF PRESENT ILLNESS   (Location/Symptom, Timing/Onset, Context/Setting, Quality, Duration, Modifying Factors, Severity)  Note limiting factors. The history is provided by the patient. No  was used. Vaginal Discharge  Quality:  Angelo Sox  Severity:  Mild  Onset quality:  Gradual  Timing:  Constant  Progression:  Unchanged  Chronicity:  Recurrent  Relieved by:  Nothing  Worsened by:  Nothing  Ineffective treatments:  None tried  Associated symptoms: no abdominal pain, no dyspareunia, no dysuria, no fever, no genital lesions, no nausea, no rash, no urinary frequency, no urinary hesitancy, no urinary incontinence, no vaginal itching and no vomiting    Risk factors: STI and unprotected sex        Review of External Medical Records:     Nursing Notes were reviewed. REVIEW OF SYSTEMS    (2-9 systems for level 4, 10 or more for level 5)     Review of Systems   Constitutional:  Negative for activity change, chills and fever. HENT:  Negative for nosebleeds. Eyes:  Negative for visual disturbance. Respiratory:  Negative for shortness of breath. Cardiovascular:  Negative for chest pain and palpitations. Gastrointestinal:  Negative for abdominal pain, constipation, diarrhea, nausea and vomiting. Genitourinary:  Positive for pelvic pain and vaginal discharge. Negative for bladder incontinence, difficulty urinating, dyspareunia, dysuria, hematuria, hesitancy and urgency. Musculoskeletal:  Positive for back pain. Negative for neck pain and neck stiffness. Skin:  Negative for color change. Allergic/Immunologic: Negative for immunocompromised state.    Neurological:  Negative for dizziness, seizures, syncope,

## 2023-06-26 RX ORDER — CHLORTHALIDONE 25 MG/1
TABLET ORAL
Qty: 30 TABLET | Refills: 1 | Status: SHIPPED | OUTPATIENT
Start: 2023-06-26

## 2023-07-06 ENCOUNTER — OFFICE VISIT (OUTPATIENT)
Facility: CLINIC | Age: 28
End: 2023-07-06
Payer: COMMERCIAL

## 2023-07-06 VITALS
DIASTOLIC BLOOD PRESSURE: 80 MMHG | HEART RATE: 84 BPM | TEMPERATURE: 98.5 F | SYSTOLIC BLOOD PRESSURE: 110 MMHG | HEIGHT: 65 IN | BODY MASS INDEX: 30.22 KG/M2 | WEIGHT: 181.4 LBS | RESPIRATION RATE: 18 BRPM | OXYGEN SATURATION: 99 %

## 2023-07-06 DIAGNOSIS — R76.8 SS-A ANTIBODY POSITIVE: ICD-10-CM

## 2023-07-06 DIAGNOSIS — E55.9 VITAMIN D DEFICIENCY: ICD-10-CM

## 2023-07-06 DIAGNOSIS — R77.9 ELEVATED BLOOD PROTEIN: ICD-10-CM

## 2023-07-06 DIAGNOSIS — R76.8 ANA POSITIVE: ICD-10-CM

## 2023-07-06 DIAGNOSIS — M25.562 ARTHRALGIA OF BOTH KNEES: ICD-10-CM

## 2023-07-06 DIAGNOSIS — M25.561 ARTHRALGIA OF BOTH KNEES: ICD-10-CM

## 2023-07-06 DIAGNOSIS — M35.7 HYPERMOBILITY SYNDROME: ICD-10-CM

## 2023-07-06 DIAGNOSIS — M35.00 SJOGREN SYNDROME, UNSPECIFIED (HCC): ICD-10-CM

## 2023-07-06 DIAGNOSIS — I10 ESSENTIAL (PRIMARY) HYPERTENSION: ICD-10-CM

## 2023-07-06 DIAGNOSIS — R76.8 RHEUMATOID FACTOR POSITIVE: ICD-10-CM

## 2023-07-06 DIAGNOSIS — I10 ESSENTIAL HYPERTENSION: Primary | ICD-10-CM

## 2023-07-06 DIAGNOSIS — D50.9 IRON DEFICIENCY ANEMIA, UNSPECIFIED IRON DEFICIENCY ANEMIA TYPE: ICD-10-CM

## 2023-07-06 PROBLEM — J20.8 ACUTE BRONCHITIS DUE TO OTHER SPECIFIED ORGANISMS: Status: RESOLVED | Noted: 2018-02-26 | Resolved: 2023-07-06

## 2023-07-06 PROBLEM — M94.0 COSTOCHONDRITIS: Status: RESOLVED | Noted: 2018-08-21 | Resolved: 2023-07-06

## 2023-07-06 PROBLEM — J01.90 ACUTE BACTERIAL SINUSITIS: Status: RESOLVED | Noted: 2018-02-26 | Resolved: 2023-07-06

## 2023-07-06 PROBLEM — B96.89 ACUTE BACTERIAL SINUSITIS: Status: RESOLVED | Noted: 2018-02-26 | Resolved: 2023-07-06

## 2023-07-06 PROCEDURE — 99213 OFFICE O/P EST LOW 20 MIN: CPT | Performed by: NURSE PRACTITIONER

## 2023-07-06 PROCEDURE — 3080F DIAST BP >= 90 MM HG: CPT | Performed by: NURSE PRACTITIONER

## 2023-07-06 PROCEDURE — 3075F SYST BP GE 130 - 139MM HG: CPT | Performed by: NURSE PRACTITIONER

## 2023-07-06 RX ORDER — VALSARTAN 320 MG/1
TABLET ORAL
Qty: 90 TABLET | Refills: 0 | Status: SHIPPED | OUTPATIENT
Start: 2023-07-06

## 2023-07-06 RX ORDER — CHLORTHALIDONE 25 MG/1
TABLET ORAL
Qty: 90 TABLET | Refills: 0 | Status: SHIPPED | OUTPATIENT
Start: 2023-07-06

## 2023-07-06 ASSESSMENT — PATIENT HEALTH QUESTIONNAIRE - PHQ9
1. LITTLE INTEREST OR PLEASURE IN DOING THINGS: 0
2. FEELING DOWN, DEPRESSED OR HOPELESS: 0
SUM OF ALL RESPONSES TO PHQ QUESTIONS 1-9: 0
SUM OF ALL RESPONSES TO PHQ9 QUESTIONS 1 & 2: 0

## 2023-07-10 ENCOUNTER — HOSPITAL ENCOUNTER (EMERGENCY)
Facility: HOSPITAL | Age: 28
Discharge: HOME | End: 2023-07-10
Attending: EMERGENCY MEDICINE
Payer: COMMERCIAL

## 2023-07-10 VITALS
WEIGHT: 188.05 LBS | SYSTOLIC BLOOD PRESSURE: 137 MMHG | DIASTOLIC BLOOD PRESSURE: 102 MMHG | BODY MASS INDEX: 30.22 KG/M2 | OXYGEN SATURATION: 100 % | RESPIRATION RATE: 18 BRPM | HEART RATE: 94 BPM | HEIGHT: 66 IN | TEMPERATURE: 98.2 F

## 2023-07-10 DIAGNOSIS — R53.81 MALAISE AND FATIGUE: Primary | ICD-10-CM

## 2023-07-10 DIAGNOSIS — R53.83 MALAISE AND FATIGUE: Primary | ICD-10-CM

## 2023-07-10 LAB
ALBUMIN SERPL-MCNC: 3.2 G/DL (ref 3.5–5)
ALBUMIN/GLOB SERPL: 0.6 (ref 1.1–2.2)
ALP SERPL-CCNC: 42 U/L (ref 45–117)
ALT SERPL-CCNC: 19 U/L (ref 12–78)
ANION GAP SERPL CALC-SCNC: 6 MMOL/L (ref 5–15)
APPEARANCE UR: CLEAR
AST SERPL-CCNC: 28 U/L (ref 15–37)
BACTERIA URNS QL MICRO: NEGATIVE /HPF
BASOPHILS # BLD: 0.1 K/UL (ref 0–0.1)
BASOPHILS NFR BLD: 1 % (ref 0–1)
BILIRUB SERPL-MCNC: 0.3 MG/DL (ref 0.2–1)
BILIRUB UR QL: NEGATIVE
BUN SERPL-MCNC: 12 MG/DL (ref 6–20)
BUN/CREAT SERPL: 16 (ref 12–20)
CALCIUM SERPL-MCNC: 8.3 MG/DL (ref 8.5–10.1)
CHLORIDE SERPL-SCNC: 100 MMOL/L (ref 97–108)
CO2 SERPL-SCNC: 27 MMOL/L (ref 21–32)
COLOR UR: ABNORMAL
CREAT SERPL-MCNC: 0.77 MG/DL (ref 0.55–1.02)
DIFFERENTIAL METHOD BLD: ABNORMAL
EOSINOPHIL # BLD: 0.2 K/UL (ref 0–0.4)
EOSINOPHIL NFR BLD: 2 % (ref 0–7)
EPITH CASTS URNS QL MICRO: ABNORMAL /LPF
ERYTHROCYTE [DISTWIDTH] IN BLOOD BY AUTOMATED COUNT: 13.2 % (ref 11.5–14.5)
GLOBULIN SER CALC-MCNC: 5.1 G/DL (ref 2–4)
GLUCOSE SERPL-MCNC: 111 MG/DL (ref 65–100)
GLUCOSE UR STRIP.AUTO-MCNC: NEGATIVE MG/DL
HCT VFR BLD AUTO: 34.9 % (ref 35–47)
HGB BLD-MCNC: 11 G/DL (ref 11.5–16)
HGB UR QL STRIP: NEGATIVE
HYALINE CASTS URNS QL MICRO: ABNORMAL /LPF (ref 0–2)
IMM GRANULOCYTES # BLD AUTO: 0 K/UL (ref 0–0.04)
IMM GRANULOCYTES NFR BLD AUTO: 0 % (ref 0–0.5)
KETONES UR QL STRIP.AUTO: NEGATIVE MG/DL
LEUKOCYTE ESTERASE UR QL STRIP.AUTO: NEGATIVE
LYMPHOCYTES # BLD: 2.9 K/UL (ref 0.8–3.5)
LYMPHOCYTES NFR BLD: 31 % (ref 12–49)
MCH RBC QN AUTO: 29.7 PG (ref 26–34)
MCHC RBC AUTO-ENTMCNC: 31.5 G/DL (ref 30–36.5)
MCV RBC AUTO: 94.3 FL (ref 80–99)
MONOCYTES # BLD: 0.6 K/UL (ref 0–1)
MONOCYTES NFR BLD: 7 % (ref 5–13)
NEUTS SEG # BLD: 5.6 K/UL (ref 1.8–8)
NEUTS SEG NFR BLD: 59 % (ref 32–75)
NITRITE UR QL STRIP.AUTO: NEGATIVE
NRBC # BLD: 0 K/UL (ref 0–0.01)
NRBC BLD-RTO: 0 PER 100 WBC
PH UR STRIP: 6.5 (ref 5–8)
PLATELET # BLD AUTO: 439 K/UL (ref 150–400)
PMV BLD AUTO: 9.1 FL (ref 8.9–12.9)
POTASSIUM SERPL-SCNC: 3.1 MMOL/L (ref 3.5–5.1)
PROT SERPL-MCNC: 8.3 G/DL (ref 6.4–8.2)
PROT UR STRIP-MCNC: NEGATIVE MG/DL
RBC # BLD AUTO: 3.7 M/UL (ref 3.8–5.2)
RBC #/AREA URNS HPF: ABNORMAL /HPF (ref 0–5)
SODIUM SERPL-SCNC: 133 MMOL/L (ref 136–145)
SP GR UR REFRACTOMETRY: <1.005
URINE CULTURE IF INDICATED: ABNORMAL
UROBILINOGEN UR QL STRIP.AUTO: 0.2 EU/DL (ref 0.2–1)
WBC # BLD AUTO: 9.3 K/UL (ref 3.6–11)
WBC URNS QL MICRO: ABNORMAL /HPF (ref 0–4)

## 2023-07-10 PROCEDURE — 99284 EMERGENCY DEPT VISIT MOD MDM: CPT

## 2023-07-10 PROCEDURE — 81001 URINALYSIS AUTO W/SCOPE: CPT

## 2023-07-10 PROCEDURE — 80053 COMPREHEN METABOLIC PANEL: CPT

## 2023-07-10 PROCEDURE — 2580000003 HC RX 258: Performed by: EMERGENCY MEDICINE

## 2023-07-10 PROCEDURE — 36415 COLL VENOUS BLD VENIPUNCTURE: CPT

## 2023-07-10 PROCEDURE — 85025 COMPLETE CBC W/AUTO DIFF WBC: CPT

## 2023-07-10 RX ORDER — 0.9 % SODIUM CHLORIDE 0.9 %
1000 INTRAVENOUS SOLUTION INTRAVENOUS ONCE
Status: COMPLETED | OUTPATIENT
Start: 2023-07-10 | End: 2023-07-10

## 2023-07-10 RX ADMIN — SODIUM CHLORIDE 1000 ML: 9 INJECTION, SOLUTION INTRAVENOUS at 04:29

## 2023-07-10 ASSESSMENT — ENCOUNTER SYMPTOMS
SHORTNESS OF BREATH: 0
WHEEZING: 0
NAUSEA: 0
EYE REDNESS: 0
ANAL BLEEDING: 0
DIARRHEA: 0
RECTAL PAIN: 0
VOMITING: 0
COLOR CHANGE: 0
TROUBLE SWALLOWING: 0
EYE PAIN: 0
SINUS PRESSURE: 0
DIARRHEA: 0
BLOOD IN STOOL: 0
NAUSEA: 0
COUGH: 0
SINUS PAIN: 0
BACK PAIN: 0
PHOTOPHOBIA: 0
ABDOMINAL PAIN: 0
VOMITING: 0
SORE THROAT: 0
ABDOMINAL PAIN: 0
FACIAL SWELLING: 0
EYE DISCHARGE: 0
SHORTNESS OF BREATH: 0
CONSTIPATION: 0
CHOKING: 0
APNEA: 0

## 2023-07-10 ASSESSMENT — PAIN - FUNCTIONAL ASSESSMENT: PAIN_FUNCTIONAL_ASSESSMENT: NONE - DENIES PAIN

## 2023-07-10 ASSESSMENT — LIFESTYLE VARIABLES
HOW OFTEN DO YOU HAVE A DRINK CONTAINING ALCOHOL: 2-4 TIMES A MONTH
HOW MANY STANDARD DRINKS CONTAINING ALCOHOL DO YOU HAVE ON A TYPICAL DAY: 1 OR 2

## 2023-07-10 NOTE — ED NOTES
DC info reviewed with Patient, all questions answered. Patient well-appearing at time of discharge, no distress. Ambulatory out of ED at this time.         Elizabeth Gilmore  07/10/23 1692

## 2023-07-10 NOTE — ED PROVIDER NOTES
encouraged them to ask questions as they arise throughout their visit. Nursing notes will be reviewed and interpreted by me as they become available in realtime while the pt has been in the ED. MEDS GIVEN     Medications Administered         0.9 % sodium chloride bolus Admin Date  07/10/2023 Action  New Bag Dose  1,000 mL Rate  1,935.5 mL/hr Route  IntraVENous Administered By  Adelaida Wood LPN            PROCEDURES      none    MDM     Patient is a 32 y.o. female who presents with feeling as though she is dehydrated. Patient has reassuring lab work today noting no severe acute kidney injury elevated BUN level. Will discharge home with primary care follow-up. CRITICAL CARE TIME      None     SOCIAL DETERMINATES OF HEALTH AFFECTING DX OR TX     Education level    FINAL IMPRESSION     1. Malaise and fatigue         DISPOSITION/PLAN     Discharge to home     Filippo Sales's  results have been reviewed with her. She has been counseled regarding her diagnosis, treatment, and plan. She verbally conveys understanding and agreement of the signs, symptoms, diagnosis, treatment and prognosis and additionally agrees to follow up as discussed. She also agrees with the care-plan and conveys that all of her questions have been answered. I have also provided discharge instructions for her that include: educational information regarding their diagnosis and treatment, and list of reasons why they would want to return to the ED prior to their follow-up appointment, should her condition change.      PATIENT REFERRED TO:  JOY Wilkinson NP  [address]  [phone]    Schedule an appointment as soon as possible for a visit in 2 days      [facility] EMERGENCY DEPT  [address] [place] [address] [address] [phone]            DISCHARGE MEDICATIONS:     Medication List        ASK your doctor about these medications      chlorthalidone 25 MG tablet  Commonly known as:

## 2023-07-10 NOTE — DISCHARGE INSTRUCTIONS
It was a pleasure taking care of you in our Emergency Department today. We know that when you come to [facility], you are entrusting us with your health, comfort, and safety. Our physicians and nurses honor that trust, and truly appreciate the opportunity to care for you and your loved ones. We also value your feedback. If you receive a survey about your Emergency Department experience today, please fill it out. We care about our patients' feedback, and we listen to what you have to say. Thank you!       Dr. Ju Henry MD.

## 2023-07-10 NOTE — ED TRIAGE NOTES
Patient ambulatory to triage from waiting room with concerns for being dehydrated. Patient reports spending a lot of time outside on Sunday without drinking a lot of water and now she feels \"dry\".

## 2023-10-30 ENCOUNTER — HOSPITAL ENCOUNTER (EMERGENCY)
Facility: HOSPITAL | Age: 28
Discharge: HOME | End: 2023-10-30
Attending: EMERGENCY MEDICINE
Payer: COMMERCIAL

## 2023-10-30 ENCOUNTER — APPOINTMENT (OUTPATIENT)
Facility: HOSPITAL | Age: 28
End: 2023-10-30
Payer: COMMERCIAL

## 2023-10-30 VITALS
WEIGHT: 185.41 LBS | TEMPERATURE: 98.5 F | SYSTOLIC BLOOD PRESSURE: 111 MMHG | HEART RATE: 81 BPM | BODY MASS INDEX: 29.8 KG/M2 | OXYGEN SATURATION: 99 % | RESPIRATION RATE: 14 BRPM | HEIGHT: 66 IN | DIASTOLIC BLOOD PRESSURE: 83 MMHG

## 2023-10-30 DIAGNOSIS — I95.1 ORTHOSTASIS: ICD-10-CM

## 2023-10-30 DIAGNOSIS — E86.0 DEHYDRATION: ICD-10-CM

## 2023-10-30 DIAGNOSIS — E87.6 HYPOKALEMIA: ICD-10-CM

## 2023-10-30 DIAGNOSIS — R07.9 CHEST PAIN, UNSPECIFIED TYPE: Primary | ICD-10-CM

## 2023-10-30 LAB
ALBUMIN SERPL-MCNC: 3.4 G/DL (ref 3.5–5)
ALBUMIN/GLOB SERPL: 0.6 (ref 1.1–2.2)
ALP SERPL-CCNC: 35 U/L (ref 45–117)
ALT SERPL-CCNC: 23 U/L (ref 12–78)
ANION GAP SERPL CALC-SCNC: 8 MMOL/L (ref 5–15)
APPEARANCE UR: CLEAR
AST SERPL-CCNC: 19 U/L (ref 15–37)
BACTERIA URNS QL MICRO: NEGATIVE /HPF
BASOPHILS # BLD: 0 K/UL (ref 0–0.1)
BASOPHILS NFR BLD: 0 % (ref 0–1)
BILIRUB SERPL-MCNC: 0.5 MG/DL (ref 0.2–1)
BILIRUB UR QL: NEGATIVE
BUN SERPL-MCNC: 10 MG/DL (ref 6–20)
BUN/CREAT SERPL: 10 (ref 12–20)
CALCIUM SERPL-MCNC: 9.2 MG/DL (ref 8.5–10.1)
CHLORIDE SERPL-SCNC: 97 MMOL/L (ref 97–108)
CO2 SERPL-SCNC: 28 MMOL/L (ref 21–32)
COLOR UR: ABNORMAL
CREAT SERPL-MCNC: 0.96 MG/DL (ref 0.55–1.02)
DIFFERENTIAL METHOD BLD: ABNORMAL
EKG ATRIAL RATE: 86 BPM
EKG DIAGNOSIS: NORMAL
EKG P AXIS: 35 DEGREES
EKG P-R INTERVAL: 156 MS
EKG Q-T INTERVAL: 366 MS
EKG QRS DURATION: 82 MS
EKG QTC CALCULATION (BAZETT): 437 MS
EKG R AXIS: 15 DEGREES
EKG T AXIS: 20 DEGREES
EKG VENTRICULAR RATE: 86 BPM
EOSINOPHIL # BLD: 0.1 K/UL (ref 0–0.4)
EOSINOPHIL NFR BLD: 1 % (ref 0–7)
EPITH CASTS URNS QL MICRO: ABNORMAL /LPF
ERYTHROCYTE [DISTWIDTH] IN BLOOD BY AUTOMATED COUNT: 13.2 % (ref 11.5–14.5)
GLOBULIN SER CALC-MCNC: 5.5 G/DL (ref 2–4)
GLUCOSE SERPL-MCNC: 130 MG/DL (ref 65–100)
GLUCOSE UR STRIP.AUTO-MCNC: NEGATIVE MG/DL
HCG UR QL: NEGATIVE
HCT VFR BLD AUTO: 35.6 % (ref 35–47)
HGB BLD-MCNC: 11.6 G/DL (ref 11.5–16)
HGB UR QL STRIP: NEGATIVE
IMM GRANULOCYTES # BLD AUTO: 0 K/UL (ref 0–0.04)
IMM GRANULOCYTES NFR BLD AUTO: 0 % (ref 0–0.5)
KETONES UR QL STRIP.AUTO: NEGATIVE MG/DL
LEUKOCYTE ESTERASE UR QL STRIP.AUTO: NEGATIVE
LYMPHOCYTES # BLD: 1.6 K/UL (ref 0.8–3.5)
LYMPHOCYTES NFR BLD: 23 % (ref 12–49)
MCH RBC QN AUTO: 30.2 PG (ref 26–34)
MCHC RBC AUTO-ENTMCNC: 32.6 G/DL (ref 30–36.5)
MCV RBC AUTO: 92.7 FL (ref 80–99)
MONOCYTES # BLD: 0.4 K/UL (ref 0–1)
MONOCYTES NFR BLD: 6 % (ref 5–13)
NEUTS SEG # BLD: 4.8 K/UL (ref 1.8–8)
NEUTS SEG NFR BLD: 70 % (ref 32–75)
NITRITE UR QL STRIP.AUTO: NEGATIVE
NRBC # BLD: 0 K/UL (ref 0–0.01)
NRBC BLD-RTO: 0 PER 100 WBC
PH UR STRIP: 7 (ref 5–8)
PLATELET # BLD AUTO: 444 K/UL (ref 150–400)
PMV BLD AUTO: 9 FL (ref 8.9–12.9)
POTASSIUM SERPL-SCNC: 2.9 MMOL/L (ref 3.5–5.1)
PROT SERPL-MCNC: 8.9 G/DL (ref 6.4–8.2)
PROT UR STRIP-MCNC: NEGATIVE MG/DL
RBC # BLD AUTO: 3.84 M/UL (ref 3.8–5.2)
RBC #/AREA URNS HPF: ABNORMAL /HPF (ref 0–5)
SODIUM SERPL-SCNC: 133 MMOL/L (ref 136–145)
SP GR UR REFRACTOMETRY: 1.01 (ref 1–1.03)
TROPONIN I SERPL HS-MCNC: 6 NG/L (ref 0–51)
UROBILINOGEN UR QL STRIP.AUTO: 0.2 EU/DL (ref 0.2–1)
WBC # BLD AUTO: 7 K/UL (ref 3.6–11)
WBC URNS QL MICRO: ABNORMAL /HPF (ref 0–4)

## 2023-10-30 PROCEDURE — 36415 COLL VENOUS BLD VENIPUNCTURE: CPT

## 2023-10-30 PROCEDURE — 80053 COMPREHEN METABOLIC PANEL: CPT

## 2023-10-30 PROCEDURE — 96361 HYDRATE IV INFUSION ADD-ON: CPT

## 2023-10-30 PROCEDURE — 93005 ELECTROCARDIOGRAM TRACING: CPT | Performed by: EMERGENCY MEDICINE

## 2023-10-30 PROCEDURE — 2580000003 HC RX 258: Performed by: PHYSICIAN ASSISTANT

## 2023-10-30 PROCEDURE — 99285 EMERGENCY DEPT VISIT HI MDM: CPT

## 2023-10-30 PROCEDURE — 84484 ASSAY OF TROPONIN QUANT: CPT

## 2023-10-30 PROCEDURE — 81001 URINALYSIS AUTO W/SCOPE: CPT

## 2023-10-30 PROCEDURE — 71046 X-RAY EXAM CHEST 2 VIEWS: CPT

## 2023-10-30 PROCEDURE — 6360000002 HC RX W HCPCS: Performed by: PHYSICIAN ASSISTANT

## 2023-10-30 PROCEDURE — 85025 COMPLETE CBC W/AUTO DIFF WBC: CPT

## 2023-10-30 PROCEDURE — 96374 THER/PROPH/DIAG INJ IV PUSH: CPT

## 2023-10-30 PROCEDURE — 81025 URINE PREGNANCY TEST: CPT

## 2023-10-30 RX ORDER — AMOXICILLIN AND CLAVULANATE POTASSIUM 875; 125 MG/1; MG/1
1 TABLET, FILM COATED ORAL 2 TIMES DAILY
Qty: 14 TABLET | Refills: 0 | COMMUNITY
Start: 2023-10-29 | End: 2023-11-05

## 2023-10-30 RX ORDER — KETOROLAC TROMETHAMINE 30 MG/ML
15 INJECTION, SOLUTION INTRAMUSCULAR; INTRAVENOUS
Status: COMPLETED | OUTPATIENT
Start: 2023-10-30 | End: 2023-10-30

## 2023-10-30 RX ORDER — 0.9 % SODIUM CHLORIDE 0.9 %
1000 INTRAVENOUS SOLUTION INTRAVENOUS ONCE
Status: COMPLETED | OUTPATIENT
Start: 2023-10-30 | End: 2023-10-30

## 2023-10-30 RX ORDER — L.ACID,PARA/B.BIFIDUM/S.THERM 8B CELL
1 CAPSULE ORAL DAILY
Qty: 7 CAPSULE | Refills: 0 | COMMUNITY
Start: 2023-10-29 | End: 2023-11-05

## 2023-10-30 RX ORDER — FLUTICASONE PROPIONATE 50 MCG
2 SPRAY, SUSPENSION (ML) NASAL DAILY
COMMUNITY
Start: 2023-10-29 | End: 2023-11-05

## 2023-10-30 RX ORDER — METFORMIN HYDROCHLORIDE 500 MG/1
TABLET, EXTENDED RELEASE ORAL
COMMUNITY

## 2023-10-30 RX ORDER — POTASSIUM CHLORIDE 20 MEQ/1
40 TABLET, EXTENDED RELEASE ORAL DAILY
Qty: 8 TABLET | Refills: 0 | Status: SHIPPED | OUTPATIENT
Start: 2023-10-30 | End: 2023-11-03

## 2023-10-30 RX ADMIN — KETOROLAC TROMETHAMINE 15 MG: 30 INJECTION, SOLUTION INTRAMUSCULAR; INTRAVENOUS at 14:31

## 2023-10-30 RX ADMIN — SODIUM CHLORIDE 1000 ML: 9 INJECTION, SOLUTION INTRAVENOUS at 14:30

## 2023-10-30 ASSESSMENT — PAIN DESCRIPTION - DESCRIPTORS: DESCRIPTORS: TIGHTNESS

## 2023-10-30 ASSESSMENT — PAIN DESCRIPTION - LOCATION
LOCATION: HEAD
LOCATION: CHEST

## 2023-10-30 ASSESSMENT — LIFESTYLE VARIABLES: HOW OFTEN DO YOU HAVE A DRINK CONTAINING ALCOHOL: 2-3 TIMES A WEEK

## 2023-10-30 ASSESSMENT — PAIN SCALES - GENERAL
PAINLEVEL_OUTOF10: 6
PAINLEVEL_OUTOF10: 6

## 2023-10-30 ASSESSMENT — PAIN - FUNCTIONAL ASSESSMENT: PAIN_FUNCTIONAL_ASSESSMENT: 0-10

## 2023-10-30 NOTE — DISCHARGE INSTRUCTIONS
Increase fluids. Follow-up with primary care as discussed. Return if any change or worsening of symptoms.

## 2023-10-30 NOTE — ED NOTES
Orthostatics as follows:    Laying- BP- 120/88, HR- 80, Sp- 100%  Sitting- BP- 112/94, HR- 80, Sp- 98%  Standing- BP- 115/99, HR- 85, Sp- 99%    Pt back in bed with call bell within reach and on monitor times 3.      Dustin Reilly  10/30/23 7916

## 2023-10-30 NOTE — ED TRIAGE NOTES
Pt reports she was seen in an ER in [facility] on Saturday night and diagnosed with in an infection in her sinuses. Pt reports today she started taking her antibiotics and she started to feel chest tightness and her HR was 110 and she checked her BP and is was low in the 80s. Pt reports she thinks she is dehydrated.

## 2023-10-30 NOTE — ED PROVIDER NOTES
EMERGENCY DEPARTMENT PHYSICIAN NOTE     Patient: Gorge Rea     Time of Service: 10/30/2023  1:17 PM     Chief complaint:   Chief Complaint   Patient presents with    Chest Pain        HISTORY:  Patient is a 25 y.o. female who presents to the emergency department with complaints of chest tightness. Patient states she was seen in the emergency room overnight Saturday night due to a headache and sinusitis. States she is currently being treated with antibiotics. States she thinks she might also be dehydrated because today when she stood up her heart rate was elevated and her blood pressure dropped. States she felt some chest discomfort which has since resolved. Admits to not eating and drinking well over the past few days because she has not felt well. Denies fever, chills, shortness of breath, abdominal pain or urinary symptoms.       Past Medical History:   Diagnosis Date    Abnormal Pap smear of cervix     seeing OBGYN    Acne 10/7/2009    Allergic rhinitis 2009    Anemia 2009    Atypical squamous cell changes of undetermined significance (ASCUS) on vaginal cytology 2017    H/o ASCUS HPV + with Dr. Nicolasa Barrera 2017    IRA III (cervical intraepithelial neoplasia III) 2017    found by LEEP     Headache     Herpes genitalia     dx by OB    History of gestational diabetes     diet controlled    Other ill-defined conditions(799.89)     scoliosis    PCB (post coital bleeding) 2014    Thoracic compression fracture ([address]) 3/2014    Pj Sanderson    Thyromegaly 2014    On exam     Unspecified vitamin D deficiency 2014    Varicella 1998        Past Surgical History:   Procedure Laterality Date    GYN           LEEP  16    found IRA 3        Family History   Problem Relation Age of Onset    Diabetes Father     Hypertension Father     Hypertension Paternal Grandmother     Diabetes Paternal Grandmother     No Known Problems Brother

## 2023-10-30 NOTE — ED NOTES
Pt ambulated to restroom without assistance with steady gait for urine sample. Pt returned and placed on monitor times 3 with call bell within reach.        Raw  10/30/23 1575

## 2023-10-30 NOTE — ED NOTES
Patient stable at time of discharge. Review discharge instructions, medications, and follow up with patient. Allowed time for questions. Patient ambulatory out of department with steady gait.       Vandana Garcia RN  10/30/23 3220

## 2023-11-10 ENCOUNTER — OFFICE VISIT (OUTPATIENT)
Facility: CLINIC | Age: 28
End: 2023-11-10
Payer: COMMERCIAL

## 2023-11-10 VITALS
RESPIRATION RATE: 18 BRPM | HEART RATE: 101 BPM | WEIGHT: 189 LBS | HEIGHT: 66 IN | BODY MASS INDEX: 30.37 KG/M2 | TEMPERATURE: 98.7 F | OXYGEN SATURATION: 98 % | DIASTOLIC BLOOD PRESSURE: 79 MMHG | SYSTOLIC BLOOD PRESSURE: 115 MMHG

## 2023-11-10 DIAGNOSIS — E87.6 HYPOKALEMIA: Primary | ICD-10-CM

## 2023-11-10 DIAGNOSIS — R76.8 SS-A ANTIBODY POSITIVE: ICD-10-CM

## 2023-11-10 DIAGNOSIS — R76.8 RHEUMATOID FACTOR POSITIVE: ICD-10-CM

## 2023-11-10 DIAGNOSIS — R73.03 PREDIABETES: ICD-10-CM

## 2023-11-10 DIAGNOSIS — M35.00 SJOGREN SYNDROME, UNSPECIFIED (HCC): ICD-10-CM

## 2023-11-10 DIAGNOSIS — I10 ESSENTIAL HYPERTENSION: ICD-10-CM

## 2023-11-10 DIAGNOSIS — R76.8 ANA POSITIVE: ICD-10-CM

## 2023-11-10 DIAGNOSIS — M25.50 ARTHRALGIA, UNSPECIFIED JOINT: ICD-10-CM

## 2023-11-10 DIAGNOSIS — Z23 ENCOUNTER FOR IMMUNIZATION: ICD-10-CM

## 2023-11-10 PROCEDURE — 3074F SYST BP LT 130 MM HG: CPT | Performed by: NURSE PRACTITIONER

## 2023-11-10 PROCEDURE — 90471 IMMUNIZATION ADMIN: CPT | Performed by: NURSE PRACTITIONER

## 2023-11-10 PROCEDURE — 90674 CCIIV4 VAC NO PRSV 0.5 ML IM: CPT | Performed by: NURSE PRACTITIONER

## 2023-11-10 PROCEDURE — 3078F DIAST BP <80 MM HG: CPT | Performed by: NURSE PRACTITIONER

## 2023-11-10 PROCEDURE — 99214 OFFICE O/P EST MOD 30 MIN: CPT | Performed by: NURSE PRACTITIONER

## 2023-11-10 RX ORDER — POTASSIUM CHLORIDE 20 MEQ/1
20 TABLET, EXTENDED RELEASE ORAL DAILY
Qty: 90 TABLET | Refills: 1 | Status: SHIPPED | OUTPATIENT
Start: 2023-11-10

## 2023-11-10 RX ORDER — CYCLOBENZAPRINE HCL 10 MG
10 TABLET ORAL 3 TIMES DAILY PRN
Qty: 90 TABLET | Refills: 0 | Status: SHIPPED | OUTPATIENT
Start: 2023-11-10

## 2023-11-10 RX ORDER — NAPROXEN 500 MG/1
500 TABLET ORAL 2 TIMES DAILY WITH MEALS
Qty: 60 TABLET | Refills: 5 | Status: SHIPPED | OUTPATIENT
Start: 2023-11-10

## 2023-11-10 ASSESSMENT — PATIENT HEALTH QUESTIONNAIRE - PHQ9
SUM OF ALL RESPONSES TO PHQ QUESTIONS 1-9: 0
SUM OF ALL RESPONSES TO PHQ9 QUESTIONS 1 & 2: 0
2. FEELING DOWN, DEPRESSED OR HOPELESS: 0
SUM OF ALL RESPONSES TO PHQ QUESTIONS 1-9: 0
1. LITTLE INTEREST OR PLEASURE IN DOING THINGS: 0

## 2023-11-10 NOTE — PROGRESS NOTES
After obtaining written consent and per orders of Dr. Tobi Miranda, injection of Flucelvax given by Shiva Goel MA, CMA. Patient tolerated procedure well. VIS was given to them. No reactions noted.

## 2023-11-10 NOTE — PROGRESS NOTES
Gorge Rea (: 1995) is a 25 y.o. female here for evaluation of the following chief complaint(s):  Follow-up (4 month f/u/Patient wants to discuss about a handicapped sticker, potassium pills, muscle relaxers, leg, and knee)         SUBJECTIVE/OBJECTIVE:  HPI    Ms. Kacey Barker, 24 yo female, here today for general follow-up. She has been to ED 3 times for dizziness and was found to be hypokalemic and hyponatremic. IV potassium and fluids always improved her symptoms. She is currently taking Chlorthalidone 25 mg daily, Valsartan 320 mg daily and KL+ 20 mEq daily. We discussed changing these medication d/t this most likely being the cause but she does not want to change any BP medication d/t \"I finally find medication that controls my blood pressure, I do not want to make any change\"    Today she reports feeling well. No dizziness, has been taking medication as prescribed. BP in office 115/79. Denies CP, heart palpitations. EKG have been SR . Will need to get lab work today and refill medication. Allergies   Allergen Reactions    Hydrochlorothiazide Other (See Comments)     Low potassium    Metronidazole Itching       Current Outpatient Medications   Medication Sig Dispense Refill    potassium chloride (KLOR-CON M) 20 MEQ extended release tablet Take 1 tablet by mouth daily 90 tablet 1    cyclobenzaprine (FLEXERIL) 10 MG tablet Take 1 tablet by mouth 3 times daily as needed for Muscle spasms 90 tablet 0    naproxen (NAPROSYN) 500 MG tablet Take 1 tablet by mouth 2 times daily (with meals) 60 tablet 5    fluticasone (FLONASE) 50 MCG/ACT nasal spray 2 sprays by Nasal route daily      valsartan (DIOVAN) 320 MG tablet TAKE 1 TABLET BY MOUTH DAILY.  ESTABLISH WITH NEW PCP BEFORE NEXT REFILL 90 tablet 0    chlorthalidone (HYGROTON) 25 MG tablet Take 1 tablet by mouth daily 90 tablet 0    B Complex Vitamins (VITAMIN B COMPLEX PO) Take by mouth      ZINC PO Take 50 mg by mouth daily      KLOR-CON 20 MEQ

## 2023-11-11 LAB
ALBUMIN SERPL-MCNC: 4 G/DL (ref 3.5–5)
ALBUMIN/GLOB SERPL: 0.7 (ref 1.1–2.2)
ALP SERPL-CCNC: 40 U/L (ref 45–117)
ALT SERPL-CCNC: 28 U/L (ref 12–78)
ANION GAP SERPL CALC-SCNC: 3 MMOL/L (ref 5–15)
AST SERPL-CCNC: 22 U/L (ref 15–37)
BASOPHILS # BLD: 0 K/UL (ref 0–0.1)
BASOPHILS NFR BLD: 0 % (ref 0–1)
BILIRUB SERPL-MCNC: 0.4 MG/DL (ref 0.2–1)
BUN SERPL-MCNC: 18 MG/DL (ref 6–20)
BUN/CREAT SERPL: 23 (ref 12–20)
CALCIUM SERPL-MCNC: 9.4 MG/DL (ref 8.5–10.1)
CHLORIDE SERPL-SCNC: 100 MMOL/L (ref 97–108)
CO2 SERPL-SCNC: 29 MMOL/L (ref 21–32)
CREAT SERPL-MCNC: 0.78 MG/DL (ref 0.55–1.02)
DIFFERENTIAL METHOD BLD: ABNORMAL
EOSINOPHIL # BLD: 0.1 K/UL (ref 0–0.4)
EOSINOPHIL NFR BLD: 1 % (ref 0–7)
ERYTHROCYTE [DISTWIDTH] IN BLOOD BY AUTOMATED COUNT: 12.8 % (ref 11.5–14.5)
EST. AVERAGE GLUCOSE BLD GHB EST-MCNC: 100 MG/DL
GLOBULIN SER CALC-MCNC: 5.9 G/DL (ref 2–4)
GLUCOSE SERPL-MCNC: 81 MG/DL (ref 65–100)
HBA1C MFR BLD: 5.1 % (ref 4–5.6)
HCT VFR BLD AUTO: 38 % (ref 35–47)
HGB BLD-MCNC: 12 G/DL (ref 11.5–16)
IMM GRANULOCYTES # BLD AUTO: 0 K/UL (ref 0–0.04)
IMM GRANULOCYTES NFR BLD AUTO: 0 % (ref 0–0.5)
LYMPHOCYTES # BLD: 2.1 K/UL (ref 0.8–3.5)
LYMPHOCYTES NFR BLD: 23 % (ref 12–49)
MCH RBC QN AUTO: 29.9 PG (ref 26–34)
MCHC RBC AUTO-ENTMCNC: 31.6 G/DL (ref 30–36.5)
MCV RBC AUTO: 94.8 FL (ref 80–99)
MONOCYTES # BLD: 0.7 K/UL (ref 0–1)
MONOCYTES NFR BLD: 8 % (ref 5–13)
NEUTS SEG # BLD: 6.1 K/UL (ref 1.8–8)
NEUTS SEG NFR BLD: 68 % (ref 32–75)
NRBC # BLD: 0 K/UL (ref 0–0.01)
NRBC BLD-RTO: 0 PER 100 WBC
PLATELET # BLD AUTO: 459 K/UL (ref 150–400)
PMV BLD AUTO: 9.4 FL (ref 8.9–12.9)
POTASSIUM SERPL-SCNC: 3.2 MMOL/L (ref 3.5–5.1)
PROT SERPL-MCNC: 9.9 G/DL (ref 6.4–8.2)
RBC # BLD AUTO: 4.01 M/UL (ref 3.8–5.2)
SODIUM SERPL-SCNC: 132 MMOL/L (ref 136–145)
WBC # BLD AUTO: 9 K/UL (ref 3.6–11)

## 2023-11-13 ENCOUNTER — HOSPITAL ENCOUNTER (EMERGENCY)
Facility: HOSPITAL | Age: 28
Discharge: HOME | End: 2023-11-13
Attending: EMERGENCY MEDICINE
Payer: COMMERCIAL

## 2023-11-13 VITALS
RESPIRATION RATE: 16 BRPM | OXYGEN SATURATION: 99 % | HEIGHT: 66 IN | TEMPERATURE: 98.2 F | WEIGHT: 192.24 LBS | DIASTOLIC BLOOD PRESSURE: 84 MMHG | BODY MASS INDEX: 30.9 KG/M2 | SYSTOLIC BLOOD PRESSURE: 113 MMHG | HEART RATE: 81 BPM

## 2023-11-13 DIAGNOSIS — E87.6 HYPOKALEMIA: ICD-10-CM

## 2023-11-13 DIAGNOSIS — R42 DIZZINESS: Primary | ICD-10-CM

## 2023-11-13 LAB
ALBUMIN SERPL-MCNC: 3.3 G/DL (ref 3.5–5)
ALBUMIN/GLOB SERPL: 0.6 (ref 1.1–2.2)
ALP SERPL-CCNC: 42 U/L (ref 45–117)
ALT SERPL-CCNC: 27 U/L (ref 12–78)
ANION GAP SERPL CALC-SCNC: 6 MMOL/L (ref 5–15)
APPEARANCE UR: CLEAR
AST SERPL-CCNC: 24 U/L (ref 15–37)
BACTERIA URNS QL MICRO: ABNORMAL /HPF
BASOPHILS # BLD: 0 K/UL (ref 0–0.1)
BASOPHILS NFR BLD: 0 % (ref 0–1)
BILIRUB SERPL-MCNC: 0.4 MG/DL (ref 0.2–1)
BILIRUB UR QL: NEGATIVE
BUN SERPL-MCNC: 9 MG/DL (ref 6–20)
BUN/CREAT SERPL: 11 (ref 12–20)
CALCIUM SERPL-MCNC: 8.9 MG/DL (ref 8.5–10.1)
CHLORIDE SERPL-SCNC: 96 MMOL/L (ref 97–108)
CO2 SERPL-SCNC: 29 MMOL/L (ref 21–32)
COLOR UR: ABNORMAL
CREAT SERPL-MCNC: 0.79 MG/DL (ref 0.55–1.02)
DIFFERENTIAL METHOD BLD: ABNORMAL
EKG ATRIAL RATE: 82 BPM
EKG DIAGNOSIS: NORMAL
EKG P AXIS: 56 DEGREES
EKG P-R INTERVAL: 180 MS
EKG Q-T INTERVAL: 402 MS
EKG QRS DURATION: 82 MS
EKG QTC CALCULATION (BAZETT): 469 MS
EKG R AXIS: 15 DEGREES
EKG T AXIS: 13 DEGREES
EKG VENTRICULAR RATE: 82 BPM
EOSINOPHIL # BLD: 0.1 K/UL (ref 0–0.4)
EOSINOPHIL NFR BLD: 2 % (ref 0–7)
EPITH CASTS URNS QL MICRO: ABNORMAL /LPF
ERYTHROCYTE [DISTWIDTH] IN BLOOD BY AUTOMATED COUNT: 12.7 % (ref 11.5–14.5)
GLOBULIN SER CALC-MCNC: 5.7 G/DL (ref 2–4)
GLUCOSE SERPL-MCNC: 127 MG/DL (ref 65–100)
GLUCOSE UR STRIP.AUTO-MCNC: NEGATIVE MG/DL
HCT VFR BLD AUTO: 33.7 % (ref 35–47)
HGB BLD-MCNC: 11 G/DL (ref 11.5–16)
HGB UR QL STRIP: NEGATIVE
IMM GRANULOCYTES # BLD AUTO: 0 K/UL (ref 0–0.04)
IMM GRANULOCYTES NFR BLD AUTO: 0 % (ref 0–0.5)
KETONES UR QL STRIP.AUTO: NEGATIVE MG/DL
LEUKOCYTE ESTERASE UR QL STRIP.AUTO: NEGATIVE
LYMPHOCYTES # BLD: 1.7 K/UL (ref 0.8–3.5)
LYMPHOCYTES NFR BLD: 27 % (ref 12–49)
MAGNESIUM SERPL-MCNC: 2 MG/DL (ref 1.6–2.4)
MCH RBC QN AUTO: 30.5 PG (ref 26–34)
MCHC RBC AUTO-ENTMCNC: 32.6 G/DL (ref 30–36.5)
MCV RBC AUTO: 93.4 FL (ref 80–99)
MONOCYTES # BLD: 0.4 K/UL (ref 0–1)
MONOCYTES NFR BLD: 6 % (ref 5–13)
NEUTS SEG # BLD: 4 K/UL (ref 1.8–8)
NEUTS SEG NFR BLD: 65 % (ref 32–75)
NITRITE UR QL STRIP.AUTO: NEGATIVE
NRBC # BLD: 0 K/UL (ref 0–0.01)
NRBC BLD-RTO: 0 PER 100 WBC
PH UR STRIP: 6 (ref 5–8)
PLATELET # BLD AUTO: 385 K/UL (ref 150–400)
PMV BLD AUTO: 9.2 FL (ref 8.9–12.9)
POTASSIUM SERPL-SCNC: 3 MMOL/L (ref 3.5–5.1)
PROT SERPL-MCNC: 9 G/DL (ref 6.4–8.2)
PROT UR STRIP-MCNC: NEGATIVE MG/DL
RBC # BLD AUTO: 3.61 M/UL (ref 3.8–5.2)
RBC #/AREA URNS HPF: ABNORMAL /HPF (ref 0–5)
SODIUM SERPL-SCNC: 131 MMOL/L (ref 136–145)
SP GR UR REFRACTOMETRY: 1.01 (ref 1–1.03)
URINE CULTURE IF INDICATED: ABNORMAL
UROBILINOGEN UR QL STRIP.AUTO: 0.2 EU/DL (ref 0.2–1)
WBC # BLD AUTO: 6.2 K/UL (ref 3.6–11)
WBC URNS QL MICRO: ABNORMAL /HPF (ref 0–4)

## 2023-11-13 PROCEDURE — 6370000000 HC RX 637 (ALT 250 FOR IP): Performed by: EMERGENCY MEDICINE

## 2023-11-13 PROCEDURE — 36415 COLL VENOUS BLD VENIPUNCTURE: CPT

## 2023-11-13 PROCEDURE — 83735 ASSAY OF MAGNESIUM: CPT

## 2023-11-13 PROCEDURE — 80053 COMPREHEN METABOLIC PANEL: CPT

## 2023-11-13 PROCEDURE — 99284 EMERGENCY DEPT VISIT MOD MDM: CPT

## 2023-11-13 PROCEDURE — 85025 COMPLETE CBC W/AUTO DIFF WBC: CPT

## 2023-11-13 PROCEDURE — 81001 URINALYSIS AUTO W/SCOPE: CPT

## 2023-11-13 RX ORDER — POTASSIUM CHLORIDE 750 MG/1
40 TABLET, FILM COATED, EXTENDED RELEASE ORAL
Status: COMPLETED | OUTPATIENT
Start: 2023-11-13 | End: 2023-11-13

## 2023-11-13 RX ADMIN — POTASSIUM CHLORIDE 40 MEQ: 750 TABLET, FILM COATED, EXTENDED RELEASE ORAL at 18:46

## 2023-11-13 ASSESSMENT — ENCOUNTER SYMPTOMS
ABDOMINAL PAIN: 0
SHORTNESS OF BREATH: 0
VISUAL CHANGE: 0
NAUSEA: 0
BACK PAIN: 0
DIARRHEA: 0
COLOR CHANGE: 0
CONSTIPATION: 0
VOMITING: 0

## 2023-11-13 NOTE — ED PROVIDER NOTES
to edit the dictations but occasionally words are mis-transcribed.)    Anais Rm MD (electronically signed)  Attending Emergency Physician           Nancy Wilkins MD  11/13/23 2023

## 2023-11-15 ENCOUNTER — HOSPITAL ENCOUNTER (EMERGENCY)
Facility: HOSPITAL | Age: 28
Discharge: HOME | End: 2023-11-15
Attending: EMERGENCY MEDICINE
Payer: COMMERCIAL

## 2023-11-15 ENCOUNTER — TELEPHONE (OUTPATIENT)
Facility: CLINIC | Age: 28
End: 2023-11-15

## 2023-11-15 VITALS
DIASTOLIC BLOOD PRESSURE: 104 MMHG | OXYGEN SATURATION: 100 % | HEART RATE: 84 BPM | TEMPERATURE: 98.8 F | RESPIRATION RATE: 16 BRPM | SYSTOLIC BLOOD PRESSURE: 123 MMHG | HEIGHT: 66 IN | BODY MASS INDEX: 30.9 KG/M2 | WEIGHT: 192.24 LBS

## 2023-11-15 DIAGNOSIS — E87.6 HYPOKALEMIA: Primary | ICD-10-CM

## 2023-11-15 DIAGNOSIS — R00.2 PALPITATIONS: ICD-10-CM

## 2023-11-15 DIAGNOSIS — R42 LIGHTHEADEDNESS: ICD-10-CM

## 2023-11-15 LAB
ANION GAP SERPL CALC-SCNC: 8 MMOL/L (ref 5–15)
APPEARANCE UR: CLEAR
BACTERIA URNS QL MICRO: NEGATIVE /HPF
BASOPHILS # BLD: 0 K/UL (ref 0–0.1)
BASOPHILS NFR BLD: 0 % (ref 0–1)
BILIRUB UR QL: NEGATIVE
BUN SERPL-MCNC: 9 MG/DL (ref 6–20)
BUN/CREAT SERPL: 11 (ref 12–20)
CALCIUM SERPL-MCNC: 9.3 MG/DL (ref 8.5–10.1)
CHLORIDE SERPL-SCNC: 95 MMOL/L (ref 97–108)
CO2 SERPL-SCNC: 30 MMOL/L (ref 21–32)
COLOR UR: ABNORMAL
CREAT SERPL-MCNC: 0.82 MG/DL (ref 0.55–1.02)
DIFFERENTIAL METHOD BLD: ABNORMAL
EKG ATRIAL RATE: 79 BPM
EKG DIAGNOSIS: NORMAL
EKG P AXIS: 45 DEGREES
EKG P-R INTERVAL: 180 MS
EKG Q-T INTERVAL: 402 MS
EKG QRS DURATION: 84 MS
EKG QTC CALCULATION (BAZETT): 460 MS
EKG R AXIS: 2 DEGREES
EKG T AXIS: 7 DEGREES
EKG VENTRICULAR RATE: 79 BPM
EOSINOPHIL # BLD: 0.1 K/UL (ref 0–0.4)
EOSINOPHIL NFR BLD: 1 % (ref 0–7)
EPITH CASTS URNS QL MICRO: ABNORMAL /LPF
ERYTHROCYTE [DISTWIDTH] IN BLOOD BY AUTOMATED COUNT: 12.7 % (ref 11.5–14.5)
GLUCOSE SERPL-MCNC: 108 MG/DL (ref 65–100)
GLUCOSE UR STRIP.AUTO-MCNC: NEGATIVE MG/DL
HCG UR QL: NEGATIVE
HCT VFR BLD AUTO: 33.9 % (ref 35–47)
HGB BLD-MCNC: 11.2 G/DL (ref 11.5–16)
HGB UR QL STRIP: ABNORMAL
IMM GRANULOCYTES # BLD AUTO: 0 K/UL (ref 0–0.04)
IMM GRANULOCYTES NFR BLD AUTO: 0 % (ref 0–0.5)
KETONES UR QL STRIP.AUTO: NEGATIVE MG/DL
LEUKOCYTE ESTERASE UR QL STRIP.AUTO: NEGATIVE
LYMPHOCYTES # BLD: 1.6 K/UL (ref 0.8–3.5)
LYMPHOCYTES NFR BLD: 18 % (ref 12–49)
MAGNESIUM SERPL-MCNC: 1.8 MG/DL (ref 1.6–2.4)
MCH RBC QN AUTO: 30.4 PG (ref 26–34)
MCHC RBC AUTO-ENTMCNC: 33 G/DL (ref 30–36.5)
MCV RBC AUTO: 92.1 FL (ref 80–99)
MONOCYTES # BLD: 0.5 K/UL (ref 0–1)
MONOCYTES NFR BLD: 6 % (ref 5–13)
NEUTS SEG # BLD: 6.7 K/UL (ref 1.8–8)
NEUTS SEG NFR BLD: 75 % (ref 32–75)
NITRITE UR QL STRIP.AUTO: NEGATIVE
NRBC # BLD: 0 K/UL (ref 0–0.01)
NRBC BLD-RTO: 0 PER 100 WBC
PH UR STRIP: 6.5 (ref 5–8)
PLATELET # BLD AUTO: 399 K/UL (ref 150–400)
PMV BLD AUTO: 9.1 FL (ref 8.9–12.9)
POTASSIUM SERPL-SCNC: 2.9 MMOL/L (ref 3.5–5.1)
PROT UR STRIP-MCNC: NEGATIVE MG/DL
RBC # BLD AUTO: 3.68 M/UL (ref 3.8–5.2)
RBC #/AREA URNS HPF: ABNORMAL /HPF (ref 0–5)
SODIUM SERPL-SCNC: 133 MMOL/L (ref 136–145)
SP GR UR REFRACTOMETRY: <1.005 (ref 1–1.03)
SPECIMEN HOLD: NORMAL
UROBILINOGEN UR QL STRIP.AUTO: 0.2 EU/DL (ref 0.2–1)
WBC # BLD AUTO: 9 K/UL (ref 3.6–11)
WBC URNS QL MICRO: ABNORMAL /HPF (ref 0–4)

## 2023-11-15 PROCEDURE — 83735 ASSAY OF MAGNESIUM: CPT

## 2023-11-15 PROCEDURE — 80048 BASIC METABOLIC PNL TOTAL CA: CPT

## 2023-11-15 PROCEDURE — 85025 COMPLETE CBC W/AUTO DIFF WBC: CPT

## 2023-11-15 PROCEDURE — 6370000000 HC RX 637 (ALT 250 FOR IP): Performed by: EMERGENCY MEDICINE

## 2023-11-15 PROCEDURE — 93005 ELECTROCARDIOGRAM TRACING: CPT | Performed by: EMERGENCY MEDICINE

## 2023-11-15 PROCEDURE — 81001 URINALYSIS AUTO W/SCOPE: CPT

## 2023-11-15 PROCEDURE — 96360 HYDRATION IV INFUSION INIT: CPT

## 2023-11-15 PROCEDURE — 81025 URINE PREGNANCY TEST: CPT

## 2023-11-15 PROCEDURE — 99284 EMERGENCY DEPT VISIT MOD MDM: CPT

## 2023-11-15 PROCEDURE — 36415 COLL VENOUS BLD VENIPUNCTURE: CPT

## 2023-11-15 PROCEDURE — 2580000003 HC RX 258: Performed by: EMERGENCY MEDICINE

## 2023-11-15 RX ORDER — POTASSIUM CHLORIDE 1.5 G/1
20 POWDER, FOR SOLUTION ORAL 2 TIMES DAILY
Qty: 60 EACH | Refills: 0 | Status: SHIPPED | OUTPATIENT
Start: 2023-11-15

## 2023-11-15 RX ORDER — 0.9 % SODIUM CHLORIDE 0.9 %
500 INTRAVENOUS SOLUTION INTRAVENOUS ONCE
Status: COMPLETED | OUTPATIENT
Start: 2023-11-15 | End: 2023-11-15

## 2023-11-15 RX ORDER — POTASSIUM CHLORIDE 750 MG/1
40 TABLET, FILM COATED, EXTENDED RELEASE ORAL ONCE
Status: COMPLETED | OUTPATIENT
Start: 2023-11-15 | End: 2023-11-15

## 2023-11-15 RX ADMIN — POTASSIUM CHLORIDE 40 MEQ: 750 TABLET, FILM COATED, EXTENDED RELEASE ORAL at 18:40

## 2023-11-15 RX ADMIN — SODIUM CHLORIDE 500 ML: 9 INJECTION, SOLUTION INTRAVENOUS at 17:31

## 2023-11-15 ASSESSMENT — ENCOUNTER SYMPTOMS
SHORTNESS OF BREATH: 0
CHEST TIGHTNESS: 0

## 2023-11-15 ASSESSMENT — PAIN - FUNCTIONAL ASSESSMENT: PAIN_FUNCTIONAL_ASSESSMENT: NONE - DENIES PAIN

## 2023-11-15 NOTE — ED NOTES
Patient stable at time of discharge. Reviewed discharge instructions, follow up, and home care with patient. Allowed time for questions. Patient verbalized understanding. Ambulatory out of department accompanied by mother.       Cesar Medina RN  11/15/23 [id number]

## 2023-11-15 NOTE — TELEPHONE ENCOUNTER
Pt was prescribed potassium but she states it makes her feel like she's going to pass out. She states it makes her head feel heavy and she feels weak all over. Please advise.

## 2023-11-15 NOTE — ED TRIAGE NOTES
Pt reports she has been here multiple times for low potassium. Pt reports she has returned today because she is having dizziness when she takes her potassium pills. Pt reports that usually 30 minutes after taking medication is when she feels so dizzy and weak. Pt reports when she takes the potassium in the ER she feels fine but the RX she has at home for her potassium is causing her to feel this way.

## 2023-11-15 NOTE — ED PROVIDER NOTES
[facility] EMERGENCY CTR  EMERGENCY DEPARTMENT ENCOUNTER      Pt Name: Davy Coleman  MRN: [id number]  [address] [facility] [address] 1995  Date of evaluation: 11/15/2023  Provider: Wilmer Ashton MD    CHIEF COMPLAINT       Chief Complaint   Patient presents with    Medication Reaction         HISTORY OF PRESENT ILLNESS   (Location/Symptom, Timing/Onset, Context/Setting, Quality, Duration, Modifying Factors, Severity)  Note limiting factors. Patient is a 38-year-old with a history of hypertension who is currently on valsartan and chlorthalidone. Testing demonstrated hypokalemia and she was started on a potassium supplement. Patient reports that since starting with the potassium supplement she has had increasing fatigue with lightheadedness and dizziness; she did not take the potassium today and reports that her dizziness has improved. The patient believes that she may have an allergy to the formulation dispensed by her pharmacy as she has not had any issues with the potassium administered in the emergency department. She has not had associated chest pain, shortness of breath, lower extremity swelling. She has been trying to supplement her potassium with mineral drinks and bananas today. She reports some episodes of palpitations and tachycardia today that improve with laying down. She has been eating and drinking well but continues to feel a little bit dehydrated. The history is provided by the patient. Review of External Medical Records:     Nursing Notes were reviewed. REVIEW OF SYSTEMS    (2-9 systems for level 4, 10 or more for level 5)     Review of Systems   Constitutional:  Negative for chills and fever. Respiratory:  Negative for chest tightness and shortness of breath. Cardiovascular:  Negative for chest pain and leg swelling. Neurological:  Positive for dizziness and light-headedness. All other systems reviewed and are negative.       Except as noted above the remainder of the review of

## 2023-11-16 ASSESSMENT — ENCOUNTER SYMPTOMS
BLOOD IN STOOL: 0
RECTAL PAIN: 0
TROUBLE SWALLOWING: 0
SINUS PRESSURE: 0
SHORTNESS OF BREATH: 0
APNEA: 0
BACK PAIN: 0
VOMITING: 0
CONSTIPATION: 0
EYE DISCHARGE: 0
PHOTOPHOBIA: 0
COLOR CHANGE: 0
FACIAL SWELLING: 0
SINUS PAIN: 0
EYE PAIN: 0
WHEEZING: 0
EYE REDNESS: 0
NAUSEA: 0
SORE THROAT: 0
ABDOMINAL PAIN: 0
DIARRHEA: 0
COUGH: 0
CHOKING: 0
ANAL BLEEDING: 0

## 2023-11-16 NOTE — TELEPHONE ENCOUNTER
Called patient and verified name and date of birth. Pt reports no issues with slow release potassium. Will  prescription tomorrow from pharmacy. Will continue to monitor her potassium intake and will try the new script. No further questions at this time.

## 2023-11-26 DIAGNOSIS — I10 ESSENTIAL (PRIMARY) HYPERTENSION: ICD-10-CM

## 2023-11-27 RX ORDER — CHLORTHALIDONE 25 MG/1
TABLET ORAL
Qty: 90 TABLET | Refills: 1 | Status: SHIPPED | OUTPATIENT
Start: 2023-11-27

## 2023-11-27 NOTE — TELEPHONE ENCOUNTER
PCP: JOY Lynn NP    Last appt: 11/10/2023    Future Appointments   Date Time Provider [facility] [address]   1/26/2024  4:00 PM JOY Lynn NP [facility] BS AMB       Requested Prescriptions     Pending Prescriptions Disp Refills    chlorthalidone (HYGROTON) 25 MG tablet [Pharmacy Med Name: CHLORTHALIDONE 25 MG TABLET] 90 tablet 1     Sig: TAKE 1 TABLET BY MOUTH EVERY DAY

## 2024-01-05 ENCOUNTER — HOSPITAL ENCOUNTER (EMERGENCY)
Facility: HOSPITAL | Age: 29
Discharge: HOME | End: 2024-01-05
Attending: STUDENT IN AN ORGANIZED HEALTH CARE EDUCATION/TRAINING PROGRAM
Payer: COMMERCIAL

## 2024-01-05 VITALS
HEART RATE: 85 BPM | TEMPERATURE: 98.4 F | RESPIRATION RATE: 18 BRPM | OXYGEN SATURATION: 99 % | SYSTOLIC BLOOD PRESSURE: 108 MMHG | DIASTOLIC BLOOD PRESSURE: 84 MMHG

## 2024-01-05 DIAGNOSIS — B34.9 VIRAL SYNDROME: Primary | ICD-10-CM

## 2024-01-05 LAB
DEPRECATED S PYO AG THROAT QL EIA: NEGATIVE
FLUAV AG NPH QL IA: NEGATIVE
FLUBV AG NOSE QL IA: NEGATIVE

## 2024-01-05 PROCEDURE — 87880 STREP A ASSAY W/OPTIC: CPT

## 2024-01-05 PROCEDURE — 87070 CULTURE OTHR SPECIMN AEROBIC: CPT

## 2024-01-05 PROCEDURE — 99283 EMERGENCY DEPT VISIT LOW MDM: CPT

## 2024-01-05 PROCEDURE — 87804 INFLUENZA ASSAY W/OPTIC: CPT

## 2024-01-05 RX ORDER — GUAIFENESIN 600 MG/1
600 TABLET, EXTENDED RELEASE ORAL 2 TIMES DAILY
Qty: 30 TABLET | Refills: 0 | Status: SHIPPED | OUTPATIENT
Start: 2024-01-05 | End: 2024-01-20

## 2024-01-05 RX ORDER — PSEUDOEPHEDRINE HYDROCHLORIDE 60 MG/1
60 TABLET, FILM COATED ORAL EVERY 6 HOURS PRN
Qty: 28 TABLET | Refills: 0 | Status: SHIPPED | OUTPATIENT
Start: 2024-01-05

## 2024-01-05 ASSESSMENT — PAIN DESCRIPTION - DESCRIPTORS: DESCRIPTORS: SORE

## 2024-01-05 ASSESSMENT — PAIN SCALES - GENERAL: PAINLEVEL_OUTOF10: 7

## 2024-01-05 ASSESSMENT — PAIN DESCRIPTION - LOCATION: LOCATION: BACK

## 2024-01-05 NOTE — ED PROVIDER NOTES
Vital Sign     Worried About Running Out of Food in the Last Year: Never true     Ran Out of Food in the Last Year: Never true       PHYSICAL EXAM       ED Triage Vitals [01/05/24 1030]   BP Temp Temp Source Pulse Respirations SpO2 Height Weight   108/84 98.4 °F (36.9 °C) Oral 85 18 99 % -- --     Physical Exam  Constitutional:       Appearance: Normal appearance.   HENT:      Head: Normocephalic and atraumatic.      Right Ear: Tympanic membrane is not erythematous.      Left Ear: Tympanic membrane is not erythematous.      Nose: Congestion present.      Mouth/Throat:      Mouth: Mucous membranes are moist.      Pharynx: Posterior oropharyngeal erythema present. No oropharyngeal exudate.   Eyes:      General: No scleral icterus.     Extraocular Movements: Extraocular movements intact.   Cardiovascular:      Rate and Rhythm: Normal rate.      Pulses: Normal pulses.   Pulmonary:      Effort: Pulmonary effort is normal.      Breath sounds: Normal breath sounds.   Abdominal:      General: Abdomen is flat.   Musculoskeletal:         General: No deformity or signs of injury.      Cervical back: Normal range of motion and neck supple.   Skin:     General: Skin is warm.   Neurological:      General: No focal deficit present.      Mental Status: She is alert.   Psychiatric:         Mood and Affect: Mood normal.         DIAGNOSTIC RESULTS   EKG: If obtained, EKG interpretation provided in the ED course    RADIOLOGY:   No orders to display        LABS:  Labs Reviewed   RAPID STREP SCREEN   RAPID INFLUENZA A/B ANTIGENS       All other labs were within normal range or not returned as of this dictation.    EMERGENCY DEPARTMENT COURSE and DIFFERENTIAL DIAGNOSIS/MDM:   Vitals:    Vitals:    01/05/24 1030   BP: 108/84   Pulse: 85   Resp: 18   Temp: 98.4 °F (36.9 °C)   TempSrc: Oral   SpO2: 99%       Medical Decision Making  28-year-old female with 2 to 3 days of upper respiratory symptoms.  Exam and presentation consistent with

## 2024-01-05 NOTE — ED TRIAGE NOTES
Patient arrives with cc of sore throat, chills, body aches, and low grade fever starting last night.Patient reports taking naproxen last night. Reports dark urine over the past two days. Denies nausea, vomiting, dysuria, cough. Patient arrives ambulatory, A & O x4, pov.

## 2024-01-07 LAB
BACTERIA SPEC CULT: NORMAL
SERVICE CMNT-IMP: NORMAL

## 2024-01-12 ENCOUNTER — HOSPITAL ENCOUNTER (EMERGENCY)
Facility: HOSPITAL | Age: 29
Discharge: HOME | End: 2024-01-12
Attending: EMERGENCY MEDICINE
Payer: COMMERCIAL

## 2024-01-12 VITALS
HEIGHT: 65 IN | BODY MASS INDEX: 31.65 KG/M2 | WEIGHT: 190 LBS | DIASTOLIC BLOOD PRESSURE: 95 MMHG | SYSTOLIC BLOOD PRESSURE: 122 MMHG | RESPIRATION RATE: 15 BRPM | TEMPERATURE: 98.2 F | OXYGEN SATURATION: 99 % | HEART RATE: 86 BPM

## 2024-01-12 DIAGNOSIS — E87.6 HYPOKALEMIA: ICD-10-CM

## 2024-01-12 DIAGNOSIS — R51.9 NONINTRACTABLE HEADACHE, UNSPECIFIED CHRONICITY PATTERN, UNSPECIFIED HEADACHE TYPE: Primary | ICD-10-CM

## 2024-01-12 DIAGNOSIS — J01.80 OTHER ACUTE SINUSITIS, RECURRENCE NOT SPECIFIED: ICD-10-CM

## 2024-01-12 LAB
ALBUMIN SERPL-MCNC: 3.3 G/DL (ref 3.5–5)
ALBUMIN/GLOB SERPL: 0.6 (ref 1.1–2.2)
ALP SERPL-CCNC: 37 U/L (ref 45–117)
ALT SERPL-CCNC: 19 U/L (ref 12–78)
ANION GAP SERPL CALC-SCNC: 9 MMOL/L (ref 5–15)
AST SERPL-CCNC: 22 U/L (ref 15–37)
BILIRUB SERPL-MCNC: 0.3 MG/DL (ref 0.2–1)
BUN SERPL-MCNC: 9 MG/DL (ref 6–20)
BUN/CREAT SERPL: 12 (ref 12–20)
CALCIUM SERPL-MCNC: 8.9 MG/DL (ref 8.5–10.1)
CHLORIDE SERPL-SCNC: 97 MMOL/L (ref 97–108)
CO2 SERPL-SCNC: 30 MMOL/L (ref 21–32)
CREAT SERPL-MCNC: 0.75 MG/DL (ref 0.55–1.02)
GLOBULIN SER CALC-MCNC: 5.5 G/DL (ref 2–4)
GLUCOSE SERPL-MCNC: 92 MG/DL (ref 65–100)
POTASSIUM SERPL-SCNC: 3.1 MMOL/L (ref 3.5–5.1)
PROT SERPL-MCNC: 8.8 G/DL (ref 6.4–8.2)
SODIUM SERPL-SCNC: 136 MMOL/L (ref 136–145)

## 2024-01-12 PROCEDURE — 80053 COMPREHEN METABOLIC PANEL: CPT

## 2024-01-12 PROCEDURE — 36415 COLL VENOUS BLD VENIPUNCTURE: CPT

## 2024-01-12 PROCEDURE — 96375 TX/PRO/DX INJ NEW DRUG ADDON: CPT

## 2024-01-12 PROCEDURE — 96361 HYDRATE IV INFUSION ADD-ON: CPT

## 2024-01-12 PROCEDURE — 6360000002 HC RX W HCPCS

## 2024-01-12 PROCEDURE — 99284 EMERGENCY DEPT VISIT MOD MDM: CPT

## 2024-01-12 PROCEDURE — 2580000003 HC RX 258

## 2024-01-12 PROCEDURE — 96374 THER/PROPH/DIAG INJ IV PUSH: CPT

## 2024-01-12 RX ORDER — 0.9 % SODIUM CHLORIDE 0.9 %
1000 INTRAVENOUS SOLUTION INTRAVENOUS ONCE
Status: COMPLETED | OUTPATIENT
Start: 2024-01-12 | End: 2024-01-12

## 2024-01-12 RX ORDER — METOCLOPRAMIDE HYDROCHLORIDE 5 MG/ML
10 INJECTION INTRAMUSCULAR; INTRAVENOUS EVERY 6 HOURS
Status: DISCONTINUED | OUTPATIENT
Start: 2024-01-12 | End: 2024-01-12

## 2024-01-12 RX ORDER — FLUTICASONE PROPIONATE 50 MCG
2 SPRAY, SUSPENSION (ML) NASAL DAILY
Qty: 16 G | Refills: 0 | Status: SHIPPED | OUTPATIENT
Start: 2024-01-12

## 2024-01-12 RX ORDER — DEXAMETHASONE SODIUM PHOSPHATE 4 MG/ML
4 INJECTION, SOLUTION INTRA-ARTICULAR; INTRALESIONAL; INTRAMUSCULAR; INTRAVENOUS; SOFT TISSUE ONCE
Status: COMPLETED | OUTPATIENT
Start: 2024-01-12 | End: 2024-01-12

## 2024-01-12 RX ORDER — KETOROLAC TROMETHAMINE 30 MG/ML
15 INJECTION, SOLUTION INTRAMUSCULAR; INTRAVENOUS
Status: COMPLETED | OUTPATIENT
Start: 2024-01-12 | End: 2024-01-12

## 2024-01-12 RX ADMIN — KETOROLAC TROMETHAMINE 15 MG: 30 INJECTION INTRAMUSCULAR; INTRAVENOUS at 18:55

## 2024-01-12 RX ADMIN — METOCLOPRAMIDE 10 MG: 5 INJECTION, SOLUTION INTRAMUSCULAR; INTRAVENOUS at 18:55

## 2024-01-12 RX ADMIN — SODIUM CHLORIDE 1000 ML: 9 INJECTION, SOLUTION INTRAVENOUS at 18:55

## 2024-01-12 RX ADMIN — DEXAMETHASONE SODIUM PHOSPHATE 4 MG: 4 INJECTION INTRA-ARTICULAR; INTRALESIONAL; INTRAMUSCULAR; INTRAVENOUS; SOFT TISSUE at 18:55

## 2024-01-12 ASSESSMENT — PAIN DESCRIPTION - FREQUENCY: FREQUENCY: CONTINUOUS

## 2024-01-12 ASSESSMENT — ENCOUNTER SYMPTOMS
CHEST TIGHTNESS: 0
NAUSEA: 0
WHEEZING: 0
DIARRHEA: 0
VOMITING: 0

## 2024-01-12 ASSESSMENT — PAIN SCALES - GENERAL
PAINLEVEL_OUTOF10: 9
PAINLEVEL_OUTOF10: 0

## 2024-01-12 ASSESSMENT — PAIN DESCRIPTION - LOCATION: LOCATION: HEAD

## 2024-01-12 ASSESSMENT — PAIN DESCRIPTION - DESCRIPTORS: DESCRIPTORS: ACHING;PRESSURE

## 2024-01-12 ASSESSMENT — PAIN DESCRIPTION - PAIN TYPE: TYPE: ACUTE PAIN

## 2024-01-12 ASSESSMENT — PAIN - FUNCTIONAL ASSESSMENT
PAIN_FUNCTIONAL_ASSESSMENT: 0-10
PAIN_FUNCTIONAL_ASSESSMENT: 0-10

## 2024-01-12 NOTE — ED PROVIDER NOTES
light.   Cardiovascular:      Rate and Rhythm: Normal rate.   Pulmonary:      Effort: Pulmonary effort is normal.      Breath sounds: Normal breath sounds.   Abdominal:      Tenderness: There is no abdominal tenderness.   Musculoskeletal:         General: Normal range of motion.      Cervical back: Normal range of motion and neck supple. No rigidity or tenderness.   Skin:     General: Skin is warm and dry.      Capillary Refill: Capillary refill takes less than 2 seconds.   Neurological:      General: No focal deficit present.      Mental Status: She is alert and oriented to person, place, and time.      Cranial Nerves: No cranial nerve deficit.   Psychiatric:         Mood and Affect: Mood normal.         Behavior: Behavior normal.         DIAGNOSTIC RESULTS     EKG: All EKG's are interpreted by the Emergency Department Physician who either signs or Co-signs this chart in the absence of a cardiologist.        RADIOLOGY:   Non-plain film images such as CT, Ultrasound and MRI are read by the radiologist. Plain radiographic images are visualized and preliminarily interpreted by the emergency physician with the below findings:        Interpretation per the Radiologist below, if available at the time of this note:    No orders to display        LABS:  Labs Reviewed   COMPREHENSIVE METABOLIC PANEL - Abnormal; Notable for the following components:       Result Value    Potassium 3.1 (*)     Alk Phosphatase 37 (*)     Total Protein 8.8 (*)     Albumin 3.3 (*)     Globulin 5.5 (*)     Albumin/Globulin Ratio 0.6 (*)     All other components within normal limits       All other labs were within normal range or not returned as of this dictation.    EMERGENCY DEPARTMENT COURSE and DIFFERENTIAL DIAGNOSIS/MDM:   Vitals:    Vitals:    01/12/24 1757 01/12/24 1800   BP: (!) 135/100 (!) 122/95   Pulse: 86    Resp: 15    Temp: 98.2 °F (36.8 °C)    TempSrc: Oral    SpO2: 99%    Weight: 86.2 kg (190 lb)    Height: 1.651 m (5' 5\")

## 2024-01-13 NOTE — DISCHARGE INSTRUCTIONS
Please continue to take your oral potassium as originally prescribed and follow-up with your primary care given your visit here today.  We are sending a prescription for Flonase to your pharmacy.  Please take as prescribed.  If symptoms worsen or new concerning symptoms arise, please report to the nearest emergency department.    Thank you for allowing us to provide you with medical care today.  We realize that you have many choices for your emergency care needs.  We thank you for choosing [facility].  Please choose us in the future for any continued health care needs.     The exam and treatment you received in the Emergency Department were for an emergent problem and are not intended as complete care. It is important that you follow up with a doctor, nurse practitioner, or physician's assistant for ongoing care. If your symptoms worsen or you do not improve as expected and you are unable to reach your usual health care provider, you should return to the Emergency Department.  We are available 24 hours a day.     Please make an appointment with your health care provider(s) for follow up of your Emergency Department visit.  Take this sheet with you when you go to your follow-up visit.

## 2024-01-17 RX ORDER — POTASSIUM CHLORIDE 1.5 G/1.58G
POWDER, FOR SOLUTION ORAL
Qty: 60 PACKET | Refills: 1 | Status: SHIPPED | OUTPATIENT
Start: 2024-01-17

## 2024-01-17 NOTE — TELEPHONE ENCOUNTER
PCP: Mohinder Trevino APRN - NP    Last appt: 11/10/2023    Future Appointments   Date Time Provider Department Center   1/26/2024  4:00 PM Mohinder Trevino APRN - NP [facility] BS AMB       Requested Prescriptions     Pending Prescriptions Disp Refills    potassium chloride (KLOR-CON) 20 MEQ packet [Pharmacy Med Name: POTASSIUM CL 20 MEQ PACKET] 60 packet 1     Sig: TAKE 1 PACKET (20MEQ) MOUTH 2 TIMES DAILY AS DIRECTED

## 2024-01-22 DIAGNOSIS — I10 ESSENTIAL (PRIMARY) HYPERTENSION: ICD-10-CM

## 2024-01-22 RX ORDER — VALSARTAN 320 MG/1
TABLET ORAL
Qty: 90 TABLET | Refills: 0 | Status: SHIPPED | OUTPATIENT
Start: 2024-01-22

## 2024-01-22 NOTE — TELEPHONE ENCOUNTER
PCP: Mohinder Trevino APRN - NP    Last appt: 11/10/2023  Future Appointments   Date Time Provider Department Center   1/26/2024  4:00 PM Mohinder Trevino APRN - NP [facility] BS AMB       Requested Prescriptions     Pending Prescriptions Disp Refills    valsartan (DIOVAN) 320 MG tablet [Pharmacy Med Name: VALSARTAN 320 MG TABLET] 90 tablet 0     Sig: TAKE 1 TABLET BY MOUTH DAILY. ESTABLISH WITH NEW PCP BEFORE NEXT REFILL       Prior labs and Blood pressures:  BP Readings from Last 3 Encounters:   01/12/24 (!) 122/95   01/05/24 108/84   11/15/23 (!) 123/104     Lab Results   Component Value Date/Time     01/12/2024 06:54 PM    K 3.1 01/12/2024 06:54 PM    CL 97 01/12/2024 06:54 PM    CO2 30 01/12/2024 06:54 PM    BUN 9 01/12/2024 06:54 PM    GFRAA >60 08/27/2022 08:15 PM     No results found for: \"HBA1C\", \"URF3CWII\"  No results found for: \"CHOL\", \"CHOLPOCT\", \"CHOLX\", \"CHLST\", \"CHOLV\", \"HDL\", \"HDLPOC\", \"HDLC\", \"LDL\", \"LDLC\", \"VLDLC\", \"VLDL\", \"TGLX\", \"TRIGL\"  No results found for: \"VITD3\", \"VD3RIA\"        Lab Results   Component Value Date/Time    TSH 1.42 05/01/2023 05:59 PM

## 2024-01-23 ENCOUNTER — APPOINTMENT (OUTPATIENT)
Facility: HOSPITAL | Age: 29
End: 2024-01-23
Payer: COMMERCIAL

## 2024-01-23 ENCOUNTER — HOSPITAL ENCOUNTER (EMERGENCY)
Facility: HOSPITAL | Age: 29
Discharge: HOME | End: 2024-01-23
Attending: STUDENT IN AN ORGANIZED HEALTH CARE EDUCATION/TRAINING PROGRAM
Payer: COMMERCIAL

## 2024-01-23 VITALS
HEIGHT: 65 IN | RESPIRATION RATE: 14 BRPM | OXYGEN SATURATION: 99 % | WEIGHT: 194 LBS | TEMPERATURE: 98.6 F | HEART RATE: 73 BPM | DIASTOLIC BLOOD PRESSURE: 80 MMHG | BODY MASS INDEX: 32.32 KG/M2 | SYSTOLIC BLOOD PRESSURE: 112 MMHG

## 2024-01-23 DIAGNOSIS — R07.89 CHEST DISCOMFORT: ICD-10-CM

## 2024-01-23 DIAGNOSIS — E87.6 HYPOKALEMIA: ICD-10-CM

## 2024-01-23 DIAGNOSIS — R06.00 DYSPNEA, UNSPECIFIED TYPE: Primary | ICD-10-CM

## 2024-01-23 LAB
ANION GAP SERPL CALC-SCNC: 6 MMOL/L (ref 5–15)
BASOPHILS # BLD: 0 K/UL (ref 0–0.1)
BASOPHILS NFR BLD: 1 % (ref 0–1)
BUN SERPL-MCNC: 14 MG/DL (ref 6–20)
BUN/CREAT SERPL: 17 (ref 12–20)
CALCIUM SERPL-MCNC: 8.9 MG/DL (ref 8.5–10.1)
CHLORIDE SERPL-SCNC: 99 MMOL/L (ref 97–108)
CO2 SERPL-SCNC: 31 MMOL/L (ref 21–32)
CREAT SERPL-MCNC: 0.82 MG/DL (ref 0.55–1.02)
D DIMER PPP FEU-MCNC: <0.19 MG/L FEU (ref 0–0.65)
DIFFERENTIAL METHOD BLD: ABNORMAL
EKG ATRIAL RATE: 81 BPM
EKG DIAGNOSIS: NORMAL
EKG P AXIS: 54 DEGREES
EKG P-R INTERVAL: 180 MS
EKG Q-T INTERVAL: 398 MS
EKG QRS DURATION: 78 MS
EKG QTC CALCULATION (BAZETT): 462 MS
EKG R AXIS: 5 DEGREES
EKG T AXIS: 14 DEGREES
EKG VENTRICULAR RATE: 81 BPM
EOSINOPHIL # BLD: 0.1 K/UL (ref 0–0.4)
EOSINOPHIL NFR BLD: 2 % (ref 0–7)
ERYTHROCYTE [DISTWIDTH] IN BLOOD BY AUTOMATED COUNT: 12.7 % (ref 11.5–14.5)
GLUCOSE SERPL-MCNC: 121 MG/DL (ref 65–100)
HCT VFR BLD AUTO: 34 % (ref 35–47)
HGB BLD-MCNC: 11.3 G/DL (ref 11.5–16)
IMM GRANULOCYTES # BLD AUTO: 0 K/UL (ref 0–0.04)
IMM GRANULOCYTES NFR BLD AUTO: 1 % (ref 0–0.5)
LYMPHOCYTES # BLD: 1.7 K/UL (ref 0.8–3.5)
LYMPHOCYTES NFR BLD: 26 % (ref 12–49)
MCH RBC QN AUTO: 30.7 PG (ref 26–34)
MCHC RBC AUTO-ENTMCNC: 33.2 G/DL (ref 30–36.5)
MCV RBC AUTO: 92.4 FL (ref 80–99)
MONOCYTES # BLD: 0.4 K/UL (ref 0–1)
MONOCYTES NFR BLD: 7 % (ref 5–13)
NEUTS SEG # BLD: 4.1 K/UL (ref 1.8–8)
NEUTS SEG NFR BLD: 63 % (ref 32–75)
NRBC # BLD: 0 K/UL (ref 0–0.01)
NRBC BLD-RTO: 0 PER 100 WBC
PLATELET # BLD AUTO: 407 K/UL (ref 150–400)
PMV BLD AUTO: 8.9 FL (ref 8.9–12.9)
POTASSIUM SERPL-SCNC: 3 MMOL/L (ref 3.5–5.1)
RBC # BLD AUTO: 3.68 M/UL (ref 3.8–5.2)
SODIUM SERPL-SCNC: 136 MMOL/L (ref 136–145)
TROPONIN I SERPL HS-MCNC: 4 NG/L (ref 0–51)
WBC # BLD AUTO: 6.4 K/UL (ref 3.6–11)

## 2024-01-23 PROCEDURE — 80048 BASIC METABOLIC PNL TOTAL CA: CPT

## 2024-01-23 PROCEDURE — 93005 ELECTROCARDIOGRAM TRACING: CPT | Performed by: STUDENT IN AN ORGANIZED HEALTH CARE EDUCATION/TRAINING PROGRAM

## 2024-01-23 PROCEDURE — 71046 X-RAY EXAM CHEST 2 VIEWS: CPT

## 2024-01-23 PROCEDURE — 85025 COMPLETE CBC W/AUTO DIFF WBC: CPT

## 2024-01-23 PROCEDURE — 93010 ELECTROCARDIOGRAM REPORT: CPT | Performed by: INTERNAL MEDICINE

## 2024-01-23 PROCEDURE — 99285 EMERGENCY DEPT VISIT HI MDM: CPT

## 2024-01-23 PROCEDURE — 84484 ASSAY OF TROPONIN QUANT: CPT

## 2024-01-23 PROCEDURE — 85379 FIBRIN DEGRADATION QUANT: CPT

## 2024-01-23 PROCEDURE — 36415 COLL VENOUS BLD VENIPUNCTURE: CPT

## 2024-01-23 RX ORDER — POTASSIUM CHLORIDE 750 MG/1
40 TABLET, FILM COATED, EXTENDED RELEASE ORAL ONCE
Status: DISCONTINUED | OUTPATIENT
Start: 2024-01-23 | End: 2024-01-23 | Stop reason: HOSPADM

## 2024-01-23 ASSESSMENT — ENCOUNTER SYMPTOMS
COUGH: 0
RHINORRHEA: 0
EYE REDNESS: 0
ABDOMINAL PAIN: 0
VOMITING: 0
EYE DISCHARGE: 0
NAUSEA: 0
DIARRHEA: 0

## 2024-01-23 ASSESSMENT — HEART SCORE: ECG: 0

## 2024-01-23 NOTE — DISCHARGE INSTRUCTIONS
Please follow-up with your primary care doctor.  Return to the emergency department for any new or worsening symptoms.

## 2024-01-23 NOTE — ED PROVIDER NOTES
[facility]  EMERGENCY DEPARTMENT ENCOUNTER      Pt Name: Tsering Sales  MRN: [id number]  Birthdate 1995  Date of evaluation: 1/23/2024  Provider: Antionette Snider DO    CHIEF COMPLAINT       Shortness of breath      HISTORY OF PRESENT ILLNESS    HPI    Tsering Sales is a 28 y.o. female with history listed below who presents to the emergency department for evaluation of shortness of breath.  Patient notes since yesterday she has been experiencing a abnormal sensation in her chest.  Describes it as \"when I open my mouth or breathing, it is a coolness sensation.\"  Describes it as a \" menthol like\" sensation.  Endorses associated shortness of breath.  Also complaining of left posterior scapular pain.  No anterior chest pain.  Denies recent illness including fevers, cough, vomiting or diarrhea.    Nursing Notes were reviewed.    REVIEW OF SYSTEMS       Review of Systems   Constitutional:  Negative for chills and fever.   HENT:  Negative for congestion and rhinorrhea.    Eyes:  Negative for discharge and redness.   Respiratory:  Positive for shortness of breath. Negative for cough.    Cardiovascular:  Negative for chest pain.   Gastrointestinal:  Negative for abdominal pain, diarrhea, nausea and vomiting.   Neurological:  Negative for speech difficulty.   Psychiatric/Behavioral:  Negative for agitation.            PAST MEDICAL HISTORY     Past Medical History:   Diagnosis Date    Abnormal Pap smear of cervix 2017    seeing OBGYN    Acne 10/7/2009    Allergic rhinitis 9/2/2009    Anemia 9/2/2009    Atypical squamous cell changes of undetermined significance (ASCUS) on vaginal cytology 09/14/2017    H/o ASCUS HPV + with Dr. Ilia De Dios 9/14/2017    IRA III (cervical intraepithelial neoplasia III) 11/30/2017    found by LEEP     Headache     Herpes genitalia 2017    dx by OB    History of gestational diabetes     diet controlled    Other ill-defined conditions(799.89)     scoliosis    PCB (post

## 2024-01-23 NOTE — ED NOTES
Pt ambulatory out of ED with discharge instructions and prescriptions in hand given by Dr. Snider; pt verbalized understanding of discharge paperwork and time allotted for questions. VSS. Pt alert and oriented.

## 2024-01-23 NOTE — ED TRIAGE NOTES
Triage: pt arrives to the ER unaccompanied for c/o \"cool sensation in chest\" starting last night and then developed a \"menthol\" feeling when she takes breaths. New medications taken. +bilateral posterior shoulder pain. Denies fall or heavy lifting. Denies nasal congestion, sore throat, fever, chest pain.

## 2024-01-31 ENCOUNTER — HOSPITAL ENCOUNTER (OUTPATIENT)
Facility: HOSPITAL | Age: 29
Setting detail: OBSERVATION
Discharge: HOME | End: 2024-02-01
Attending: STUDENT IN AN ORGANIZED HEALTH CARE EDUCATION/TRAINING PROGRAM | Admitting: INTERNAL MEDICINE
Payer: COMMERCIAL

## 2024-01-31 ENCOUNTER — APPOINTMENT (OUTPATIENT)
Facility: HOSPITAL | Age: 29
End: 2024-01-31
Payer: COMMERCIAL

## 2024-01-31 DIAGNOSIS — R03.0 ELEVATED BLOOD PRESSURE READING: ICD-10-CM

## 2024-01-31 DIAGNOSIS — R29.90 STROKE-LIKE SYMPTOMS: Primary | ICD-10-CM

## 2024-01-31 LAB
ALBUMIN SERPL-MCNC: 3.4 G/DL (ref 3.5–5)
ALBUMIN/GLOB SERPL: 0.6 (ref 1.1–2.2)
ALP SERPL-CCNC: 39 U/L (ref 45–117)
ALT SERPL-CCNC: 22 U/L (ref 12–78)
ANION GAP SERPL CALC-SCNC: 10 MMOL/L (ref 5–15)
AST SERPL-CCNC: 20 U/L (ref 15–37)
BASOPHILS # BLD: 0 K/UL (ref 0–0.1)
BASOPHILS NFR BLD: 0 % (ref 0–1)
BILIRUB SERPL-MCNC: 0.3 MG/DL (ref 0.2–1)
BUN SERPL-MCNC: 10 MG/DL (ref 6–20)
BUN/CREAT SERPL: 12 (ref 12–20)
CALCIUM SERPL-MCNC: 8.9 MG/DL (ref 8.5–10.1)
CHLORIDE SERPL-SCNC: 100 MMOL/L (ref 97–108)
CO2 SERPL-SCNC: 28 MMOL/L (ref 21–32)
CREAT SERPL-MCNC: 0.82 MG/DL (ref 0.55–1.02)
DIFFERENTIAL METHOD BLD: ABNORMAL
EOSINOPHIL # BLD: 0.1 K/UL (ref 0–0.4)
EOSINOPHIL NFR BLD: 1 % (ref 0–7)
ERYTHROCYTE [DISTWIDTH] IN BLOOD BY AUTOMATED COUNT: 12.8 % (ref 11.5–14.5)
GLOBULIN SER CALC-MCNC: 5.6 G/DL (ref 2–4)
GLUCOSE BLD STRIP.AUTO-MCNC: 104 MG/DL (ref 65–117)
GLUCOSE SERPL-MCNC: 96 MG/DL (ref 65–100)
HCT VFR BLD AUTO: 35.1 % (ref 35–47)
HGB BLD-MCNC: 11.5 G/DL (ref 11.5–16)
IMM GRANULOCYTES # BLD AUTO: 0 K/UL (ref 0–0.04)
IMM GRANULOCYTES NFR BLD AUTO: 0 % (ref 0–0.5)
INR PPP: 1 (ref 0.9–1.1)
LYMPHOCYTES # BLD: 2.1 K/UL (ref 0.8–3.5)
LYMPHOCYTES NFR BLD: 27 % (ref 12–49)
MAGNESIUM SERPL-MCNC: 2.1 MG/DL (ref 1.6–2.4)
MCH RBC QN AUTO: 30.3 PG (ref 26–34)
MCHC RBC AUTO-ENTMCNC: 32.8 G/DL (ref 30–36.5)
MCV RBC AUTO: 92.6 FL (ref 80–99)
MONOCYTES # BLD: 0.8 K/UL (ref 0–1)
MONOCYTES NFR BLD: 10 % (ref 5–13)
NEUTS SEG # BLD: 4.6 K/UL (ref 1.8–8)
NEUTS SEG NFR BLD: 62 % (ref 32–75)
NRBC # BLD: 0 K/UL (ref 0–0.01)
NRBC BLD-RTO: 0 PER 100 WBC
PLATELET # BLD AUTO: 407 K/UL (ref 150–400)
PMV BLD AUTO: 8.8 FL (ref 8.9–12.9)
POTASSIUM SERPL-SCNC: 3.4 MMOL/L (ref 3.5–5.1)
PROT SERPL-MCNC: 9 G/DL (ref 6.4–8.2)
PROTHROMBIN TIME: 10.2 SEC (ref 9–11.1)
RBC # BLD AUTO: 3.79 M/UL (ref 3.8–5.2)
SERVICE CMNT-IMP: NORMAL
SODIUM SERPL-SCNC: 138 MMOL/L (ref 136–145)
TROPONIN I SERPL HS-MCNC: <4 NG/L (ref 0–51)
TSH SERPL DL<=0.05 MIU/L-ACNC: 1.16 UIU/ML (ref 0.36–3.74)
WBC # BLD AUTO: 7.7 K/UL (ref 3.6–11)

## 2024-01-31 PROCEDURE — 6360000004 HC RX CONTRAST MEDICATION: Performed by: STUDENT IN AN ORGANIZED HEALTH CARE EDUCATION/TRAINING PROGRAM

## 2024-01-31 PROCEDURE — G0378 HOSPITAL OBSERVATION PER HR: HCPCS

## 2024-01-31 PROCEDURE — 85025 COMPLETE CBC W/AUTO DIFF WBC: CPT

## 2024-01-31 PROCEDURE — 6370000000 HC RX 637 (ALT 250 FOR IP): Performed by: STUDENT IN AN ORGANIZED HEALTH CARE EDUCATION/TRAINING PROGRAM

## 2024-01-31 PROCEDURE — 80053 COMPREHEN METABOLIC PANEL: CPT

## 2024-01-31 PROCEDURE — 6360000002 HC RX W HCPCS: Performed by: STUDENT IN AN ORGANIZED HEALTH CARE EDUCATION/TRAINING PROGRAM

## 2024-01-31 PROCEDURE — 85610 PROTHROMBIN TIME: CPT

## 2024-01-31 PROCEDURE — 70498 CT ANGIOGRAPHY NECK: CPT

## 2024-01-31 PROCEDURE — 70553 MRI BRAIN STEM W/O & W/DYE: CPT

## 2024-01-31 PROCEDURE — 36415 COLL VENOUS BLD VENIPUNCTURE: CPT

## 2024-01-31 PROCEDURE — 93005 ELECTROCARDIOGRAM TRACING: CPT | Performed by: STUDENT IN AN ORGANIZED HEALTH CARE EDUCATION/TRAINING PROGRAM

## 2024-01-31 PROCEDURE — 2580000003 HC RX 258: Performed by: STUDENT IN AN ORGANIZED HEALTH CARE EDUCATION/TRAINING PROGRAM

## 2024-01-31 PROCEDURE — 84443 ASSAY THYROID STIM HORMONE: CPT

## 2024-01-31 PROCEDURE — 84439 ASSAY OF FREE THYROXINE: CPT

## 2024-01-31 PROCEDURE — 2580000003 HC RX 258: Performed by: FAMILY MEDICINE

## 2024-01-31 PROCEDURE — 82962 GLUCOSE BLOOD TEST: CPT

## 2024-01-31 PROCEDURE — 83735 ASSAY OF MAGNESIUM: CPT

## 2024-01-31 PROCEDURE — 82607 VITAMIN B-12: CPT

## 2024-01-31 PROCEDURE — 82746 ASSAY OF FOLIC ACID SERUM: CPT

## 2024-01-31 PROCEDURE — 99285 EMERGENCY DEPT VISIT HI MDM: CPT

## 2024-01-31 PROCEDURE — 70450 CT HEAD/BRAIN W/O DYE: CPT

## 2024-01-31 PROCEDURE — A9579 GAD-BASE MR CONTRAST NOS,1ML: HCPCS | Performed by: STUDENT IN AN ORGANIZED HEALTH CARE EDUCATION/TRAINING PROGRAM

## 2024-01-31 PROCEDURE — 84484 ASSAY OF TROPONIN QUANT: CPT

## 2024-01-31 RX ORDER — CLOPIDOGREL BISULFATE 75 MG/1
75 TABLET ORAL DAILY
Status: DISCONTINUED | OUTPATIENT
Start: 2024-02-01 | End: 2024-02-01

## 2024-01-31 RX ORDER — SODIUM CHLORIDE 0.9 % (FLUSH) 0.9 %
5-40 SYRINGE (ML) INJECTION PRN
Status: DISCONTINUED | OUTPATIENT
Start: 2024-01-31 | End: 2024-02-01 | Stop reason: HOSPADM

## 2024-01-31 RX ORDER — ONDANSETRON 4 MG/1
4 TABLET, ORALLY DISINTEGRATING ORAL EVERY 8 HOURS PRN
Status: DISCONTINUED | OUTPATIENT
Start: 2024-01-31 | End: 2024-02-01 | Stop reason: HOSPADM

## 2024-01-31 RX ORDER — 0.9 % SODIUM CHLORIDE 0.9 %
1000 INTRAVENOUS SOLUTION INTRAVENOUS ONCE
Status: COMPLETED | OUTPATIENT
Start: 2024-01-31 | End: 2024-01-31

## 2024-01-31 RX ORDER — SODIUM CHLORIDE 9 MG/ML
INJECTION, SOLUTION INTRAVENOUS PRN
Status: DISCONTINUED | OUTPATIENT
Start: 2024-01-31 | End: 2024-02-01 | Stop reason: HOSPADM

## 2024-01-31 RX ORDER — LABETALOL HYDROCHLORIDE 5 MG/ML
10 INJECTION, SOLUTION INTRAVENOUS EVERY 6 HOURS PRN
Status: DISCONTINUED | OUTPATIENT
Start: 2024-01-31 | End: 2024-02-01 | Stop reason: HOSPADM

## 2024-01-31 RX ORDER — SODIUM CHLORIDE 0.9 % (FLUSH) 0.9 %
5-40 SYRINGE (ML) INJECTION 2 TIMES DAILY
Status: DISCONTINUED | OUTPATIENT
Start: 2024-01-31 | End: 2024-02-01 | Stop reason: HOSPADM

## 2024-01-31 RX ORDER — DIAZEPAM 5 MG/ML
5 INJECTION, SOLUTION INTRAMUSCULAR; INTRAVENOUS
Status: COMPLETED | OUTPATIENT
Start: 2024-01-31 | End: 2024-01-31

## 2024-01-31 RX ORDER — ONDANSETRON 2 MG/ML
4 INJECTION INTRAMUSCULAR; INTRAVENOUS EVERY 6 HOURS PRN
Status: DISCONTINUED | OUTPATIENT
Start: 2024-01-31 | End: 2024-02-01 | Stop reason: HOSPADM

## 2024-01-31 RX ORDER — POLYETHYLENE GLYCOL 3350 17 G/17G
17 POWDER, FOR SOLUTION ORAL DAILY PRN
Status: DISCONTINUED | OUTPATIENT
Start: 2024-01-31 | End: 2024-02-01 | Stop reason: HOSPADM

## 2024-01-31 RX ORDER — CLOPIDOGREL BISULFATE 75 MG/1
300 TABLET ORAL ONCE
Status: COMPLETED | OUTPATIENT
Start: 2024-01-31 | End: 2024-01-31

## 2024-01-31 RX ORDER — SODIUM CHLORIDE 0.9 % (FLUSH) 0.9 %
5-40 SYRINGE (ML) INJECTION EVERY 12 HOURS SCHEDULED
Status: DISCONTINUED | OUTPATIENT
Start: 2024-01-31 | End: 2024-02-01 | Stop reason: HOSPADM

## 2024-01-31 RX ORDER — ASPIRIN 81 MG/1
324 TABLET, CHEWABLE ORAL
Status: COMPLETED | OUTPATIENT
Start: 2024-01-31 | End: 2024-01-31

## 2024-01-31 RX ADMIN — SODIUM CHLORIDE, PRESERVATIVE FREE 10 ML: 5 INJECTION INTRAVENOUS at 22:32

## 2024-01-31 RX ADMIN — GADOTERIDOL 15 ML: 279.3 INJECTION, SOLUTION INTRAVENOUS at 22:18

## 2024-01-31 RX ADMIN — SODIUM CHLORIDE, PRESERVATIVE FREE 10 ML: 5 INJECTION INTRAVENOUS at 22:31

## 2024-01-31 RX ADMIN — SODIUM CHLORIDE 1000 ML: 9 INJECTION, SOLUTION INTRAVENOUS at 16:32

## 2024-01-31 RX ADMIN — DIAZEPAM 5 MG: 5 INJECTION, SOLUTION INTRAMUSCULAR; INTRAVENOUS at 21:39

## 2024-01-31 RX ADMIN — IOPAMIDOL 100 ML: 755 INJECTION, SOLUTION INTRAVENOUS at 16:49

## 2024-01-31 RX ADMIN — CLOPIDOGREL BISULFATE 300 MG: 75 TABLET, FILM COATED ORAL at 18:49

## 2024-01-31 RX ADMIN — ASPIRIN 324 MG: 81 TABLET, CHEWABLE ORAL at 18:48

## 2024-01-31 ASSESSMENT — PAIN SCALES - GENERAL: PAINLEVEL_OUTOF10: 0

## 2024-01-31 ASSESSMENT — PAIN - FUNCTIONAL ASSESSMENT: PAIN_FUNCTIONAL_ASSESSMENT: NONE - DENIES PAIN

## 2024-01-31 ASSESSMENT — ENCOUNTER SYMPTOMS
ABDOMINAL PAIN: 0
SHORTNESS OF BREATH: 0

## 2024-01-31 ASSESSMENT — LIFESTYLE VARIABLES
HOW MANY STANDARD DRINKS CONTAINING ALCOHOL DO YOU HAVE ON A TYPICAL DAY: 1 OR 2
HOW OFTEN DO YOU HAVE A DRINK CONTAINING ALCOHOL: 2-3 TIMES A WEEK

## 2024-01-31 NOTE — ED NOTES
Code Stroke     Signs and symptoms: Right arm numbness and tingling    Last known well date and time: 1430    Provider at bedside time: 1606     Code Stroke activation time: 1606     Level and VAN Score: Code Stroke Level 1     Call to TeleNeuro time: 1606     Call to CT time: 1604     Blood glucose result/ time: 104 at 1601    NIH Stroke Scale completed: Yes    TeleNeuro call back time: 1608     TeleNeuro video time: 1626     Thromboembolic order: No    Thromboembolic given time : N/A     Swallow screen: Pass    Additional information:

## 2024-01-31 NOTE — ED PROVIDER NOTES
[facility]  EMERGENCY DEPARTMENT ENCOUNTER      Pt Name: Tsering Sales  MRN: [id number]  Birthdate 1995  Date of evaluation: 1/31/2024  Provider: Vinod De Jesus DO    CHIEF COMPLAINT     No chief complaint on file.        HISTORY OF PRESENT ILLNESS   (Location/Symptom, Timing/Onset, Context/Setting, Quality, Duration, Modifying Factors, Severity)  Note limiting factors.   Patient is a 28-year-old female history of thyromegaly, anemia, Sjogren's syndrome, recurrent hypokalemia presenting today with right-sided numbness.  This started at 2 PM.  She reports that she developed numbness in the right arm.  She also feeling dizzy minty taste in her mouth.  She denies headache or neck pain.  No weakness.  No trouble with balance or vision.  No trouble with speech.  Had an episode over the weekend similar but resolved on its own.  No prior history of stroke.  Not on blood thinners.  Level One stroke initiated upon arrival.            Review of External Medical Records:     Nursing Notes were reviewed.    REVIEW OF SYSTEMS    (2-9 systems for level 4, 10 or more for level 5)     Review of Systems   Constitutional:  Negative for fever.   Respiratory:  Negative for shortness of breath.    Cardiovascular:  Negative for chest pain.   Gastrointestinal:  Negative for abdominal pain.   Neurological:  Positive for numbness. Negative for weakness.       Except as noted above the remainder of the review of systems was reviewed and negative.       PAST MEDICAL HISTORY     Past Medical History:   Diagnosis Date    Abnormal Pap smear of cervix 2017    seeing OBGYN    Acne 10/7/2009    Allergic rhinitis 9/2/2009    Anemia 9/2/2009    Atypical squamous cell changes of undetermined significance (ASCUS) on vaginal cytology 09/14/2017    H/o ASCUS HPV + with Dr. Ilia De Dios 9/14/2017    IRA III (cervical intraepithelial neoplasia III) 11/30/2017    found by LEEP     Headache     Herpes genitalia 2017    dx by OB

## 2024-01-31 NOTE — ED TRIAGE NOTES
1558: Patient arrives with numbness and tingling in her right arm with a \"minty\" taste in her mouth that started at 1415. States this happened also on Saturday and then resolved.    1604: Dr. De Jesus at bedside assessing patient.    1609: called level 1 code S, VAN negative    NIHSS: 1- for decreased sensation in right arm and right leg     BS: 104

## 2024-02-01 ENCOUNTER — TELEPHONE (OUTPATIENT)
Facility: HOSPITAL | Age: 29
End: 2024-02-01

## 2024-02-01 VITALS
DIASTOLIC BLOOD PRESSURE: 52 MMHG | OXYGEN SATURATION: 100 % | HEART RATE: 101 BPM | BODY MASS INDEX: 31.75 KG/M2 | RESPIRATION RATE: 20 BRPM | SYSTOLIC BLOOD PRESSURE: 110 MMHG | WEIGHT: 190.8 LBS | TEMPERATURE: 98.7 F

## 2024-02-01 PROBLEM — R43.2 ALTERED TASTE: Status: RESOLVED | Noted: 2024-02-01 | Resolved: 2024-02-01

## 2024-02-01 PROBLEM — R20.2 TINGLING OF RIGHT UPPER EXTREMITY: Status: ACTIVE | Noted: 2024-02-01

## 2024-02-01 PROBLEM — R43.2 ALTERED TASTE: Status: ACTIVE | Noted: 2024-02-01

## 2024-02-01 PROBLEM — R20.2 TINGLING OF RIGHT UPPER EXTREMITY: Status: RESOLVED | Noted: 2024-02-01 | Resolved: 2024-02-01

## 2024-02-01 LAB
CHOLEST SERPL-MCNC: 157 MG/DL
EKG ATRIAL RATE: 93 BPM
EKG DIAGNOSIS: NORMAL
EKG P AXIS: 43 DEGREES
EKG P-R INTERVAL: 182 MS
EKG Q-T INTERVAL: 376 MS
EKG QRS DURATION: 96 MS
EKG QTC CALCULATION (BAZETT): 467 MS
EKG R AXIS: 14 DEGREES
EKG T AXIS: 23 DEGREES
EKG VENTRICULAR RATE: 93 BPM
ERYTHROCYTE [DISTWIDTH] IN BLOOD BY AUTOMATED COUNT: 12.7 % (ref 11.5–14.5)
EST. AVERAGE GLUCOSE BLD GHB EST-MCNC: 103 MG/DL
FOLATE SERPL-MCNC: 40 NG/ML (ref 5–21)
HBA1C MFR BLD: 5.2 % (ref 4–5.6)
HCT VFR BLD AUTO: 33 % (ref 35–47)
HDLC SERPL-MCNC: 63 MG/DL
HDLC SERPL: 2.5 (ref 0–5)
HGB BLD-MCNC: 11.2 G/DL (ref 11.5–16)
LDLC SERPL CALC-MCNC: 82.4 MG/DL (ref 0–100)
MCH RBC QN AUTO: 30.9 PG (ref 26–34)
MCHC RBC AUTO-ENTMCNC: 33.9 G/DL (ref 30–36.5)
MCV RBC AUTO: 91.2 FL (ref 80–99)
NRBC # BLD: 0 K/UL (ref 0–0.01)
NRBC BLD-RTO: 0 PER 100 WBC
PLATELET # BLD AUTO: 383 K/UL (ref 150–400)
PMV BLD AUTO: 8.8 FL (ref 8.9–12.9)
RBC # BLD AUTO: 3.62 M/UL (ref 3.8–5.2)
T4 FREE SERPL-MCNC: 0.9 NG/DL (ref 0.8–1.5)
TRIGL SERPL-MCNC: 58 MG/DL
VIT B12 SERPL-MCNC: 380 PG/ML (ref 193–986)
VLDLC SERPL CALC-MCNC: 11.6 MG/DL
WBC # BLD AUTO: 8.1 K/UL (ref 3.6–11)

## 2024-02-01 PROCEDURE — 36415 COLL VENOUS BLD VENIPUNCTURE: CPT

## 2024-02-01 PROCEDURE — 85027 COMPLETE CBC AUTOMATED: CPT

## 2024-02-01 PROCEDURE — 97535 SELF CARE MNGMENT TRAINING: CPT

## 2024-02-01 PROCEDURE — 6370000000 HC RX 637 (ALT 250 FOR IP): Performed by: FAMILY MEDICINE

## 2024-02-01 PROCEDURE — 97161 PT EVAL LOW COMPLEX 20 MIN: CPT

## 2024-02-01 PROCEDURE — G0378 HOSPITAL OBSERVATION PER HR: HCPCS

## 2024-02-01 PROCEDURE — 97165 OT EVAL LOW COMPLEX 30 MIN: CPT

## 2024-02-01 PROCEDURE — 97112 NEUROMUSCULAR REEDUCATION: CPT

## 2024-02-01 PROCEDURE — 83036 HEMOGLOBIN GLYCOSYLATED A1C: CPT

## 2024-02-01 PROCEDURE — 80061 LIPID PANEL: CPT

## 2024-02-01 RX ORDER — POTASSIUM CHLORIDE 750 MG/1
20 TABLET, FILM COATED, EXTENDED RELEASE ORAL DAILY
Status: DISCONTINUED | OUTPATIENT
Start: 2024-02-01 | End: 2024-02-01

## 2024-02-01 RX ORDER — FLUTICASONE PROPIONATE 50 MCG
2 SPRAY, SUSPENSION (ML) NASAL DAILY
Status: DISCONTINUED | OUTPATIENT
Start: 2024-02-01 | End: 2024-02-01 | Stop reason: HOSPADM

## 2024-02-01 RX ORDER — CHLORTHALIDONE 25 MG/1
25 TABLET ORAL DAILY
Status: DISCONTINUED | OUTPATIENT
Start: 2024-02-01 | End: 2024-02-01 | Stop reason: HOSPADM

## 2024-02-01 RX ORDER — ZINC SULFATE 50(220)MG
50 CAPSULE ORAL DAILY
Status: DISCONTINUED | OUTPATIENT
Start: 2024-02-01 | End: 2024-02-01 | Stop reason: HOSPADM

## 2024-02-01 RX ORDER — ZINC GLUCONATE 50 MG
50 TABLET ORAL DAILY
Status: DISCONTINUED | OUTPATIENT
Start: 2024-02-01 | End: 2024-02-01 | Stop reason: CLARIF

## 2024-02-01 RX ORDER — VALSARTAN 160 MG/1
320 TABLET ORAL DAILY
Status: DISCONTINUED | OUTPATIENT
Start: 2024-02-01 | End: 2024-02-01 | Stop reason: HOSPADM

## 2024-02-01 RX ADMIN — CLOPIDOGREL BISULFATE 75 MG: 75 TABLET ORAL at 09:08

## 2024-02-01 RX ADMIN — VALSARTAN 320 MG: 160 TABLET, FILM COATED ORAL at 09:08

## 2024-02-01 RX ADMIN — CHLORTHALIDONE 25 MG: 25 TABLET ORAL at 09:08

## 2024-02-01 ASSESSMENT — PAIN SCALES - GENERAL: PAINLEVEL_OUTOF10: 0

## 2024-02-01 NOTE — DISCHARGE SUMMARY
all extremities,  Skin: warm     CHRONIC MEDICAL DIAGNOSES:      Greater than 31 minutes were spent with the patient on counseling and coordination of care    Signed:   JOY Miller NP  2/1/2024  1:07 PM

## 2024-02-01 NOTE — TELEPHONE ENCOUNTER
Pt needs a hospital follow up appointment  Provider: Dr. Sandhu  Location: [facility]  In person or virtual: In person  When: Next available f/u, no rush  Diagnosis/reason for follow up:  Possible right arm ulnar neuropathy  Additional information:

## 2024-02-01 NOTE — ED NOTES
Bedside shift change report given to CONCHA Pearce (oncoming nurse) by RN Adrian (offgoing nurse). Report included the following information Nurse Handoff Report, Index, ED Encounter Summary, ED SBAR, and MAR.

## 2024-02-01 NOTE — CONSULTS
Neurology Consult Note     NAME: Tsering Sales   :  1995   MRN:  [id number]   DATE:   2024       HPI:  Pt is a 29yo RH female admitted 24 with a cool sensation like menthol feeling when she takes a breath and c/o NT in right arm. She had another spell on 24 of a minty taste in her mouth. NIHSS score 1 for right sided numbness. /83. CTH neg. CTA H/N normal.  Dr. Westbrook suggested extensive stroke and seizure evaluation, loaded her with Plavix and ASA and recommended seizure evaluation with EEG, loading her with Keppra and putting her on Keppra maintenance dose Keppra.   Thankfully, the pt thought this recommendation made no sense herself and refused Keppra.  She has pins and needles feeling, no numbness, in the right forearm only and is associated with an achy type pain. She had no weakness.  This is the third time this has happened and each time she has been lying on her right arm. One time she took her potassium and her arm felt better, she had chronic hypokalemia from taking chlorthalidone for HTN.   She has a h/o CTS bilaterally.  Regarding the minty coolness in her mouth, she thinks it may be due to a shake she makes as she has noticed it twice after drinking the shake.  She also notes sinus disease and uses flonase and thought it might be due to that, but not temporally related to when she uses the nasal spray necessarily. She does have HTN, well controlled and monitors at home, pre-diabetes in past- checks BG at home and normal. No HARSHAL, No HLD, No smoking, no drug use, no family h/o CVA.  MRI brain w/wo contrast neg.     PMH:  HTN  Prediabetes  Sjogrens  Thyromegaly  Anemia  Allergies  IRA III s/p LEEP  Genital HSV  Gestational DM  S/p    Scoliosis  Vit D deficiency  Thoracic compression fx      ROS:  Per HPI o/w

## 2024-02-01 NOTE — DISCHARGE INSTRUCTIONS
Discharge Instructions       PATIENT ID: Tsering Sales  MRN: [id number]   YOB: 1995    DATE OF ADMISSION: 1/31/2024   DATE OF DISCHARGE: 2/1/2024    PRIMARY CARE PROVIDER: Mohinder Trevino     ATTENDING PHYSICIAN: Darren Calhoun MD   DISCHARGING PROVIDER: JOY Miller NP    To contact this individual call [phone] and ask the  to page.   If unavailable ask to be transferred the Adult Hospitalist Department.    DISCHARGE DIAGNOSES: paresthesias     CONSULTATIONS: Neurology    PROCEDURES/SURGERIES: * No surgery found *    PENDING TEST RESULTS:   At the time of discharge the following test results are still pending: none    FOLLOW UP APPOINTMENTS:    PCP, neurology clinic    ADDITIONAL CARE RECOMMENDATIONS:   No acute stroke on imaging. No seizures. No new medications. Follow up in neurology clinic for any new pins and needles in arm    DIET: Regular    ACTIVITY: activity as tolerated    WOUND CARE: none    EQUIPMENT needed: none    DISCHARGE MEDICATIONS:   See Medication Reconciliation Form    It is important that you take the medication exactly as they are prescribed.   Keep your medication in the bottles provided by the pharmacist and keep a list of the medication names, dosages, and times to be taken in your wallet.   Do not take other medications without consulting your doctor.       NOTIFY YOUR PHYSICIAN FOR ANY OF THE FOLLOWING:   Fever over 101 degrees for 24 hours.   Chest pain, shortness of breath, fever, chills, nausea, vomiting, diarrhea, change in mentation, falling, weakness, bleeding. Severe pain or pain not relieved by medications.  Or, any other signs or symptoms that you may have questions about.      DISPOSITION:   x Home With:   OT  PT  HH  RN       SNF/Inpatient Rehab/LTAC    Independent/assisted living    Hospice    Other:     CDMP Checked:   Yes x     PROBLEM LIST Updated:  Yes x       Signed:   JOY Miller NP  2/1/2024  11:28 AM

## 2024-02-01 NOTE — PROGRESS NOTES
Full note to follow    Patient endorses she had pins-and-needles to her right arm which resolved prior to arrival [facility]  MRI brain negative  Needs EEG read  Can discharge later  BP controlled  Echo not needed  Not clear why she's on asa and plavix  Not clear why pt was ordered Robby Pinto, -[facility] medicine    
I have read and reviewed discharge instructions with patient. Patient verbalizes understanding.     Bedside RN performed patient education and medication education. Discharge concerns initiated and discussed with patient, including clarification on \"who\" assists the patient at their home and instructions for when the home going patient should call their provider after discharge. Opportunity for questions and clarification was provided.      Patient receptive to education:Yes  Patient stated: acceptance  Barriers to Education: none  Diagnosis Education given:  Yes    Length of stay: 0  Expected Day of Discharge: 2/1/24  Ask if they have \"Help at Home\" & add to white board?  Yes    Education Day #: 2    Medication Education Given:  Yes  M in the box Medication name: NA    Pt aware of HCAHPS survey: Yes          Stroke Education documented in Patient Education: Yes  Core Measures Documented in [facility]:  Risk Factors: Yes  Warning signs of stroke: Yes  When to Activate 911: Yes  Medication Education for Risk Factors: Yes  Smoking cessation if applicable: Yes  Written Education Given:  Yes    Discharge NIH Completed: Yes  Score: 0    BRAINS: Yes    Follow Up Appointment Made: No  Date/Time if applicable: NA  
OCCUPATIONAL THERAPY EVALUATION/DISCHARGE  Patient: Tsering Sales (28 y.o. female)  Date: 2/1/2024  Primary Diagnosis: Stroke-like symptoms [R29.90]         Precautions: None                  ASSESSMENT :  Based on the objective data below, the patient presents with intact sensation, ROM, strength, balance, and coordination. Patient received semi supine in bed and agreeable to working with therapy. Patient initially presented with right UE numbness and underwent CT and MRI that were both negative for acute process. Patient transferred to [facility] and underwent UE assessment, no noted deficits at this time and patient reports resolution of symptoms. Patient transferred to [facility], ambulating in hallway and then completed toileting and grooming in bathroom, requesting to return back to bed at end of session as she did not sleep well the night prior. Overall patient did well and is at functional baseline at this time, no OT needs at discharge. Please re-consult if functional status changes.      02/01/24 0615 02/01/24 0830   Vitals   Pulse 96 (!) 111   Heart Rate Source Monitor  --    Respirations 19  --    BP (!) 121/99 (!) 127/98   MAP (Calculated) 106 108   BP Location Right upper arm Right upper arm   BP Method Automatic Automatic   Patient Position Supine Sitting     Functional Outcome Measure:  The patient scored 24/24 on the AM-PAC outcome measure which is indicative of Cutoff score 39.4 (19) correlates to a good likelihood of discharging home versus a facility.      Further skilled acute occupational therapy is not indicated at this time.     PLAN :  Recommend with staff: up ad ben in room    Recommendation for discharge: (in order for the patient to meet his/her long term goals): No skilled occupational therapy    Other factors to consider for discharge: no additional factors    IF patient discharges home will need the following DME: none     SUBJECTIVE:   Patient stated, “My son turns 11 on the 
Orders received, chart reviewed and patient evaluated by physical therapy, recommend:  No skilled physical therapy    Recommend with nursing OOB to chair 3x/day and walking daily. Thank you for completing as able in order to maintain patient strength, endurance and independence.     Full evaluation to follow.      Thank you for your consideration,    Kamryn Hartman, PT, DPT   
PHYSICAL THERAPY EVALUATION/DISCHARGE    Patient: Tsering Sales (28 y.o. female)  Date: 2/1/2024  Primary Diagnosis: Stroke-like symptoms [R29.90]       Precautions: None                      ASSESSMENT AND RECOMMENDATIONS:  Based on the objective data below, the patient has no functional deficits and has returned to her PLOF. This is a 28 year old female who presented to hospital with symptoms of R UE numbness/tingling. Patient drove herself to the hospital with concern for stroke. CT, CTA, MRI negative for acute infarct/bleed. Patient is independent with all mobility, strength, coordination and sensation equal and intact. Pt scores 56/56 on JAUREGUI, 30/30 on FGA indicating no risk for falls. VSS during session. Patient has no further skilled PT needs. Will sign off.    Patient Vitals for the past 6 hrs:   Pulse BP Patient Position   02/01/24 0908 -- (!) 127/98 --   02/01/24 0830 (!) 111 (!) 127/98 Sitting   02/01/24 0615 96 (!) 121/99 Supine   02/01/24 0600 99 -- --        Functional Outcome Measure:  The patient scored 56 on the JAUREGUI outcome measure which is indicative of no risk for falls.          Further skilled acute physical therapy is not indicated at this time.       PLAN :  Recommendation for discharge: (in order for the patient to meet his/her long term goals): No skilled physical therapy    Other factors to consider for discharge: no additional factors    IF patient discharges home will need the following DME: none       SUBJECTIVE:   Patient stated “I drove myself to the hospital, I won't do that next time.”    OBJECTIVE DATA SUMMARY:     Past Medical History:   Diagnosis Date    Abnormal Pap smear of cervix 2017    seeing OBGYN    Acne 10/7/2009    Allergic rhinitis 9/2/2009    Anemia 9/2/2009    Atypical squamous cell changes of undetermined significance (ASCUS) on vaginal cytology 09/14/2017    H/o ASCUS HPV + with Dr. Ilia De Dios 9/14/2017    IRA III (cervical intraepithelial neoplasia III) 
SLP Contact Note    Consult received and appreciated. Pt chart reviewed. MRI negative for acute infarct. Pt passed swallow screen. NIH 1. No history of dysphagia. No SLP needs. Will sign off.  Please reconsult should SLP be of any assistance.      Thank you,  Kaylah Gamboa M.Ed, CCC-SLP  Speech-Language Pathologist    
17-Oct-2022

## 2024-02-01 NOTE — ED NOTES
TRANSFER - OUT REPORT:    Verbal report given to Wanda on Tsering Sales  being transferred to [facility] 6S 665 for routine progression of patient care       Report consisted of patient's Situation, Background, Assessment and   Recommendations(SBAR).     Information from the following report(s) Nurse Handoff Report, ED Encounter Summary, ED SBAR, Intake/Output, MAR, Recent Results, Cardiac Rhythm NSR, and Neuro Assessment was reviewed with the receiving nurse.    [place] Fall Assessment:    Presents to emergency department  because of falls (Syncope, seizure, or loss of consciousness): No  Age > 70: No  Altered Mental Status, Intoxication with alcohol or substance confusion (Disorientation, impaired judgment, poor safety awaremess, or inability to follow instructions): No  Impaired Mobility: Ambulates or transfers with assistive devices or assistance; Unable to ambulate or transer.: No  Nursing Judgement: No          Lines:   Peripheral IV 01/31/24 Right Antecubital (Active)   Site Assessment Clean, dry & intact 01/31/24 1623   Phlebitis Assessment No symptoms 01/31/24 1623   Infiltration Assessment 0 01/31/24 1623        Opportunity for questions and clarification was provided.      Patient transported with:  Monitor and Tech/medic.  [facility] already enroute with patient.

## 2024-02-01 NOTE — H&P
EARLY MOBILITY ASSESSMENT: Recommend routine ambulation while hospitalized with the assistance of nursing staff and Recommend an assessment from physical therapy and/or occupational therapy  ANTICIPATED DISCHARGE: 24-48 hours.  ANTICIPATED DISPOSITION: Home  EMERGENCY CONTACT/SURROGATE DECISION MAKER:   mother Mckeon  [phone]    CRITICAL CARE WAS PERFORMED FOR THIS ENCOUNTER: NO.      Signed By: Lucian Ortiz MD     January 31, 2024         Please note that this dictation may have been completed with Dragon, the computer voice recognition software.  Quite often unanticipated grammatical, syntax, homophones, and other interpretive errors are inadvertently transcribed by the computer software.  Please disregard these errors.  Please excuse any errors that have escaped final proofreading.

## 2024-02-01 NOTE — ED NOTES
Nurse Adrian completed MRI form and gave it to MRI staff. MRI Staff said ETA for MRI is 2130 to 2200. MRI Staff notified about premedication before MRI.

## 2024-02-01 NOTE — PLAN OF CARE
Problem: Discharge Planning  Goal: Discharge to home or other facility with appropriate resources  2/1/2024 1210 by Amina Ferrari RN  Outcome: Completed  Flowsheets (Taken 2/1/2024 0800)  Discharge to home or other facility with appropriate resources: Identify barriers to discharge with patient and caregiver  2/1/2024 0005 by Prakash Almeida RN  Outcome: Progressing     Problem: Safety - Adult  Goal: Free from fall injury  2/1/2024 1210 by Amina Ferrari RN  Outcome: Completed  Flowsheets (Taken 2/1/2024 0800)  Free From Fall Injury: Instruct family/caregiver on patient safety  2/1/2024 0005 by Prakash Almeida RN  Outcome: Progressing     Problem: ABCDS Injury Assessment  Goal: Absence of physical injury  2/1/2024 1210 by Amina Ferrari RN  Outcome: Completed  2/1/2024 0005 by Prakash Almeida RN  Outcome: Progressing     Problem: Chronic Conditions and Co-morbidities  Goal: Patient's chronic conditions and co-morbidity symptoms are monitored and maintained or improved  2/1/2024 1210 by Amina Ferrari RN  Outcome: Completed  Flowsheets (Taken 2/1/2024 0800)  Care Plan - Patient's Chronic Conditions and Co-Morbidity Symptoms are Monitored and Maintained or Improved: Monitor and assess patient's chronic conditions and comorbid symptoms for stability, deterioration, or improvement  2/1/2024 0007 by Prakash Almeida RN  Outcome: Progressing     Problem: Pain  Goal: Verbalizes/displays adequate comfort level or baseline comfort level  Outcome: Completed

## 2024-02-14 ENCOUNTER — TELEPHONE (OUTPATIENT)
Age: 29
End: 2024-02-14

## 2024-02-14 NOTE — TELEPHONE ENCOUNTER
Called patient to try and get hospital f/u apt scheduled. Patient did not  and VM was also full. Could not leave message

## 2024-02-15 ENCOUNTER — HOSPITAL ENCOUNTER (EMERGENCY)
Facility: HOSPITAL | Age: 29
Discharge: HOME | End: 2024-02-15
Attending: EMERGENCY MEDICINE
Payer: COMMERCIAL

## 2024-02-15 VITALS
HEART RATE: 94 BPM | SYSTOLIC BLOOD PRESSURE: 138 MMHG | RESPIRATION RATE: 18 BRPM | WEIGHT: 191.36 LBS | DIASTOLIC BLOOD PRESSURE: 106 MMHG | OXYGEN SATURATION: 100 % | HEIGHT: 65 IN | TEMPERATURE: 97.8 F | BODY MASS INDEX: 31.88 KG/M2

## 2024-02-15 DIAGNOSIS — E87.6 HYPOKALEMIA: Primary | ICD-10-CM

## 2024-02-15 DIAGNOSIS — R11.2 NAUSEA AND VOMITING, UNSPECIFIED VOMITING TYPE: ICD-10-CM

## 2024-02-15 LAB
ANION GAP SERPL CALC-SCNC: 11 MMOL/L (ref 5–15)
BUN SERPL-MCNC: 9 MG/DL (ref 6–20)
BUN/CREAT SERPL: 13 (ref 12–20)
CALCIUM SERPL-MCNC: 9 MG/DL (ref 8.5–10.1)
CHLORIDE SERPL-SCNC: 96 MMOL/L (ref 97–108)
CO2 SERPL-SCNC: 28 MMOL/L (ref 21–32)
CREAT SERPL-MCNC: 0.68 MG/DL (ref 0.55–1.02)
GLUCOSE SERPL-MCNC: 110 MG/DL (ref 65–100)
MAGNESIUM SERPL-MCNC: 1.8 MG/DL (ref 1.6–2.4)
POTASSIUM SERPL-SCNC: 3 MMOL/L (ref 3.5–5.1)
SODIUM SERPL-SCNC: 135 MMOL/L (ref 136–145)

## 2024-02-15 PROCEDURE — 96374 THER/PROPH/DIAG INJ IV PUSH: CPT

## 2024-02-15 PROCEDURE — 6360000002 HC RX W HCPCS: Performed by: NURSE PRACTITIONER

## 2024-02-15 PROCEDURE — 99284 EMERGENCY DEPT VISIT MOD MDM: CPT

## 2024-02-15 PROCEDURE — 6370000000 HC RX 637 (ALT 250 FOR IP): Performed by: NURSE PRACTITIONER

## 2024-02-15 PROCEDURE — 36415 COLL VENOUS BLD VENIPUNCTURE: CPT

## 2024-02-15 PROCEDURE — 96361 HYDRATE IV INFUSION ADD-ON: CPT

## 2024-02-15 PROCEDURE — 2580000003 HC RX 258: Performed by: NURSE PRACTITIONER

## 2024-02-15 PROCEDURE — 83735 ASSAY OF MAGNESIUM: CPT

## 2024-02-15 PROCEDURE — 80048 BASIC METABOLIC PNL TOTAL CA: CPT

## 2024-02-15 RX ORDER — ACETAMINOPHEN 500 MG
1000 TABLET ORAL
Status: COMPLETED | OUTPATIENT
Start: 2024-02-15 | End: 2024-02-15

## 2024-02-15 RX ORDER — ONDANSETRON 2 MG/ML
4 INJECTION INTRAMUSCULAR; INTRAVENOUS ONCE
Status: COMPLETED | OUTPATIENT
Start: 2024-02-15 | End: 2024-02-15

## 2024-02-15 RX ORDER — POTASSIUM CHLORIDE 750 MG/1
40 TABLET, FILM COATED, EXTENDED RELEASE ORAL ONCE
Status: COMPLETED | OUTPATIENT
Start: 2024-02-15 | End: 2024-02-15

## 2024-02-15 RX ORDER — 0.9 % SODIUM CHLORIDE 0.9 %
1000 INTRAVENOUS SOLUTION INTRAVENOUS ONCE
Status: COMPLETED | OUTPATIENT
Start: 2024-02-15 | End: 2024-02-15

## 2024-02-15 RX ADMIN — POTASSIUM CHLORIDE 40 MEQ: 750 TABLET, FILM COATED, EXTENDED RELEASE ORAL at 19:52

## 2024-02-15 RX ADMIN — ACETAMINOPHEN 1000 MG: 500 TABLET ORAL at 19:52

## 2024-02-15 RX ADMIN — SODIUM CHLORIDE 1000 ML: 9 INJECTION, SOLUTION INTRAVENOUS at 19:13

## 2024-02-15 RX ADMIN — ONDANSETRON 4 MG: 2 INJECTION INTRAMUSCULAR; INTRAVENOUS at 19:14

## 2024-02-15 NOTE — ED TRIAGE NOTES
Pt c/o being dehydrated after drinking 3 glasses of wine last night. Pt states that she has been drinking electrolytes, but has thrown up twice today.

## 2024-02-15 NOTE — ED PROVIDER NOTES
mouth 3 times daily as needed for Muscle spasms    FLUTICASONE (FLONASE) 50 MCG/ACT NASAL SPRAY    2 sprays by Each Nostril route daily    NAPROXEN (NAPROSYN) 500 MG TABLET    Take 1 tablet by mouth 2 times daily (with meals)    POTASSIUM CHLORIDE (KLOR-CON M) 20 MEQ EXTENDED RELEASE TABLET    Take 1 tablet by mouth daily    POTASSIUM CHLORIDE (KLOR-CON) 20 MEQ PACKET    TAKE 1 PACKET (20MEQ) MOUTH 2 TIMES DAILY AS DIRECTED    VALSARTAN (DIOVAN) 320 MG TABLET    TAKE 1 TABLET BY MOUTH DAILY       ALLERGIES     Hydrochlorothiazide and Metronidazole    FAMILY HISTORY       Family History   Problem Relation Age of Onset    Diabetes Father     Hypertension Father     Hypertension Paternal Grandmother     Diabetes Paternal Grandmother     No Known Problems Brother     Osteoporosis Neg Hx     Breast Cancer Maternal Grandmother         great, great gma, age?    Hypertension Maternal Grandmother     Breast Cancer Maternal Aunt 31        survivor          SOCIAL HISTORY       Social History     Socioeconomic History    Marital status: Single     Spouse name: None    Number of children: None    Years of education: None    Highest education level: None   Tobacco Use    Smoking status: Never    Smokeless tobacco: Never   Vaping Use    Vaping Use: Never used   Substance and Sexual Activity    Alcohol use: Yes     Alcohol/week: 2.0 standard drinks of alcohol     Types: 2 Glasses of wine per week    Drug use: No   Social History Narrative    Lives in [place] with parents and 2 yo son.  Works at retail data.     Social Determinants of Health     Financial Resource Strain: Low Risk  (7/25/2022)    Overall Financial Resource Strain (CARDIA)     Difficulty of Paying Living Expenses: Not hard at all   Food Insecurity: No Food Insecurity (7/25/2022)    Hunger Vital Sign     Worried About Running Out of Food in the Last Year: Never true     Ran Out of Food in the Last Year: Never true           Review of Systems-as delineated in

## 2024-02-16 NOTE — ED NOTES
Patient left ED in no acute distress, alert and oriented x4. Patient was encourage to come back if symptoms get worse. Patient was provided with discharge instructions.  All questions were answered. Patient left ambulatory.

## 2024-02-16 NOTE — DISCHARGE INSTRUCTIONS
Thank you for allowing us to provide you with medical care today.  We realize that you have many choices for your emergency care needs.  We thank you for choosing [facility].  Please choose us in the future for any continued health care needs.     We hope we addressed all of your medical concerns. We strive to provide excellent quality care in the Emergency Department.  Anything less than excellent does not meet our expectations.     The exam and treatment you received in the Emergency Department were for an emergent problem and are not intended as complete care. It is important that you follow up with a doctor, nurse practitioner, or physician’s assistant for ongoing care. If your symptoms worsen or you do not improve as expected and you are unable to reach your usual health care provider, you should return to the Emergency Department. We are available 24 hours a day.     Take this sheet with you when you go to your follow-up visit.     If you have any problem arranging the follow-up visit, contact the Emergency Department immediately.     Make an appointment your family doctor for follow up of this visit. Return to the ER if you are unable to be seen in a timely manner.

## 2024-02-26 ENCOUNTER — HOSPITAL ENCOUNTER (EMERGENCY)
Facility: HOSPITAL | Age: 29
Discharge: HOME | End: 2024-02-27
Attending: EMERGENCY MEDICINE
Payer: COMMERCIAL

## 2024-02-26 DIAGNOSIS — E87.6 HYPOKALEMIA: ICD-10-CM

## 2024-02-26 DIAGNOSIS — R42 DIZZINESS: Primary | ICD-10-CM

## 2024-02-26 LAB
ALBUMIN SERPL-MCNC: 3.4 G/DL (ref 3.5–5)
ALBUMIN/GLOB SERPL: 0.6 (ref 1.1–2.2)
ALP SERPL-CCNC: 39 U/L (ref 45–117)
ALT SERPL-CCNC: 18 U/L (ref 12–78)
ANION GAP SERPL CALC-SCNC: 9 MMOL/L (ref 5–15)
AST SERPL-CCNC: 18 U/L (ref 15–37)
BASOPHILS # BLD: 0 K/UL (ref 0–0.1)
BASOPHILS NFR BLD: 0 % (ref 0–1)
BILIRUB SERPL-MCNC: 0.4 MG/DL (ref 0.2–1)
BUN SERPL-MCNC: 10 MG/DL (ref 6–20)
BUN/CREAT SERPL: 10 (ref 12–20)
CALCIUM SERPL-MCNC: 8.9 MG/DL (ref 8.5–10.1)
CHLORIDE SERPL-SCNC: 99 MMOL/L (ref 97–108)
CO2 SERPL-SCNC: 29 MMOL/L (ref 21–32)
CREAT SERPL-MCNC: 1.01 MG/DL (ref 0.55–1.02)
DIFFERENTIAL METHOD BLD: ABNORMAL
EOSINOPHIL # BLD: 0.1 K/UL (ref 0–0.4)
EOSINOPHIL NFR BLD: 1 % (ref 0–7)
ERYTHROCYTE [DISTWIDTH] IN BLOOD BY AUTOMATED COUNT: 12.7 % (ref 11.5–14.5)
GLOBULIN SER CALC-MCNC: 5.6 G/DL (ref 2–4)
GLUCOSE SERPL-MCNC: 115 MG/DL (ref 65–100)
HCT VFR BLD AUTO: 34.5 % (ref 35–47)
HGB BLD-MCNC: 11.1 G/DL (ref 11.5–16)
IMM GRANULOCYTES # BLD AUTO: 0 K/UL (ref 0–0.04)
IMM GRANULOCYTES NFR BLD AUTO: 0 % (ref 0–0.5)
LYMPHOCYTES # BLD: 2.3 K/UL (ref 0.8–3.5)
LYMPHOCYTES NFR BLD: 26 % (ref 12–49)
MAGNESIUM SERPL-MCNC: 1.9 MG/DL (ref 1.6–2.4)
MCH RBC QN AUTO: 29.9 PG (ref 26–34)
MCHC RBC AUTO-ENTMCNC: 32.2 G/DL (ref 30–36.5)
MCV RBC AUTO: 93 FL (ref 80–99)
MONOCYTES # BLD: 0.6 K/UL (ref 0–1)
MONOCYTES NFR BLD: 7 % (ref 5–13)
NEUTS SEG # BLD: 5.7 K/UL (ref 1.8–8)
NEUTS SEG NFR BLD: 66 % (ref 32–75)
NRBC # BLD: 0 K/UL (ref 0–0.01)
NRBC BLD-RTO: 0 PER 100 WBC
PLATELET # BLD AUTO: 395 K/UL (ref 150–400)
PMV BLD AUTO: 9 FL (ref 8.9–12.9)
POTASSIUM SERPL-SCNC: 2.7 MMOL/L (ref 3.5–5.1)
PROT SERPL-MCNC: 9 G/DL (ref 6.4–8.2)
RBC # BLD AUTO: 3.71 M/UL (ref 3.8–5.2)
SODIUM SERPL-SCNC: 137 MMOL/L (ref 136–145)
WBC # BLD AUTO: 8.8 K/UL (ref 3.6–11)

## 2024-02-26 PROCEDURE — 93005 ELECTROCARDIOGRAM TRACING: CPT | Performed by: EMERGENCY MEDICINE

## 2024-02-26 PROCEDURE — 2580000003 HC RX 258: Performed by: EMERGENCY MEDICINE

## 2024-02-26 PROCEDURE — 85025 COMPLETE CBC W/AUTO DIFF WBC: CPT

## 2024-02-26 PROCEDURE — 96360 HYDRATION IV INFUSION INIT: CPT

## 2024-02-26 PROCEDURE — 6370000000 HC RX 637 (ALT 250 FOR IP): Performed by: EMERGENCY MEDICINE

## 2024-02-26 PROCEDURE — 83735 ASSAY OF MAGNESIUM: CPT

## 2024-02-26 PROCEDURE — 80053 COMPREHEN METABOLIC PANEL: CPT

## 2024-02-26 PROCEDURE — 96361 HYDRATE IV INFUSION ADD-ON: CPT

## 2024-02-26 PROCEDURE — 6360000002 HC RX W HCPCS: Performed by: EMERGENCY MEDICINE

## 2024-02-26 PROCEDURE — 96365 THER/PROPH/DIAG IV INF INIT: CPT

## 2024-02-26 PROCEDURE — 99284 EMERGENCY DEPT VISIT MOD MDM: CPT

## 2024-02-26 RX ORDER — POTASSIUM CHLORIDE 7.45 MG/ML
10 INJECTION INTRAVENOUS ONCE
Status: COMPLETED | OUTPATIENT
Start: 2024-02-26 | End: 2024-02-27

## 2024-02-26 RX ORDER — 0.9 % SODIUM CHLORIDE 0.9 %
1000 INTRAVENOUS SOLUTION INTRAVENOUS ONCE
Status: COMPLETED | OUTPATIENT
Start: 2024-02-26 | End: 2024-02-26

## 2024-02-26 RX ORDER — POTASSIUM CHLORIDE 750 MG/1
40 TABLET, FILM COATED, EXTENDED RELEASE ORAL ONCE
Status: COMPLETED | OUTPATIENT
Start: 2024-02-26 | End: 2024-02-26

## 2024-02-26 RX ADMIN — POTASSIUM CHLORIDE 40 MEQ: 750 TABLET, EXTENDED RELEASE ORAL at 23:15

## 2024-02-26 RX ADMIN — POTASSIUM CHLORIDE 10 MEQ: 7.46 INJECTION, SOLUTION INTRAVENOUS at 23:19

## 2024-02-26 RX ADMIN — SODIUM CHLORIDE 1000 ML: 9 INJECTION, SOLUTION INTRAVENOUS at 22:16

## 2024-02-26 ASSESSMENT — PAIN - FUNCTIONAL ASSESSMENT: PAIN_FUNCTIONAL_ASSESSMENT: NONE - DENIES PAIN

## 2024-02-27 VITALS
WEIGHT: 190.26 LBS | OXYGEN SATURATION: 100 % | TEMPERATURE: 97.6 F | DIASTOLIC BLOOD PRESSURE: 103 MMHG | HEIGHT: 65 IN | SYSTOLIC BLOOD PRESSURE: 129 MMHG | BODY MASS INDEX: 31.7 KG/M2 | HEART RATE: 88 BPM | RESPIRATION RATE: 14 BRPM

## 2024-02-27 LAB
EKG ATRIAL RATE: 98 BPM
EKG DIAGNOSIS: NORMAL
EKG P AXIS: 56 DEGREES
EKG P-R INTERVAL: 164 MS
EKG Q-T INTERVAL: 356 MS
EKG QRS DURATION: 88 MS
EKG QTC CALCULATION (BAZETT): 454 MS
EKG R AXIS: 28 DEGREES
EKG T AXIS: 22 DEGREES
EKG VENTRICULAR RATE: 98 BPM

## 2024-02-27 ASSESSMENT — ENCOUNTER SYMPTOMS
SHORTNESS OF BREATH: 0
BACK PAIN: 0
COUGH: 0
SORE THROAT: 0
CHEST TIGHTNESS: 0
EYE PAIN: 0
EYE DISCHARGE: 0
DIARRHEA: 0
BLOOD IN STOOL: 0
NAUSEA: 0
ABDOMINAL PAIN: 0
VOMITING: 0
VISUAL CHANGE: 0
FACIAL SWELLING: 0

## 2024-02-27 NOTE — ED TRIAGE NOTES
Patient presents to triage with steady gait. Pt reports she was cooking and got lightheaded and dizzy. Pt reports her bp was 86/57 at home, and heart rate was 110. Pt reports she has a hx of low potassium. Denies pain. Denies recent illness.

## 2024-02-27 NOTE — ED PROVIDER NOTES
[facility] EMERGENCY CTR  EMERGENCY DEPARTMENT ENCOUNTER      Pt Name: Tsering Sales  MRN: [id number]  Birthdate 1995  Date of evaluation: 2/26/2024  Provider: Igor Aponte MD    CHIEF COMPLAINT       Chief Complaint   Patient presents with    Dizziness         HISTORY OF PRESENT ILLNESS   (Location/Symptom, Timing/Onset, Context/Setting, Quality, Duration, Modifying Factors, Severity)  Note limiting factors.   28-year-old female with dizziness and hypotension.  She states she is felt this way with low blood pressures before.  She noted a blood pressure of 86/57 at home with a heart rate of 110 and felt dizzy while standing.  She is doing well in the emergency department.  Heart rate is improved as has blood pressure.  No fall or injury.  She states she has been this way with hypokalemia previously.    The history is provided by the patient.   Dizziness  Quality:  Lightheadedness  Severity:  Mild  Onset quality:  Gradual  Duration:  3 hours  Timing:  Intermittent  Progression:  Waxing and waning  Chronicity:  Recurrent  Context: standing up    Context: not when bending over, not with bowel movement, not with head movement, not with inactivity and not with loss of consciousness    Relieved by:  Nothing  Worsened by:  Nothing  Ineffective treatments:  None tried  Associated symptoms: no blood in stool, no chest pain, no diarrhea, no headaches, no nausea, no palpitations, no shortness of breath, no tinnitus, no vision changes and no vomiting          Review of External Medical Records:     Nursing Notes were reviewed.    REVIEW OF SYSTEMS    (2-9 systems for level 4, 10 or more for level 5)     Review of Systems   Constitutional:  Negative for activity change, appetite change, chills and diaphoresis.   HENT:  Negative for congestion, ear pain, facial swelling, sore throat and tinnitus.    Eyes:  Negative for pain, discharge and visual disturbance.   Respiratory:  Negative for cough, chest tightness and

## 2024-02-27 NOTE — ED NOTES
Pain assessment on discharge was   Condition Stable  Patient discharged to home  Patient education was completed  Education taught to patient  Teaching method used was handout and verbal  Understanding of teaching was good  Patient was discharged ambulatory  Discharged with self  Valuables were given to patient remained in possession of belongings during stay

## 2024-03-04 SDOH — HEALTH STABILITY: PHYSICAL HEALTH: ON AVERAGE, HOW MANY MINUTES DO YOU ENGAGE IN EXERCISE AT THIS LEVEL?: 60 MIN

## 2024-03-04 SDOH — HEALTH STABILITY: PHYSICAL HEALTH: ON AVERAGE, HOW MANY DAYS PER WEEK DO YOU ENGAGE IN MODERATE TO STRENUOUS EXERCISE (LIKE A BRISK WALK)?: 2 DAYS

## 2024-03-05 ENCOUNTER — OFFICE VISIT (OUTPATIENT)
Facility: CLINIC | Age: 29
End: 2024-03-05
Payer: COMMERCIAL

## 2024-03-05 VITALS
BODY MASS INDEX: 31.16 KG/M2 | SYSTOLIC BLOOD PRESSURE: 112 MMHG | TEMPERATURE: 98.2 F | OXYGEN SATURATION: 90 % | HEIGHT: 65 IN | WEIGHT: 187 LBS | HEART RATE: 88 BPM | DIASTOLIC BLOOD PRESSURE: 80 MMHG

## 2024-03-05 DIAGNOSIS — E87.6 HYPOKALEMIA: ICD-10-CM

## 2024-03-05 DIAGNOSIS — I10 ESSENTIAL HYPERTENSION: Primary | ICD-10-CM

## 2024-03-05 PROCEDURE — 3074F SYST BP LT 130 MM HG: CPT

## 2024-03-05 PROCEDURE — 99213 OFFICE O/P EST LOW 20 MIN: CPT

## 2024-03-05 PROCEDURE — 3079F DIAST BP 80-89 MM HG: CPT

## 2024-03-05 RX ORDER — NIFEDIPINE 30 MG/1
30 TABLET, EXTENDED RELEASE ORAL DAILY
Qty: 30 TABLET | Refills: 0 | Status: SHIPPED | OUTPATIENT
Start: 2024-03-05

## 2024-03-05 SDOH — ECONOMIC STABILITY: FOOD INSECURITY: WITHIN THE PAST 12 MONTHS, THE FOOD YOU BOUGHT JUST DIDN'T LAST AND YOU DIDN'T HAVE MONEY TO GET MORE.: NEVER TRUE

## 2024-03-05 SDOH — ECONOMIC STABILITY: FOOD INSECURITY: WITHIN THE PAST 12 MONTHS, YOU WORRIED THAT YOUR FOOD WOULD RUN OUT BEFORE YOU GOT MONEY TO BUY MORE.: NEVER TRUE

## 2024-03-05 SDOH — ECONOMIC STABILITY: HOUSING INSECURITY
IN THE LAST 12 MONTHS, WAS THERE A TIME WHEN YOU DID NOT HAVE A STEADY PLACE TO SLEEP OR SLEPT IN A SHELTER (INCLUDING NOW)?: NO

## 2024-03-05 SDOH — ECONOMIC STABILITY: INCOME INSECURITY: HOW HARD IS IT FOR YOU TO PAY FOR THE VERY BASICS LIKE FOOD, HOUSING, MEDICAL CARE, AND HEATING?: NOT HARD AT ALL

## 2024-03-05 ASSESSMENT — PATIENT HEALTH QUESTIONNAIRE - PHQ9
SUM OF ALL RESPONSES TO PHQ QUESTIONS 1-9: 0
SUM OF ALL RESPONSES TO PHQ QUESTIONS 1-9: 0
SUM OF ALL RESPONSES TO PHQ9 QUESTIONS 1 & 2: 0
SUM OF ALL RESPONSES TO PHQ QUESTIONS 1-9: 0
2. FEELING DOWN, DEPRESSED OR HOPELESS: 0
1. LITTLE INTEREST OR PLEASURE IN DOING THINGS: 0
SUM OF ALL RESPONSES TO PHQ QUESTIONS 1-9: 0

## 2024-03-05 ASSESSMENT — ENCOUNTER SYMPTOMS
VOMITING: 0
BACK PAIN: 0
COUGH: 0
CONSTIPATION: 0
DIARRHEA: 0
BLOOD IN STOOL: 0
WHEEZING: 0
PHOTOPHOBIA: 0
NAUSEA: 0
SHORTNESS OF BREATH: 0
SORE THROAT: 0
CHEST TIGHTNESS: 0
TROUBLE SWALLOWING: 0
EYE PAIN: 0
ABDOMINAL PAIN: 0

## 2024-03-05 NOTE — PROGRESS NOTES
\"Have you been to the ER, urgent care clinic since your last visit?  Hospitalized since your last visit?\"    YES - When: approximately 1 months ago.  Where and Why: [facility] , low potassium .    “Have you seen or consulted any other health care providers outside of [facility] since your last visit?”    NO       Pt is here to establish care and she wants to change her Blood pressure medication because medication she is taking is cause really bad side effects such as extremely low potassium , dizziness , hard to focus .     Chief Complaint   Patient presents with    New Patient    Establish Care     /80 (Site: Right Upper Arm, Position: Sitting, Cuff Size: Medium Adult)   Pulse 88   Temp 98.2 °F (36.8 °C) (Temporal)   Ht 1.651 m (5' 5\")   Wt 84.8 kg (187 lb)   LMP 02/08/2024 (Approximate)   SpO2 90%   BMI 31.12 kg/m²     
Topics    Alcohol use: Yes     Alcohol/week: 2.0 standard drinks of alcohol     Types: 2 Glasses of wine per week       Allergies   Allergen Reactions    Hydrochlorothiazide Other (See Comments)     Low potassium    Metronidazole Itching       Disposition   No follow-up provider specified.  Future Appointments   Date Time Provider Department Center   3/26/2024  [facility]

## 2024-03-13 ENCOUNTER — OFFICE VISIT (OUTPATIENT)
Facility: CLINIC | Age: 29
End: 2024-03-13
Payer: COMMERCIAL

## 2024-03-13 ENCOUNTER — TELEPHONE (OUTPATIENT)
Age: 29
End: 2024-03-13

## 2024-03-13 VITALS
TEMPERATURE: 98.8 F | WEIGHT: 189 LBS | HEIGHT: 65 IN | OXYGEN SATURATION: 97 % | HEART RATE: 95 BPM | BODY MASS INDEX: 31.49 KG/M2 | SYSTOLIC BLOOD PRESSURE: 130 MMHG | DIASTOLIC BLOOD PRESSURE: 90 MMHG

## 2024-03-13 DIAGNOSIS — M35.00 SJOGREN SYNDROME, UNSPECIFIED (HCC): ICD-10-CM

## 2024-03-13 DIAGNOSIS — I10 ESSENTIAL HYPERTENSION: ICD-10-CM

## 2024-03-13 DIAGNOSIS — G43.909 MIGRAINE WITHOUT STATUS MIGRAINOSUS, NOT INTRACTABLE, UNSPECIFIED MIGRAINE TYPE: ICD-10-CM

## 2024-03-13 DIAGNOSIS — I10 ESSENTIAL HYPERTENSION: Primary | ICD-10-CM

## 2024-03-13 DIAGNOSIS — E87.6 HYPOKALEMIA: ICD-10-CM

## 2024-03-13 DIAGNOSIS — R53.1 GENERALIZED WEAKNESS: ICD-10-CM

## 2024-03-13 PROCEDURE — 99214 OFFICE O/P EST MOD 30 MIN: CPT

## 2024-03-13 PROCEDURE — 3075F SYST BP GE 130 - 139MM HG: CPT

## 2024-03-13 PROCEDURE — 3080F DIAST BP >= 90 MM HG: CPT

## 2024-03-13 RX ORDER — PREDNISONE 5 MG/1
TABLET ORAL
Qty: 21 EACH | Refills: 0 | Status: SHIPPED | OUTPATIENT
Start: 2024-03-13

## 2024-03-13 RX ORDER — BUTALBITAL, ACETAMINOPHEN AND CAFFEINE 50; 325; 40 MG/1; MG/1; MG/1
1 TABLET ORAL EVERY 4 HOURS PRN
Qty: 60 TABLET | Refills: 0 | Status: SHIPPED | OUTPATIENT
Start: 2024-03-13

## 2024-03-13 ASSESSMENT — ENCOUNTER SYMPTOMS
WHEEZING: 0
DIARRHEA: 0
EYE PAIN: 0
NAUSEA: 0
PHOTOPHOBIA: 0
SORE THROAT: 0
CONSTIPATION: 0
ABDOMINAL PAIN: 0
TROUBLE SWALLOWING: 0
BACK PAIN: 0
CHEST TIGHTNESS: 0
COUGH: 0
VOMITING: 0
BLOOD IN STOOL: 0
SHORTNESS OF BREATH: 0

## 2024-03-13 NOTE — PROGRESS NOTES
\"Have you been to the ER, urgent care clinic since your last visit?  Hospitalized since your last visit?\"    NO    “Have you seen or consulted any other health care providers outside of [facility] since your last visit?”    NO    Pt is here to discuss FMLA paper work .  Chief Complaint   Patient presents with    FMLA papperwork     dicuss medical condition        BP (!) 130/90 (Site: Left Upper Arm, Position: Sitting, Cuff Size: Medium Adult)   Pulse 95   Temp 98.8 °F (37.1 °C) (Temporal)   Ht 1.651 m (5' 5\")   Wt 85.7 kg (189 lb)   LMP 02/08/2024 (Approximate)   SpO2 97%   BMI 31.45 kg/m²             Click Here for Release of Records Request    
exam     Unspecified vitamin D deficiency 2014    Varicella 1998      Past Surgical History:   Procedure Laterality Date    GYN       2011    LEEP  16    found IRA 3     Family History   Problem Relation Age of Onset    Diabetes Father     Hypertension Father     Hypertension Paternal Grandmother     Diabetes Paternal Grandmother     No Known Problems Brother     Osteoporosis Neg Hx     Breast Cancer Maternal Grandmother         great, great gma, age?    Hypertension Maternal Grandmother     Breast Cancer Maternal Aunt 31        survivor     Social History     Tobacco Use    Smoking status: Never    Smokeless tobacco: Never   Substance Use Topics    Alcohol use: Yes     Alcohol/week: 2.0 standard drinks of alcohol     Types: 2 Glasses of wine per week       Allergies   Allergen Reactions    Hydrochlorothiazide Other (See Comments)     Low potassium    Metronidazole Itching       Disposition   No follow-up provider specified.  Future Appointments   Date Time Provider Department Center   3/26/2024  8:30 AM [facility] NURSE [facility] BS AMB

## 2024-03-13 NOTE — TELEPHONE ENCOUNTER
Called PT to schedule per NP Bonnie Davis, PT stated that she called around and was able to find somewhere with earlier appointments

## 2024-03-22 ENCOUNTER — HOSPITAL ENCOUNTER (EMERGENCY)
Facility: HOSPITAL | Age: 29
Discharge: HOME | End: 2024-03-22
Attending: EMERGENCY MEDICINE
Payer: COMMERCIAL

## 2024-03-22 VITALS
TEMPERATURE: 98.1 F | OXYGEN SATURATION: 100 % | DIASTOLIC BLOOD PRESSURE: 111 MMHG | HEART RATE: 96 BPM | SYSTOLIC BLOOD PRESSURE: 140 MMHG | RESPIRATION RATE: 18 BRPM

## 2024-03-22 DIAGNOSIS — I10 ASYMPTOMATIC HYPERTENSION: Primary | ICD-10-CM

## 2024-03-22 LAB
ALBUMIN SERPL-MCNC: 3.3 G/DL (ref 3.5–5)
ALBUMIN/GLOB SERPL: 0.6 (ref 1.1–2.2)
ALP SERPL-CCNC: 39 U/L (ref 45–117)
ALT SERPL-CCNC: 18 U/L (ref 12–78)
ANION GAP SERPL CALC-SCNC: 10 MMOL/L (ref 5–15)
AST SERPL-CCNC: 19 U/L (ref 15–37)
BASOPHILS # BLD: 0 K/UL (ref 0–0.1)
BASOPHILS NFR BLD: 0 % (ref 0–1)
BILIRUB SERPL-MCNC: 0.4 MG/DL (ref 0.2–1)
BUN SERPL-MCNC: 12 MG/DL (ref 6–20)
BUN/CREAT SERPL: 16 (ref 12–20)
CALCIUM SERPL-MCNC: 8.6 MG/DL (ref 8.5–10.1)
CHLORIDE SERPL-SCNC: 102 MMOL/L (ref 97–108)
CO2 SERPL-SCNC: 25 MMOL/L (ref 21–32)
CREAT SERPL-MCNC: 0.77 MG/DL (ref 0.55–1.02)
DIFFERENTIAL METHOD BLD: ABNORMAL
EKG ATRIAL RATE: 95 BPM
EKG DIAGNOSIS: NORMAL
EKG P AXIS: 51 DEGREES
EKG P-R INTERVAL: 178 MS
EKG Q-T INTERVAL: 386 MS
EKG QRS DURATION: 76 MS
EKG QTC CALCULATION (BAZETT): 485 MS
EKG R AXIS: 38 DEGREES
EKG T AXIS: 17 DEGREES
EKG VENTRICULAR RATE: 95 BPM
EOSINOPHIL # BLD: 0.1 K/UL (ref 0–0.4)
EOSINOPHIL NFR BLD: 1 % (ref 0–7)
ERYTHROCYTE [DISTWIDTH] IN BLOOD BY AUTOMATED COUNT: 13.2 % (ref 11.5–14.5)
GLOBULIN SER CALC-MCNC: 5.3 G/DL (ref 2–4)
GLUCOSE SERPL-MCNC: 92 MG/DL (ref 65–100)
HCT VFR BLD AUTO: 35.2 % (ref 35–47)
HGB BLD-MCNC: 11.5 G/DL (ref 11.5–16)
IMM GRANULOCYTES # BLD AUTO: 0 K/UL (ref 0–0.04)
IMM GRANULOCYTES NFR BLD AUTO: 0 % (ref 0–0.5)
LYMPHOCYTES # BLD: 2.2 K/UL (ref 0.8–3.5)
LYMPHOCYTES NFR BLD: 32 % (ref 12–49)
MCH RBC QN AUTO: 30.1 PG (ref 26–34)
MCHC RBC AUTO-ENTMCNC: 32.7 G/DL (ref 30–36.5)
MCV RBC AUTO: 92.1 FL (ref 80–99)
MONOCYTES # BLD: 0.5 K/UL (ref 0–1)
MONOCYTES NFR BLD: 7 % (ref 5–13)
NEUTS SEG # BLD: 4 K/UL (ref 1.8–8)
NEUTS SEG NFR BLD: 60 % (ref 32–75)
NRBC # BLD: 0 K/UL (ref 0–0.01)
NRBC BLD-RTO: 0 PER 100 WBC
PLATELET # BLD AUTO: 456 K/UL (ref 150–400)
PMV BLD AUTO: 9.1 FL (ref 8.9–12.9)
POTASSIUM SERPL-SCNC: 3.4 MMOL/L (ref 3.5–5.1)
PROT SERPL-MCNC: 8.6 G/DL (ref 6.4–8.2)
RBC # BLD AUTO: 3.82 M/UL (ref 3.8–5.2)
SODIUM SERPL-SCNC: 137 MMOL/L (ref 136–145)
WBC # BLD AUTO: 6.9 K/UL (ref 3.6–11)

## 2024-03-22 PROCEDURE — 93005 ELECTROCARDIOGRAM TRACING: CPT | Performed by: EMERGENCY MEDICINE

## 2024-03-22 PROCEDURE — 80053 COMPREHEN METABOLIC PANEL: CPT

## 2024-03-22 PROCEDURE — 99284 EMERGENCY DEPT VISIT MOD MDM: CPT

## 2024-03-22 PROCEDURE — 36415 COLL VENOUS BLD VENIPUNCTURE: CPT

## 2024-03-22 PROCEDURE — 85025 COMPLETE CBC W/AUTO DIFF WBC: CPT

## 2024-03-22 NOTE — DISCHARGE INSTRUCTIONS
Please take a log of your blood pressure 3 times a day.  Write these down and take him to your primary care doctor to discuss any changes to your high blood pressure medications

## 2024-03-22 NOTE — ED NOTES
Patient given discharge papers and instructions by this RN. Patient verbalized understanding and stated not having questions or concerns regarding her care. Patient ambulatory out of ED in no new acute distress.

## 2024-03-22 NOTE — ED TRIAGE NOTES
Pt arrives with concern over HTN today after taking her BP multiple times today, getting higher each time. BP upon arrival to ED is 139/104. Pt takes BP medication.

## 2024-03-22 NOTE — ED PROVIDER NOTES
[facility]  EMERGENCY DEPARTMENT ENCOUNTER      Pt Name: Tsering Sales  MRN: [id number]  Birthdate 1995  Date of evaluation: 3/22/2024  Provider: Roderick Mills MD      HISTORY OF PRESENT ILLNESS      HPI  28-year-old female with a past medical history of anemia, high blood pressure, and scoliosis presenting to the emergency department due to high blood pressure.  Patient checked her blood pressure this morning.  She says her diastolic was about 100.  She subsequently checked her blood pressure multiple times and it continued to go up.  She went to [facility] and it continued to be elevated as high as the 180 systolic.  She never had any symptoms other than feeling anxious because her blood pressure kept going up.  She does state that she was recently started on nifedipine but realized for the last few days she has been taking a muscle relaxer instead of her nifedipine.  She says she has been compliant with the losartan.  No headache.  No chest pain or shortness of breath.      Nursing Notes were reviewed.    REVIEW OF SYSTEMS         Review of Systems  A complete review of systems was performed all systems reviewed are negative unless otherwise document in the HPI      PAST MEDICAL HISTORY     Past Medical History:   Diagnosis Date    Abnormal Pap smear of cervix 2017    seeing OBGYN    Acne 10/7/2009    Allergic rhinitis 9/2/2009    Anemia 9/2/2009    Atypical squamous cell changes of undetermined significance (ASCUS) on vaginal cytology 09/14/2017    H/o ASCUS HPV + with Dr. Ilia De Dios 9/14/2017    IRA III (cervical intraepithelial neoplasia III) 11/30/2017    found by LEEP     Headache     Herpes genitalia 2017    dx by OB    History of gestational diabetes     diet controlled    Hypertension     Other ill-defined conditions(799.89)     scoliosis    PCB (post coital bleeding) 11/25/2014    Thoracic compression fracture (HCC) 3/2014    Isak Lopezchotaj    Thyromegaly 11/25/2014    On exam

## 2024-03-26 ENCOUNTER — NURSE ONLY (OUTPATIENT)
Facility: CLINIC | Age: 29
End: 2024-03-26
Payer: COMMERCIAL

## 2024-03-26 VITALS
HEART RATE: 98 BPM | WEIGHT: 189 LBS | TEMPERATURE: 98.9 F | HEIGHT: 65 IN | OXYGEN SATURATION: 99 % | SYSTOLIC BLOOD PRESSURE: 129 MMHG | DIASTOLIC BLOOD PRESSURE: 98 MMHG | BODY MASS INDEX: 31.49 KG/M2

## 2024-03-26 DIAGNOSIS — I10 ESSENTIAL (PRIMARY) HYPERTENSION: ICD-10-CM

## 2024-03-26 PROCEDURE — 3080F DIAST BP >= 90 MM HG: CPT

## 2024-03-26 PROCEDURE — 3074F SYST BP LT 130 MM HG: CPT

## 2024-03-26 PROCEDURE — 99214 OFFICE O/P EST MOD 30 MIN: CPT

## 2024-03-26 RX ORDER — VALSARTAN 320 MG/1
TABLET ORAL
Qty: 30 TABLET | Refills: 1 | Status: SHIPPED | OUTPATIENT
Start: 2024-03-26

## 2024-03-26 RX ORDER — NIFEDIPINE 60 MG/1
60 TABLET, EXTENDED RELEASE ORAL DAILY
Qty: 30 TABLET | Refills: 1 | Status: SHIPPED | OUTPATIENT
Start: 2024-03-26

## 2024-03-26 NOTE — PROGRESS NOTES
\"Have you been to the ER, urgent care clinic since your last visit?  Hospitalized since your last visit?\"    {YES/NO:[id number]}    “Have you seen or consulted any other health care providers outside of [facility] since your last visit?”    {YES/NO:[id number]}            Click Here for Release of Records Request

## 2024-03-26 NOTE — PROGRESS NOTES
Tsering Sales  28 y.o. female  1995  [address] [place] [address]  [id number]     [facility]: Progress Note       Encounter Date: 3/26/2024  History provided by patient  History of Present Illness   Tsering Sales is a 28 y.o. female who presents today for blood pressure check.   Previously, patient's last BP was 140/111 at last appointment.  Today, the blood pressure reading is 129/98.    The patient reports that she is currently taking nifedipine 30 & valsartan 320  for blood pressure control.    Patient denies headache, chest pain/TIA, blurry vision.       Vitals:     Vitals:    03/26/24 0834 03/26/24 0853   BP: (!) 130/98 (!) 129/98   Site: Right Upper Arm Right Upper Arm   Position: Sitting Sitting   Cuff Size: Medium Adult Medium Adult   Pulse: 98    Temp: 98.9 °F (37.2 °C)    TempSrc: Temporal    SpO2: 99%    Weight: 85.7 kg (189 lb)    Height: 1.651 m (5' 5\")      Body mass index is 31.45 kg/m².    Wt Readings from Last 3 Encounters:   03/26/24 85.7 kg (189 lb)   03/13/24 85.7 kg (189 lb)   03/05/24 84.8 kg (187 lb)       Assessment and Plan:   1. Essential (primary) hypertension  Comments:  change nifedipine to 60, continue valsartan. discussed importance of controlling anxiety-related symtpoms  Orders:  -     valsartan (DIOVAN) 320 MG tablet; TAKE 1 TABLET BY MOUTH DAILY, Disp-30 tablet, R-1Normal  -     NIFEdipine (PROCARDIA XL) 60 MG extended release tablet; Take 1 tablet by mouth daily, Disp-30 tablet, R-1Normal     I have discussed the diagnosis with the patient and the intended plan as seen in the above orders. she has expressed understanding. The patient has received an after-visit summary and questions were answered concerning future plans. I have discussed medication side effects and warnings with the patient as well.    Electronically Signed: Liat Cintron PA-C  I  do attest that this note was reviewed and I was available for  PA

## 2024-04-01 DIAGNOSIS — I10 ESSENTIAL HYPERTENSION: ICD-10-CM

## 2024-04-01 RX ORDER — NIFEDIPINE 30 MG/1
30 TABLET, EXTENDED RELEASE ORAL DAILY
Qty: 30 TABLET | Refills: 0 | OUTPATIENT
Start: 2024-04-01

## 2024-04-23 ENCOUNTER — NURSE ONLY (OUTPATIENT)
Facility: CLINIC | Age: 29
End: 2024-04-23

## 2024-04-23 VITALS — SYSTOLIC BLOOD PRESSURE: 119 MMHG | DIASTOLIC BLOOD PRESSURE: 83 MMHG

## 2024-04-23 DIAGNOSIS — Z01.30 BLOOD PRESSURE CHECK: Primary | ICD-10-CM

## 2024-05-07 DIAGNOSIS — I10 ESSENTIAL (PRIMARY) HYPERTENSION: ICD-10-CM

## 2024-05-07 RX ORDER — NIFEDIPINE 60 MG/1
60 TABLET, EXTENDED RELEASE ORAL DAILY
Qty: 60 TABLET | Refills: 0 | Status: SHIPPED | OUTPATIENT
Start: 2024-05-07

## 2024-05-08 ENCOUNTER — HOSPITAL ENCOUNTER (EMERGENCY)
Facility: HOSPITAL | Age: 29
Discharge: HOME | End: 2024-05-08
Attending: STUDENT IN AN ORGANIZED HEALTH CARE EDUCATION/TRAINING PROGRAM
Payer: COMMERCIAL

## 2024-05-08 ENCOUNTER — HOSPITAL ENCOUNTER (EMERGENCY)
Facility: HOSPITAL | Age: 29
Discharge: LEFT BEFORE DISCHARGE | End: 2024-05-08
Attending: STUDENT IN AN ORGANIZED HEALTH CARE EDUCATION/TRAINING PROGRAM

## 2024-05-08 VITALS
SYSTOLIC BLOOD PRESSURE: 133 MMHG | OXYGEN SATURATION: 99 % | HEART RATE: 96 BPM | TEMPERATURE: 98.3 F | RESPIRATION RATE: 18 BRPM | HEIGHT: 66 IN | DIASTOLIC BLOOD PRESSURE: 92 MMHG | WEIGHT: 190.26 LBS | BODY MASS INDEX: 30.58 KG/M2

## 2024-05-08 VITALS
RESPIRATION RATE: 18 BRPM | DIASTOLIC BLOOD PRESSURE: 104 MMHG | TEMPERATURE: 98.1 F | OXYGEN SATURATION: 96 % | HEART RATE: 100 BPM | SYSTOLIC BLOOD PRESSURE: 140 MMHG

## 2024-05-08 DIAGNOSIS — R20.2 PARESTHESIAS: Primary | ICD-10-CM

## 2024-05-08 LAB
ALBUMIN SERPL-MCNC: 3.7 G/DL (ref 3.5–5)
ALBUMIN/GLOB SERPL: 0.6 (ref 1.1–2.2)
ALP SERPL-CCNC: 57 U/L (ref 45–117)
ALT SERPL-CCNC: 17 U/L (ref 12–78)
ANION GAP SERPL CALC-SCNC: 6 MMOL/L (ref 5–15)
AST SERPL-CCNC: 15 U/L (ref 15–37)
BASOPHILS # BLD: 0 K/UL (ref 0–0.1)
BASOPHILS NFR BLD: 0 % (ref 0–1)
BILIRUB SERPL-MCNC: 0.5 MG/DL (ref 0.2–1)
BUN SERPL-MCNC: 8 MG/DL (ref 6–20)
BUN/CREAT SERPL: 10 (ref 12–20)
CALCIUM SERPL-MCNC: 9.8 MG/DL (ref 8.5–10.1)
CHLORIDE SERPL-SCNC: 105 MMOL/L (ref 97–108)
CO2 SERPL-SCNC: 25 MMOL/L (ref 21–32)
COMMENT:: NORMAL
CREAT SERPL-MCNC: 0.79 MG/DL (ref 0.55–1.02)
DIFFERENTIAL METHOD BLD: ABNORMAL
EOSINOPHIL # BLD: 0.1 K/UL (ref 0–0.4)
EOSINOPHIL NFR BLD: 2 % (ref 0–7)
ERYTHROCYTE [DISTWIDTH] IN BLOOD BY AUTOMATED COUNT: 12.9 % (ref 11.5–14.5)
GLOBULIN SER CALC-MCNC: 6 G/DL (ref 2–4)
GLUCOSE SERPL-MCNC: 95 MG/DL (ref 65–100)
HCT VFR BLD AUTO: 34.4 % (ref 35–47)
HGB BLD-MCNC: 11.5 G/DL (ref 11.5–16)
IMM GRANULOCYTES # BLD AUTO: 0 K/UL (ref 0–0.04)
IMM GRANULOCYTES NFR BLD AUTO: 0 % (ref 0–0.5)
LYMPHOCYTES # BLD: 2 K/UL (ref 0.8–3.5)
LYMPHOCYTES NFR BLD: 28 % (ref 12–49)
MAGNESIUM SERPL-MCNC: 2.2 MG/DL (ref 1.6–2.4)
MCH RBC QN AUTO: 30.8 PG (ref 26–34)
MCHC RBC AUTO-ENTMCNC: 33.4 G/DL (ref 30–36.5)
MCV RBC AUTO: 92.2 FL (ref 80–99)
MONOCYTES # BLD: 0.5 K/UL (ref 0–1)
MONOCYTES NFR BLD: 7 % (ref 5–13)
NEUTS SEG # BLD: 4.5 K/UL (ref 1.8–8)
NEUTS SEG NFR BLD: 63 % (ref 32–75)
NRBC # BLD: 0 K/UL (ref 0–0.01)
NRBC BLD-RTO: 0 PER 100 WBC
PLATELET # BLD AUTO: 411 K/UL (ref 150–400)
PMV BLD AUTO: 8.8 FL (ref 8.9–12.9)
POTASSIUM SERPL-SCNC: 3.4 MMOL/L (ref 3.5–5.1)
PROT SERPL-MCNC: 9.7 G/DL (ref 6.4–8.2)
RBC # BLD AUTO: 3.73 M/UL (ref 3.8–5.2)
SODIUM SERPL-SCNC: 136 MMOL/L (ref 136–145)
SPECIMEN HOLD: NORMAL
TROPONIN I SERPL HS-MCNC: 4 NG/L (ref 0–51)
WBC # BLD AUTO: 7.1 K/UL (ref 3.6–11)

## 2024-05-08 PROCEDURE — 83735 ASSAY OF MAGNESIUM: CPT

## 2024-05-08 PROCEDURE — 93005 ELECTROCARDIOGRAM TRACING: CPT | Performed by: STUDENT IN AN ORGANIZED HEALTH CARE EDUCATION/TRAINING PROGRAM

## 2024-05-08 PROCEDURE — 80053 COMPREHEN METABOLIC PANEL: CPT

## 2024-05-08 PROCEDURE — 85025 COMPLETE CBC W/AUTO DIFF WBC: CPT

## 2024-05-08 PROCEDURE — 99284 EMERGENCY DEPT VISIT MOD MDM: CPT

## 2024-05-08 PROCEDURE — 84484 ASSAY OF TROPONIN QUANT: CPT

## 2024-05-08 PROCEDURE — 36415 COLL VENOUS BLD VENIPUNCTURE: CPT

## 2024-05-08 PROCEDURE — 6370000000 HC RX 637 (ALT 250 FOR IP): Performed by: STUDENT IN AN ORGANIZED HEALTH CARE EDUCATION/TRAINING PROGRAM

## 2024-05-08 RX ORDER — POTASSIUM CHLORIDE 750 MG/1
20 TABLET, FILM COATED, EXTENDED RELEASE ORAL ONCE
Status: COMPLETED | OUTPATIENT
Start: 2024-05-08 | End: 2024-05-08

## 2024-05-08 RX ADMIN — POTASSIUM CHLORIDE 20 MEQ: 750 TABLET, FILM COATED, EXTENDED RELEASE ORAL at 22:53

## 2024-05-08 ASSESSMENT — PAIN DESCRIPTION - LOCATION: LOCATION: LEG

## 2024-05-08 ASSESSMENT — PAIN DESCRIPTION - ORIENTATION: ORIENTATION: RIGHT;LEFT

## 2024-05-08 ASSESSMENT — PAIN SCALES - GENERAL: PAINLEVEL_OUTOF10: 4

## 2024-05-08 ASSESSMENT — PAIN - FUNCTIONAL ASSESSMENT
PAIN_FUNCTIONAL_ASSESSMENT: 0-10
PAIN_FUNCTIONAL_ASSESSMENT: ACTIVITIES ARE NOT PREVENTED

## 2024-05-08 ASSESSMENT — PAIN DESCRIPTION - PAIN TYPE: TYPE: ACUTE PAIN

## 2024-05-08 ASSESSMENT — PAIN DESCRIPTION - DESCRIPTORS: DESCRIPTORS: HEAVINESS

## 2024-05-08 ASSESSMENT — PAIN DESCRIPTION - ONSET: ONSET: ON-GOING

## 2024-05-08 ASSESSMENT — PAIN DESCRIPTION - FREQUENCY: FREQUENCY: CONTINUOUS

## 2024-05-08 NOTE — ED TRIAGE NOTES
Triage: pt arrives to the ER accompanied by son for c/o right sided tingling \"pins and needles\" x2 days ago. Pt stated she has a potassium deficiency and was placed on another BP medication recently ~2 months ago from chlorthalidone to valsartan-nifedipine. Pt concerned for potassium and vitamin B12 levels and would like it checked.

## 2024-05-09 LAB
EKG ATRIAL RATE: 86 BPM
EKG DIAGNOSIS: NORMAL
EKG P AXIS: 64 DEGREES
EKG P-R INTERVAL: 180 MS
EKG Q-T INTERVAL: 374 MS
EKG QRS DURATION: 76 MS
EKG QTC CALCULATION (BAZETT): 447 MS
EKG R AXIS: 38 DEGREES
EKG T AXIS: 36 DEGREES
EKG VENTRICULAR RATE: 86 BPM

## 2024-05-09 PROCEDURE — 93010 ELECTROCARDIOGRAM REPORT: CPT | Performed by: INTERNAL MEDICINE

## 2024-05-09 NOTE — ED TRIAGE NOTES
Pt comes in from home with CC of her \"legs being heavy\" and right sided numbness for the last three days. Pt was at [facility] today for the same CC but left due to wait times. Pt reports she is also on her cycle and is anemic. States her B12 is low.

## 2024-05-09 NOTE — ED PROVIDER NOTES
[facility] EMERGENCY DEP  EMERGENCY DEPARTMENT ENCOUNTER      Pt Name: Tsering Sales  MRN: [id number]  Birthdate 1995  Date of evaluation: 5/8/2024  Provider: Teagan Adam PA-C    CHIEF COMPLAINT       Chief Complaint   Patient presents with    Numbness         HISTORY OF PRESENT ILLNESS    HPI  28-year-old female presents with paresthesias on the right side of her body from head to toe.  She states she has had this for about 3 days but frequently gets this for the last 2 years.  She states she was talking to someone and told her that she could have a B12 deficiency and that she should get this checked so she came to the emergency department for blood testing.  She is also concerned that she could be anemic.  She reports a heavy sensation all over her chest and entire body.    Nursing Notes were reviewed.      REVIEW OF SYSTEMS       Review of Systems   Neurological:         Paresthesias on the right side of body from head to toe, heaviness all over her entire body       Except as noted above the remainder of the review of systems was reviewed and negative.       PAST MEDICAL HISTORY     Past Medical History:   Diagnosis Date    Abnormal Pap smear of cervix 2017    seeing OBGYN    Acne 10/7/2009    Allergic rhinitis 9/2/2009    Anemia 9/2/2009    Atypical squamous cell changes of undetermined significance (ASCUS) on vaginal cytology 09/14/2017    H/o ASCUS HPV + with Dr. Ilia De Dios 9/14/2017    IRA III (cervical intraepithelial neoplasia III) 11/30/2017    found by LEEP     Headache     Herpes genitalia 2017    dx by OB    History of gestational diabetes     diet controlled    Hypertension     Other ill-defined conditions(799.89)     scoliosis    PCB (post coital bleeding) 11/25/2014    Thoracic compression fracture (HCC) 3/2014    Isak Vasquez    Thyromegaly 11/25/2014    On exam     Unspecified vitamin D deficiency 6/26/2014    Varicella 5/1/1998         SURGICAL HISTORY       Past Surgical History:

## 2024-05-15 ENCOUNTER — OFFICE VISIT (OUTPATIENT)
Facility: CLINIC | Age: 29
End: 2024-05-15
Payer: COMMERCIAL

## 2024-05-15 VITALS
SYSTOLIC BLOOD PRESSURE: 118 MMHG | WEIGHT: 189 LBS | HEART RATE: 78 BPM | OXYGEN SATURATION: 98 % | BODY MASS INDEX: 30.37 KG/M2 | HEIGHT: 66 IN | TEMPERATURE: 98 F | RESPIRATION RATE: 16 BRPM | DIASTOLIC BLOOD PRESSURE: 78 MMHG

## 2024-05-15 DIAGNOSIS — I10 ESSENTIAL (PRIMARY) HYPERTENSION: ICD-10-CM

## 2024-05-15 DIAGNOSIS — R20.2 PARESTHESIAS: ICD-10-CM

## 2024-05-15 DIAGNOSIS — F41.9 ANXIETY: Primary | ICD-10-CM

## 2024-05-15 PROCEDURE — 3074F SYST BP LT 130 MM HG: CPT

## 2024-05-15 PROCEDURE — 99214 OFFICE O/P EST MOD 30 MIN: CPT

## 2024-05-15 PROCEDURE — 3078F DIAST BP <80 MM HG: CPT

## 2024-05-15 RX ORDER — HYDROXYZINE HYDROCHLORIDE 10 MG/1
10 TABLET, FILM COATED ORAL 3 TIMES DAILY PRN
Qty: 90 TABLET | Refills: 0 | Status: SHIPPED | OUTPATIENT
Start: 2024-05-15

## 2024-05-15 ASSESSMENT — ENCOUNTER SYMPTOMS
BACK PAIN: 0
COUGH: 0
SORE THROAT: 0
PHOTOPHOBIA: 0
WHEEZING: 0
EYE PAIN: 0
NAUSEA: 0
ABDOMINAL PAIN: 0
CONSTIPATION: 0
SHORTNESS OF BREATH: 0
TROUBLE SWALLOWING: 0
DIARRHEA: 0
CHEST TIGHTNESS: 0
BLOOD IN STOOL: 0

## 2024-05-15 NOTE — PROGRESS NOTES
Chief Complaint   Patient presents with    Fatigue     /78   Pulse 78   Temp 98 °F (36.7 °C)   Resp 16   Ht 1.664 m (5' 5.5\")   Wt 85.7 kg (189 lb)   LMP 05/08/2024   SpO2 98%   BMI 30.97 kg/m²   \"Have you been to the ER, urgent care clinic since your last visit?  Hospitalized since your last visit?\"    NO    “Have you seen or consulted any other health care providers outside of [facility] since your last visit?”    NO            Click Here for Release of Records Request    
understanding. The patient has received an after-visit summary and questions were answered concerning future plans. I have discussed medication side effects and warnings with the patient as well.    On this date 5/15/2024 I have spent 30 minutes reviewing previous notes, test results and face to face with the patient discussing the diagnosis and importance of compliance with the treatment plan as well as documenting on the day of the visit.    Electronically Signed: Liat Cintron PA-C  I  do attest that this note was reviewed and I was available for  TALI Cintron on this day of service and will follow along with TALI Ocampo MD     History/Allergies   Patients past medical, surgical and family histories were reviewed and updated.    Past Medical History:   Diagnosis Date    Abnormal Pap smear of cervix     seeing OBGYN    Acne 10/7/2009    Allergic rhinitis 2009    Anemia 2009    Atypical squamous cell changes of undetermined significance (ASCUS) on vaginal cytology 2017    H/o ASCUS HPV + with Dr. Ilia De Dios 2017    IRA III (cervical intraepithelial neoplasia III) 2017    found by LEEP     Headache     Herpes genitalia     dx by OB    History of gestational diabetes     diet controlled    Hypertension     Other ill-defined conditions(799.89)     scoliosis    PCB (post coital bleeding) 2014    Thoracic compression fracture (HCC) 3/2014    Isak Lopezchotaj    Thyromegaly 2014    On exam     Unspecified vitamin D deficiency 2014    Varicella 1998      Past Surgical History:   Procedure Laterality Date    GYN           LEEP  16    found IRA 3     Family History   Problem Relation Age of Onset    Diabetes Father     Hypertension Father     Hypertension Paternal Grandmother     Diabetes Paternal Grandmother     No Known Problems Brother     Osteoporosis Neg Hx     Breast Cancer Maternal Grandmother         great, great

## 2024-06-10 ENCOUNTER — HOSPITAL ENCOUNTER (EMERGENCY)
Facility: HOSPITAL | Age: 29
Discharge: HOME | End: 2024-06-10
Attending: STUDENT IN AN ORGANIZED HEALTH CARE EDUCATION/TRAINING PROGRAM
Payer: COMMERCIAL

## 2024-06-10 ENCOUNTER — APPOINTMENT (OUTPATIENT)
Facility: HOSPITAL | Age: 29
End: 2024-06-10
Payer: COMMERCIAL

## 2024-06-10 VITALS
SYSTOLIC BLOOD PRESSURE: 104 MMHG | DIASTOLIC BLOOD PRESSURE: 78 MMHG | HEART RATE: 97 BPM | TEMPERATURE: 99.4 F | OXYGEN SATURATION: 100 % | HEIGHT: 66 IN | WEIGHT: 190.7 LBS | BODY MASS INDEX: 30.65 KG/M2 | RESPIRATION RATE: 18 BRPM

## 2024-06-10 DIAGNOSIS — R50.9 ACUTE FEBRILE ILLNESS: ICD-10-CM

## 2024-06-10 DIAGNOSIS — K59.01 CONSTIPATION DUE TO SLOW TRANSIT: Primary | ICD-10-CM

## 2024-06-10 LAB
ALBUMIN SERPL-MCNC: 3.7 G/DL (ref 3.5–5)
ALBUMIN/GLOB SERPL: 0.6 (ref 1.1–2.2)
ALP SERPL-CCNC: 61 U/L (ref 45–117)
ALT SERPL-CCNC: 19 U/L (ref 12–78)
ANION GAP SERPL CALC-SCNC: 7 MMOL/L (ref 5–15)
APPEARANCE UR: ABNORMAL
AST SERPL-CCNC: 17 U/L (ref 15–37)
BACTERIA URNS QL MICRO: ABNORMAL /HPF
BASOPHILS # BLD: 0 K/UL (ref 0–0.1)
BASOPHILS NFR BLD: 0 % (ref 0–1)
BILIRUB SERPL-MCNC: 0.7 MG/DL (ref 0.2–1)
BILIRUB UR QL: NEGATIVE
BUN SERPL-MCNC: 10 MG/DL (ref 6–20)
BUN/CREAT SERPL: 11 (ref 12–20)
CALCIUM SERPL-MCNC: 9 MG/DL (ref 8.5–10.1)
CHLORIDE SERPL-SCNC: 99 MMOL/L (ref 97–108)
CO2 SERPL-SCNC: 29 MMOL/L (ref 21–32)
COLOR UR: ABNORMAL
CREAT SERPL-MCNC: 0.94 MG/DL (ref 0.55–1.02)
DIFFERENTIAL METHOD BLD: ABNORMAL
EOSINOPHIL # BLD: 0.1 K/UL (ref 0–0.4)
EOSINOPHIL NFR BLD: 0 % (ref 0–7)
EPITH CASTS URNS QL MICRO: ABNORMAL /LPF
ERYTHROCYTE [DISTWIDTH] IN BLOOD BY AUTOMATED COUNT: 13.1 % (ref 11.5–14.5)
FLUAV RNA SPEC QL NAA+PROBE: NOT DETECTED
FLUBV RNA SPEC QL NAA+PROBE: NOT DETECTED
GLOBULIN SER CALC-MCNC: 5.8 G/DL (ref 2–4)
GLUCOSE SERPL-MCNC: 104 MG/DL (ref 65–100)
GLUCOSE UR STRIP.AUTO-MCNC: NEGATIVE MG/DL
HCG UR QL: NEGATIVE
HCT VFR BLD AUTO: 35 % (ref 35–47)
HGB BLD-MCNC: 11.5 G/DL (ref 11.5–16)
HGB UR QL STRIP: NEGATIVE
IMM GRANULOCYTES # BLD AUTO: 0.1 K/UL (ref 0–0.04)
IMM GRANULOCYTES NFR BLD AUTO: 0 % (ref 0–0.5)
KETONES UR QL STRIP.AUTO: NEGATIVE MG/DL
LEUKOCYTE ESTERASE UR QL STRIP.AUTO: ABNORMAL
LIPASE SERPL-CCNC: 17 U/L (ref 13–75)
LYMPHOCYTES # BLD: 1.4 K/UL (ref 0.8–3.5)
LYMPHOCYTES NFR BLD: 10 % (ref 12–49)
MCH RBC QN AUTO: 30.5 PG (ref 26–34)
MCHC RBC AUTO-ENTMCNC: 32.9 G/DL (ref 30–36.5)
MCV RBC AUTO: 92.8 FL (ref 80–99)
MONOCYTES # BLD: 0.9 K/UL (ref 0–1)
MONOCYTES NFR BLD: 6 % (ref 5–13)
MUCOUS THREADS URNS QL MICRO: ABNORMAL /LPF
NEUTS SEG # BLD: 12.4 K/UL (ref 1.8–8)
NEUTS SEG NFR BLD: 84 % (ref 32–75)
NITRITE UR QL STRIP.AUTO: NEGATIVE
NRBC # BLD: 0 K/UL (ref 0–0.01)
NRBC BLD-RTO: 0 PER 100 WBC
PH UR STRIP: 6 (ref 5–8)
PLATELET # BLD AUTO: 432 K/UL (ref 150–400)
PMV BLD AUTO: 8.8 FL (ref 8.9–12.9)
POTASSIUM SERPL-SCNC: 3.8 MMOL/L (ref 3.5–5.1)
PROT SERPL-MCNC: 9.5 G/DL (ref 6.4–8.2)
PROT UR STRIP-MCNC: ABNORMAL MG/DL
RBC # BLD AUTO: 3.77 M/UL (ref 3.8–5.2)
RBC #/AREA URNS HPF: ABNORMAL /HPF (ref 0–5)
SARS-COV-2 RNA RESP QL NAA+PROBE: NOT DETECTED
SODIUM SERPL-SCNC: 135 MMOL/L (ref 136–145)
SP GR UR REFRACTOMETRY: 1.02 (ref 1–1.03)
UROBILINOGEN UR QL STRIP.AUTO: 1 EU/DL (ref 0.2–1)
WBC # BLD AUTO: 14.8 K/UL (ref 3.6–11)
WBC URNS QL MICRO: ABNORMAL /HPF (ref 0–4)

## 2024-06-10 PROCEDURE — 36415 COLL VENOUS BLD VENIPUNCTURE: CPT

## 2024-06-10 PROCEDURE — 81001 URINALYSIS AUTO W/SCOPE: CPT

## 2024-06-10 PROCEDURE — 85025 COMPLETE CBC W/AUTO DIFF WBC: CPT

## 2024-06-10 PROCEDURE — 81025 URINE PREGNANCY TEST: CPT

## 2024-06-10 PROCEDURE — 2580000003 HC RX 258: Performed by: PHYSICIAN ASSISTANT

## 2024-06-10 PROCEDURE — 87636 SARSCOV2 & INF A&B AMP PRB: CPT

## 2024-06-10 PROCEDURE — 74177 CT ABD & PELVIS W/CONTRAST: CPT

## 2024-06-10 PROCEDURE — 80053 COMPREHEN METABOLIC PANEL: CPT

## 2024-06-10 PROCEDURE — 6360000004 HC RX CONTRAST MEDICATION: Performed by: STUDENT IN AN ORGANIZED HEALTH CARE EDUCATION/TRAINING PROGRAM

## 2024-06-10 PROCEDURE — 83690 ASSAY OF LIPASE: CPT

## 2024-06-10 PROCEDURE — 99285 EMERGENCY DEPT VISIT HI MDM: CPT

## 2024-06-10 RX ORDER — 0.9 % SODIUM CHLORIDE 0.9 %
1000 INTRAVENOUS SOLUTION INTRAVENOUS ONCE
Status: COMPLETED | OUTPATIENT
Start: 2024-06-10 | End: 2024-06-10

## 2024-06-10 RX ADMIN — SODIUM CHLORIDE 1000 ML: 9 INJECTION, SOLUTION INTRAVENOUS at 14:15

## 2024-06-10 RX ADMIN — IOPAMIDOL 100 ML: 755 INJECTION, SOLUTION INTRAVENOUS at 15:10

## 2024-06-10 ASSESSMENT — PAIN SCALES - GENERAL: PAINLEVEL_OUTOF10: 8

## 2024-06-10 ASSESSMENT — LIFESTYLE VARIABLES
HOW MANY STANDARD DRINKS CONTAINING ALCOHOL DO YOU HAVE ON A TYPICAL DAY: PATIENT DOES NOT DRINK
HOW OFTEN DO YOU HAVE A DRINK CONTAINING ALCOHOL: NEVER

## 2024-06-10 ASSESSMENT — PAIN DESCRIPTION - ORIENTATION: ORIENTATION: UPPER;LOWER;MID

## 2024-06-10 ASSESSMENT — PAIN DESCRIPTION - LOCATION: LOCATION: BACK

## 2024-06-10 NOTE — ED PROVIDER NOTES
\Allyson rL PA-C  Physician Assistant  Specialty: Physician Assistant     ED Notes      Incomplete     Date of Service: 6/10/2024 11:22 AM     Incomplete       Expand All Collapse All            [facility]  EMERGENCY DEPARTMENT ENCOUNTER       Pt Name: Tsering Sales  MRN: [id number]  Birthdate 1995  Date of evaluation: 6/10/2024  Provider: Allyson Lr PA-C     CHIEF COMPLAINT            Chief Complaint   Patient presents with    Concern For COVID-19            HISTORY OF PRESENT ILLNESS   (Location/Symptom, Timing/Onset, Context/Setting, Quality, Duration, Modifying Factors, Severity)  Note limiting factors.   Patient is a 28-year-old female presents emergency department with fever, body aches, and low back pain.  The patient came in as she was concerned that she may have COVID, and states that she began to have fever yesterday morning when she awoke, it was up to 101 at the maximum.  She was also having bodyaches, back pain, and mild nasal congestion.  She has no cough, she has no discomfort in her chest, shortness of breath, dyspnea or wheezing.  She has had loss of appetite, and decreased intake.  Her back pain is in the bilateral lower back, right more than left, and the right side radiates around the abdomen to the right mid abdomen.  She denies any hematuria, dysuria, frequency, but endorses urgency.     She has taken Tylenol for her symptoms, it did not help much.  She does not have any history of any kidney stones, pyelonephritis, or other kidney or back problems.  Her last menstrual period ended this week, she denies possibility of pregnancy, but denies using contraception.     The history is provided by the patient.            Review of External Medical Records:      Nursing Notes were reviewed.     REVIEW OF SYSTEMS    (2-9 systems for level 4, 10 or more for level 5)      Review of Systems     Except as noted above the remainder of the review of systems was reviewed and negative.

## 2024-06-10 NOTE — ED TRIAGE NOTES
Pt reports flu like symptoms including fever, sore throat, body aches, back pain, nasal congestion and weakness that started yesterday afternoon. Pt reports she would like to be tested for COVID because \"she doesn't want to go to work if she has COVID\".

## 2024-06-10 NOTE — DISCHARGE INSTRUCTIONS
Consider miralax or enema, if needed for constipation.   Follow up with your primary provider   Tylenol or ibuprofen as needed for fever or discomfort  If symptoms persist, consider additional covid testing

## 2024-06-11 ENCOUNTER — OFFICE VISIT (OUTPATIENT)
Facility: CLINIC | Age: 29
End: 2024-06-11
Payer: COMMERCIAL

## 2024-06-11 VITALS
TEMPERATURE: 99 F | SYSTOLIC BLOOD PRESSURE: 118 MMHG | HEART RATE: 105 BPM | HEIGHT: 66 IN | RESPIRATION RATE: 18 BRPM | BODY MASS INDEX: 30.44 KG/M2 | OXYGEN SATURATION: 98 % | WEIGHT: 189.4 LBS | DIASTOLIC BLOOD PRESSURE: 82 MMHG

## 2024-06-11 DIAGNOSIS — J06.9 BACTERIAL URI: Primary | ICD-10-CM

## 2024-06-11 DIAGNOSIS — Z09 HOSPITAL DISCHARGE FOLLOW-UP: ICD-10-CM

## 2024-06-11 DIAGNOSIS — M79.10 MYALGIA: ICD-10-CM

## 2024-06-11 DIAGNOSIS — B96.89 BACTERIAL URI: Primary | ICD-10-CM

## 2024-06-11 DIAGNOSIS — R09.81 NASAL CONGESTION: ICD-10-CM

## 2024-06-11 DIAGNOSIS — K59.00 CONSTIPATION, UNSPECIFIED CONSTIPATION TYPE: ICD-10-CM

## 2024-06-11 LAB
EXP DATE SOLUTION: NORMAL
EXP DATE SWAB: NORMAL
EXPIRATION DATE: NORMAL
INFLUENZA A ANTIGEN, POC: NEGATIVE
INFLUENZA B ANTIGEN, POC: NEGATIVE
LOT NUMBER POC: NORMAL
LOT NUMBER SOLUTION: NORMAL
LOT NUMBER SWAB: NORMAL
SARS-COV-2 RNA, POC: NEGATIVE
VALID INTERNAL CONTROL, POC: NORMAL

## 2024-06-11 PROCEDURE — 1111F DSCHRG MED/CURRENT MED MERGE: CPT

## 2024-06-11 PROCEDURE — 87635 SARS-COV-2 COVID-19 AMP PRB: CPT

## 2024-06-11 PROCEDURE — 99213 OFFICE O/P EST LOW 20 MIN: CPT

## 2024-06-11 PROCEDURE — 87502 INFLUENZA DNA AMP PROBE: CPT

## 2024-06-11 PROCEDURE — 3074F SYST BP LT 130 MM HG: CPT

## 2024-06-11 PROCEDURE — 3079F DIAST BP 80-89 MM HG: CPT

## 2024-06-11 RX ORDER — IBUPROFEN 600 MG/1
600 TABLET ORAL 3 TIMES DAILY PRN
Qty: 30 TABLET | Refills: 0 | Status: SHIPPED | OUTPATIENT
Start: 2024-06-11

## 2024-06-11 RX ORDER — METHOCARBAMOL 750 MG/1
750 TABLET, FILM COATED ORAL 3 TIMES DAILY
Qty: 30 TABLET | Refills: 0 | Status: SHIPPED | OUTPATIENT
Start: 2024-06-11 | End: 2024-06-21

## 2024-06-11 RX ORDER — POLYETHYLENE GLYCOL 3350 17 G/17G
17 POWDER, FOR SOLUTION ORAL DAILY
Qty: 1530 G | Refills: 0 | Status: SHIPPED | OUTPATIENT
Start: 2024-06-11

## 2024-06-11 RX ORDER — AMOXICILLIN AND CLAVULANATE POTASSIUM 875; 125 MG/1; MG/1
1 TABLET, FILM COATED ORAL 2 TIMES DAILY
Qty: 20 TABLET | Refills: 0 | Status: SHIPPED | OUTPATIENT
Start: 2024-06-11 | End: 2024-06-21

## 2024-06-11 ASSESSMENT — ENCOUNTER SYMPTOMS
RHINORRHEA: 1
BLOOD IN STOOL: 0
DIARRHEA: 0
NAUSEA: 0
VOMITING: 0
ABDOMINAL PAIN: 0
SHORTNESS OF BREATH: 1
WHEEZING: 0
PHOTOPHOBIA: 0
SORE THROAT: 0
EYE PAIN: 0
BACK PAIN: 1
TROUBLE SWALLOWING: 0
COUGH: 1
CONSTIPATION: 0
CHEST TIGHTNESS: 0

## 2024-06-11 NOTE — PROGRESS NOTES
Tsering Sales  28 y.o. female  1995  [address] [place] [address]  [id number]     [facility]: Progress Note       Encounter Date: 6/11/2024    Patient presents with the following chief complaint(s)    Chief Complaint   Patient presents with    Follow-Up from Hospital     Pt here for hospital ER follow up, seen for body aches, weakness, nasal congestion        History provided by patient    Assessment and Plan:   1. Bacterial URI  -     amoxicillin-clavulanate (AUGMENTIN) 875-125 MG per tablet; Take 1 tablet by mouth 2 times daily for 10 days, Disp-20 tablet, R-0Normal  2. Myalgia  -     AMB POC INFLUENZA A  AND B REAL-TIME RT-PCR  -     ibuprofen (ADVIL;MOTRIN) 600 MG tablet; Take 1 tablet by mouth 3 times daily as needed for Pain, Disp-30 tablet, R-0Normal  -     methocarbamol (ROBAXIN-750) 750 MG tablet; Take 1 tablet by mouth 3 times daily for 10 days, Disp-30 tablet, R-0Normal  3. Nasal congestion  -     AMB POC COVID-19 COV  4. Constipation, unspecified constipation type  -     polyethylene glycol (GLYCOLAX) 17 GM/SCOOP powder; Take 17 g by mouth daily, Disp-1530 g, R-0Normal  5. Hospital discharge follow-up  -     [place] DISCHARGE MEDS RECONCILED W/ CURRENT OUTPATIENT MED LIST       Return if symptoms worsen or fail to improve.  History of Present Illness   Tsering Sales is a 28 y.o. female with past medical history listed, who presents to clinic today for an acute problem visit.    Pt says she went to ER yesterday for 3-day fever, highest 101.3 x 3 days, but now low grade. Reports associated myalgias, back pain, congestion, rhinorrhea, sore throat. Cough with green and yellow sputum. Says she was at ER, tested for COVID & Flu. Also given CT of abdomen. Says she was dx with constipation. No meds given at that time.   Tried Tylenol at home but no improvement. Denies SOB, CP.    Health Maintenance  Health Maintenance Due   Topic Date Due    Varicella

## 2024-06-11 NOTE — PROGRESS NOTES
Chief Complaint   Patient presents with    Follow-Up from Hospital     Pt here for hospital ER follow up     BP (!) 130/90 (Site: Right Upper Arm, Position: Sitting, Cuff Size: Large Adult)   Pulse (!) 102   Temp 99 °F (37.2 °C) (Oral)   Resp 18   Ht 1.664 m (5' 5.5\")   Wt 85.9 kg (189 lb 6.4 oz)   LMP 06/04/2024   SpO2 98%   BMI 31.04 kg/m²         \"Have you been to the ER, urgent care clinic since your last visit?  Hospitalized since your last visit?\"    YES - When: approximately 1 days ago.  Where and Why: 6/10/2024 .[facility] for body aches, weakness, nasal congestion    “Have you seen or consulted any other health care providers outside of [facility] since your last visit?”    NO            Click Here for Release of Records Request

## 2024-06-17 ENCOUNTER — APPOINTMENT (OUTPATIENT)
Facility: HOSPITAL | Age: 29
End: 2024-06-17
Payer: COMMERCIAL

## 2024-06-17 ENCOUNTER — HOSPITAL ENCOUNTER (EMERGENCY)
Facility: HOSPITAL | Age: 29
Discharge: HOME | End: 2024-06-17
Attending: EMERGENCY MEDICINE
Payer: COMMERCIAL

## 2024-06-17 VITALS
WEIGHT: 189.6 LBS | TEMPERATURE: 98.3 F | BODY MASS INDEX: 31.07 KG/M2 | DIASTOLIC BLOOD PRESSURE: 87 MMHG | RESPIRATION RATE: 16 BRPM | HEART RATE: 99 BPM | SYSTOLIC BLOOD PRESSURE: 129 MMHG | OXYGEN SATURATION: 100 %

## 2024-06-17 DIAGNOSIS — R07.9 CHEST PAIN, UNSPECIFIED TYPE: ICD-10-CM

## 2024-06-17 DIAGNOSIS — R05.9 COUGH, UNSPECIFIED TYPE: Primary | ICD-10-CM

## 2024-06-17 LAB
EKG ATRIAL RATE: 94 BPM
EKG DIAGNOSIS: NORMAL
EKG P AXIS: 58 DEGREES
EKG P-R INTERVAL: 174 MS
EKG Q-T INTERVAL: 370 MS
EKG QRS DURATION: 76 MS
EKG QTC CALCULATION (BAZETT): 462 MS
EKG R AXIS: 61 DEGREES
EKG T AXIS: 23 DEGREES
EKG VENTRICULAR RATE: 94 BPM
TROPONIN I SERPL HS-MCNC: <4 NG/L (ref 0–51)

## 2024-06-17 PROCEDURE — 93005 ELECTROCARDIOGRAM TRACING: CPT | Performed by: EMERGENCY MEDICINE

## 2024-06-17 PROCEDURE — 84484 ASSAY OF TROPONIN QUANT: CPT

## 2024-06-17 PROCEDURE — 99285 EMERGENCY DEPT VISIT HI MDM: CPT

## 2024-06-17 PROCEDURE — 36415 COLL VENOUS BLD VENIPUNCTURE: CPT

## 2024-06-17 PROCEDURE — 71046 X-RAY EXAM CHEST 2 VIEWS: CPT

## 2024-06-17 ASSESSMENT — ENCOUNTER SYMPTOMS
VOMITING: 0
NAUSEA: 0
COUGH: 1
DIARRHEA: 0
ABDOMINAL PAIN: 0
BACK PAIN: 0
TROUBLE SWALLOWING: 0

## 2024-06-17 ASSESSMENT — PAIN - FUNCTIONAL ASSESSMENT: PAIN_FUNCTIONAL_ASSESSMENT: NONE - DENIES PAIN

## 2024-06-17 NOTE — DISCHARGE INSTRUCTIONS
Throw out your toothbrush and start fresh with a new toothbrush.  Increase your fluids to 8 ounces every hour that you are awake; include salty liquid such as soups, broths, seltzer water and Gatorade.  Continue with your previously prescribed antibiotic as ordered until complete.  Close follow-up with your PCP and/or cardiology if symptoms persist.  Referral given.

## 2024-06-17 NOTE — ED PROVIDER NOTES
[facility] EMERGENCY DEP  EMERGENCY DEPARTMENT ENCOUNTER      Pt Name: Tsering Sales  MRN: [id number]  Birthdate 1995  Date of evaluation: 6/17/2024  Provider: JOY Patino NP    CHIEF COMPLAINT       Chief Complaint   Patient presents with    Cough    Chest Pain         HISTORY OF PRESENT ILLNESS   (Location/Symptom, Timing/Onset, Context/Setting, Quality, Duration, Modifying Factors, Severity)  Note limiting factors.   HPI  Patient is a 28-year-old female with past medical history significant for anemia, vitamin D deficiency, hypertension, rheumatoid factor positive, allergic rhinitis, carpal tunnel syndrome, Sjogren's syndrome and prediabetes who returns to the ED with chest congestion, left-sided chest pain and concerns that she has having heart problems.  She was evaluated at this facility on 6/10/2024 and had lab work and CT abdomen and pelvis with contrast within normal limits.  She was followed up on the next day with her PCP who placed her on Augmentin twice daily x 10 days for treatment of bacterial upper respiratory infection.  She denies any known family history of early heart disease.Denies fever, headache, neck pain, visual changes, focal weakness or rash. Denies any difficulty breathing, difficulty swallowing or SOB. Denies any nausea, vomiting or diarrhea.   Old charts reviewed        Review of External Medical Records:     Nursing Notes were reviewed.    REVIEW OF SYSTEMS    (2-9 systems for level 4, 10 or more for level 5)     Review of Systems   Constitutional:  Negative for activity change, appetite change, fever and unexpected weight change.   HENT:  Positive for congestion. Negative for trouble swallowing.    Respiratory:  Positive for cough.    Cardiovascular:  Positive for chest pain.   Gastrointestinal:  Negative for abdominal pain, diarrhea, nausea and vomiting.   Genitourinary:  Negative for dysuria and flank pain.   Musculoskeletal:  Negative for back pain, gait problem and       POST OP NOTE     PATIENT NAME: Irvin Ross    DATE: 1/5/2022 MED REC #: [id number]  YOB: 1970   PHYSICIAN: Doc Frausto MD         PROCEDURE PERFORMED:  Left KARLI for fragility hip fracture.  DATE OF SURGERY:  11/9/2021  LAST POST OP VISIT:  12/1/2021 with Kenroy Mercer PA-C     PAIN MEDICATION:None   PAIN WELL CONTROLLED: YES    SUBJECTIVE:   Irvin reports compliance with his home exercise program and tolerance of ADLs without pain. He denies any groin pain. He denies any numbness and tingling or radiating pain. He is sleeping through the night. He only complains of some lateral hip pain first thing in the morning that resolves with moving.     No cane or assistive device.  Leg lengths feel equal.      REVIEW OF SYSTEMS:  NEURO: Negative  RESP: Negative  CARDIO: Negative  GENERAL: Negative  PSYCH: Negative      OBJECTIVE:   Patient mobilizes well, mild left Trendelenburg.    The patient's leg lengths appear slightly unequal, with the operative side short.    Excellent range of motion of both hips, no pain on provocation.    Knee arcs physiologic, pain-free to provocative testing.  Nontender pes negative Jenny's test bilateral.  No popliteal fullness or tenderness.  No patellofemoral grind or pain.  No subluxation or apprehension.  Negative Diamond's and Lachman's.  Stable to ligament testing/MCL/saphenous/patellar tendon/quads/IT band.    Sensorimotor distally normal.    XRAY  Patient's left total hip arthroplasty is well fixed and aligned, with about 5 mm shortening, medial heterotopic ossification at the lesser trochanter.  Scoliosis evident on the lower spine.    ASSESSMENT & PLAN:  Doing well, left total hip arthroplasty for femoral neck displaced fragility fracture.    1. Home exercise program and reconditioning as described, patient to work on exercise daily, including active straight leg raise for quadriceps.  2.  No hip dislocation precautions, patient advised not to jump off of  furniture.  3.  Follow-up in 3 months for review with no x-rays.  May be discharged at that point.      ______________________________  Doc Frausto MD  ORTHOPAEDICS

## 2024-06-17 NOTE — ED TRIAGE NOTES
Pt c/o chest congestion, inside of chest feels cold down her arm, chest heaviness last week , denies sob, +cough, denies fever, denies sore throat , pt seen one week ago for the same , pt requesting her heart to be looked at

## 2024-06-20 ENCOUNTER — OFFICE VISIT (OUTPATIENT)
Facility: CLINIC | Age: 29
End: 2024-06-20
Payer: COMMERCIAL

## 2024-06-20 VITALS
HEIGHT: 65 IN | OXYGEN SATURATION: 98 % | RESPIRATION RATE: 16 BRPM | SYSTOLIC BLOOD PRESSURE: 126 MMHG | TEMPERATURE: 98 F | HEART RATE: 91 BPM | WEIGHT: 188.8 LBS | DIASTOLIC BLOOD PRESSURE: 89 MMHG | BODY MASS INDEX: 31.46 KG/M2

## 2024-06-20 DIAGNOSIS — K59.00 CONSTIPATION, UNSPECIFIED CONSTIPATION TYPE: ICD-10-CM

## 2024-06-20 DIAGNOSIS — E87.6 HYPOKALEMIA: ICD-10-CM

## 2024-06-20 DIAGNOSIS — G43.909 MIGRAINE WITHOUT STATUS MIGRAINOSUS, NOT INTRACTABLE, UNSPECIFIED MIGRAINE TYPE: ICD-10-CM

## 2024-06-20 DIAGNOSIS — I10 ESSENTIAL (PRIMARY) HYPERTENSION: Primary | ICD-10-CM

## 2024-06-20 DIAGNOSIS — R73.03 PREDIABETES: ICD-10-CM

## 2024-06-20 DIAGNOSIS — F41.9 ANXIETY: ICD-10-CM

## 2024-06-20 DIAGNOSIS — M35.00 SJOGREN SYNDROME, UNSPECIFIED (HCC): ICD-10-CM

## 2024-06-20 LAB — GLUCOSE, POC: 200 MG/DL

## 2024-06-20 PROCEDURE — 3079F DIAST BP 80-89 MM HG: CPT

## 2024-06-20 PROCEDURE — 3074F SYST BP LT 130 MM HG: CPT

## 2024-06-20 PROCEDURE — 82962 GLUCOSE BLOOD TEST: CPT

## 2024-06-20 PROCEDURE — 99214 OFFICE O/P EST MOD 30 MIN: CPT

## 2024-06-20 RX ORDER — VALSARTAN 320 MG/1
TABLET ORAL
Qty: 90 TABLET | Refills: 0 | Status: SHIPPED | OUTPATIENT
Start: 2024-06-20

## 2024-06-20 RX ORDER — POTASSIUM CHLORIDE 1.5 G/1.58G
20 POWDER, FOR SOLUTION ORAL EVERY OTHER DAY
Qty: 45 PACKET | Refills: 0 | Status: SHIPPED | OUTPATIENT
Start: 2024-06-20

## 2024-06-20 RX ORDER — NIFEDIPINE 60 MG/1
60 TABLET, EXTENDED RELEASE ORAL DAILY
Qty: 90 TABLET | Refills: 0 | Status: SHIPPED | OUTPATIENT
Start: 2024-06-20

## 2024-06-20 NOTE — PROGRESS NOTES
\"Have you been to the ER, urgent care clinic since your last visit?  Hospitalized since your last visit?\"    Yes, [facility] for \"gas pain\".    “Have you seen or consulted any other health care providers outside of [facility] since your last visit?”    NO        Chief Complaint   Patient presents with    Follow-up    Hypertension    Anemia     /89 (Site: Left Upper Arm, Position: Sitting, Cuff Size: Medium Adult)   Pulse 91   Temp 98 °F (36.7 °C) (Oral)   Resp 16   Ht 1.651 m (5' 5\")   Wt 85.6 kg (188 lb 12.8 oz)   LMP 06/04/2024 (Exact Date)   SpO2 98%   BMI 31.42 kg/m²       Click Here for Release of Records Request  
Herpes genitalia     dx by OB   • History of gestational diabetes     diet controlled   • Hypertension    • Other ill-defined conditions(799.89)     scoliosis   • PCB (post coital bleeding) 2014   • Thoracic compression fracture (HCC) 3/2014    Isak Vasquez   • Thyromegaly 2014    On exam    • Unspecified vitamin D deficiency 2014   • Varicella 1998      Past Surgical History:   Procedure Laterality Date   • GYN          • LEEP  16    found IRA 3     Family History   Problem Relation Age of Onset   • Diabetes Father    • Hypertension Father    • Hypertension Paternal Grandmother    • Diabetes Paternal Grandmother    • No Known Problems Brother    • Osteoporosis Neg Hx    • Breast Cancer Maternal Grandmother         great, great gma, age?   • Hypertension Maternal Grandmother    • Breast Cancer Maternal Aunt 31        survivor     Social History     Tobacco Use   • Smoking status: Never   • Smokeless tobacco: Never   Substance Use Topics   • Alcohol use: Yes     Alcohol/week: 2.0 standard drinks of alcohol     Types: 2 Glasses of wine per week       Allergies   Allergen Reactions   • Hydrochlorothiazide Other (See Comments)     Low potassium   • Metronidazole Itching       Disposition   No follow-up provider specified.  Future Appointments   Date Time Provider Department Center   2024  2:00 PM Liat Cintron PA-C [facility]

## 2024-06-21 ASSESSMENT — ENCOUNTER SYMPTOMS
CONSTIPATION: 0
EYE PAIN: 0
PHOTOPHOBIA: 0
VOMITING: 0
COUGH: 0
BLOOD IN STOOL: 0
TROUBLE SWALLOWING: 0
CHEST TIGHTNESS: 0
SORE THROAT: 0
DIARRHEA: 0
ABDOMINAL PAIN: 0
NAUSEA: 0
SHORTNESS OF BREATH: 0
WHEEZING: 0
BACK PAIN: 0

## 2024-07-16 ENCOUNTER — APPOINTMENT (OUTPATIENT)
Facility: HOSPITAL | Age: 29
End: 2024-07-16
Payer: COMMERCIAL

## 2024-07-16 ENCOUNTER — HOSPITAL ENCOUNTER (EMERGENCY)
Facility: HOSPITAL | Age: 29
Discharge: HOME | End: 2024-07-16
Attending: EMERGENCY MEDICINE
Payer: COMMERCIAL

## 2024-07-16 VITALS
WEIGHT: 189.82 LBS | SYSTOLIC BLOOD PRESSURE: 117 MMHG | HEART RATE: 89 BPM | OXYGEN SATURATION: 100 % | DIASTOLIC BLOOD PRESSURE: 79 MMHG | RESPIRATION RATE: 18 BRPM | BODY MASS INDEX: 31.59 KG/M2 | TEMPERATURE: 98.6 F

## 2024-07-16 DIAGNOSIS — E86.0 DEHYDRATION: ICD-10-CM

## 2024-07-16 DIAGNOSIS — R42 DIZZINESS: Primary | ICD-10-CM

## 2024-07-16 DIAGNOSIS — R07.89 CHEST PRESSURE: ICD-10-CM

## 2024-07-16 DIAGNOSIS — R19.7 DIARRHEA, UNSPECIFIED TYPE: ICD-10-CM

## 2024-07-16 LAB
ALBUMIN SERPL-MCNC: 3.7 G/DL (ref 3.5–5)
ALBUMIN/GLOB SERPL: 0.6 (ref 1.1–2.2)
ALP SERPL-CCNC: 67 U/L (ref 45–117)
ALT SERPL-CCNC: 17 U/L (ref 12–78)
ANION GAP SERPL CALC-SCNC: 5 MMOL/L (ref 5–15)
AST SERPL-CCNC: 15 U/L (ref 15–37)
BASOPHILS # BLD: 0 K/UL (ref 0–0.1)
BASOPHILS NFR BLD: 1 % (ref 0–1)
BILIRUB SERPL-MCNC: 0.4 MG/DL (ref 0.2–1)
BUN SERPL-MCNC: 7 MG/DL (ref 6–20)
BUN/CREAT SERPL: 10 (ref 12–20)
CALCIUM SERPL-MCNC: 9.7 MG/DL (ref 8.5–10.1)
CHLORIDE SERPL-SCNC: 104 MMOL/L (ref 97–108)
CO2 SERPL-SCNC: 25 MMOL/L (ref 21–32)
CREAT SERPL-MCNC: 0.73 MG/DL (ref 0.55–1.02)
D DIMER PPP FEU-MCNC: 0.26 MG/L FEU (ref 0–0.65)
DIFFERENTIAL METHOD BLD: ABNORMAL
EKG ATRIAL RATE: 100 BPM
EKG DIAGNOSIS: NORMAL
EKG P AXIS: 65 DEGREES
EKG P-R INTERVAL: 160 MS
EKG Q-T INTERVAL: 338 MS
EKG QRS DURATION: 74 MS
EKG QTC CALCULATION (BAZETT): 436 MS
EKG R AXIS: 65 DEGREES
EKG T AXIS: 27 DEGREES
EKG VENTRICULAR RATE: 100 BPM
EOSINOPHIL # BLD: 0.1 K/UL (ref 0–0.4)
EOSINOPHIL NFR BLD: 1 % (ref 0–7)
ERYTHROCYTE [DISTWIDTH] IN BLOOD BY AUTOMATED COUNT: 13.1 % (ref 11.5–14.5)
GLOBULIN SER CALC-MCNC: 6.1 G/DL (ref 2–4)
GLUCOSE SERPL-MCNC: 102 MG/DL (ref 65–100)
HCG SERPL QL: NEGATIVE
HCT VFR BLD AUTO: 35.7 % (ref 35–47)
HGB BLD-MCNC: 11.8 G/DL (ref 11.5–16)
IMM GRANULOCYTES # BLD AUTO: 0 K/UL (ref 0–0.04)
IMM GRANULOCYTES NFR BLD AUTO: 0 % (ref 0–0.5)
LYMPHOCYTES # BLD: 1.6 K/UL (ref 0.8–3.5)
LYMPHOCYTES NFR BLD: 25 % (ref 12–49)
MAGNESIUM SERPL-MCNC: 1.9 MG/DL (ref 1.6–2.4)
MCH RBC QN AUTO: 30.8 PG (ref 26–34)
MCHC RBC AUTO-ENTMCNC: 33.1 G/DL (ref 30–36.5)
MCV RBC AUTO: 93.2 FL (ref 80–99)
MONOCYTES # BLD: 0.4 K/UL (ref 0–1)
MONOCYTES NFR BLD: 7 % (ref 5–13)
NEUTS SEG # BLD: 4.2 K/UL (ref 1.8–8)
NEUTS SEG NFR BLD: 66 % (ref 32–75)
NRBC # BLD: 0 K/UL (ref 0–0.01)
NRBC BLD-RTO: 0 PER 100 WBC
PLATELET # BLD AUTO: 455 K/UL (ref 150–400)
PMV BLD AUTO: 9.1 FL (ref 8.9–12.9)
POTASSIUM SERPL-SCNC: 4.1 MMOL/L (ref 3.5–5.1)
PROT SERPL-MCNC: 9.8 G/DL (ref 6.4–8.2)
RBC # BLD AUTO: 3.83 M/UL (ref 3.8–5.2)
SODIUM SERPL-SCNC: 134 MMOL/L (ref 136–145)
TROPONIN I SERPL HS-MCNC: <4 NG/L (ref 0–51)
WBC # BLD AUTO: 6.3 K/UL (ref 3.6–11)

## 2024-07-16 PROCEDURE — 84703 CHORIONIC GONADOTROPIN ASSAY: CPT

## 2024-07-16 PROCEDURE — 84484 ASSAY OF TROPONIN QUANT: CPT

## 2024-07-16 PROCEDURE — 85379 FIBRIN DEGRADATION QUANT: CPT

## 2024-07-16 PROCEDURE — 93005 ELECTROCARDIOGRAM TRACING: CPT

## 2024-07-16 PROCEDURE — 83735 ASSAY OF MAGNESIUM: CPT

## 2024-07-16 PROCEDURE — 80053 COMPREHEN METABOLIC PANEL: CPT

## 2024-07-16 PROCEDURE — 2580000003 HC RX 258

## 2024-07-16 PROCEDURE — 71046 X-RAY EXAM CHEST 2 VIEWS: CPT

## 2024-07-16 PROCEDURE — 99285 EMERGENCY DEPT VISIT HI MDM: CPT

## 2024-07-16 PROCEDURE — 36415 COLL VENOUS BLD VENIPUNCTURE: CPT

## 2024-07-16 PROCEDURE — 96360 HYDRATION IV INFUSION INIT: CPT

## 2024-07-16 PROCEDURE — 70450 CT HEAD/BRAIN W/O DYE: CPT

## 2024-07-16 PROCEDURE — 85025 COMPLETE CBC W/AUTO DIFF WBC: CPT

## 2024-07-16 RX ORDER — 0.9 % SODIUM CHLORIDE 0.9 %
1000 INTRAVENOUS SOLUTION INTRAVENOUS ONCE
Status: COMPLETED | OUTPATIENT
Start: 2024-07-16 | End: 2024-07-16

## 2024-07-16 RX ADMIN — SODIUM CHLORIDE 1000 ML: 9 INJECTION, SOLUTION INTRAVENOUS at 12:45

## 2024-07-16 ASSESSMENT — PAIN - FUNCTIONAL ASSESSMENT: PAIN_FUNCTIONAL_ASSESSMENT: NONE - DENIES PAIN

## 2024-07-16 NOTE — ED TRIAGE NOTES
Pt c/o dizziness, diarrhea, high heart rate, low /70, feeling dehydrated feeling like she is going to pass out, symptoms x one week, denies cp or sob

## 2024-07-16 NOTE — DISCHARGE INSTRUCTIONS
Discussed visit today.  Please follow-up with your primary care provider for further evaluation.  Discussed eating 5 small meals throughout the day versus 1 big meal.  Stay hydrated with lots of fluids whether that is water, Pedialyte, or Gatorade.    Return to the emergency room with any worsening of symptoms.

## 2024-07-16 NOTE — ED PROVIDER NOTES
[facility] [address]  [phone]    Schedule an appointment as soon as possible for a visit   As needed      DISCHARGE MEDICATIONS:  Discharge Medication List as of 7/16/2024  4:13 PM        Controlled Substances Monitoring:          No data to display                (Please note that portions of this note were completed with a voice recognition program.  Efforts were made to edit the dictations but occasionally words are mis-transcribed.)    John Fitzpatrick PA-C (electronically signed)  Physician Assistant            John Fitzpatrick PA-C  07/16/24 [id number]

## 2024-07-17 LAB
EKG ATRIAL RATE: 84 BPM
EKG DIAGNOSIS: NORMAL
EKG P AXIS: 52 DEGREES
EKG P-R INTERVAL: 160 MS
EKG Q-T INTERVAL: 370 MS
EKG QRS DURATION: 88 MS
EKG QTC CALCULATION (BAZETT): 437 MS
EKG R AXIS: 6 DEGREES
EKG T AXIS: 27 DEGREES
EKG VENTRICULAR RATE: 84 BPM

## 2024-07-21 DIAGNOSIS — I10 ESSENTIAL (PRIMARY) HYPERTENSION: ICD-10-CM

## 2024-07-22 RX ORDER — VALSARTAN 320 MG/1
TABLET ORAL
Qty: 90 TABLET | Refills: 0 | OUTPATIENT
Start: 2024-07-22

## 2024-07-26 DIAGNOSIS — F41.9 ANXIETY: ICD-10-CM

## 2024-07-26 RX ORDER — HYDROXYZINE HYDROCHLORIDE 10 MG/1
10 TABLET, FILM COATED ORAL 3 TIMES DAILY PRN
Qty: 90 TABLET | Refills: 0 | Status: SHIPPED | OUTPATIENT
Start: 2024-07-26

## 2024-07-31 ENCOUNTER — OFFICE VISIT (OUTPATIENT)
Facility: CLINIC | Age: 29
End: 2024-07-31
Payer: COMMERCIAL

## 2024-07-31 VITALS
DIASTOLIC BLOOD PRESSURE: 74 MMHG | TEMPERATURE: 99.6 F | SYSTOLIC BLOOD PRESSURE: 106 MMHG | BODY MASS INDEX: 31.46 KG/M2 | OXYGEN SATURATION: 99 % | RESPIRATION RATE: 14 BRPM | HEART RATE: 114 BPM | WEIGHT: 188.8 LBS | HEIGHT: 65 IN

## 2024-07-31 DIAGNOSIS — G44.209 ACUTE NON INTRACTABLE TENSION-TYPE HEADACHE: ICD-10-CM

## 2024-07-31 DIAGNOSIS — U07.1 COVID-19: Primary | ICD-10-CM

## 2024-07-31 DIAGNOSIS — R09.81 NASAL CONGESTION: ICD-10-CM

## 2024-07-31 LAB
EXP DATE SOLUTION: ABNORMAL
EXP DATE SWAB: ABNORMAL
EXPIRATION DATE: ABNORMAL
LOT NUMBER POC: ABNORMAL
LOT NUMBER SOLUTION: ABNORMAL
LOT NUMBER SWAB: ABNORMAL
SARS-COV-2 RNA, POC: POSITIVE

## 2024-07-31 PROCEDURE — 3074F SYST BP LT 130 MM HG: CPT

## 2024-07-31 PROCEDURE — 87635 SARS-COV-2 COVID-19 AMP PRB: CPT

## 2024-07-31 PROCEDURE — 99213 OFFICE O/P EST LOW 20 MIN: CPT

## 2024-07-31 PROCEDURE — 3078F DIAST BP <80 MM HG: CPT

## 2024-07-31 RX ORDER — IBUPROFEN 600 MG/1
600 TABLET ORAL 3 TIMES DAILY PRN
Qty: 30 TABLET | Refills: 0 | Status: SHIPPED | OUTPATIENT
Start: 2024-07-31

## 2024-07-31 ASSESSMENT — ENCOUNTER SYMPTOMS
SHORTNESS OF BREATH: 0
COUGH: 0
NAUSEA: 0
SORE THROAT: 1
DIARRHEA: 0
CHEST TIGHTNESS: 0
VOMITING: 0

## 2024-07-31 NOTE — PROGRESS NOTES
\"Have you been to the ER, urgent care clinic since your last visit?  Hospitalized since your last visit?\"    Yes [facility] for dehydration    “Have you seen or consulted any other health care providers outside of [facility] since your last visit?”    NO     “Have you had a pap smear?”    NO    Date of last Cervical Cancer screen (HPV or PAP): 2021         Chief Complaint   Patient presents with    Follow-up    Other     \"Yesterday evening I started to get a fever and my throat is sore and my sinus is draining.  I think I have a viral or sinus infection. My aunt just passed away and her  is Saturday and I need to be well for that.  No cough but my chest feels alittle heavy.\"     /74 (Site: Left Upper Arm, Position: Sitting, Cuff Size: Medium Adult)   Pulse (!) 114   Temp 99.6 °F (37.6 °C) (Oral)   Resp 14   Ht 1.651 m (5' 5\")   Wt 85.6 kg (188 lb 12.8 oz)   LMP 2024 (Exact Date)   SpO2 99%   BMI 31.42 kg/m²       Click Here for Release of Records Request  
shortness of breath.    Cardiovascular:  Negative for chest pain.   Gastrointestinal:  Negative for diarrhea, nausea and vomiting.   Neurological:  Positive for headaches.          Physical Exam:     Physical Exam  Constitutional:       Appearance: Normal appearance. She is ill-appearing.   HENT:      Head: Normocephalic and atraumatic.      Nose: Congestion present.      Mouth/Throat:      Pharynx: Posterior oropharyngeal erythema present.   Eyes:      Extraocular Movements: Extraocular movements intact.      Pupils: Pupils are equal, round, and reactive to light.   Neck:      Thyroid: Thyromegaly present.   Cardiovascular:      Rate and Rhythm: Normal rate and regular rhythm.      Pulses: Normal pulses.      Heart sounds: Normal heart sounds.   Pulmonary:      Effort: Pulmonary effort is normal.      Breath sounds: Normal breath sounds.   Skin:     General: Skin is warm and dry.   Neurological:      General: No focal deficit present.      Mental Status: She is alert.   Psychiatric:         Mood and Affect: Mood normal.         Behavior: Behavior normal.         Thought Content: Thought content normal.          Recent Results (from the past 24 hour(s))   AMB POC COVID-19 COV    Collection Time: 07/31/24 11:36 AM   Result Value Ref Range    SARS-COV-2 RNA, POC Positive     Lot number swab      EXP date swab      Lot number solution      EXP date solution      LOT NUMBER POC      EXPIRATION DATE           I have discussed the diagnosis with the patient and the intended plan as seen in the above orders. she has expressed understanding. The patient has received an after-visit summary and questions were answered concerning future plans. I have discussed medication side effects and warnings with the patient as well.    On this date 7/31/2024 I have spent 20 minutes reviewing previous notes, test results and face to face with the patient discussing the diagnosis and importance of compliance with the treatment plan as well

## 2024-09-20 DIAGNOSIS — I10 ESSENTIAL (PRIMARY) HYPERTENSION: ICD-10-CM

## 2024-09-23 DIAGNOSIS — I10 ESSENTIAL (PRIMARY) HYPERTENSION: ICD-10-CM

## 2024-09-23 RX ORDER — VALSARTAN 320 MG/1
TABLET ORAL
Qty: 90 TABLET | Refills: 0 | OUTPATIENT
Start: 2024-09-23

## 2024-09-24 ENCOUNTER — OFFICE VISIT (OUTPATIENT)
Facility: CLINIC | Age: 29
End: 2024-09-24

## 2024-09-24 VITALS
OXYGEN SATURATION: 99 % | BODY MASS INDEX: 30.99 KG/M2 | HEART RATE: 87 BPM | DIASTOLIC BLOOD PRESSURE: 84 MMHG | TEMPERATURE: 98.1 F | HEIGHT: 65 IN | RESPIRATION RATE: 16 BRPM | SYSTOLIC BLOOD PRESSURE: 126 MMHG | WEIGHT: 186 LBS

## 2024-09-24 DIAGNOSIS — E87.1 HYPONATREMIA: ICD-10-CM

## 2024-09-24 DIAGNOSIS — N83.209 CYST OF OVARY, UNSPECIFIED LATERALITY: ICD-10-CM

## 2024-09-24 DIAGNOSIS — E87.6 HYPOKALEMIA: ICD-10-CM

## 2024-09-24 DIAGNOSIS — R73.03 PREDIABETES: ICD-10-CM

## 2024-09-24 DIAGNOSIS — I10 ESSENTIAL (PRIMARY) HYPERTENSION: ICD-10-CM

## 2024-09-24 DIAGNOSIS — I10 ESSENTIAL (PRIMARY) HYPERTENSION: Primary | ICD-10-CM

## 2024-09-24 DIAGNOSIS — M35.00 SJOGREN SYNDROME, UNSPECIFIED (HCC): ICD-10-CM

## 2024-09-24 DIAGNOSIS — Z23 NEEDS FLU SHOT: ICD-10-CM

## 2024-09-24 RX ORDER — VALSARTAN 320 MG/1
TABLET ORAL
Qty: 90 TABLET | Refills: 0 | OUTPATIENT
Start: 2024-09-24

## 2024-09-24 RX ORDER — NIFEDIPINE 60 MG/1
60 TABLET, EXTENDED RELEASE ORAL DAILY
Qty: 90 TABLET | Refills: 0 | Status: SHIPPED | OUTPATIENT
Start: 2024-09-24

## 2024-09-24 RX ORDER — VALSARTAN 320 MG/1
TABLET ORAL
Qty: 90 TABLET | Refills: 0 | Status: SHIPPED | OUTPATIENT
Start: 2024-09-24

## 2024-09-24 ASSESSMENT — ENCOUNTER SYMPTOMS
COUGH: 0
CHEST TIGHTNESS: 0
DIARRHEA: 0
CONSTIPATION: 0
ABDOMINAL PAIN: 0
NAUSEA: 0
WHEEZING: 0
VOMITING: 0
BACK PAIN: 0
SHORTNESS OF BREATH: 0

## 2024-09-27 ENCOUNTER — HOSPITAL ENCOUNTER (EMERGENCY)
Facility: HOSPITAL | Age: 29
Discharge: HOME | End: 2024-09-27
Attending: EMERGENCY MEDICINE
Payer: COMMERCIAL

## 2024-09-27 VITALS
DIASTOLIC BLOOD PRESSURE: 95 MMHG | SYSTOLIC BLOOD PRESSURE: 140 MMHG | TEMPERATURE: 98 F | OXYGEN SATURATION: 98 % | HEART RATE: 90 BPM | RESPIRATION RATE: 18 BRPM

## 2024-09-27 DIAGNOSIS — R42 LIGHTHEADEDNESS: Primary | ICD-10-CM

## 2024-09-27 DIAGNOSIS — R53.83 FATIGUE, UNSPECIFIED TYPE: ICD-10-CM

## 2024-09-27 LAB
ALBUMIN SERPL-MCNC: 3.8 G/DL (ref 3.5–5)
ALBUMIN/GLOB SERPL: 0.6 (ref 1.1–2.2)
ALP SERPL-CCNC: 68 U/L (ref 45–117)
ALT SERPL-CCNC: 15 U/L (ref 12–78)
ANION GAP SERPL CALC-SCNC: 6 MMOL/L (ref 2–12)
APPEARANCE UR: CLEAR
AST SERPL-CCNC: 14 U/L (ref 15–37)
BACTERIA URNS QL MICRO: NEGATIVE /HPF
BASOPHILS # BLD: 0 K/UL (ref 0–0.1)
BASOPHILS NFR BLD: 0 % (ref 0–1)
BILIRUB SERPL-MCNC: 0.4 MG/DL (ref 0.2–1)
BILIRUB UR QL: NEGATIVE
BUN SERPL-MCNC: 9 MG/DL (ref 6–20)
BUN/CREAT SERPL: 11 (ref 12–20)
CALCIUM SERPL-MCNC: 9.6 MG/DL (ref 8.5–10.1)
CHLORIDE SERPL-SCNC: 104 MMOL/L (ref 97–108)
CO2 SERPL-SCNC: 25 MMOL/L (ref 21–32)
COLOR UR: ABNORMAL
COMMENT:: NORMAL
CREAT SERPL-MCNC: 0.83 MG/DL (ref 0.55–1.02)
DIFFERENTIAL METHOD BLD: ABNORMAL
EOSINOPHIL # BLD: 0.1 K/UL (ref 0–0.4)
EOSINOPHIL NFR BLD: 2 % (ref 0–7)
EPITH CASTS URNS QL MICRO: ABNORMAL /LPF
ERYTHROCYTE [DISTWIDTH] IN BLOOD BY AUTOMATED COUNT: 13 % (ref 11.5–14.5)
GLOBULIN SER CALC-MCNC: 6.1 G/DL (ref 2–4)
GLUCOSE SERPL-MCNC: 93 MG/DL (ref 65–100)
GLUCOSE UR STRIP.AUTO-MCNC: NEGATIVE MG/DL
HCG UR QL: NEGATIVE
HCT VFR BLD AUTO: 37 % (ref 35–47)
HGB BLD-MCNC: 11.9 G/DL (ref 11.5–16)
HGB UR QL STRIP: NEGATIVE
HYALINE CASTS URNS QL MICRO: ABNORMAL /LPF (ref 0–5)
IMM GRANULOCYTES # BLD AUTO: 0 K/UL (ref 0–0.04)
IMM GRANULOCYTES NFR BLD AUTO: 0 % (ref 0–0.5)
KETONES UR QL STRIP.AUTO: NEGATIVE MG/DL
LEUKOCYTE ESTERASE UR QL STRIP.AUTO: ABNORMAL
LYMPHOCYTES # BLD: 1.9 K/UL (ref 0.8–3.5)
LYMPHOCYTES NFR BLD: 27 % (ref 12–49)
MCH RBC QN AUTO: 30 PG (ref 26–34)
MCHC RBC AUTO-ENTMCNC: 32.2 G/DL (ref 30–36.5)
MCV RBC AUTO: 93.2 FL (ref 80–99)
MONOCYTES # BLD: 0.5 K/UL (ref 0–1)
MONOCYTES NFR BLD: 8 % (ref 5–13)
NEUTS SEG # BLD: 4.3 K/UL (ref 1.8–8)
NEUTS SEG NFR BLD: 63 % (ref 32–75)
NITRITE UR QL STRIP.AUTO: NEGATIVE
NRBC # BLD: 0 K/UL (ref 0–0.01)
NRBC BLD-RTO: 0 PER 100 WBC
PH UR STRIP: 6 (ref 5–8)
PLATELET # BLD AUTO: 432 K/UL (ref 150–400)
PMV BLD AUTO: 8.9 FL (ref 8.9–12.9)
POTASSIUM SERPL-SCNC: 3.9 MMOL/L (ref 3.5–5.1)
PROT SERPL-MCNC: 9.9 G/DL (ref 6.4–8.2)
PROT UR STRIP-MCNC: NEGATIVE MG/DL
RBC # BLD AUTO: 3.97 M/UL (ref 3.8–5.2)
RBC #/AREA URNS HPF: ABNORMAL /HPF (ref 0–5)
SODIUM SERPL-SCNC: 135 MMOL/L (ref 136–145)
SP GR UR REFRACTOMETRY: 1.01 (ref 1–1.03)
SPECIMEN HOLD: NORMAL
SPECIMEN HOLD: NORMAL
UROBILINOGEN UR QL STRIP.AUTO: 0.2 EU/DL (ref 0.2–1)
WBC # BLD AUTO: 6.9 K/UL (ref 3.6–11)
WBC URNS QL MICRO: ABNORMAL /HPF (ref 0–4)

## 2024-09-27 PROCEDURE — 81001 URINALYSIS AUTO W/SCOPE: CPT

## 2024-09-27 PROCEDURE — 99284 EMERGENCY DEPT VISIT MOD MDM: CPT

## 2024-09-27 PROCEDURE — 96360 HYDRATION IV INFUSION INIT: CPT

## 2024-09-27 PROCEDURE — 81025 URINE PREGNANCY TEST: CPT

## 2024-09-27 PROCEDURE — 93005 ELECTROCARDIOGRAM TRACING: CPT

## 2024-09-27 PROCEDURE — 80053 COMPREHEN METABOLIC PANEL: CPT

## 2024-09-27 PROCEDURE — 2580000003 HC RX 258

## 2024-09-27 PROCEDURE — 36415 COLL VENOUS BLD VENIPUNCTURE: CPT

## 2024-09-27 PROCEDURE — 85025 COMPLETE CBC W/AUTO DIFF WBC: CPT

## 2024-09-27 RX ORDER — 0.9 % SODIUM CHLORIDE 0.9 %
1000 INTRAVENOUS SOLUTION INTRAVENOUS ONCE
Status: COMPLETED | OUTPATIENT
Start: 2024-09-27 | End: 2024-09-27

## 2024-09-27 RX ADMIN — SODIUM CHLORIDE 1000 ML: 9 INJECTION, SOLUTION INTRAVENOUS at 19:25

## 2024-09-27 ASSESSMENT — PAIN SCALES - GENERAL: PAINLEVEL_OUTOF10: 0

## 2024-09-27 NOTE — ED PROVIDER NOTES
[facility] EMERGENCY DEP  EMERGENCY DEPARTMENT ENCOUNTER      Pt Name: Tsering Sales  MRN: [id number]  Birthdate 1995  Date of evaluation: 9/27/2024  Provider: Bobby Eaton PA-C    CHIEF COMPLAINT       Chief Complaint   Patient presents with    Dizziness         HISTORY OF PRESENT ILLNESS   (Location/Symptom, Timing/Onset, Context/Setting, Quality, Duration, Modifying Factors, Severity)  Note limiting factors.   29 year old with history of hypertension, dizziness, hypokalemia, dehydration, and constipation, reports to ED with lightheadedness and fatigue over past 3 days and \"thinks its her potassium.\"  Endorses some nausea.  Denies fever/chills, chest pain, shortness of breath, abdominal pain, urinary complaints, tinnitus, vomiting, or diarrhea.  States she is eating well, but could always drink more water.  Endorses history of ED visits for similar complaints.  States she can walk without difficulty.            Review of External Medical Records:     Nursing Notes were reviewed.    REVIEW OF SYSTEMS    (2-9 systems for level 4, 10 or more for level 5)     Review of Systems    Except as noted above the remainder of the review of systems was reviewed and negative.       PAST MEDICAL HISTORY     Past Medical History:   Diagnosis Date    Abnormal Pap smear of cervix 2017    seeing OBGYN    Acne 10/7/2009    Allergic rhinitis 9/2/2009    Anemia 9/2/2009    Atypical squamous cell changes of undetermined significance (ASCUS) on vaginal cytology 09/14/2017    H/o ASCUS HPV + with Dr. Ilia De Dios 9/14/2017    IRA III (cervical intraepithelial neoplasia III) 11/30/2017    found by LEEP     Headache     Herpes genitalia 2017    dx by OB    History of gestational diabetes     diet controlled    Hypertension     Other ill-defined conditions(799.89)     scoliosis    Ovarian cyst     PCB (post coital bleeding) 11/25/2014    Thoracic compression fracture (HCC) 3/2014    Isak Lopezchotaj    Thyromegaly 11/25/2014     normal sinus rhythm; and regular . Rate (approx.): 90; Axis: normal; P wave: normal; QRS interval: normal ; ST/T wave: normal; Other findings: Normal.  EKG interpreted by Olga Gutierrez MD.  [CH]      ED Course User Index  [CH] Olga Gutierrez MD           CONSULTS:  None    PROCEDURES:  Unless otherwise noted below, none     Procedures      FINAL IMPRESSION      1. Lightheadedness    2. Fatigue, unspecified type          DISPOSITION/PLAN   DISPOSITION Decision To Discharge 09/27/2024 08:22:26 PM      PATIENT REFERRED TO:  Liat Cintron PA-C  [id number] [facility] [id number] [phone]    Schedule an appointment as soon as possible for a visit       [facility] EMERGENCY DEP  [address] [place] [id number]  [phone]    As needed, If symptoms worsen      DISCHARGE MEDICATIONS:  Discharge Medication List as of 9/27/2024  8:51 PM            (Please note that portions of this note were completed with a voice recognition program.  Efforts were made to edit the dictations but occasionally words are mis-transcribed.)    Bobby Eaton PA-C (electronically signed)  Emergency Attending Physician / Physician Assistant / Nurse Practitioner              Bobby Eaton PA-C  09/29/24 [id number]

## 2024-09-27 NOTE — ED TRIAGE NOTES
TRIAGE: Pt arrived ambulatory reporting dizziness and possible low potassium, ongoing for 3 days.    +nausea  - fevers, SOB, CP

## 2024-09-28 LAB
EKG ATRIAL RATE: 90 BPM
EKG DIAGNOSIS: NORMAL
EKG P AXIS: 66 DEGREES
EKG P-R INTERVAL: 178 MS
EKG Q-T INTERVAL: 368 MS
EKG QRS DURATION: 72 MS
EKG QTC CALCULATION (BAZETT): 450 MS
EKG R AXIS: 43 DEGREES
EKG T AXIS: 31 DEGREES
EKG VENTRICULAR RATE: 90 BPM

## 2024-09-28 PROCEDURE — 93010 ELECTROCARDIOGRAM REPORT: CPT | Performed by: INTERNAL MEDICINE

## 2024-09-28 NOTE — DISCHARGE INSTRUCTIONS
We did not find any concerning abnormalities in your workup today.  Your potassium today is 3.9.  There is recommended to follow-up with your primary care given visit today here.  Please continue to eat as your appetite allows, more small meals per day then if you were large ones, and remain properly hydrated.  If symptoms worsen or new concerning symptoms arise, please report to the nearest emergency department.    Thank you for allowing us to provide you with medical care today.  We realize that you have many choices for your emergency care needs.  We thank you for choosing [facility].  Please choose us in the future for any continued health care needs.     The exam and treatment you received in the Emergency Department were for an emergent problem and are not intended as complete care. It is important that you follow up with a doctor, nurse practitioner, or physician assistant for ongoing care. If your symptoms worsen or you do not improve as expected and you are unable to reach your usual health care provider, you should return to the Emergency Department.  We are available 24 hours a day.     Please make an appointment with your health care provider(s) for follow up of your Emergency Department visit.  Take this sheet with you when you go to your follow-up visit.

## 2024-09-28 NOTE — ED NOTES
Discharge instructions given to patient by MD and RN. IV D/C. Pt advised to come to the ER if her symptoms worsen. Pt ambulated off unit with no signs of distress.

## 2024-10-25 ENCOUNTER — OFFICE VISIT (OUTPATIENT)
Facility: CLINIC | Age: 29
End: 2024-10-25
Payer: COMMERCIAL

## 2024-10-25 VITALS
HEIGHT: 65 IN | SYSTOLIC BLOOD PRESSURE: 124 MMHG | RESPIRATION RATE: 16 BRPM | BODY MASS INDEX: 31.25 KG/M2 | TEMPERATURE: 98.3 F | DIASTOLIC BLOOD PRESSURE: 91 MMHG | OXYGEN SATURATION: 98 % | HEART RATE: 98 BPM | WEIGHT: 187.6 LBS

## 2024-10-25 DIAGNOSIS — J01.90 ACUTE BACTERIAL SINUSITIS: Primary | ICD-10-CM

## 2024-10-25 DIAGNOSIS — B96.89 ACUTE BACTERIAL SINUSITIS: Primary | ICD-10-CM

## 2024-10-25 DIAGNOSIS — R09.81 HEAD CONGESTION: ICD-10-CM

## 2024-10-25 LAB
EXP DATE SOLUTION: NORMAL
EXP DATE SWAB: NORMAL
EXPIRATION DATE: NORMAL
LOT NUMBER POC: NORMAL
LOT NUMBER SOLUTION: NORMAL
LOT NUMBER SWAB: NORMAL
SARS-COV-2 RNA, POC: NEGATIVE

## 2024-10-25 PROCEDURE — 87635 SARS-COV-2 COVID-19 AMP PRB: CPT

## 2024-10-25 PROCEDURE — 99213 OFFICE O/P EST LOW 20 MIN: CPT

## 2024-10-25 PROCEDURE — 3074F SYST BP LT 130 MM HG: CPT

## 2024-10-25 PROCEDURE — 3080F DIAST BP >= 90 MM HG: CPT

## 2024-10-25 RX ORDER — DOXYCYCLINE HYCLATE 100 MG
100 TABLET ORAL 2 TIMES DAILY
Qty: 14 TABLET | Refills: 0 | Status: SHIPPED | OUTPATIENT
Start: 2024-10-25 | End: 2024-11-01

## 2024-10-25 ASSESSMENT — ENCOUNTER SYMPTOMS
SINUS PRESSURE: 1
SORE THROAT: 0
COUGH: 0
CONSTIPATION: 0
SHORTNESS OF BREATH: 0
DIARRHEA: 0
VOMITING: 0
RHINORRHEA: 1
ABDOMINAL PAIN: 0
NAUSEA: 0

## 2024-10-25 NOTE — PROGRESS NOTES
\"Have you been to the ER, urgent care clinic since your last visit?  Hospitalized since your last visit?\"    NO    “Have you seen or consulted any other health care providers outside our system since your last visit?”    NO     “Have you had a pap smear?”    NO    Date of last Cervical Cancer screen (HPV or PAP): 6/22/2021     Chief Complaint   Patient presents with    Other     \"My son has pneumonia and my chest feels like soreness, like something is in my chest.\"     BP (!) 124/91 (Site: Left Upper Arm, Position: Sitting, Cuff Size: Medium Adult)   Pulse 98   Temp 98.3 °F (36.8 °C) (Oral)   Resp 16   Ht 1.651 m (5' 5\")   Wt 85.1 kg (187 lb 9.6 oz)   LMP 10/07/2024 (Exact Date)   SpO2 98%   BMI 31.22 kg/m²

## 2024-10-25 NOTE — PROGRESS NOTES
Tsering Sales  29 y.o. female  1995  [address] [place] [address]  [id number]     [facility]: Progress Note       Encounter Date: 10/25/2024    Patient presents with the following chief complaint(s)    Chief Complaint   Patient presents with    Other     \"My son has pneumonia and my chest feels like soreness, like something is in my chest.\"        History provided by patient    Assessment and Plan:   1. Acute bacterial sinusitis  -     doxycycline hyclate (VIBRA-TABS) 100 MG tablet; Take 1 tablet by mouth 2 times daily for 7 days, Disp-14 tablet, R-0Normal  2. Head congestion  -     AMB POC COVID-19 COV  -     doxycycline hyclate (VIBRA-TABS) 100 MG tablet; Take 1 tablet by mouth 2 times daily for 7 days, Disp-14 tablet, R-0Normal     Return if symptoms worsen or fail to improve.  History of Present Illness   Tsering Sales is a 29 y.o. female with past medical history listed, who presents to clinic today for an acute problem visit.  Symptoms going into week 3.   Reports + chills, congestion, headache  Using Vicks & tylenol  Clearing throat - noticing some yellow & green mucus  Denies fever, N/V/D, SOB    No fever  Health Maintenance  Health Maintenance Due   Topic Date Due    Varicella vaccine (1 of 2 - 13+ 2-dose series) Never done    DTaP/Tdap/Td vaccine (8 - Td or Tdap) 01/14/2023    Cervical cancer screen  06/22/2024    COVID-19 Vaccine (3 - 2023-24 season) 09/01/2024       Vitals:     Vitals:    10/25/24 1305   BP: (!) 124/91   Site: Left Upper Arm   Position: Sitting   Cuff Size: Medium Adult   Pulse: 98   Resp: 16   Temp: 98.3 °F (36.8 °C)   TempSrc: Oral   SpO2: 98%   Weight: 85.1 kg (187 lb 9.6 oz)   Height: 1.651 m (5' 5\")     Body mass index is 31.22 kg/m².    Wt Readings from Last 3 Encounters:   10/25/24 85.1 kg (187 lb 9.6 oz)   09/24/24 84.4 kg (186 lb)   07/31/24 85.6 kg (188 lb 12.8 oz)       Medications:     Current Outpatient

## 2024-10-28 ENCOUNTER — TELEPHONE (OUTPATIENT)
Facility: CLINIC | Age: 29
End: 2024-10-28

## 2024-10-28 DIAGNOSIS — B96.89 ACUTE BACTERIAL SINUSITIS: Primary | ICD-10-CM

## 2024-10-28 DIAGNOSIS — J01.90 ACUTE BACTERIAL SINUSITIS: Primary | ICD-10-CM

## 2024-10-28 RX ORDER — AZITHROMYCIN 250 MG/1
TABLET, FILM COATED ORAL
Qty: 6 TABLET | Refills: 0 | Status: SHIPPED | OUTPATIENT
Start: 2024-10-28 | End: 2024-11-07

## 2024-10-28 NOTE — TELEPHONE ENCOUNTER
Doxycycline Hyclate was prescribed on 10/25, is messing w blood pressure, causing headaches, would like to know if any other medication could be prescribed instead. Patient would like to discuss with nurse on options when available.

## 2024-11-17 ENCOUNTER — HOSPITAL ENCOUNTER (EMERGENCY)
Facility: HOSPITAL | Age: 29
Discharge: HOME | End: 2024-11-17
Attending: STUDENT IN AN ORGANIZED HEALTH CARE EDUCATION/TRAINING PROGRAM
Payer: COMMERCIAL

## 2024-11-17 VITALS
HEART RATE: 99 BPM | TEMPERATURE: 98.2 F | OXYGEN SATURATION: 100 % | SYSTOLIC BLOOD PRESSURE: 135 MMHG | BODY MASS INDEX: 31.96 KG/M2 | HEIGHT: 65 IN | DIASTOLIC BLOOD PRESSURE: 96 MMHG | RESPIRATION RATE: 17 BRPM | WEIGHT: 191.8 LBS

## 2024-11-17 DIAGNOSIS — R42 LIGHTHEADEDNESS: Primary | ICD-10-CM

## 2024-11-17 LAB
ALBUMIN SERPL-MCNC: 3.5 G/DL (ref 3.5–5)
ALBUMIN/GLOB SERPL: 0.6 (ref 1.1–2.2)
ALP SERPL-CCNC: 74 U/L (ref 45–117)
ALT SERPL-CCNC: 26 U/L (ref 12–78)
ANION GAP SERPL CALC-SCNC: 2 MMOL/L (ref 2–12)
AST SERPL-CCNC: ABNORMAL U/L (ref 15–37)
BILIRUB SERPL-MCNC: 0.4 MG/DL (ref 0.2–1)
BUN SERPL-MCNC: 10 MG/DL (ref 6–20)
BUN/CREAT SERPL: 13 (ref 12–20)
CA-I BLD-SCNC: 1.32 MMOL/L (ref 1.12–1.32)
CALCIUM SERPL-MCNC: 9.2 MG/DL (ref 8.5–10.1)
CHLORIDE SERPL-SCNC: 105 MMOL/L (ref 97–108)
CO2 SERPL-SCNC: 26 MMOL/L (ref 21–32)
CREAT SERPL-MCNC: 0.79 MG/DL (ref 0.55–1.02)
EKG ATRIAL RATE: 83 BPM
EKG DIAGNOSIS: NORMAL
EKG P AXIS: 59 DEGREES
EKG P-R INTERVAL: 174 MS
EKG Q-T INTERVAL: 384 MS
EKG QRS DURATION: 72 MS
EKG QTC CALCULATION (BAZETT): 451 MS
EKG R AXIS: 14 DEGREES
EKG T AXIS: 20 DEGREES
EKG VENTRICULAR RATE: 83 BPM
ERYTHROCYTE [DISTWIDTH] IN BLOOD BY AUTOMATED COUNT: 13.5 % (ref 11.5–14.5)
GLOBULIN SER CALC-MCNC: 6 G/DL (ref 2–4)
GLUCOSE SERPL-MCNC: 119 MG/DL (ref 65–100)
HCT VFR BLD AUTO: 35.6 % (ref 35–47)
HGB BLD-MCNC: 11.7 G/DL (ref 11.5–16)
MAGNESIUM SERPL-MCNC: NORMAL MG/DL (ref 1.6–2.4)
MCH RBC QN AUTO: 30.1 PG (ref 26–34)
MCHC RBC AUTO-ENTMCNC: 32.9 G/DL (ref 30–36.5)
MCV RBC AUTO: 91.5 FL (ref 80–99)
NRBC # BLD: 0 K/UL (ref 0–0.01)
NRBC BLD-RTO: 0 PER 100 WBC
PLATELET # BLD AUTO: 520 K/UL (ref 150–400)
PMV BLD AUTO: 9.9 FL (ref 8.9–12.9)
POTASSIUM SERPL-SCNC: ABNORMAL MMOL/L (ref 3.5–5.1)
PROT SERPL-MCNC: 9.5 G/DL (ref 6.4–8.2)
RBC # BLD AUTO: 3.89 M/UL (ref 3.8–5.2)
SODIUM SERPL-SCNC: 133 MMOL/L (ref 136–145)
WBC # BLD AUTO: 5.8 K/UL (ref 3.6–11)

## 2024-11-17 PROCEDURE — 93005 ELECTROCARDIOGRAM TRACING: CPT | Performed by: STUDENT IN AN ORGANIZED HEALTH CARE EDUCATION/TRAINING PROGRAM

## 2024-11-17 PROCEDURE — 82330 ASSAY OF CALCIUM: CPT

## 2024-11-17 PROCEDURE — 83735 ASSAY OF MAGNESIUM: CPT

## 2024-11-17 PROCEDURE — 99284 EMERGENCY DEPT VISIT MOD MDM: CPT

## 2024-11-17 PROCEDURE — 36415 COLL VENOUS BLD VENIPUNCTURE: CPT

## 2024-11-17 PROCEDURE — 85027 COMPLETE CBC AUTOMATED: CPT

## 2024-11-17 PROCEDURE — 80053 COMPREHEN METABOLIC PANEL: CPT

## 2024-11-17 PROCEDURE — 93010 ELECTROCARDIOGRAM REPORT: CPT | Performed by: INTERNAL MEDICINE

## 2024-11-17 ASSESSMENT — PAIN - FUNCTIONAL ASSESSMENT: PAIN_FUNCTIONAL_ASSESSMENT: NONE - DENIES PAIN

## 2024-11-17 NOTE — DISCHARGE INSTRUCTIONS
You presented to ED with several days of lightheadedness.  Symptoms are somewhat better here in the ED after taking potassium last night.  Your initial labs were hemolyzed and the blood was clotted and potassium was unable to be read.  However we did a bedside test here that showed a potassium of 4.1 within normal limits.  He do of a mild thrombocytopenia.  Recommend outpatient follow-up with your primary care doctor to check for resolution.  If symptoms change or worsen return to the ED for further evaluation.

## 2024-11-17 NOTE — ED TRIAGE NOTES
Pt walked in the ED with CC of Dizzy that has been ongoing since Tuesday. The pt has a history of low K and HTN. When her potassium is low in the past she felt dizzy. Pt denies chest pain, SOB, nor N/v/d.

## 2024-11-17 NOTE — ED PROVIDER NOTES
EMERGENCY DEPARTMENT PHYSICIAN NOTE     Patient: Tsering Sales     Time of Service: 2024  7:00 AM     Chief complaint:   Chief Complaint   Patient presents with    Dizziness        HISTORY:  Patient is a 29 y.o. female who presents to the emergency department with complaints of intermittent lightheadedness and possibly dizziness has been going on since last Thursday.  Not present at this time.  Patient ambulatory without difficulty.  Patient reports history of hypertension as well as a history of low potassium.  Last time she had symptoms like this was due to low potassium.  Vital signs are stable.  No nausea vomiting or neurodeficits.  No chest pain or shortness of breath      Past Medical History:   Diagnosis Date    Abnormal Pap smear of cervix     seeing OBGYN    Acne 10/7/2009    Allergic rhinitis 2009    Anemia 2009    Atypical squamous cell changes of undetermined significance (ASCUS) on vaginal cytology 2017    H/o ASCUS HPV + with Dr. Ilia De Dios 2017    IRA III (cervical intraepithelial neoplasia III) 2017    found by LEEP     Headache     Herpes genitalia     dx by OB    History of gestational diabetes     diet controlled    Hypertension     Other ill-defined conditions(799.89)     scoliosis    Ovarian cyst     PCB (post coital bleeding) 2014    Thoracic compression fracture (HCC) 3/2014    Isak Alonsokachorsang    Thyromegaly 2014    On exam     Unspecified vitamin D deficiency 2014    Varicella 1998        Past Surgical History:   Procedure Laterality Date    GYN           LEEP  16    found IRA 3        Family History   Problem Relation Age of Onset    Diabetes Father     Hypertension Father     Hypertension Paternal Grandmother     Diabetes Paternal Grandmother     No Known Problems Brother     Osteoporosis Neg Hx     Breast Cancer Maternal Grandmother         great, great gma, age?    Hypertension Maternal Grandmother

## 2024-12-16 ENCOUNTER — OFFICE VISIT (OUTPATIENT)
Facility: CLINIC | Age: 29
End: 2024-12-16
Payer: COMMERCIAL

## 2024-12-16 VITALS
SYSTOLIC BLOOD PRESSURE: 122 MMHG | RESPIRATION RATE: 16 BRPM | BODY MASS INDEX: 31.16 KG/M2 | HEIGHT: 65 IN | HEART RATE: 82 BPM | OXYGEN SATURATION: 100 % | WEIGHT: 187 LBS | TEMPERATURE: 98.1 F | DIASTOLIC BLOOD PRESSURE: 80 MMHG

## 2024-12-16 DIAGNOSIS — E87.1 HYPONATREMIA: ICD-10-CM

## 2024-12-16 DIAGNOSIS — R42 LIGHTHEADEDNESS: ICD-10-CM

## 2024-12-16 DIAGNOSIS — I10 ESSENTIAL (PRIMARY) HYPERTENSION: ICD-10-CM

## 2024-12-16 DIAGNOSIS — I10 ESSENTIAL (PRIMARY) HYPERTENSION: Primary | ICD-10-CM

## 2024-12-16 DIAGNOSIS — E87.6 HYPOKALEMIA: ICD-10-CM

## 2024-12-16 PROCEDURE — 99214 OFFICE O/P EST MOD 30 MIN: CPT

## 2024-12-16 PROCEDURE — 3074F SYST BP LT 130 MM HG: CPT

## 2024-12-16 PROCEDURE — 3079F DIAST BP 80-89 MM HG: CPT

## 2024-12-16 RX ORDER — VALSARTAN 320 MG/1
TABLET ORAL
Qty: 90 TABLET | Refills: 0 | Status: SHIPPED | OUTPATIENT
Start: 2024-12-16

## 2024-12-16 RX ORDER — NIFEDIPINE 60 MG/1
60 TABLET, EXTENDED RELEASE ORAL DAILY
Qty: 90 TABLET | Refills: 0 | Status: SHIPPED | OUTPATIENT
Start: 2024-12-16

## 2024-12-16 ASSESSMENT — ENCOUNTER SYMPTOMS
WHEEZING: 0
BACK PAIN: 0
COUGH: 0
DIARRHEA: 0
NAUSEA: 0
ABDOMINAL PAIN: 0
VOMITING: 0
SHORTNESS OF BREATH: 0
CONSTIPATION: 0
CHEST TIGHTNESS: 0

## 2024-12-16 NOTE — PROGRESS NOTES
\"Have you been to the ER, urgent care clinic since your last visit?  Hospitalized since your last visit?\"    YES - When: approximately 1 month ago.  Where and Why: [facility] for dehydration.    “Have you seen or consulted any other health care providers outside our system since your last visit?”    NO     “Have you had a pap smear?”    NO    Date of last Cervical Cancer screen (HPV or PAP): 6/22/2021     Chief Complaint   Patient presents with    Follow-up     /80 (Site: Left Upper Arm, Position: Sitting, Cuff Size: Medium Adult)   Pulse 82   Temp 98.1 °F (36.7 °C) (Temporal)   Resp 16   Ht 1.651 m (5' 5\")   Wt 84.8 kg (187 lb)   LMP 12/09/2024 (Approximate)   SpO2 100%   BMI 31.12 kg/m²          
lightheadedness episodes. Says that she has improvement with taking b12. No identifiable triggers. Says that it occurs both at home or either at work. Says that she will also drink electrolytes which can also help. Say it occurs about 3-4 times per week.  Denies N/V, blurry vision.      Patient has history of Sjogren's for which she is seeing rheumatology, needs to make appt.      Health Maintenance  Health Maintenance Due   Topic Date Due    Varicella vaccine (1 of 2 - 13+ 2-dose series) Never done    DTaP/Tdap/Td vaccine (8 - Td or Tdap) 01/14/2023    Cervical cancer screen  06/22/2024    COVID-19 Vaccine (3 - 2023-24 season) 09/01/2024       Vitals:     Vitals:    12/16/24 1324   BP: 122/80   Site: Left Upper Arm   Position: Sitting   Cuff Size: Medium Adult   Pulse: 82   Resp: 16   Temp: 98.1 °F (36.7 °C)   TempSrc: Temporal   SpO2: 100%   Weight: 84.8 kg (187 lb)   Height: 1.651 m (5' 5\")     Body mass index is 31.12 kg/m².    Wt Readings from Last 3 Encounters:   12/16/24 84.8 kg (187 lb)   11/17/24 87 kg (191 lb 12.8 oz)   10/25/24 85.1 kg (187 lb 9.6 oz)       Medications:     Current Outpatient Medications   Medication Sig Dispense Refill    NIFEdipine (PROCARDIA XL) 60 MG extended release tablet Take 1 tablet by mouth daily 90 tablet 0    valsartan (DIOVAN) 320 MG tablet TAKE 1 TABLET BY MOUTH DAILY 90 tablet 0    fluticasone (FLONASE) 50 MCG/ACT nasal spray 2 sprays by Each Nostril route daily 16 g 0    cyclobenzaprine (FLEXERIL) 10 MG tablet Take 1 tablet by mouth 3 times daily as needed for Muscle spasms 90 tablet 0    naproxen (NAPROSYN) 500 MG tablet Take 1 tablet by mouth 2 times daily (with meals) 60 tablet 5     No current facility-administered medications for this visit.        Review of Systems   Review of Systems   Constitutional:  Negative for chills, fatigue and fever.   HENT:  Negative for congestion and tinnitus.    Respiratory:  Negative for cough, chest tightness, shortness of breath and

## 2025-01-07 ENCOUNTER — OFFICE VISIT (OUTPATIENT)
Facility: CLINIC | Age: 30
End: 2025-01-07
Payer: COMMERCIAL

## 2025-01-07 VITALS
RESPIRATION RATE: 16 BRPM | HEIGHT: 65 IN | SYSTOLIC BLOOD PRESSURE: 118 MMHG | WEIGHT: 183 LBS | OXYGEN SATURATION: 100 % | TEMPERATURE: 98.1 F | DIASTOLIC BLOOD PRESSURE: 84 MMHG | HEART RATE: 104 BPM | BODY MASS INDEX: 30.49 KG/M2

## 2025-01-07 DIAGNOSIS — M62.830 SPASM OF MUSCLE OF LOWER BACK: Primary | ICD-10-CM

## 2025-01-07 PROCEDURE — 3074F SYST BP LT 130 MM HG: CPT

## 2025-01-07 PROCEDURE — 3079F DIAST BP 80-89 MM HG: CPT

## 2025-01-07 PROCEDURE — 99213 OFFICE O/P EST LOW 20 MIN: CPT

## 2025-01-07 RX ORDER — CYCLOBENZAPRINE HCL 10 MG
10 TABLET ORAL 3 TIMES DAILY PRN
Qty: 60 TABLET | Refills: 0 | Status: SHIPPED | OUTPATIENT
Start: 2025-01-07

## 2025-01-07 RX ORDER — CYCLOBENZAPRINE HCL 10 MG
10 TABLET ORAL 2 TIMES DAILY PRN
Qty: 20 TABLET | Refills: 0 | Status: SHIPPED | OUTPATIENT
Start: 2025-01-07 | End: 2025-01-07 | Stop reason: CLARIF

## 2025-01-07 RX ORDER — LIDOCAINE 50 MG/G
1 PATCH TOPICAL DAILY
Qty: 10 PATCH | Refills: 0 | Status: SHIPPED | OUTPATIENT
Start: 2025-01-07 | End: 2025-01-17

## 2025-01-07 RX ORDER — IBUPROFEN 600 MG/1
600 TABLET, FILM COATED ORAL 3 TIMES DAILY PRN
Qty: 30 TABLET | Refills: 0 | Status: SHIPPED | OUTPATIENT
Start: 2025-01-07

## 2025-01-07 ASSESSMENT — ENCOUNTER SYMPTOMS
WHEEZING: 0
CONSTIPATION: 0
NAUSEA: 0
COUGH: 0
BACK PAIN: 0
VOMITING: 0
CHEST TIGHTNESS: 0
DIARRHEA: 0
SHORTNESS OF BREATH: 0
ABDOMINAL PAIN: 0

## 2025-01-07 ASSESSMENT — PATIENT HEALTH QUESTIONNAIRE - PHQ9
SUM OF ALL RESPONSES TO PHQ9 QUESTIONS 1 & 2: 0
SUM OF ALL RESPONSES TO PHQ QUESTIONS 1-9: 0
2. FEELING DOWN, DEPRESSED OR HOPELESS: NOT AT ALL
1. LITTLE INTEREST OR PLEASURE IN DOING THINGS: NOT AT ALL
SUM OF ALL RESPONSES TO PHQ QUESTIONS 1-9: 0

## 2025-01-07 NOTE — PROGRESS NOTES
Tsering Sales  29 y.o. female  1995  [address]  [place] [address]  [id number]     [facility]: Progress Note       Encounter Date: 1/7/2025    Patient presents with the following chief complaint(s)    Chief Complaint   Patient presents with    Back Pain     \"It hurts to sit down but I'm fine when I'm standing.  Going on for 3 days.  Never experienced this pain before.\"        History provided by patient    Assessment and Plan:   1. Spasm of muscle of lower back  -     lidocaine (LIDODERM) 5 %; Place 1 patch onto the skin daily for 10 days 12 hours on, 12 hours off., Disp-10 patch, R-0Normal  -     ibuprofen (ADVIL;MOTRIN) 600 MG tablet; Take 1 tablet by mouth 3 times daily as needed for Pain, Disp-30 tablet, R-0Normal  -     cyclobenzaprine (FLEXERIL) 10 MG tablet; Take 1 tablet by mouth 3 times daily as needed for Muscle spasms, Disp-60 tablet, R-0Normal       Return in about 6 weeks (around 2/18/2025) for back.  History of Present Illness   Tsering Sales is a 29 y.o. female with past medical history listed, who presents to clinic today for an acute problem visit.    Pt having back pain x 3 days.  No hx of injury or trauma  She says that she was in bed laying down.   Has pain with sitting down, standing is not painful.   Pain is 7/10, described as throbbing, worsened   Using heating pad   No changes in bowel & bladder habits, IVDU    Health Maintenance  Health Maintenance Due   Topic Date Due    Varicella vaccine (1 of 2 - 13+ 2-dose series) Never done    DTaP/Tdap/Td vaccine (8 - Td or Tdap) 01/14/2023    Cervical cancer screen  06/22/2024    COVID-19 Vaccine (3 - 2023-24 season) 09/01/2024    A1C test (Diabetic or Prediabetic)  02/01/2025       Vitals:     Vitals:    01/07/25 1550   BP: 118/84   Site: Left Upper Arm   Position: Sitting   Cuff Size: Medium Adult   Pulse: (!) 104   Resp: 16   Temp: 98.1 °F (36.7 °C)   TempSrc: Skin   SpO2: 100%

## 2025-01-07 NOTE — PROGRESS NOTES
\"Have you been to the ER, urgent care clinic since your last visit?  Hospitalized since your last visit?\"    NO    “Have you seen or consulted any other health care providers outside our system since your last visit?”    NO     “Have you had a pap smear?”    NO    Date of last Cervical Cancer screen (HPV or PAP): 6/22/2021   Chief Complaint   Patient presents with    Back Pain     \"It hurts to sit down but I'm fine when I'm standing.  Going on for 3 days.  Never experienced this pain before.\"     /84 (Site: Left Upper Arm, Position: Sitting, Cuff Size: Medium Adult)   Pulse (!) 104   Temp 98.1 °F (36.7 °C) (Skin)   Resp 16   Ht 1.651 m (5' 5\")   Wt 83 kg (183 lb)   LMP 12/09/2024 (Approximate)   SpO2 100%   BMI 30.45 kg/m²

## 2025-03-03 ENCOUNTER — HOSPITAL ENCOUNTER (OUTPATIENT)
Facility: HOSPITAL | Age: 30
Discharge: HOME | End: 2025-03-06
Payer: COMMERCIAL

## 2025-03-03 ENCOUNTER — OFFICE VISIT (OUTPATIENT)
Facility: CLINIC | Age: 30
End: 2025-03-03
Payer: COMMERCIAL

## 2025-03-03 VITALS
HEIGHT: 65 IN | SYSTOLIC BLOOD PRESSURE: 124 MMHG | WEIGHT: 195 LBS | RESPIRATION RATE: 16 BRPM | DIASTOLIC BLOOD PRESSURE: 82 MMHG | TEMPERATURE: 97.9 F | HEART RATE: 77 BPM | BODY MASS INDEX: 32.49 KG/M2 | OXYGEN SATURATION: 100 %

## 2025-03-03 DIAGNOSIS — R10.9 FLANK PAIN: Primary | ICD-10-CM

## 2025-03-03 DIAGNOSIS — R10.9 FLANK PAIN: ICD-10-CM

## 2025-03-03 LAB
BILIRUBIN, URINE, POC: NEGATIVE
BLOOD URINE, POC: NEGATIVE
GLUCOSE URINE, POC: NEGATIVE
KETONES, URINE, POC: NEGATIVE
LEUKOCYTE ESTERASE, URINE, POC: NEGATIVE
NITRITE, URINE, POC: NEGATIVE
PH, URINE, POC: 5.5 (ref 4.6–8)
PROTEIN,URINE, POC: NEGATIVE
SPECIFIC GRAVITY, URINE, POC: 1.02 (ref 1–1.03)
URINALYSIS CLARITY, POC: CLEAR
URINALYSIS COLOR, POC: YELLOW
UROBILINOGEN, POC: NORMAL

## 2025-03-03 PROCEDURE — 81003 URINALYSIS AUTO W/O SCOPE: CPT

## 2025-03-03 PROCEDURE — 99213 OFFICE O/P EST LOW 20 MIN: CPT

## 2025-03-03 PROCEDURE — 3079F DIAST BP 80-89 MM HG: CPT

## 2025-03-03 PROCEDURE — 3074F SYST BP LT 130 MM HG: CPT

## 2025-03-03 PROCEDURE — 74018 RADEX ABDOMEN 1 VIEW: CPT

## 2025-03-03 RX ORDER — LIDOCAINE 50 MG/G
1 PATCH TOPICAL DAILY
Qty: 10 PATCH | Refills: 0 | Status: SHIPPED | OUTPATIENT
Start: 2025-03-03 | End: 2025-03-13

## 2025-03-03 SDOH — ECONOMIC STABILITY: FOOD INSECURITY: WITHIN THE PAST 12 MONTHS, YOU WORRIED THAT YOUR FOOD WOULD RUN OUT BEFORE YOU GOT MONEY TO BUY MORE.: NEVER TRUE

## 2025-03-03 SDOH — ECONOMIC STABILITY: FOOD INSECURITY: WITHIN THE PAST 12 MONTHS, THE FOOD YOU BOUGHT JUST DIDN'T LAST AND YOU DIDN'T HAVE MONEY TO GET MORE.: NEVER TRUE

## 2025-03-03 ASSESSMENT — ENCOUNTER SYMPTOMS
BACK PAIN: 1
ABDOMINAL PAIN: 0
NAUSEA: 0
VOMITING: 0
SHORTNESS OF BREATH: 0
DIARRHEA: 0
CHEST TIGHTNESS: 0

## 2025-03-03 NOTE — RESULT ENCOUNTER NOTE
Dear Tsering Sales,    Your imaging that you obtained did not show any kidney stones. You should consider increasing your fiber intake as well as hydration as we discussed while we await for the results of your urine culture.  If you are not any better by your regular appointment on 3/14, then we can get imaging.    Take Care.

## 2025-03-03 NOTE — PROGRESS NOTES
\"Have you been to the ER, urgent care clinic since your last visit?  Hospitalized since your last visit?\"    NO    “Have you seen or consulted any other health care providers outside our system since your last visit?”    NO     “Have you had a pap smear?”    NO    Date of last Cervical Cancer screen (HPV or PAP): 6/22/2021     Chief Complaint   Patient presents with    Other     C/o back pain x3days (no previous injury)     /82 (Site: Left Upper Arm, Position: Sitting, Cuff Size: Medium Adult)   Pulse 77   Temp 97.9 °F (36.6 °C) (Skin)   Resp 16   Ht 1.651 m (5' 5\")   Wt 88.5 kg (195 lb)   LMP 02/09/2025   SpO2 100%   BMI 32.45 kg/m²          
Appointments   Date Time Provider Department Center   3/14/2025  2:30 PM Liat Cintron PA-C [facility]

## 2025-03-06 LAB — BACTERIA UR CULT: ABNORMAL

## 2025-03-07 DIAGNOSIS — R82.71 BACTERIURIA: Primary | ICD-10-CM

## 2025-03-07 RX ORDER — NITROFURANTOIN 25; 75 MG/1; MG/1
100 CAPSULE ORAL 2 TIMES DAILY
Qty: 14 CAPSULE | Refills: 0 | Status: SHIPPED | OUTPATIENT
Start: 2025-03-07 | End: 2025-03-14

## 2025-03-22 DIAGNOSIS — I10 ESSENTIAL (PRIMARY) HYPERTENSION: ICD-10-CM

## 2025-03-24 RX ORDER — VALSARTAN 320 MG/1
320 TABLET ORAL DAILY
Qty: 90 TABLET | Refills: 0 | Status: SHIPPED | OUTPATIENT
Start: 2025-03-24

## 2025-04-28 ENCOUNTER — OFFICE VISIT (OUTPATIENT)
Age: 30
End: 2025-04-28

## 2025-04-28 VITALS
SYSTOLIC BLOOD PRESSURE: 108 MMHG | RESPIRATION RATE: 16 BRPM | HEART RATE: 93 BPM | TEMPERATURE: 99 F | DIASTOLIC BLOOD PRESSURE: 76 MMHG | OXYGEN SATURATION: 97 % | BODY MASS INDEX: 32.62 KG/M2 | WEIGHT: 196 LBS

## 2025-04-28 DIAGNOSIS — I10 ESSENTIAL (PRIMARY) HYPERTENSION: ICD-10-CM

## 2025-04-28 DIAGNOSIS — R19.7 DIARRHEA, UNSPECIFIED TYPE: Primary | ICD-10-CM

## 2025-04-28 RX ORDER — DICYCLOMINE HYDROCHLORIDE 10 MG/1
10 CAPSULE ORAL 4 TIMES DAILY PRN
Qty: 28 CAPSULE | Refills: 0 | Status: SHIPPED | OUTPATIENT
Start: 2025-04-28 | End: 2025-05-05

## 2025-04-28 RX ORDER — NIFEDIPINE 60 MG/1
60 TABLET, EXTENDED RELEASE ORAL DAILY
Qty: 90 TABLET | Refills: 0 | Status: SHIPPED | OUTPATIENT
Start: 2025-04-28

## 2025-04-28 ASSESSMENT — ENCOUNTER SYMPTOMS
VOMITING: 0
DIARRHEA: 1
SHORTNESS OF BREATH: 0
COUGH: 0
NAUSEA: 0

## 2025-04-28 NOTE — PROGRESS NOTES
workup can be falsely reassuring.  Routine discharge counseling and specific return precautions discussed with patient and the patient understands that worsening, changing or persistent symptoms should prompt an immediate return to the urgent care or emergency department.  Patient/Guardian expressed understanding and agrees with the discharge plan.  No further questions at time of discharge.    Clfiton Loza MD

## 2025-05-15 ENCOUNTER — OFFICE VISIT (OUTPATIENT)
Age: 30
End: 2025-05-15

## 2025-05-15 VITALS
WEIGHT: 199.8 LBS | SYSTOLIC BLOOD PRESSURE: 124 MMHG | DIASTOLIC BLOOD PRESSURE: 91 MMHG | RESPIRATION RATE: 16 BRPM | BODY MASS INDEX: 33.29 KG/M2 | OXYGEN SATURATION: 98 % | HEART RATE: 98 BPM | TEMPERATURE: 98.8 F | HEIGHT: 65 IN

## 2025-05-15 DIAGNOSIS — J01.90 ACUTE BACTERIAL SINUSITIS: Primary | ICD-10-CM

## 2025-05-15 DIAGNOSIS — B96.89 ACUTE BACTERIAL SINUSITIS: Primary | ICD-10-CM

## 2025-05-15 RX ORDER — CEFPROZIL 250 MG/1
250 TABLET, FILM COATED ORAL 2 TIMES DAILY
Qty: 20 TABLET | Refills: 0 | Status: SHIPPED | OUTPATIENT
Start: 2025-05-15 | End: 2025-05-15 | Stop reason: CLARIF

## 2025-05-15 RX ORDER — CEFPROZIL 250 MG/1
250 TABLET, FILM COATED ORAL 2 TIMES DAILY
Qty: 20 TABLET | Refills: 0 | Status: SHIPPED | OUTPATIENT
Start: 2025-05-15 | End: 2025-05-25

## 2025-05-15 ASSESSMENT — ENCOUNTER SYMPTOMS
VOMITING: 0
COUGH: 0
SINUS COMPLAINT: 1
DIARRHEA: 0
SINUS PRESSURE: 1
SHORTNESS OF BREATH: 0
NAUSEA: 0
SORE THROAT: 0

## 2025-05-15 NOTE — PROGRESS NOTES
Subjective     Chief Complaint   Patient presents with    Sinus Problem     Right sided facial pressure onset 2 weeks ago and not getting better.           Sinus Problem  Associated symptoms include congestion and sinus pressure. Pertinent negatives include no chills, coughing, shortness of breath or sore throat.    29-year-old female presents for sinus pressure mostly on the right side going on for 2 weeks.  No fever chills nausea vomiting diarrhea urinary symptoms cough.  Patient been taking some over-the-counter medicines mostly her allergy medicine symptoms persist    Past Medical History:   Diagnosis Date    Abnormal Pap smear of cervix     seeing OBGYN    Acne 10/7/2009    Allergic rhinitis 2009    Anemia 2009    Atypical squamous cell changes of undetermined significance (ASCUS) on vaginal cytology 2017    H/o ASCUS HPV + with Dr. Ilia De Dios 2017    IRA III (cervical intraepithelial neoplasia III) 2017    found by LEEP     Headache     Herpes genitalia     dx by OB    History of gestational diabetes     diet controlled    Hypertension     Other ill-defined conditions(799.89)     scoliosis    Ovarian cyst     PCB (post coital bleeding) 2014    Thoracic compression fracture (HCC) 3/2014    Isak Alonsokachorsang    Thyromegaly 2014    On exam     Unspecified vitamin D deficiency 2014    Varicella 1998       Past Surgical History:   Procedure Laterality Date    GYN           LEEP  16    found IRA 3       Family History   Problem Relation Age of Onset    Diabetes Father     Hypertension Father     Hypertension Paternal Grandmother     Diabetes Paternal Grandmother     No Known Problems Brother     Osteoporosis Neg Hx     Breast Cancer Maternal Grandmother         great, great gma, age?    Hypertension Maternal Grandmother     Breast Cancer Maternal Aunt 31        survivor       Allergies   Allergen Reactions    Hydrochlorothiazide Other (See

## 2025-06-19 DIAGNOSIS — I10 ESSENTIAL (PRIMARY) HYPERTENSION: ICD-10-CM

## 2025-06-19 RX ORDER — VALSARTAN 320 MG/1
320 TABLET ORAL DAILY
Qty: 90 TABLET | Refills: 0 | Status: SHIPPED | OUTPATIENT
Start: 2025-06-19

## 2025-07-21 ENCOUNTER — OFFICE VISIT (OUTPATIENT)
Facility: CLINIC | Age: 30
End: 2025-07-21
Payer: COMMERCIAL

## 2025-07-21 VITALS
WEIGHT: 197 LBS | BODY MASS INDEX: 32.82 KG/M2 | HEART RATE: 87 BPM | SYSTOLIC BLOOD PRESSURE: 119 MMHG | TEMPERATURE: 98.1 F | DIASTOLIC BLOOD PRESSURE: 80 MMHG | OXYGEN SATURATION: 99 % | HEIGHT: 65 IN

## 2025-07-21 DIAGNOSIS — J06.9 UPPER RESPIRATORY TRACT INFECTION, UNSPECIFIED TYPE: Primary | ICD-10-CM

## 2025-07-21 LAB
Lab: NORMAL
QC PASS/FAIL: NORMAL
SARS-COV-2, POC: NORMAL

## 2025-07-21 PROCEDURE — 99213 OFFICE O/P EST LOW 20 MIN: CPT | Performed by: NURSE PRACTITIONER

## 2025-07-21 PROCEDURE — 3079F DIAST BP 80-89 MM HG: CPT | Performed by: NURSE PRACTITIONER

## 2025-07-21 PROCEDURE — 87635 SARS-COV-2 COVID-19 AMP PRB: CPT | Performed by: NURSE PRACTITIONER

## 2025-07-21 PROCEDURE — 3074F SYST BP LT 130 MM HG: CPT | Performed by: NURSE PRACTITIONER

## 2025-07-21 RX ORDER — GUAIFENESIN/DEXTROMETHORPHAN 100-10MG/5
5 SYRUP ORAL 3 TIMES DAILY PRN
Qty: 120 ML | Refills: 0 | Status: CANCELLED | OUTPATIENT
Start: 2025-07-21 | End: 2025-07-31

## 2025-07-21 RX ORDER — AZITHROMYCIN 250 MG/1
TABLET, FILM COATED ORAL
Qty: 6 TABLET | Refills: 0 | Status: SHIPPED | OUTPATIENT
Start: 2025-07-21 | End: 2025-07-31

## 2025-07-21 RX ORDER — HYDROCODONE POLISTIREX AND CHLORPHENIRAMINE POLISTIREX 10; 8 MG/5ML; MG/5ML
5 SUSPENSION, EXTENDED RELEASE ORAL EVERY 12 HOURS PRN
Qty: 70 ML | Refills: 0 | Status: SHIPPED | OUTPATIENT
Start: 2025-07-21 | End: 2025-07-28

## 2025-07-21 RX ORDER — FLUTICASONE PROPIONATE 50 MCG
2 SPRAY, SUSPENSION (ML) NASAL DAILY
Qty: 48 G | Refills: 1 | Status: SHIPPED | OUTPATIENT
Start: 2025-07-21

## 2025-07-21 RX ORDER — BENZONATATE 200 MG/1
200 CAPSULE ORAL 3 TIMES DAILY PRN
Qty: 20 CAPSULE | Refills: 0 | Status: SHIPPED | OUTPATIENT
Start: 2025-07-21 | End: 2025-07-28

## 2025-07-21 ASSESSMENT — ENCOUNTER SYMPTOMS
TROUBLE SWALLOWING: 0
ABDOMINAL PAIN: 0
SHORTNESS OF BREATH: 1
WHEEZING: 1
RHINORRHEA: 1
COUGH: 1
VOMITING: 0
EYE DISCHARGE: 0
EYE REDNESS: 0
NAUSEA: 0
COLOR CHANGE: 0
CHEST TIGHTNESS: 0
SORE THROAT: 0
EYE PAIN: 0
SINUS PRESSURE: 1
SINUS PAIN: 1

## 2025-07-21 NOTE — PROGRESS NOTES
Chief Complaint   Patient presents with    Cough    Congestion     For past 2 weeks, drainage-    /80 (BP Site: Right Upper Arm, Patient Position: Sitting, BP Cuff Size: Medium Adult)   Pulse 87   Temp 98.1 °F (36.7 °C) (Oral)   Ht 1.651 m (5' 5\")   Wt 89.4 kg (197 lb)   LMP 07/11/2025   SpO2 99%   BMI 32.78 kg/m²    Have you been to the ER, urgent care clinic since your last visit?  Hospitalized since your last visit?   NO    Have you seen or consulted any other health care providers outside our system since your last visit?   NO     “Have you had a pap smear?”    YES - Where: [facility] Nurse/CMA to request most recent records if not in the chart    Date of last Cervical Cancer screen (HPV or PAP): 6/22/2021

## 2025-07-21 NOTE — PROGRESS NOTES
Subjective  Chief Complaint   Patient presents with    Cough    Congestion     For past 2 weeks, drainage-     HPI:  Tsering Sales is a 29 y.o. female with medical history as reviewed and included.     Patient presents to the clinic for an acute care visit for evaluation of worsening upper respiratory infection symptoms. Symptoms include fatigue, myalgias/arthralgias, headache, nasal congestion, post nasal drip, facial pain/sinus pressure: bilateral frontal, hoarseness, shortness of breath, wheezing/chest tightness, chest congestion, and productive cough: Sputum is yellow and green. Onset of symptoms was 3 weeks ago, and is rapidly worsening since that time. Patient denies fever, chills. Patient previously seen for a sinus infection in May 15, 2025, prescribed Cefprozil without resolution of symptoms. Patient reports worsening productive cough started 7/16/2025.     Obtained in clinic point-of-care testing for COVID, impression negative.  Point-of-care testing for influenza A and influenza B unavailable.   Prescribing patient Z-Kiran for sinusitis, Tessalon Perles, cough syrup, and Flonase for symptom management. Advised patient to utilize OTC analgesics for additional symptom management as directed and increase oral fluids. Advise patient to continue medical surveillance and guidance of PCP.      Review of Systems   Constitutional:  Positive for fatigue. Negative for chills and fever.   HENT:  Positive for congestion, rhinorrhea, sinus pressure and sinus pain. Negative for ear pain, hearing loss, sore throat and trouble swallowing.    Eyes:  Negative for pain, discharge and redness.   Respiratory:  Positive for cough, shortness of breath and wheezing. Negative for chest tightness.    Gastrointestinal:  Negative for abdominal pain, nausea and vomiting.   Endocrine: Negative for cold intolerance and heat intolerance.   Genitourinary:  Negative for dysuria and flank pain.   Skin:  Negative for color change and

## 2025-07-25 DIAGNOSIS — I10 ESSENTIAL (PRIMARY) HYPERTENSION: ICD-10-CM

## 2025-07-28 RX ORDER — NIFEDIPINE 60 MG/1
60 TABLET, EXTENDED RELEASE ORAL DAILY
Qty: 30 TABLET | Refills: 0 | Status: SHIPPED | OUTPATIENT
Start: 2025-07-28 | End: 2025-07-29 | Stop reason: SDUPTHER

## 2025-07-29 DIAGNOSIS — I10 ESSENTIAL (PRIMARY) HYPERTENSION: ICD-10-CM

## 2025-07-29 RX ORDER — NIFEDIPINE 60 MG/1
60 TABLET, EXTENDED RELEASE ORAL DAILY
Qty: 30 TABLET | Refills: 0 | Status: SHIPPED | OUTPATIENT
Start: 2025-07-29

## 2025-08-08 ENCOUNTER — OFFICE VISIT (OUTPATIENT)
Facility: CLINIC | Age: 30
End: 2025-08-08
Payer: COMMERCIAL

## 2025-08-08 VITALS
TEMPERATURE: 97.8 F | HEART RATE: 89 BPM | OXYGEN SATURATION: 100 % | WEIGHT: 202 LBS | RESPIRATION RATE: 16 BRPM | HEIGHT: 65 IN | BODY MASS INDEX: 33.66 KG/M2 | SYSTOLIC BLOOD PRESSURE: 122 MMHG | DIASTOLIC BLOOD PRESSURE: 82 MMHG

## 2025-08-08 DIAGNOSIS — E87.1 HYPONATREMIA: ICD-10-CM

## 2025-08-08 DIAGNOSIS — Z87.898 HISTORY OF PREDIABETES: ICD-10-CM

## 2025-08-08 DIAGNOSIS — Z13.6 ENCOUNTER FOR LIPID SCREENING FOR CARDIOVASCULAR DISEASE: ICD-10-CM

## 2025-08-08 DIAGNOSIS — Z13.220 ENCOUNTER FOR LIPID SCREENING FOR CARDIOVASCULAR DISEASE: ICD-10-CM

## 2025-08-08 DIAGNOSIS — I10 ESSENTIAL (PRIMARY) HYPERTENSION: Primary | ICD-10-CM

## 2025-08-08 DIAGNOSIS — E87.6 HYPOKALEMIA: ICD-10-CM

## 2025-08-08 PROCEDURE — 99214 OFFICE O/P EST MOD 30 MIN: CPT

## 2025-08-08 PROCEDURE — 3074F SYST BP LT 130 MM HG: CPT

## 2025-08-08 PROCEDURE — 3079F DIAST BP 80-89 MM HG: CPT

## 2025-08-08 RX ORDER — NIFEDIPINE 60 MG/1
60 TABLET, EXTENDED RELEASE ORAL DAILY
Qty: 90 TABLET | Refills: 1 | Status: SHIPPED | OUTPATIENT
Start: 2025-08-08

## 2025-08-08 RX ORDER — VALSARTAN 320 MG/1
320 TABLET ORAL DAILY
Qty: 90 TABLET | Refills: 1 | Status: SHIPPED | OUTPATIENT
Start: 2025-08-08

## 2025-08-08 ASSESSMENT — ENCOUNTER SYMPTOMS
WHEEZING: 0
ABDOMINAL PAIN: 0
DIARRHEA: 0
SHORTNESS OF BREATH: 0
BACK PAIN: 0
CHEST TIGHTNESS: 0
CONSTIPATION: 0
COUGH: 0
VOMITING: 0
NAUSEA: 0

## 2025-08-22 ENCOUNTER — APPOINTMENT (OUTPATIENT)
Facility: HOSPITAL | Age: 30
End: 2025-08-22
Payer: COMMERCIAL

## 2025-08-22 ENCOUNTER — HOSPITAL ENCOUNTER (EMERGENCY)
Facility: HOSPITAL | Age: 30
Discharge: HOME | End: 2025-08-22
Attending: EMERGENCY MEDICINE
Payer: COMMERCIAL

## 2025-08-22 VITALS
OXYGEN SATURATION: 99 % | SYSTOLIC BLOOD PRESSURE: 142 MMHG | RESPIRATION RATE: 18 BRPM | BODY MASS INDEX: 33.86 KG/M2 | WEIGHT: 203.48 LBS | TEMPERATURE: 98.1 F | HEART RATE: 64 BPM | DIASTOLIC BLOOD PRESSURE: 84 MMHG

## 2025-08-22 DIAGNOSIS — R05.9 COUGH, UNSPECIFIED TYPE: ICD-10-CM

## 2025-08-22 DIAGNOSIS — R42 LIGHTHEADEDNESS: Primary | ICD-10-CM

## 2025-08-22 DIAGNOSIS — M79.604 LEG PAIN, RIGHT: ICD-10-CM

## 2025-08-22 LAB
ALBUMIN SERPL-MCNC: 3.9 G/DL (ref 3.5–5.2)
ALBUMIN/GLOB SERPL: 0.7 (ref 1.1–2.2)
ALP SERPL-CCNC: 81 U/L (ref 35–104)
ALT SERPL-CCNC: 15 U/L (ref 10–35)
ANION GAP SERPL CALC-SCNC: 11 MMOL/L (ref 2–14)
AST SERPL-CCNC: 24 U/L (ref 10–35)
BASOPHILS # BLD: 0.04 K/UL (ref 0–0.1)
BASOPHILS NFR BLD: 0.6 % (ref 0–1)
BILIRUB SERPL-MCNC: 0.5 MG/DL (ref 0–1.2)
BUN SERPL-MCNC: 9 MG/DL (ref 6–20)
BUN/CREAT SERPL: 16 (ref 12–20)
CALCIUM SERPL-MCNC: 9.6 MG/DL (ref 8.6–10)
CHLORIDE SERPL-SCNC: 100 MMOL/L (ref 98–107)
CO2 SERPL-SCNC: 24 MMOL/L (ref 20–29)
COMMENT:: NORMAL
CREAT SERPL-MCNC: 0.58 MG/DL (ref 0.6–1)
D DIMER PPP FEU-MCNC: 0.39 MG/L FEU (ref 0–0.65)
DIFFERENTIAL METHOD BLD: ABNORMAL
EOSINOPHIL # BLD: 0.12 K/UL (ref 0–0.4)
EOSINOPHIL NFR BLD: 1.9 % (ref 0–7)
ERYTHROCYTE [DISTWIDTH] IN BLOOD BY AUTOMATED COUNT: 12.7 % (ref 11.5–14.5)
FLUAV RNA SPEC QL NAA+PROBE: NOT DETECTED
FLUBV RNA SPEC QL NAA+PROBE: NOT DETECTED
GLOBULIN SER CALC-MCNC: 5.5 G/DL (ref 2–4)
GLUCOSE SERPL-MCNC: 79 MG/DL (ref 65–100)
HCT VFR BLD AUTO: 36.1 % (ref 35–47)
HGB BLD-MCNC: 11.5 G/DL (ref 11.5–16)
IMM GRANULOCYTES # BLD AUTO: 0.02 K/UL (ref 0–0.04)
IMM GRANULOCYTES NFR BLD AUTO: 0.3 % (ref 0–0.5)
LYMPHOCYTES # BLD: 2.08 K/UL (ref 0.8–3.5)
LYMPHOCYTES NFR BLD: 32.3 % (ref 12–49)
MAGNESIUM SERPL-MCNC: 2.1 MG/DL (ref 1.6–2.6)
MCH RBC QN AUTO: 29.6 PG (ref 26–34)
MCHC RBC AUTO-ENTMCNC: 31.9 G/DL (ref 30–36.5)
MCV RBC AUTO: 93 FL (ref 80–99)
MONOCYTES # BLD: 0.51 K/UL (ref 0–1)
MONOCYTES NFR BLD: 7.9 % (ref 5–13)
NEUTS SEG # BLD: 3.67 K/UL (ref 1.8–8)
NEUTS SEG NFR BLD: 57 % (ref 32–75)
NRBC # BLD: 0 K/UL (ref 0–0.01)
NRBC BLD-RTO: 0 PER 100 WBC
PLATELET # BLD AUTO: 438 K/UL (ref 150–400)
PMV BLD AUTO: 8.7 FL (ref 8.9–12.9)
POTASSIUM SERPL-SCNC: 3.7 MMOL/L (ref 3.5–5.1)
PROT SERPL-MCNC: 9.4 G/DL (ref 6.4–8.3)
RBC # BLD AUTO: 3.88 M/UL (ref 3.8–5.2)
SARS-COV-2 RNA RESP QL NAA+PROBE: NOT DETECTED
SODIUM SERPL-SCNC: 136 MMOL/L (ref 136–145)
SOURCE: NORMAL
SPECIMEN HOLD: NORMAL
TROPONIN T SERPL HS-MCNC: <6 NG/L (ref 0–14)
WBC # BLD AUTO: 6.4 K/UL (ref 3.6–11)

## 2025-08-22 PROCEDURE — 6370000000 HC RX 637 (ALT 250 FOR IP)

## 2025-08-22 PROCEDURE — 87636 SARSCOV2 & INF A&B AMP PRB: CPT

## 2025-08-22 PROCEDURE — 2580000003 HC RX 258

## 2025-08-22 PROCEDURE — 80053 COMPREHEN METABOLIC PANEL: CPT

## 2025-08-22 PROCEDURE — 71046 X-RAY EXAM CHEST 2 VIEWS: CPT

## 2025-08-22 PROCEDURE — 93005 ELECTROCARDIOGRAM TRACING: CPT | Performed by: EMERGENCY MEDICINE

## 2025-08-22 PROCEDURE — 85379 FIBRIN DEGRADATION QUANT: CPT

## 2025-08-22 PROCEDURE — 83735 ASSAY OF MAGNESIUM: CPT

## 2025-08-22 PROCEDURE — 85025 COMPLETE CBC W/AUTO DIFF WBC: CPT

## 2025-08-22 PROCEDURE — 99285 EMERGENCY DEPT VISIT HI MDM: CPT

## 2025-08-22 PROCEDURE — 93971 EXTREMITY STUDY: CPT

## 2025-08-22 PROCEDURE — 84484 ASSAY OF TROPONIN QUANT: CPT

## 2025-08-22 RX ORDER — ACETAMINOPHEN 500 MG
1000 TABLET ORAL
Status: COMPLETED | OUTPATIENT
Start: 2025-08-22 | End: 2025-08-22

## 2025-08-22 RX ORDER — 0.9 % SODIUM CHLORIDE 0.9 %
1000 INTRAVENOUS SOLUTION INTRAVENOUS ONCE
Status: COMPLETED | OUTPATIENT
Start: 2025-08-22 | End: 2025-08-22

## 2025-08-22 RX ADMIN — SODIUM CHLORIDE 1000 ML: 0.9 INJECTION, SOLUTION INTRAVENOUS at 21:15

## 2025-08-22 RX ADMIN — ACETAMINOPHEN 1000 MG: 500 TABLET ORAL at 21:25

## 2025-08-22 ASSESSMENT — PAIN SCALES - GENERAL
PAINLEVEL_OUTOF10: 0
PAINLEVEL_OUTOF10: 0

## 2025-08-22 ASSESSMENT — PAIN - FUNCTIONAL ASSESSMENT: PAIN_FUNCTIONAL_ASSESSMENT: 0-10

## 2025-08-24 LAB
EKG ATRIAL RATE: 98 BPM
EKG DIAGNOSIS: NORMAL
EKG P AXIS: 43 DEGREES
EKG P-R INTERVAL: 182 MS
EKG Q-T INTERVAL: 356 MS
EKG QRS DURATION: 74 MS
EKG QTC CALCULATION (BAZETT): 454 MS
EKG R AXIS: 28 DEGREES
EKG T AXIS: 10 DEGREES
EKG VENTRICULAR RATE: 98 BPM

## 2025-08-25 ENCOUNTER — OFFICE VISIT (OUTPATIENT)
Age: 30
End: 2025-08-25

## 2025-08-25 VITALS
HEIGHT: 65 IN | RESPIRATION RATE: 19 BRPM | BODY MASS INDEX: 33.99 KG/M2 | OXYGEN SATURATION: 99 % | WEIGHT: 204 LBS | HEART RATE: 97 BPM | DIASTOLIC BLOOD PRESSURE: 71 MMHG | TEMPERATURE: 97.8 F | SYSTOLIC BLOOD PRESSURE: 127 MMHG

## 2025-08-25 DIAGNOSIS — J02.8 ACUTE BACTERIAL PHARYNGITIS: Primary | ICD-10-CM

## 2025-08-25 DIAGNOSIS — J02.9 SORE THROAT: ICD-10-CM

## 2025-08-25 DIAGNOSIS — B96.89 ACUTE BACTERIAL PHARYNGITIS: Primary | ICD-10-CM

## 2025-08-25 LAB
HETEROPHILE ANTIBODIES: NORMAL
S PYO AG THROAT QL: NORMAL

## 2025-08-25 RX ORDER — DOXYCYCLINE HYCLATE 100 MG
100 TABLET ORAL 2 TIMES DAILY
Qty: 10 TABLET | Refills: 0 | Status: SHIPPED | OUTPATIENT
Start: 2025-08-25 | End: 2025-08-25 | Stop reason: ALTCHOICE

## 2025-08-25 RX ORDER — AZITHROMYCIN 500 MG/1
500 TABLET, FILM COATED ORAL DAILY
Qty: 1 PACKET | Refills: 0 | Status: SHIPPED | OUTPATIENT
Start: 2025-08-25 | End: 2025-08-30

## 2025-08-25 RX ORDER — LIDOCAINE HYDROCHLORIDE 20 MG/ML
15 SOLUTION OROPHARYNGEAL
Qty: 100 ML | Refills: 0 | Status: SHIPPED | OUTPATIENT
Start: 2025-08-25

## 2025-08-25 RX ORDER — METFORMIN HYDROCHLORIDE 500 MG/1
TABLET, EXTENDED RELEASE ORAL
COMMUNITY

## 2025-08-25 ASSESSMENT — ENCOUNTER SYMPTOMS
EYES NEGATIVE: 1
ALLERGIC/IMMUNOLOGIC NEGATIVE: 1
RESPIRATORY NEGATIVE: 1
GASTROINTESTINAL NEGATIVE: 1
SORE THROAT: 1